# Patient Record
Sex: FEMALE | Race: WHITE | NOT HISPANIC OR LATINO | Employment: FULL TIME | URBAN - METROPOLITAN AREA
[De-identification: names, ages, dates, MRNs, and addresses within clinical notes are randomized per-mention and may not be internally consistent; named-entity substitution may affect disease eponyms.]

---

## 2019-09-01 ENCOUNTER — APPOINTMENT (OUTPATIENT)
Dept: RESPIRATORY THERAPY | Facility: HOSPITAL | Age: 39
End: 2019-09-01

## 2019-09-01 ENCOUNTER — HOSPITAL ENCOUNTER (OUTPATIENT)
Facility: HOSPITAL | Age: 39
Setting detail: OBSERVATION
Discharge: HOME | End: 2019-09-01
Attending: EMERGENCY MEDICINE | Admitting: NURSE PRACTITIONER

## 2019-09-01 ENCOUNTER — APPOINTMENT (OUTPATIENT)
Dept: GENERAL RADIOLOGY | Facility: HOSPITAL | Age: 39
End: 2019-09-01

## 2019-09-01 ENCOUNTER — HOSPITAL ENCOUNTER (OUTPATIENT)
Dept: CARDIOLOGY | Facility: HOSPITAL | Age: 39
Setting detail: OBSERVATION
Discharge: HOME | End: 2019-09-01

## 2019-09-01 VITALS
WEIGHT: 293 LBS | HEART RATE: 71 BPM | BODY MASS INDEX: 53.92 KG/M2 | OXYGEN SATURATION: 99 % | HEIGHT: 62 IN | SYSTOLIC BLOOD PRESSURE: 143 MMHG | DIASTOLIC BLOOD PRESSURE: 86 MMHG | RESPIRATION RATE: 17 BRPM | TEMPERATURE: 99.9 F

## 2019-09-01 DIAGNOSIS — R00.2 PALPITATIONS: ICD-10-CM

## 2019-09-01 DIAGNOSIS — R55 NEAR SYNCOPE: Primary | ICD-10-CM

## 2019-09-01 PROBLEM — E66.01 OBESITY, MORBID, BMI 50 OR HIGHER (HCC): Chronic | Status: ACTIVE | Noted: 2019-09-01

## 2019-09-01 PROBLEM — E03.9 HYPOTHYROIDISM (ACQUIRED): Chronic | Status: ACTIVE | Noted: 2019-09-01

## 2019-09-01 LAB
ALBUMIN SERPL-MCNC: 3.8 G/DL (ref 3.5–4.8)
ALBUMIN/GLOB SERPL: 1.3 G/DL (ref 1–1.7)
ALP SERPL-CCNC: 58 U/L (ref 32–91)
ALT SERPL W P-5'-P-CCNC: 34 U/L (ref 14–54)
ANION GAP SERPL CALCULATED.3IONS-SCNC: 13.7 MMOL/L (ref 5–15)
ANION GAP SERPL CALCULATED.3IONS-SCNC: 15 MMOL/L (ref 5–15)
ARTICHOKE IGE QN: 108 MG/DL (ref 0–100)
AST SERPL-CCNC: 32 U/L (ref 15–41)
BASOPHILS # BLD AUTO: 0.1 10*3/MM3 (ref 0–0.2)
BASOPHILS # BLD AUTO: 0.1 10*3/MM3 (ref 0–0.2)
BASOPHILS NFR BLD AUTO: 1 % (ref 0–1.5)
BASOPHILS NFR BLD AUTO: 1.1 % (ref 0–1.5)
BH CV STRESS DURATION MIN STAGE 1: 3
BH CV STRESS DURATION MIN STAGE 2: 1
BH CV STRESS DURATION SEC STAGE 1: 0
BH CV STRESS DURATION SEC STAGE 2: 45
BH CV STRESS GRADE STAGE 1: 10
BH CV STRESS GRADE STAGE 2: 12
BH CV STRESS HR STAGE 1: 123
BH CV STRESS HR STAGE 2: 152
BH CV STRESS METS STAGE 1: 5
BH CV STRESS METS STAGE 2: 7.5
BH CV STRESS PROTOCOL 1: NORMAL
BH CV STRESS RECOVERY BP: NORMAL MMHG
BH CV STRESS RECOVERY HR: 92 BPM
BH CV STRESS SPEED STAGE 1: 1.7
BH CV STRESS SPEED STAGE 2: 2.5
BH CV STRESS STAGE 1: 1
BH CV STRESS STAGE 2: 2
BILIRUB SERPL-MCNC: 0.7 MG/DL (ref 0.3–1.2)
BUN BLD-MCNC: 12 MG/DL (ref 8–20)
BUN BLD-MCNC: 9 MG/DL (ref 8–20)
BUN/CREAT SERPL: 15 (ref 5.4–26.2)
BUN/CREAT SERPL: 24 (ref 5.4–26.2)
CALCIUM SPEC-SCNC: 8.1 MG/DL (ref 8.9–10.3)
CALCIUM SPEC-SCNC: 8.1 MG/DL (ref 8.9–10.3)
CHLORIDE SERPL-SCNC: 105 MMOL/L (ref 101–111)
CHLORIDE SERPL-SCNC: 107 MMOL/L (ref 101–111)
CHOLEST SERPL-MCNC: 153 MG/DL
CO2 SERPL-SCNC: 21 MMOL/L (ref 22–32)
CO2 SERPL-SCNC: 21 MMOL/L (ref 22–32)
CREAT BLD-MCNC: 0.5 MG/DL (ref 0.4–1)
CREAT BLD-MCNC: 0.6 MG/DL (ref 0.4–1)
D DIMER PPP FEU-MCNC: 0.47 MCGFEU/ML (ref 0.17–0.59)
DEPRECATED RDW RBC AUTO: 44.2 FL (ref 37–54)
DEPRECATED RDW RBC AUTO: 44.2 FL (ref 37–54)
EOSINOPHIL # BLD AUTO: 0.3 10*3/MM3 (ref 0–0.4)
EOSINOPHIL # BLD AUTO: 0.3 10*3/MM3 (ref 0–0.4)
EOSINOPHIL NFR BLD AUTO: 4 % (ref 0.3–6.2)
EOSINOPHIL NFR BLD AUTO: 4.2 % (ref 0.3–6.2)
ERYTHROCYTE [DISTWIDTH] IN BLOOD BY AUTOMATED COUNT: 13.4 % (ref 12.3–15.4)
ERYTHROCYTE [DISTWIDTH] IN BLOOD BY AUTOMATED COUNT: 13.5 % (ref 12.3–15.4)
GFR SERPL CREATININE-BSD FRML MDRD: 110 ML/MIN/1.73
GFR SERPL CREATININE-BSD FRML MDRD: 136 ML/MIN/1.73
GLOBULIN UR ELPH-MCNC: 2.9 GM/DL (ref 2.5–3.8)
GLUCOSE BLD-MCNC: 107 MG/DL (ref 65–99)
GLUCOSE BLD-MCNC: 115 MG/DL (ref 65–99)
HCT VFR BLD AUTO: 33.8 % (ref 34–46.6)
HCT VFR BLD AUTO: 34.4 % (ref 34–46.6)
HDLC SERPL QL: 5.1
HDLC SERPL-MCNC: 30 MG/DL
HGB BLD-MCNC: 11.8 G/DL (ref 12–15.9)
HGB BLD-MCNC: 12 G/DL (ref 12–15.9)
LDLC/HDLC SERPL: 3.07 {RATIO}
LYMPHOCYTES # BLD AUTO: 2.1 10*3/MM3 (ref 0.7–3.1)
LYMPHOCYTES # BLD AUTO: 2.4 10*3/MM3 (ref 0.7–3.1)
LYMPHOCYTES NFR BLD AUTO: 29.6 % (ref 19.6–45.3)
LYMPHOCYTES NFR BLD AUTO: 35.3 % (ref 19.6–45.3)
MAGNESIUM SERPL-MCNC: 2 MG/DL (ref 1.8–2.5)
MAXIMAL PREDICTED HEART RATE: 179 BPM
MCH RBC QN AUTO: 32.6 PG (ref 26.6–33)
MCH RBC QN AUTO: 32.6 PG (ref 26.6–33)
MCHC RBC AUTO-ENTMCNC: 34.8 G/DL (ref 31.5–35.7)
MCHC RBC AUTO-ENTMCNC: 34.8 G/DL (ref 31.5–35.7)
MCV RBC AUTO: 93.4 FL (ref 79–97)
MCV RBC AUTO: 93.9 FL (ref 79–97)
MONOCYTES # BLD AUTO: 0.6 10*3/MM3 (ref 0.1–0.9)
MONOCYTES # BLD AUTO: 0.6 10*3/MM3 (ref 0.1–0.9)
MONOCYTES NFR BLD AUTO: 8.4 % (ref 5–12)
MONOCYTES NFR BLD AUTO: 8.5 % (ref 5–12)
NEUTROPHILS # BLD AUTO: 3.4 10*3/MM3 (ref 1.7–7)
NEUTROPHILS # BLD AUTO: 4.1 10*3/MM3 (ref 1.7–7)
NEUTROPHILS NFR BLD AUTO: 51.1 % (ref 42.7–76)
NEUTROPHILS NFR BLD AUTO: 56.8 % (ref 42.7–76)
NRBC BLD AUTO-RTO: 0.1 /100 WBC (ref 0–0.2)
NRBC BLD AUTO-RTO: 0.1 /100 WBC (ref 0–0.2)
PERCENT MAX PREDICTED HR: 84.92 %
PLATELET # BLD AUTO: 219 10*3/MM3 (ref 140–450)
PLATELET # BLD AUTO: 230 10*3/MM3 (ref 140–450)
PMV BLD AUTO: 7.6 FL (ref 6–12)
PMV BLD AUTO: 7.7 FL (ref 6–12)
POTASSIUM BLD-SCNC: 3.7 MMOL/L (ref 3.6–5.1)
POTASSIUM BLD-SCNC: 4 MMOL/L (ref 3.6–5.1)
PROT SERPL-MCNC: 6.7 G/DL (ref 6.1–7.9)
RBC # BLD AUTO: 3.61 10*6/MM3 (ref 3.77–5.28)
RBC # BLD AUTO: 3.68 10*6/MM3 (ref 3.77–5.28)
SODIUM BLD-SCNC: 137 MMOL/L (ref 136–144)
SODIUM BLD-SCNC: 138 MMOL/L (ref 136–144)
STRESS BASELINE BP: NORMAL MMHG
STRESS BASELINE HR: 75 BPM
STRESS PERCENT HR: 100 %
STRESS POST ESTIMATED WORKLOAD: 7 METS
STRESS POST EXERCISE DUR MIN: 4 MIN
STRESS POST EXERCISE DUR SEC: 45 SEC
STRESS POST PEAK BP: NORMAL MMHG
STRESS POST PEAK HR: 152 BPM
STRESS TARGET HR: 152 BPM
T4 FREE SERPL-MCNC: 0.56 NG/DL (ref 0.58–1.64)
TRIGL SERPL-MCNC: 154 MG/DL
TROPONIN I SERPL-MCNC: <0.03 NG/ML (ref 0–0.03)
TSH SERPL DL<=0.05 MIU/L-ACNC: 7.58 UIU/ML (ref 0.34–5.6)
VLDLC SERPL-MCNC: 30.8 MG/DL
WBC NRBC COR # BLD: 6.7 10*3/MM3 (ref 3.4–10.8)
WBC NRBC COR # BLD: 7.2 10*3/MM3 (ref 3.4–10.8)

## 2019-09-01 PROCEDURE — 99284 EMERGENCY DEPT VISIT MOD MDM: CPT

## 2019-09-01 PROCEDURE — G0378 HOSPITAL OBSERVATION PER HR: HCPCS

## 2019-09-01 PROCEDURE — 84443 ASSAY THYROID STIM HORMONE: CPT | Performed by: NURSE PRACTITIONER

## 2019-09-01 PROCEDURE — 80061 LIPID PANEL: CPT | Performed by: PHYSICIAN ASSISTANT

## 2019-09-01 PROCEDURE — 99235 HOSP IP/OBS SAME DATE MOD 70: CPT | Performed by: PHYSICIAN ASSISTANT

## 2019-09-01 PROCEDURE — 84439 ASSAY OF FREE THYROXINE: CPT | Performed by: NURSE PRACTITIONER

## 2019-09-01 PROCEDURE — 83735 ASSAY OF MAGNESIUM: CPT | Performed by: PHYSICIAN ASSISTANT

## 2019-09-01 PROCEDURE — 93226 XTRNL ECG REC<48 HR SCAN A/R: CPT

## 2019-09-01 PROCEDURE — 80048 BASIC METABOLIC PNL TOTAL CA: CPT | Performed by: PHYSICIAN ASSISTANT

## 2019-09-01 PROCEDURE — 85025 COMPLETE CBC W/AUTO DIFF WBC: CPT | Performed by: PHYSICIAN ASSISTANT

## 2019-09-01 PROCEDURE — 84484 ASSAY OF TROPONIN QUANT: CPT | Performed by: NURSE PRACTITIONER

## 2019-09-01 PROCEDURE — 83036 HEMOGLOBIN GLYCOSYLATED A1C: CPT | Performed by: PHYSICIAN ASSISTANT

## 2019-09-01 PROCEDURE — 93017 CV STRESS TEST TRACING ONLY: CPT

## 2019-09-01 PROCEDURE — 71045 X-RAY EXAM CHEST 1 VIEW: CPT

## 2019-09-01 PROCEDURE — 85025 COMPLETE CBC W/AUTO DIFF WBC: CPT | Performed by: NURSE PRACTITIONER

## 2019-09-01 PROCEDURE — 84484 ASSAY OF TROPONIN QUANT: CPT | Performed by: PHYSICIAN ASSISTANT

## 2019-09-01 PROCEDURE — 93018 CV STRESS TEST I&R ONLY: CPT | Performed by: INTERNAL MEDICINE

## 2019-09-01 PROCEDURE — 80053 COMPREHEN METABOLIC PANEL: CPT | Performed by: NURSE PRACTITIONER

## 2019-09-01 PROCEDURE — 93016 CV STRESS TEST SUPVJ ONLY: CPT | Performed by: NURSE PRACTITIONER

## 2019-09-01 PROCEDURE — 85379 FIBRIN DEGRADATION QUANT: CPT | Performed by: NURSE PRACTITIONER

## 2019-09-01 PROCEDURE — 93005 ELECTROCARDIOGRAM TRACING: CPT | Performed by: INTERNAL MEDICINE

## 2019-09-01 RX ORDER — ACETAMINOPHEN 160 MG/5ML
650 SOLUTION ORAL EVERY 4 HOURS PRN
Status: DISCONTINUED | OUTPATIENT
Start: 2019-09-01 | End: 2019-09-01 | Stop reason: HOSPADM

## 2019-09-01 RX ORDER — SODIUM CHLORIDE 0.9 % (FLUSH) 0.9 %
10 SYRINGE (ML) INJECTION AS NEEDED
Status: DISCONTINUED | OUTPATIENT
Start: 2019-09-01 | End: 2019-09-01 | Stop reason: HOSPADM

## 2019-09-01 RX ORDER — ACETAMINOPHEN 325 MG/1
650 TABLET ORAL EVERY 4 HOURS PRN
Status: DISCONTINUED | OUTPATIENT
Start: 2019-09-01 | End: 2019-09-01 | Stop reason: HOSPADM

## 2019-09-01 RX ORDER — CHOLECALCIFEROL (VITAMIN D3) 125 MCG
5 CAPSULE ORAL NIGHTLY PRN
Status: DISCONTINUED | OUTPATIENT
Start: 2019-09-01 | End: 2019-09-01 | Stop reason: HOSPADM

## 2019-09-01 RX ORDER — ACETAMINOPHEN 650 MG/1
650 SUPPOSITORY RECTAL EVERY 4 HOURS PRN
Status: DISCONTINUED | OUTPATIENT
Start: 2019-09-01 | End: 2019-09-01 | Stop reason: HOSPADM

## 2019-09-01 RX ORDER — POTASSIUM CHLORIDE 1.5 G/1.77G
40 POWDER, FOR SOLUTION ORAL AS NEEDED
Status: DISCONTINUED | OUTPATIENT
Start: 2019-09-01 | End: 2019-09-01 | Stop reason: HOSPADM

## 2019-09-01 RX ORDER — NITROGLYCERIN 0.4 MG/1
0.4 TABLET SUBLINGUAL
Status: DISCONTINUED | OUTPATIENT
Start: 2019-09-01 | End: 2019-09-01 | Stop reason: HOSPADM

## 2019-09-01 RX ORDER — ONDANSETRON 4 MG/1
4 TABLET, FILM COATED ORAL EVERY 6 HOURS PRN
Status: DISCONTINUED | OUTPATIENT
Start: 2019-09-01 | End: 2019-09-01 | Stop reason: HOSPADM

## 2019-09-01 RX ORDER — ALUMINA, MAGNESIA, AND SIMETHICONE 2400; 2400; 240 MG/30ML; MG/30ML; MG/30ML
15 SUSPENSION ORAL EVERY 6 HOURS PRN
Status: DISCONTINUED | OUTPATIENT
Start: 2019-09-01 | End: 2019-09-01 | Stop reason: HOSPADM

## 2019-09-01 RX ORDER — DOCUSATE SODIUM 100 MG/1
100 CAPSULE, LIQUID FILLED ORAL 2 TIMES DAILY PRN
Status: DISCONTINUED | OUTPATIENT
Start: 2019-09-01 | End: 2019-09-01 | Stop reason: HOSPADM

## 2019-09-01 RX ORDER — SODIUM CHLORIDE 0.9 % (FLUSH) 0.9 %
10 SYRINGE (ML) INJECTION EVERY 12 HOURS SCHEDULED
Status: DISCONTINUED | OUTPATIENT
Start: 2019-09-01 | End: 2019-09-01 | Stop reason: HOSPADM

## 2019-09-01 RX ORDER — MAGNESIUM SULFATE HEPTAHYDRATE 40 MG/ML
4 INJECTION, SOLUTION INTRAVENOUS AS NEEDED
Status: DISCONTINUED | OUTPATIENT
Start: 2019-09-01 | End: 2019-09-01 | Stop reason: HOSPADM

## 2019-09-01 RX ORDER — POTASSIUM CHLORIDE 20 MEQ/1
40 TABLET, EXTENDED RELEASE ORAL AS NEEDED
Status: DISCONTINUED | OUTPATIENT
Start: 2019-09-01 | End: 2019-09-01 | Stop reason: HOSPADM

## 2019-09-01 RX ORDER — LEVOTHYROXINE SODIUM 0.03 MG/1
25 TABLET ORAL
Status: DISCONTINUED | OUTPATIENT
Start: 2019-09-01 | End: 2019-09-01 | Stop reason: HOSPADM

## 2019-09-01 RX ORDER — POTASSIUM CHLORIDE 7.45 MG/ML
10 INJECTION INTRAVENOUS
Status: DISCONTINUED | OUTPATIENT
Start: 2019-09-01 | End: 2019-09-01 | Stop reason: HOSPADM

## 2019-09-01 RX ORDER — ONDANSETRON 2 MG/ML
4 INJECTION INTRAMUSCULAR; INTRAVENOUS EVERY 6 HOURS PRN
Status: DISCONTINUED | OUTPATIENT
Start: 2019-09-01 | End: 2019-09-01 | Stop reason: HOSPADM

## 2019-09-01 RX ORDER — MAGNESIUM SULFATE HEPTAHYDRATE 40 MG/ML
2 INJECTION, SOLUTION INTRAVENOUS AS NEEDED
Status: DISCONTINUED | OUTPATIENT
Start: 2019-09-01 | End: 2019-09-01 | Stop reason: HOSPADM

## 2019-09-01 RX ORDER — BISACODYL 10 MG
10 SUPPOSITORY, RECTAL RECTAL DAILY PRN
Status: DISCONTINUED | OUTPATIENT
Start: 2019-09-01 | End: 2019-09-01 | Stop reason: HOSPADM

## 2019-09-01 RX ADMIN — Medication 10 ML: at 08:00

## 2019-09-01 RX ADMIN — LEVOTHYROXINE SODIUM 25 MCG: 25 TABLET ORAL at 06:11

## 2019-09-01 RX ADMIN — OYSTER SHELL CALCIUM WITH VITAMIN D 1 TABLET: 500; 200 TABLET, FILM COATED ORAL at 08:00

## 2019-09-01 NOTE — DISCHARGE SUMMARY
Date of Admission: 9/1/2019    Date of Discharge:  9/1/2019    Length of stay:  LOS: 0 days     Presenting Problem/History of Present Illness  Active Hospital Problems    Diagnosis  POA   • **Palpitations [R00.2]  Yes   • Near syncope [R55]  Yes   • Obesity, morbid, BMI 50 or higher (CMS/HCC) [E66.01]  Yes   • Hypothyroidism (acquired) [E03.9]  Yes      Resolved Hospital Problems   No resolved problems to display.     Hospital course     Please refer to  history of physical for details history and physical for details.  We will add Holter monitor on discharge and follow-up with cardiology given patient's symptoms of palpitation.  No reported arrhythmia so far.  Currently pending stress test report.  Patient reports follow-up previously with Dr. Watkins and had an echocardiogram that showed mild regurgitation without any other symptoms or problems      Past Medical History:     Past Medical History:   Diagnosis Date   • Disease of thyroid gland    • Heart murmur        Past Surgical History:   History reviewed. No pertinent surgical history.    Social History:   Social History     Socioeconomic History   • Marital status:      Spouse name: Not on file   • Number of children: Not on file   • Years of education: Not on file   • Highest education level: Not on file   Tobacco Use   • Smoking status: Former Smoker     Types: Cigarettes     Last attempt to quit: 9/1/2009     Years since quitting: 10.0   • Smokeless tobacco: Never Used   Substance and Sexual Activity   • Alcohol use: No     Frequency: Never   • Drug use: No   • Sexual activity: Defer       Procedures Performed         Vital Signs  Temp:  [97.5 °F (36.4 °C)-98.2 °F (36.8 °C)] 97.7 °F (36.5 °C)  Heart Rate:  [62-74] 63  Resp:  [16-19] 18  BP: (115-150)/(45-98) 150/98    Physical Exam:  Physical Exam   Constitutional: She is oriented to person, place, and time. She appears well-developed and well-nourished. No distress.   HENT:   Head: Normocephalic and  atraumatic.   Right Ear: External ear normal.   Left Ear: External ear normal.   Nose: Nose normal.   Mouth/Throat: Oropharynx is clear and moist. No oropharyngeal exudate.   Eyes: Conjunctivae and EOM are normal. Pupils are equal, round, and reactive to light. Right eye exhibits no discharge. Left eye exhibits no discharge. No scleral icterus.   Neck: Normal range of motion. No JVD present. No tracheal deviation present. No thyromegaly present.   Cardiovascular: Normal rate, regular rhythm, normal heart sounds and intact distal pulses. Exam reveals no gallop and no friction rub.   No murmur heard.  Pulmonary/Chest: Effort normal and breath sounds normal. No stridor. No respiratory distress. She has no wheezes. She has no rales. She exhibits no tenderness.   Abdominal: Soft. Bowel sounds are normal. She exhibits no distension and no mass. There is no tenderness. There is no rebound and no guarding. No hernia.   Musculoskeletal: Normal range of motion. She exhibits no edema, tenderness or deformity.   Lymphadenopathy:     She has no cervical adenopathy.   Neurological: She is alert and oriented to person, place, and time. No cranial nerve deficit or sensory deficit. She exhibits normal muscle tone. Coordination normal.   Skin: Skin is warm and dry. No rash noted. She is not diaphoretic. No erythema.   Psychiatric: She has a normal mood and affect. Her behavior is normal.   Nursing note and vitals reviewed.      Discharge Diagnosis:     Palpitations    Near syncope    Obesity, morbid, BMI 50 or higher (CMS/[facility])    Hypothyroidism (acquired)      Discharge Disposition         Discharge Medications      Patient Not Prescribed Medications Upon Discharge         Consults:   Consults     Date and Time Order Name Status Description    9/1/2019 0146 Hospitalist (on-call MD unless specified) Completed           Pertinent Test Results:     I reviewed the patient's new clinical results.    Results from last 7 days   Lab Units  09/01/19 0533 09/01/19 0036   WBC 10*3/mm3 6.70 7.20   HEMOGLOBIN g/dL 11.8* 12.0   HEMATOCRIT % 33.8* 34.4   PLATELETS 10*3/mm3 219 230     Results from last 7 days   Lab Units 09/01/19  0533 09/01/19 0036   SODIUM mmol/L 138 137   POTASSIUM mmol/L 3.7 4.0   CHLORIDE mmol/L 107 105   CO2 mmol/L 21.0* 21.0*   BUN mg/dL 12 9   CREATININE mg/dL 0.50 0.60   GLUCOSE mg/dL 107* 115*   CALCIUM mg/dL 8.1* 8.1*     Results from last 7 days   Lab Units 09/01/19 0533 09/01/19 0036   SODIUM mmol/L 138 137   POTASSIUM mmol/L 3.7 4.0   CHLORIDE mmol/L 107 105   CO2 mmol/L 21.0* 21.0*   BUN mg/dL 12 9   CREATININE mg/dL 0.50 0.60   CALCIUM mg/dL 8.1* 8.1*   BILIRUBIN mg/dL  --  0.7   ALK PHOS U/L  --  58   ALT (SGPT) U/L  --  34   AST (SGOT) U/L  --  32   GLUCOSE mg/dL 107* 115*     Results from last 7 days   Lab Units 09/01/19  0533   MAGNESIUM mg/dL 2.0     Lab Results   Component Value Date    CALCIUM 8.1 (L) 09/01/2019     No results found for: HGBA1C          Microbiology Results (last 10 days)     ** No results found for the last 240 hours. **          ECG/EMG Results (most recent)     Procedure Component Value Units Date/Time    ECG 12 Lead [[id number]] Collected:  09/01/19 0013     Updated:  09/01/19 0514    Narrative:       HEART RATE= 63  bpm  RR Interval= 948  ms  DE Interval= 171  ms  P Horizontal Axis= 10  deg  P Front Axis= 60  deg  QRSD Interval= 94  ms  QT Interval= 424  ms  QRS Axis= -1  deg  T Wave Axis= 38  deg  - NORMAL ECG -  Sinus rhythm  Electronically Signed By:   Date and Time of Study: 2019-09-01 00:13:56    ECG 12 Lead [[id number]] Collected:  09/01/19 0813     Updated:  09/01/19 0816    Narrative:       HEART RATE= 64  bpm  RR Interval= 936  ms  DE Interval= 180  ms  P Horizontal Axis= 18  deg  P Front Axis= 59  deg  QRSD Interval= 93  ms  QT Interval= 440  ms  QRS Axis= 0  deg  T Wave Axis= 28  deg  - NORMAL ECG -  Sinus rhythm  Electronically Signed By:   Date and Time of Study: 2019-09-01  08:13:51                    Xr Chest 1 View    Result Date: 9/1/2019   1. Mild cardiomegaly without failure 2. No active cardiopulmonary process is seen 3. I have no comparison 4. Exam was obtained in the lordotic position  Electronically Signed By-Sg Rocha Jr. On:9/1/2019 7:40 AM This report was finalized on [id number] by  Sg Rocha Jr., .      Xrays, labs reviewed personally by physician.      Condition on Discharge:    Stable    Discharge Diet:     Activity at Discharge:     Follow-up Appointments  No future appointments.      Test Results Pending at Discharge   Order Current Status    Hemoglobin A1c In process           Risk for Readmission (LACE) Score: 1 (9/1/2019  6:00 AM)          Guru Yoder MD  09/01/19  10:15 AM    Time: Greater than 30 minutes in discharge planning

## 2019-09-01 NOTE — ED PROVIDER NOTES
Subjective   Patient states for the last couple days she has been having intermittent periods of rapid heart rate lasting 1 to 2 minutes during which she gets very dizzy.  The episodes seem to be occurring more frequently and her symptoms seems to be worsening . she is at approximately 10 today.  She has no chest pain during these episodes.  She states she has had previous Holter monitor and echocardiogram more than a year ago to evaluate a heart murmur.            Review of Systems   Constitutional: Negative for fever.   Respiratory: Negative for shortness of breath.    Cardiovascular: Positive for palpitations. Negative for chest pain and leg swelling.   Neurological: Positive for dizziness. Negative for headaches.       Past Medical History:   Diagnosis Date   • Disease of thyroid gland    • Heart murmur        Allergies   Allergen Reactions   • Amoxicillin Hives   • Cleocin [Clindamycin Hcl] Hives   • Penicillins Hives       History reviewed. No pertinent surgical history.    Family History   Problem Relation Age of Onset   • Diabetes Mother    • Heart disease Father    • Cancer Father        Social History     Socioeconomic History   • Marital status:      Spouse name: Not on file   • Number of children: Not on file   • Years of education: Not on file   • Highest education level: Not on file   Tobacco Use   • Smoking status: Former Smoker     Types: Cigarettes     Last attempt to quit: 9/1/2009     Years since quitting: 10.0   • Smokeless tobacco: Never Used   Substance and Sexual Activity   • Alcohol use: No     Frequency: Never   • Drug use: No   • Sexual activity: Defer           Objective   Physical Exam  39-year-old woman sitting up in the bed awake alert no acute distress on examination pharynx clear airway patent mouth moist neck is supple no JVD or bruit breath sounds clear and equal bilaterally heart sounds S1-S2 no discernible murmur abdomen is soft obese and nontender legs have no tenderness  or swelling no evidence of DVT  Procedures           ED Course      Results for orders placed or performed during the hospital encounter of 09/01/19   Comprehensive Metabolic Panel   Result Value Ref Range    Glucose 115 (H) 65 - 99 mg/dL    BUN 9 8 - 20 mg/dL    Creatinine 0.60 0.40 - 1.00 mg/dL    Sodium 137 136 - 144 mmol/L    Potassium 4.0 3.6 - 5.1 mmol/L    Chloride 105 101 - 111 mmol/L    CO2 21.0 (L) 22.0 - 32.0 mmol/L    Calcium 8.1 (L) 8.9 - 10.3 mg/dL    Total Protein 6.7 6.1 - 7.9 g/dL    Albumin 3.80 3.50 - 4.80 g/dL    ALT (SGPT) 34 14 - 54 U/L    AST (SGOT) 32 15 - 41 U/L    Alkaline Phosphatase 58 32 - 91 U/L    Total Bilirubin 0.7 0.3 - 1.2 mg/dL    eGFR Non African Amer 110 >60 mL/min/1.73    Globulin 2.9 2.5 - 3.8 gm/dL    A/G Ratio 1.3 1.0 - 1.7 g/dL    BUN/Creatinine Ratio 15.0 5.4 - 26.2    Anion Gap 15.0 5.0 - 15.0 mmol/L   D-dimer, Quantitative   Result Value Ref Range    D-Dimer, Quantitative 0.47 0.17 - 0.59 MCGFEU/mL   Troponin   Result Value Ref Range    Troponin I <0.030 0.000 - 0.030 ng/mL   T4, Free   Result Value Ref Range    Free T4 0.56 (L) 0.58 - 1.64 ng/dL   TSH   Result Value Ref Range    TSH 7.580 (H) 0.340 - 5.600 uIU/mL   CBC Auto Differential   Result Value Ref Range    WBC 7.20 3.40 - 10.80 10*3/mm3    RBC 3.68 (L) 3.77 - 5.28 10*6/mm3    Hemoglobin 12.0 12.0 - 15.9 g/dL    Hematocrit 34.4 34.0 - 46.6 %    MCV 93.4 79.0 - 97.0 fL    MCH 32.6 26.6 - 33.0 pg    MCHC 34.8 31.5 - 35.7 g/dL    RDW 13.4 12.3 - 15.4 %    RDW-SD 44.2 37.0 - 54.0 fl    MPV 7.7 6.0 - 12.0 fL    Platelets 230 140 - 450 10*3/mm3    Neutrophil % 56.8 42.7 - 76.0 %    Lymphocyte % 29.6 19.6 - 45.3 %    Monocyte % 8.5 5.0 - 12.0 %    Eosinophil % 4.0 0.3 - 6.2 %    Basophil % 1.1 0.0 - 1.5 %    Neutrophils, Absolute 4.10 1.70 - 7.00 10*3/mm3    Lymphocytes, Absolute 2.10 0.70 - 3.10 10*3/mm3    Monocytes, Absolute 0.60 0.10 - 0.90 10*3/mm3    Eosinophils, Absolute 0.30 0.00 - 0.40 10*3/mm3    Basophils,  Absolute 0.10 0.00 - 0.20 10*3/mm3    nRBC 0.1 0.0 - 0.2 /100 WBC   CBC Auto Differential   Result Value Ref Range    WBC 6.70 3.40 - 10.80 10*3/mm3    RBC 3.61 (L) 3.77 - 5.28 10*6/mm3    Hemoglobin 11.8 (L) 12.0 - 15.9 g/dL    Hematocrit 33.8 (L) 34.0 - 46.6 %    MCV 93.9 79.0 - 97.0 fL    MCH 32.6 26.6 - 33.0 pg    MCHC 34.8 31.5 - 35.7 g/dL    RDW 13.5 12.3 - 15.4 %    RDW-SD 44.2 37.0 - 54.0 fl    MPV 7.6 6.0 - 12.0 fL    Platelets 219 140 - 450 10*3/mm3    Neutrophil % 51.1 42.7 - 76.0 %    Lymphocyte % 35.3 19.6 - 45.3 %    Monocyte % 8.4 5.0 - 12.0 %    Eosinophil % 4.2 0.3 - 6.2 %    Basophil % 1.0 0.0 - 1.5 %    Neutrophils, Absolute 3.40 1.70 - 7.00 10*3/mm3    Lymphocytes, Absolute 2.40 0.70 - 3.10 10*3/mm3    Monocytes, Absolute 0.60 0.10 - 0.90 10*3/mm3    Eosinophils, Absolute 0.30 0.00 - 0.40 10*3/mm3    Basophils, Absolute 0.10 0.00 - 0.20 10*3/mm3    nRBC 0.1 0.0 - 0.2 /100 WBC   Basic Metabolic Panel   Result Value Ref Range    Glucose 107 (H) 65 - 99 mg/dL    BUN 12 8 - 20 mg/dL    Creatinine 0.50 0.40 - 1.00 mg/dL    Sodium 138 136 - 144 mmol/L    Potassium 3.7 3.6 - 5.1 mmol/L    Chloride 107 101 - 111 mmol/L    CO2 21.0 (L) 22.0 - 32.0 mmol/L    Calcium 8.1 (L) 8.9 - 10.3 mg/dL    eGFR Non African Amer 136 >60 mL/min/1.73    BUN/Creatinine Ratio 24.0 5.4 - 26.2    Anion Gap 13.7 5.0 - 15.0 mmol/L   Lipid Panel   Result Value Ref Range    Total Cholesterol 153 <=200 mg/dL    Triglycerides 154 (H) <=150 mg/dL    HDL Cholesterol 30 (L) >=39 mg/dL    LDL Cholesterol  108 (H) 0 - 100 mg/dL    VLDL Cholesterol 30.8 mg/dL    LDL/HDL Ratio 3.07     Chol/HDL Ratio 5.10    Magnesium   Result Value Ref Range    Magnesium 2.0 1.8 - 2.5 mg/dL   Troponin   Result Value Ref Range    Troponin I <0.030 0.000 - 0.030 ng/mL         EKG shows a normal sinus rhythm rate of 63 with no acute ST abnormality no ectopy chest x-ray reviewed by myself and Dr. Garcia shows mild cardiomegaly patient had no episodes of SVT  or rapid heart rate during her ER evaluation.  Lab work is negative for tachycardia work-up.  With patient's apparent cardiomegaly on chest x-ray she will be admitted to the hospitalist service for further cardiac evaluation        MDM      Final diagnoses:   Near syncope   Palpitations            Francisco Garcia MD  09/01/19 0612

## 2019-09-01 NOTE — H&P
[facility] HOSPITALIST     Kelsey Sanchez    CHIEF COMPLAINT:     Fast heart Rate with lightheadedness    HISTORY OF PRESENT ILLNESS:    38yo WF who presented to the ED with complaint of fast heart rate that is intermittent, with duration of 1-2 minutes, that is associated with lightheadedness, dyspnea, chest tightness, and nausea. Patient reports that she has had these episodes multiple times a day on 8/31/19 and reports that they were occurring back to back which is abnormal. Patient states that she has had this issue for about 1 month but she has had an increase in frequency over this past week reporting that she has had them daily and increasing amounts each day. Patient reports that she has a known murmur and was worked up with a holter monitor and ECHO at [facility] about 1 year ago, but no significant findings were appreciated. Patient denies having these symptoms at that time. The only pattern the patient is able to appreciate is that the palpitations only start in the afternoon. 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 Patient also reports that she is suppose to be on thyroid medicine but has no insurance and has not been following with PCP or Endocrinologist for management.     In, ED, admission vitals were 98.2F, 66HR, 16 RR, 139/89, 98% on RA. On labs, patient had a negative troponin, stable CMP, negative d-dimer, and stable CBC, with an elevated TSH 7.5 and a low T4 0.56. In ED, Patient reported to appear to be having SVTs. CXR pending final report. No medications given in the ED.       Past Medical History:   Diagnosis Date   • Disease of thyroid gland    • Heart murmur      History reviewed. No pertinent surgical history.  Family History   Problem Relation Age of Onset   • Diabetes Mother    • Heart disease Father    • Cancer Father      Social History     Tobacco Use   • Smoking status: Former Smoker     Types: Cigarettes     Last attempt to quit: 9/1/2009      "Years since quitting: 10.0   • Smokeless tobacco: Never Used   Substance Use Topics   • Alcohol use: No     Frequency: Never   • Drug use: No     No medications prior to admission.     Allergies:  Amoxicillin; Cleocin [clindamycin hcl]; and Penicillins      There is no immunization history on file for this patient.        REVIEW OF SYSTEMS:  Please see the above history of present illness for pertinent positives and negatives.  The remainder of the patient's systems have been reviewed and are negative.     Vital Signs  Visit Vitals  /89   Pulse 69   Temp 98.2 °F (36.8 °C)   Resp 19   Ht 160 cm (63\")   Wt (!) 141 kg (311 lb 4.6 oz)   LMP 07/31/2019   SpO2 98%   Breastfeeding? No   BMI 55.14 kg/m²       Flowsheet Rows      First Filed Value   Admission Height  160 cm (63\") Documented at 09/01/2019 0004   Admission Weight  141 kg (311 lb 4.6 oz)  (Abnormal)  Documented at 09/01/2019 0004           Physical Exam:  Physical Exam   Constitutional: Patient appears well-developed and well-nourished and in no acute distress     HEENT:   Head: Normocephalic and atraumatic.   Eyes: EOM are intact. Sclera are anicteric and non-injected.  Mouth and Throat: Patient has moist mucous membranes. Oropharynx is clear of any erythema or exudate.       Neck: Neck supple.     Cardiovascular: Inspection: No JVD present. Palpation: Palpable Pedal pulses bilaterally. No leg edema. Auscultation: cardiac sounds distant d/t body habitus but no murmur appreciated on exam, RRR    Pulmonary/Chest: Inspection: No distress, no use of accessory muscles. Lungs are clear to auscultation bilaterally. No respiratory distress. No wheezes. No rhonchi. No rales.     Abdomen /Gastrointestinal: Inspection: no distension. Palpation: no masses, no organomegaly. Soft. There is no tenderness. Bowel sounds are normal. Morbidly Obese abdomen    Musculoskeletal: Normal Muscle tone. Age appropriate, no deformities.    Neurological: Patient is alert and " oriented to person, place, and time. Cranial nerves II-XII are grossly intact with no focal deficits. Sensori-motor exam is normal. No cerebellar signs.    Skin: Skin is warm. No rash noted. Nails show no clubbing.  No cyanosis or erythema. No bruising.      Results Review:     Scheduled Meds:  Continuous Infusions:  No current facility-administered medications for this encounter.   PRN Meds:.     I reviewed the patient's new clinical results.  Lab Results (most recent)     Procedure Component Value Units Date/Time    T4, Free [[id number]]  (Abnormal) Collected:  09/01/19 0036    Specimen:  Blood Updated:  09/01/19 0131     Free T4 0.56 ng/dL      Comment: Results may be falsely increased if patient taking Biotin.       TSH [[id number]]  (Abnormal) Collected:  09/01/19 0036    Specimen:  Blood Updated:  09/01/19 0131     TSH 7.580 uIU/mL      Comment: Results may be falsely decreased if patient taking Biotin.       Troponin [[id number]]  (Normal) Collected:  09/01/19 0036    Specimen:  Blood Updated:  09/01/19 0125     Troponin I <0.030 ng/mL     Narrative:       Troponin I Reference Range:    0.00-0.03  Negative.  Repeat testing in 4-6 hours if clinically indicated.    0.04-0.29  Suspicious for myocardial injury. Serial measurements and clinical  correlation may be necessary to confirm or exclude diagnosis of acute  coronary syndrome.  Repeat testing in 4-6 hours if indicated.     >0.29 Consistent with myocardial injury.  Recommend clinical and laboratory correlation.     Results my be falsely decreased if patient taking Biotin.     Comprehensive Metabolic Panel [[id number]]  (Abnormal) Collected:  09/01/19 0036    Specimen:  Blood Updated:  09/01/19 0112     Glucose 115 mg/dL      BUN 9 mg/dL      Creatinine 0.60 mg/dL      Sodium 137 mmol/L      Potassium 4.0 mmol/L      Comment: Specimen hemolyzed.  Results may be affected.        Chloride 105 mmol/L      CO2 21.0 mmol/L      Calcium 8.1 mg/dL      Total Protein 6.7  g/dL      Albumin 3.80 g/dL      ALT (SGPT) 34 U/L      Comment: Specimen hemolyzed.  Results may be affected.        AST (SGOT) 32 U/L      Comment: Specimen hemolyzed.  Results may be affected.        Alkaline Phosphatase 58 U/L      Total Bilirubin 0.7 mg/dL      Comment: Specimen hemolyzed.  Results may be affected.        eGFR Non African Amer 110 mL/min/1.73      Globulin 2.9 gm/dL      A/G Ratio 1.3 g/dL      BUN/Creatinine Ratio 15.0     Anion Gap 15.0 mmol/L     D-dimer, Quantitative [[id number]]  (Normal) Collected:  09/01/19 0036    Specimen:  Blood Updated:  09/01/19 0103     D-Dimer, Quantitative 0.47 MCGFEU/mL     Narrative:       Reference Range  --------------------------------------------------------------------     < 0.50   Negative Predictive Value  0.50-0.59   Indeterminate    >= 0.60   Probable VTE             A very low percentage of patients with DVT may yield D-Dimer results   below the cut-off of 0.50 MCGFEU/mL.  This is known to be more   prevalent in patients with distal DVT.             Results of this test should always be interpreted in conjunction with   the patient's medical history, clinical presentation and other   findings.  Clinical diagnosis should not be based on the result of   [place] D-Dimer alone.    CBC & Differential [[id number]] Collected:  09/01/19 0036    Specimen:  Blood Updated:  09/01/19 0045    Narrative:       The following orders were created for panel order CBC & Differential.  Procedure                               Abnormality         Status                     ---------                               -----------         ------                     CBC Auto Differential[[id number]]        Abnormal            Final result                 Please view results for these tests on the individual orders.    CBC Auto Differential [[id number]]  (Abnormal) Collected:  09/01/19 0036    Specimen:  Blood Updated:  09/01/19 0045     WBC 7.20 10*3/mm3      RBC 3.68 10*6/mm3       Hemoglobin 12.0 g/dL      Hematocrit 34.4 %      MCV 93.4 fL      MCH 32.6 pg      MCHC 34.8 g/dL      RDW 13.4 %      RDW-SD 44.2 fl      MPV 7.7 fL      Platelets 230 10*3/mm3      Neutrophil % 56.8 %      Lymphocyte % 29.6 %      Monocyte % 8.5 %      Eosinophil % 4.0 %      Basophil % 1.1 %      Neutrophils, Absolute 4.10 10*3/mm3      Lymphocytes, Absolute 2.10 10*3/mm3      Monocytes, Absolute 0.60 10*3/mm3      Eosinophils, Absolute 0.30 10*3/mm3      Basophils, Absolute 0.10 10*3/mm3      nRBC 0.1 /100 WBC           Imaging Results (most recent)     Procedure Component Value Units Date/Time    XR Chest 1 View [[id number]] Updated:  09/01/19 0104        reviewed    ECG/EMG Results (most recent)     None        reviewed    Assessment/Plan       Palpitations    Near syncope    Obesity, morbid, BMI 50 or higher (CMS/[facility])    Hypothyroidism (acquired)    Palpitations with Lightheadedness:  - admission vitals were 98.2F, 66HR, 16 RR, 139/89, 98% on RA.   - On labs, patient had a negative troponin, stable CMP, negative d-dimer, and stable CBC, with an elevated TSH 7.5 and a low T4 0.56.   - In ED, Patient reported to appear to be having SVTs.   - CXR pending final report.   - No medications given in the ED.  - Will continue to monitor on tele and trend troponins  - Will request cardiac records from [facility].     Acute Hypothyroidism:  - elevated TSH 7.5 and a low T4 0.56  - Starting Levothyroxine 25mg PO    Morbid Obesity: BMI 56.45, dietary consult ordered.     I discussed the patients findings and my recommendations with patient.     Maribeth Vazquez PA-C  09/01/19  2:01 AM

## 2019-09-01 NOTE — NURSING NOTE
Patient c/o palpations and pressure in chest, appears w/o distress, placed order stat EKG , will continue to monitor.

## 2019-09-02 LAB — HBA1C MFR BLD: 5.4 % (ref 3.5–5.6)

## 2019-09-03 NOTE — PROGRESS NOTES
Case Management Discharge Note    Final Note: return home              Final Discharge Disposition Code: 01 - home or self-care

## 2019-09-05 PROCEDURE — 93227 XTRNL ECG REC<48 HR R&I: CPT | Performed by: INTERNAL MEDICINE

## 2019-10-03 PROCEDURE — 93010 ELECTROCARDIOGRAM REPORT: CPT | Performed by: INTERNAL MEDICINE

## 2020-01-24 ENCOUNTER — HOSPITAL ENCOUNTER (EMERGENCY)
Facility: HOSPITAL | Age: 40
Discharge: HOME | End: 2020-01-24
Admitting: EMERGENCY MEDICINE

## 2020-01-24 VITALS
BODY MASS INDEX: 51.91 KG/M2 | DIASTOLIC BLOOD PRESSURE: 111 MMHG | TEMPERATURE: 98.4 F | HEIGHT: 63 IN | RESPIRATION RATE: 14 BRPM | SYSTOLIC BLOOD PRESSURE: 173 MMHG | HEART RATE: 65 BPM | WEIGHT: 293 LBS | OXYGEN SATURATION: 98 %

## 2020-01-24 DIAGNOSIS — M79.605 LOWER EXTREMITY PAIN, ANTERIOR, LEFT: Primary | ICD-10-CM

## 2020-01-24 LAB
ALBUMIN SERPL-MCNC: 4.2 G/DL (ref 3.5–5.2)
ALBUMIN/GLOB SERPL: 1.2 G/DL
ALP SERPL-CCNC: 68 U/L (ref 39–117)
ALT SERPL W P-5'-P-CCNC: 32 U/L (ref 1–33)
ANION GAP SERPL CALCULATED.3IONS-SCNC: 11 MMOL/L (ref 5–15)
AST SERPL-CCNC: 23 U/L (ref 1–32)
BASOPHILS # BLD AUTO: 0.1 10*3/MM3 (ref 0–0.2)
BASOPHILS NFR BLD AUTO: 0.8 % (ref 0–1.5)
BILIRUB SERPL-MCNC: 0.2 MG/DL (ref 0.2–1.2)
BUN BLD-MCNC: 11 MG/DL (ref 6–20)
BUN/CREAT SERPL: 18.3 (ref 7–25)
CALCIUM SPEC-SCNC: 8.8 MG/DL (ref 8.6–10.5)
CHLORIDE SERPL-SCNC: 104 MMOL/L (ref 98–107)
CO2 SERPL-SCNC: 25 MMOL/L (ref 22–29)
CREAT BLD-MCNC: 0.6 MG/DL (ref 0.57–1)
DEPRECATED RDW RBC AUTO: 45.5 FL (ref 37–54)
EOSINOPHIL # BLD AUTO: 0.3 10*3/MM3 (ref 0–0.4)
EOSINOPHIL NFR BLD AUTO: 3.7 % (ref 0.3–6.2)
ERYTHROCYTE [DISTWIDTH] IN BLOOD BY AUTOMATED COUNT: 13.8 % (ref 12.3–15.4)
GFR SERPL CREATININE-BSD FRML MDRD: 110 ML/MIN/1.73
GLOBULIN UR ELPH-MCNC: 3.5 GM/DL
GLUCOSE BLD-MCNC: 99 MG/DL (ref 65–99)
HCT VFR BLD AUTO: 37.2 % (ref 34–46.6)
HGB BLD-MCNC: 13 G/DL (ref 12–15.9)
LYMPHOCYTES # BLD AUTO: 2.1 10*3/MM3 (ref 0.7–3.1)
LYMPHOCYTES NFR BLD AUTO: 29.4 % (ref 19.6–45.3)
MCH RBC QN AUTO: 32.8 PG (ref 26.6–33)
MCHC RBC AUTO-ENTMCNC: 34.9 G/DL (ref 31.5–35.7)
MCV RBC AUTO: 94.1 FL (ref 79–97)
MONOCYTES # BLD AUTO: 0.7 10*3/MM3 (ref 0.1–0.9)
MONOCYTES NFR BLD AUTO: 9.4 % (ref 5–12)
NEUTROPHILS # BLD AUTO: 4.1 10*3/MM3 (ref 1.7–7)
NEUTROPHILS NFR BLD AUTO: 56.7 % (ref 42.7–76)
NRBC BLD AUTO-RTO: 0.1 /100 WBC (ref 0–0.2)
NT-PROBNP SERPL-MCNC: 18.6 PG/ML (ref 5–450)
PLATELET # BLD AUTO: 256 10*3/MM3 (ref 140–450)
PMV BLD AUTO: 7.1 FL (ref 6–12)
POTASSIUM BLD-SCNC: 4 MMOL/L (ref 3.5–5.2)
PROT SERPL-MCNC: 7.7 G/DL (ref 6–8.5)
RBC # BLD AUTO: 3.95 10*6/MM3 (ref 3.77–5.28)
SODIUM BLD-SCNC: 140 MMOL/L (ref 136–145)
WBC NRBC COR # BLD: 7.2 10*3/MM3 (ref 3.4–10.8)

## 2020-01-24 PROCEDURE — 85025 COMPLETE CBC W/AUTO DIFF WBC: CPT | Performed by: NURSE PRACTITIONER

## 2020-01-24 PROCEDURE — 83880 ASSAY OF NATRIURETIC PEPTIDE: CPT | Performed by: NURSE PRACTITIONER

## 2020-01-24 PROCEDURE — 99283 EMERGENCY DEPT VISIT LOW MDM: CPT

## 2020-01-24 PROCEDURE — 80053 COMPREHEN METABOLIC PANEL: CPT | Performed by: NURSE PRACTITIONER

## 2020-01-24 RX ORDER — LIDOCAINE 50 MG/G
1 PATCH TOPICAL
Status: DISCONTINUED | OUTPATIENT
Start: 2020-01-24 | End: 2020-01-24 | Stop reason: HOSPADM

## 2020-01-24 RX ORDER — AMIODARONE HYDROCHLORIDE 200 MG/1
200 TABLET ORAL DAILY
COMMUNITY
End: 2020-05-05

## 2020-01-24 RX ORDER — ACETAMINOPHEN 500 MG
1000 TABLET ORAL ONCE
Status: COMPLETED | OUTPATIENT
Start: 2020-01-24 | End: 2020-01-24

## 2020-01-24 RX ORDER — DILTIAZEM HYDROCHLORIDE 90 MG/1
20 CAPSULE, EXTENDED RELEASE ORAL DAILY
COMMUNITY
End: 2020-05-05

## 2020-01-24 RX ORDER — ACETAMINOPHEN 500 MG
500 TABLET ORAL DAILY PRN
COMMUNITY
End: 2020-06-12

## 2020-01-24 RX ADMIN — ACETAMINOPHEN 1000 MG: 500 TABLET, FILM COATED ORAL at 19:28

## 2020-01-24 RX ADMIN — LIDOCAINE 1 PATCH: 50 PATCH CUTANEOUS at 19:28

## 2020-01-25 ENCOUNTER — HOSPITAL ENCOUNTER (OUTPATIENT)
Dept: CARDIOLOGY | Facility: HOSPITAL | Age: 40
Discharge: HOME | End: 2020-01-25
Admitting: NURSE PRACTITIONER

## 2020-01-25 LAB
BH CV LOWER VASCULAR LEFT COMMON FEMORAL AUGMENT: NORMAL
BH CV LOWER VASCULAR LEFT COMMON FEMORAL COMPETENT: NORMAL
BH CV LOWER VASCULAR LEFT COMMON FEMORAL COMPRESS: NORMAL
BH CV LOWER VASCULAR LEFT COMMON FEMORAL PHASIC: NORMAL
BH CV LOWER VASCULAR LEFT COMMON FEMORAL SPONT: NORMAL
BH CV LOWER VASCULAR LEFT DISTAL FEMORAL COMPRESS: NORMAL
BH CV LOWER VASCULAR LEFT GASTRONEMIUS COMPRESS: NORMAL
BH CV LOWER VASCULAR LEFT GREATER SAPH AK COMPRESS: NORMAL
BH CV LOWER VASCULAR LEFT GREATER SAPH BK COMPRESS: NORMAL
BH CV LOWER VASCULAR LEFT LESSER SAPH COMPRESS: NORMAL
BH CV LOWER VASCULAR LEFT MID FEMORAL AUGMENT: NORMAL
BH CV LOWER VASCULAR LEFT MID FEMORAL COMPETENT: NORMAL
BH CV LOWER VASCULAR LEFT MID FEMORAL COMPRESS: NORMAL
BH CV LOWER VASCULAR LEFT MID FEMORAL PHASIC: NORMAL
BH CV LOWER VASCULAR LEFT MID FEMORAL SPONT: NORMAL
BH CV LOWER VASCULAR LEFT PERONEAL COMPRESS: NORMAL
BH CV LOWER VASCULAR LEFT POPLITEAL AUGMENT: NORMAL
BH CV LOWER VASCULAR LEFT POPLITEAL COMPETENT: NORMAL
BH CV LOWER VASCULAR LEFT POPLITEAL COMPRESS: NORMAL
BH CV LOWER VASCULAR LEFT POPLITEAL PHASIC: NORMAL
BH CV LOWER VASCULAR LEFT POPLITEAL SPONT: NORMAL
BH CV LOWER VASCULAR LEFT POSTERIOR TIBIAL COMPRESS: NORMAL
BH CV LOWER VASCULAR LEFT PROXIMAL FEMORAL COMPRESS: NORMAL
BH CV LOWER VASCULAR LEFT SAPHENOFEMORAL JUNCTION AUGMENT: NORMAL
BH CV LOWER VASCULAR LEFT SAPHENOFEMORAL JUNCTION COMPETENT: NORMAL
BH CV LOWER VASCULAR LEFT SAPHENOFEMORAL JUNCTION COMPRESS: NORMAL
BH CV LOWER VASCULAR LEFT SAPHENOFEMORAL JUNCTION PHASIC: NORMAL
BH CV LOWER VASCULAR LEFT SAPHENOFEMORAL JUNCTION SPONT: NORMAL
BH CV LOWER VASCULAR RIGHT COMMON FEMORAL AUGMENT: NORMAL
BH CV LOWER VASCULAR RIGHT COMMON FEMORAL COMPETENT: NORMAL
BH CV LOWER VASCULAR RIGHT COMMON FEMORAL COMPRESS: NORMAL
BH CV LOWER VASCULAR RIGHT COMMON FEMORAL PHASIC: NORMAL
BH CV LOWER VASCULAR RIGHT COMMON FEMORAL SPONT: NORMAL

## 2020-01-25 PROCEDURE — 93971 EXTREMITY STUDY: CPT

## 2020-01-25 NOTE — DISCHARGE INSTRUCTIONS
REST AND KEEP LEFT LEG ELEVATED.  RETURN TO THIS HOSPITAL TOMORROW MORNING BETWEEN THE HOURS OF 8 AND 10 AM FOR YOUR ULTRASOUND OF YOUR LEFT LOWER LEG.  CONTINUE MEDICATIONS, AS PRESCRIBED.

## 2020-01-25 NOTE — ED PROVIDER NOTES
Subjective   39-year-old morbidly obese  female presents to the emergency room with left lower extremity pain.  Patient states that the redness and swelling started about 2 weeks ago but had increased pain yesterday and today.  Patient reports that she called her cardiologist Dr. Watkins and he suggested that she come to the ER for further evaluation.  DENIES CHEST PAIN, SHORTNESS OF BREATH OR DIZZINESS.    Onset: 2 weeks but worsening over the past 2 days  Location: Front of left lower extremity  Duration: 2 weeks  Character: Swelling, burning pain and redness  Aggravating/Alleviating Factors: Movement/none--- she denies taking any over-the-counter medications for her pain.  Radiation: Up into top of the left knee and left thigh area  Severity: Moderate to severe            Review of Systems   Constitutional: Negative.  Negative for appetite change, chills, fatigue and fever.   HENT: Negative.    Respiratory: Negative.  Negative for cough and shortness of breath.    Cardiovascular: Negative.  Negative for chest pain.   Gastrointestinal: Negative.  Negative for nausea and vomiting.   Musculoskeletal: Positive for myalgias. Negative for gait problem and joint swelling.   Skin: Positive for color change and rash. Negative for wound.   Neurological: Negative.        Past Medical History:   Diagnosis Date   • Disease of thyroid gland    • Heart murmur        Allergies   Allergen Reactions   • Amoxicillin Hives   • Cleocin [Clindamycin Hcl] Hives   • Penicillins Hives       History reviewed. No pertinent surgical history.    Family History   Problem Relation Age of Onset   • Diabetes Mother    • Heart disease Father    • Cancer Father        Social History     Socioeconomic History   • Marital status:      Spouse name: Not on file   • Number of children: Not on file   • Years of education: Not on file   • Highest education level: Not on file   Tobacco Use   • Smoking status: Former Smoker     Types:  Cigarettes     Last attempt to quit: 9/1/2009     Years since quitting: 10.4   • Smokeless tobacco: Never Used   Substance and Sexual Activity   • Alcohol use: No     Frequency: Never   • Drug use: No   • Sexual activity: Defer           Objective   Physical Exam   Constitutional: She appears well-developed and well-nourished. No distress.   HENT:   Head: Normocephalic and atraumatic.   Eyes: Pupils are equal, round, and reactive to light. Conjunctivae and EOM are normal.   Musculoskeletal: Normal range of motion.   Skin: Skin is warm, dry and intact. Capillary refill takes less than 2 seconds. Rash noted. She is not diaphoretic. There is erythema.        VERY MILDLY GENERALLY EDEMATOUS, LOCALIZED ANTERIOR REDNESS.  NO FLUCTUANCE NOTED.  NO OBVIOUS CELLULITIC APPEARANCE.   Nursing note and vitals reviewed.      Procedures           ED Course      Medications   lidocaine (LIDODERM) 5 % 1 patch (1 patch Transdermal Medication Applied 1/24/20 1928)   acetaminophen (TYLENOL) tablet 1,000 mg (1,000 mg Oral Given 1/24/20 1928)     Labs Reviewed   BNP (IN-HOUSE) - Normal    Narrative:     Among patients with dyspnea, NT-proBNP is highly sensitive for the detection of acute congestive heart failure. In addition NT-proBNP of <300 pg/ml effectively rules out acute congestive heart failure with 99% negative predictive value.    Results may be falsely decreased if patient taking Biotin.     CBC WITH AUTO DIFFERENTIAL - Normal   COMPREHENSIVE METABOLIC PANEL    Narrative:     GFR Normal >60  Chronic Kidney Disease <60  Kidney Failure <15     CBC AND DIFFERENTIAL    Narrative:     The following orders were created for panel order CBC & Differential.  Procedure                               Abnormality         Status                     ---------                               -----------         ------                     CBC Auto Differential[[id number]]        Normal              Final result                 Please view results  for these tests on the individual orders.     No radiology results for the last day - US OF VENOUS DOPPLER ORDERED AT 1855, BUT NOT ABLE TO OBTAIN TODAY, SO TOLD HER TO RETURN TO [facility] TOMORROW TO OUTPATIENT SCHEDULING FOR HER US.      ENCOURAGED PATIENT TO REST, ICE AND ELEVATE AND LIMIT ACTIVITY FOR NEXT 24 HOURS.    NOTIFIED PATIENT THAT HER LAB RESULTS WERE NORMAL                                         MDM    Final diagnoses:   Lower extremity pain, anterior, left            Fabienne Luna, APRN  01/24/20 2013

## 2020-05-05 ENCOUNTER — TELEMEDICINE (OUTPATIENT)
Dept: CARDIOLOGY | Facility: CLINIC | Age: 40
End: 2020-05-05

## 2020-05-05 VITALS — BODY MASS INDEX: 57.57 KG/M2 | WEIGHT: 293 LBS

## 2020-05-05 DIAGNOSIS — I48.91 ATRIAL FIBRILLATION, UNSPECIFIED TYPE (HCC): ICD-10-CM

## 2020-05-05 DIAGNOSIS — R00.2 PALPITATIONS: Primary | ICD-10-CM

## 2020-05-05 DIAGNOSIS — G47.33 OBSTRUCTIVE SLEEP APNEA: ICD-10-CM

## 2020-05-05 DIAGNOSIS — I10 ESSENTIAL HYPERTENSION: ICD-10-CM

## 2020-05-05 PROBLEM — I48.0 PAROXYSMAL ATRIAL FIBRILLATION: Status: ACTIVE | Noted: 2020-05-05

## 2020-05-05 PROCEDURE — 99204 OFFICE O/P NEW MOD 45 MIN: CPT | Performed by: INTERNAL MEDICINE

## 2020-05-05 RX ORDER — DILTIAZEM HYDROCHLORIDE 240 MG/1
240 CAPSULE, COATED, EXTENDED RELEASE ORAL DAILY
Status: ON HOLD | COMMUNITY
End: 2020-06-10

## 2020-05-05 RX ORDER — HYDROCHLOROTHIAZIDE 12.5 MG/1
12.5 CAPSULE, GELATIN COATED ORAL 3 TIMES WEEKLY
COMMUNITY
End: 2020-06-12

## 2020-05-05 RX ORDER — METOPROLOL SUCCINATE 25 MG/1
25 TABLET, EXTENDED RELEASE ORAL EVERY EVENING
COMMUNITY
End: 2020-06-11 | Stop reason: HOSPADM

## 2020-05-05 NOTE — PROGRESS NOTES
This is an Electronic video visit, consent obtained for video visit, electronic video visit established via My chart    CC--atrial fibrillation and sleep apnea    Sub--40-year-old pleasant patient has been having symptoms of atrial arrhythmias since last October 2019--she has seen Dr. Watkins and patient underwent EKGs, echocardiography in the past--she was placed on amiodarone which has been stopped and currently on a combination of beta-blockers and calcium channel blockers--she is unable to afford Eliquis--she was seen back in March of this year by Dr. Watkins when she was in sinus rhythm--she started have recurrent symptoms of arrhythmias with atrial arrhythmias and she is currently off amiodarone and patient established a video  visit for new consultation--she complains of class III fatigue, tiredness, shortness of breath and feels heaviness with palpitations with atrial arrhythmias in her chest--she denies any prashant syncope  She has history of significant obesity and sleep apnea and she is awaiting for sleep specialist to optimize sleep apnea therapy  He does have history of mild hypertension        Past Medical History:   Diagnosis Date   • Arrhythmia    • Atrial fibrillation (CMS/HCC)    • Disease of thyroid gland    • Heart murmur    • Hypertension      History reviewed. No pertinent surgical history.  Family History   Problem Relation Age of Onset   • Diabetes Mother    • Heart disease Father    • Cancer Father      Social History     Tobacco Use   • Smoking status: Former Smoker     Types: Cigarettes     Last attempt to quit: 9/1/2009     Years since quitting: 10.6   • Smokeless tobacco: Never Used   Substance Use Topics   • Alcohol use: No     Frequency: Never   • Drug use: No     Allergies:  Amoxicillin; Cleocin [clindamycin hcl]; and Penicillins    Review of Systems   General:  positive for fatigue and tiredness  Eyes: No redness  Cardiovascular: No chest pain, no palpitations  Respiratory:   positive for  class 3 shortness of breath  Gastrointestinal: No nausea or vomiting, bleeding  Genitourinary: no hematuria or dysuria  Musculoskeletal: No arthralgia or myalgia  Skin: No rash  Neurologic: No numbness, tingling, syncope  Hematologic/Lymphatic: No abnormal bleeding      Physical Exam   Constitutional: She appears well-developed and well-nourished. No distress.   HENT:   Head: Normocephalic and atraumatic.   Eyes: Right eye exhibits no discharge. Left eye exhibits no discharge.   Neck:   Thick neck and trachea and JVP could not be visualized clearly on this visit   Pulmonary/Chest: Effort normal.  No respiratory distress.  Neurological: She is alert.   Skin: No rash noted. She is not diaphoretic.   Psychiatric: She has a normal mood and affect.         Assessment and plan    Recurrent symptomatic atrial arrhythmias--obtain records from Dr. Watkins  Patient failed amiodarone treatment  Currently symptomatic despite beta-blockers and calcium channel blockers  Sleep apnea and awaiting for optimization of sleep apnea therapy and patient was educated about sleep apnea optimization for treatment of atrial arrhythmias  Significant obesity  Mild hypertension    After obtaining records from Dr. Watkins patient to be evaluated in the office in 2 weeks to discuss different therapeutic options with the patient  In the interim patient to continue current medications    Electronically signed by Joseph Andrews MD, 05/05/20, 12:50 PM.

## 2020-05-20 ENCOUNTER — OFFICE VISIT (OUTPATIENT)
Dept: CARDIOLOGY | Facility: CLINIC | Age: 40
End: 2020-05-20

## 2020-05-20 VITALS
WEIGHT: 293 LBS | DIASTOLIC BLOOD PRESSURE: 84 MMHG | SYSTOLIC BLOOD PRESSURE: 140 MMHG | BODY MASS INDEX: 56.15 KG/M2 | OXYGEN SATURATION: 98 % | HEART RATE: 64 BPM

## 2020-05-20 DIAGNOSIS — I48.0 PAROXYSMAL ATRIAL FIBRILLATION (HCC): ICD-10-CM

## 2020-05-20 DIAGNOSIS — R00.2 PALPITATIONS: ICD-10-CM

## 2020-05-20 DIAGNOSIS — G47.33 OBSTRUCTIVE SLEEP APNEA: Primary | ICD-10-CM

## 2020-05-20 DIAGNOSIS — I10 ESSENTIAL HYPERTENSION: ICD-10-CM

## 2020-05-20 DIAGNOSIS — E70.1 PHENYLKETONURIA (HCC): ICD-10-CM

## 2020-05-20 PROCEDURE — 93000 ELECTROCARDIOGRAM COMPLETE: CPT | Performed by: INTERNAL MEDICINE

## 2020-05-20 PROCEDURE — 99214 OFFICE O/P EST MOD 30 MIN: CPT | Performed by: INTERNAL MEDICINE

## 2020-05-20 NOTE — PROGRESS NOTES
CC--recurrent symptomatic paroxysmal atrial fibrillation    Sub--40-year-old pleasant patient has been having symptoms of atrial arrhythmias since last October 2019--she has seen Dr. Watkins and patient underwent EKGs, echocardiography in the past--she was placed on amiodarone which has been stopped and currently on a combination of beta-blockers and calcium channel blockers--she is unable to afford Eliquis--she was seen back in March of this year by Dr. Watkins when she was in sinus rhythm--she started have recurrent symptoms of arrhythmias with atrial arrhythmias and she is currently off amiodarone and patient came for follow-up --she is still having recurrent arrhythmias in the last episode happened last night and feels her heart is racing with dyspnea and fatigue without syncope or chest pain --She has history of significant obesity and sleep apnea and she is awaiting for sleep specialist to optimize sleep apnea therapy  She does have history of mild hypertension  Chronic medical conditions also include phenylketonuria, PCOS, obesity with sleep apnea  Patient did have contact with COVID patient and underwent echo with testing which was negative  She denies any symptoms of ongoing COVID pandemic    Past surgical history includes--tubal ligation, tonsillectomy and uvulectomy for sleep apnea and carpal tunnel surgery    Past Medical History:   Diagnosis Date   • Arrhythmia    • Atrial fibrillation (CMS/HCC)    • Disease of thyroid gland    • Heart murmur    • Hypertension        Family History   Problem Relation Age of Onset   • Diabetes Mother    • Heart disease Father    • Cancer Father      Social History     Tobacco Use   • Smoking status: Former Smoker     Types: Cigarettes     Last attempt to quit: 9/1/2009     Years since quitting: 10.6   • Smokeless tobacco: Never Used   Substance Use Topics   • Alcohol use: No     Frequency: Never   • Drug use: No     Allergies:  Amoxicillin; Cleocin [clindamycin hcl]; and  Penicillins    Review of Systems   General:  positive for fatigue and tiredness  Eyes: No redness  Cardiovascular: No chest pain, no palpitations  Respiratory:   positive for class 3 shortness of breath  Gastrointestinal: No nausea or vomiting, bleeding  Genitourinary: no hematuria or dysuria  Musculoskeletal: No arthralgia or myalgia  Skin: No rash  Neurologic: No numbness, tingling, syncope  Hematologic/Lymphatic: No abnormal bleeding      Physical Exam--blood pressure 140/84, pulse rate 64 patient is afebrile respiration 12 times a minute  EKG shows sinus rhythm with left atrial enlargement heart rate 64 PA interval 186 QTC is 432 and patient is in sinus rhythm and indication for EKG includes paroxysmal atrial arrhythmias and no significant EKG changes compared to prior EKG, low voltage complexes noted in the precordium consistent with obesity    General:      well developed, well nourished, in no acute distress.    Head:      normocephalic and atraumatic.    Eyes:      PERRL/EOM intact, conjunctiva and sclera clear with out nystagmus.    Neck:      no masses, thyromegaly, trachea central with normal respiratory effort  Lungs:      clear bilaterally to auscultation.    Heart:      non-displaced PMI, chest non-tender; regular rate and rhythm, S1, S2 without murmurs, rubs, or gallops  Skin:      intact without lesions or rashes.    Psych:      alert and cooperative; normal mood and affect; normal attention span and concentration.      Extremities with trace ankle edema without any cyanosis or clubbing    Neurological no focal deficits and alert oriented x3      Assessment and plan    Recurrent symptomatic paroxysmal atrial fibrillation  Obesity with sleep apnea  Mild hypertension  History of phenylketonuria    Recommendations  Therapeutic options for atrial arrhythmias educated  Medical treatment with antiarrhythmics versus ablation educated  Patient wanted to talk to her spouse and decide on further  therapy    Electronically signed by Joseph Andrews MD, 05/20/20, 8:51 AM.

## 2020-05-22 ENCOUNTER — TRANSCRIBE ORDERS (OUTPATIENT)
Dept: ADMINISTRATIVE | Facility: HOSPITAL | Age: 40
End: 2020-05-22

## 2020-05-22 ENCOUNTER — PREP FOR SURGERY (OUTPATIENT)
Dept: OTHER | Facility: HOSPITAL | Age: 40
End: 2020-05-22

## 2020-05-22 DIAGNOSIS — I48.0 PAROXYSMAL ATRIAL FIBRILLATION (HCC): Primary | ICD-10-CM

## 2020-05-22 DIAGNOSIS — E03.9 HYPOTHYROIDISM, UNSPECIFIED TYPE: Primary | ICD-10-CM

## 2020-05-22 RX ORDER — SODIUM CHLORIDE 0.9 % (FLUSH) 0.9 %
10 SYRINGE (ML) INJECTION AS NEEDED
Status: CANCELLED | OUTPATIENT
Start: 2020-05-22

## 2020-05-22 RX ORDER — SODIUM CHLORIDE 0.9 % (FLUSH) 0.9 %
3 SYRINGE (ML) INJECTION EVERY 12 HOURS SCHEDULED
Status: CANCELLED | OUTPATIENT
Start: 2020-05-22

## 2020-05-22 RX ORDER — SODIUM CHLORIDE 9 MG/ML
80 INJECTION, SOLUTION INTRAVENOUS CONTINUOUS
Status: CANCELLED | OUTPATIENT
Start: 2020-05-22

## 2020-06-08 ENCOUNTER — TRANSCRIBE ORDERS (OUTPATIENT)
Dept: ADMINISTRATIVE | Facility: HOSPITAL | Age: 40
End: 2020-06-08

## 2020-06-08 ENCOUNTER — APPOINTMENT (OUTPATIENT)
Dept: LAB | Facility: HOSPITAL | Age: 40
End: 2020-06-08

## 2020-06-08 ENCOUNTER — HOSPITAL ENCOUNTER (OUTPATIENT)
Dept: ULTRASOUND IMAGING | Facility: HOSPITAL | Age: 40
Discharge: HOME | End: 2020-06-08
Admitting: MEDICAL GENETICS

## 2020-06-08 ENCOUNTER — LAB (OUTPATIENT)
Dept: LAB | Facility: HOSPITAL | Age: 40
End: 2020-06-08

## 2020-06-08 DIAGNOSIS — E55.9 VITAMIN D DEFICIENCY: ICD-10-CM

## 2020-06-08 DIAGNOSIS — E03.9 HYPOTHYROIDISM, ADULT: Primary | ICD-10-CM

## 2020-06-08 DIAGNOSIS — E03.9 HYPOTHYROIDISM, ADULT: ICD-10-CM

## 2020-06-08 DIAGNOSIS — E03.9 HYPOTHYROIDISM, UNSPECIFIED TYPE: ICD-10-CM

## 2020-06-08 DIAGNOSIS — I48.0 PAROXYSMAL ATRIAL FIBRILLATION (HCC): ICD-10-CM

## 2020-06-08 LAB
25(OH)D3 SERPL-MCNC: 20.9 NG/ML (ref 30–100)
ALBUMIN SERPL-MCNC: 4.3 G/DL (ref 3.5–5.2)
ALBUMIN/GLOB SERPL: 1.5 G/DL
ALP SERPL-CCNC: 56 U/L (ref 39–117)
ALT SERPL W P-5'-P-CCNC: 47 U/L (ref 1–33)
ANION GAP SERPL CALCULATED.3IONS-SCNC: 10.2 MMOL/L (ref 5–15)
AST SERPL-CCNC: 37 U/L (ref 1–32)
B-HCG UR QL: NEGATIVE
BASOPHILS # BLD AUTO: 0.06 10*3/MM3 (ref 0–0.2)
BASOPHILS NFR BLD AUTO: 1 % (ref 0–1.5)
BILIRUB SERPL-MCNC: 0.5 MG/DL (ref 0.2–1.2)
BUN BLD-MCNC: 10 MG/DL (ref 6–20)
BUN/CREAT SERPL: 14.7 (ref 7–25)
CALCIUM SPEC-SCNC: 8.6 MG/DL (ref 8.6–10.5)
CHLORIDE SERPL-SCNC: 102 MMOL/L (ref 98–107)
CHOLEST SERPL-MCNC: 182 MG/DL (ref 0–200)
CO2 SERPL-SCNC: 22.8 MMOL/L (ref 22–29)
CREAT BLD-MCNC: 0.68 MG/DL (ref 0.57–1)
DEPRECATED RDW RBC AUTO: 45.3 FL (ref 37–54)
EOSINOPHIL # BLD AUTO: 0.2 10*3/MM3 (ref 0–0.4)
EOSINOPHIL NFR BLD AUTO: 3.5 % (ref 0.3–6.2)
ERYTHROCYTE [DISTWIDTH] IN BLOOD BY AUTOMATED COUNT: 13.2 % (ref 12.3–15.4)
FERRITIN SERPL-MCNC: 170 NG/ML (ref 13–150)
GFR SERPL CREATININE-BSD FRML MDRD: 95 ML/MIN/1.73
GLOBULIN UR ELPH-MCNC: 2.9 GM/DL
GLUCOSE BLD-MCNC: 106 MG/DL (ref 65–99)
HBA1C MFR BLD: 5.8 % (ref 3.5–5.6)
HCT VFR BLD AUTO: 40.1 % (ref 34–46.6)
HDLC SERPL-MCNC: 41 MG/DL (ref 40–60)
HGB BLD-MCNC: 13.6 G/DL (ref 12–15.9)
IMM GRANULOCYTES # BLD AUTO: 0.02 10*3/MM3 (ref 0–0.05)
IMM GRANULOCYTES NFR BLD AUTO: 0.3 % (ref 0–0.5)
IRON 24H UR-MRATE: 140 MCG/DL (ref 37–145)
IRON SATN MFR SERPL: 36 % (ref 20–50)
LDLC SERPL CALC-MCNC: 112 MG/DL (ref 0–100)
LDLC/HDLC SERPL: 2.74 {RATIO}
LYMPHOCYTES # BLD AUTO: 1.81 10*3/MM3 (ref 0.7–3.1)
LYMPHOCYTES NFR BLD AUTO: 31.4 % (ref 19.6–45.3)
MAGNESIUM SERPL-MCNC: 2.1 MG/DL (ref 1.6–2.6)
MCH RBC QN AUTO: 31.6 PG (ref 26.6–33)
MCHC RBC AUTO-ENTMCNC: 33.9 G/DL (ref 31.5–35.7)
MCV RBC AUTO: 93.3 FL (ref 79–97)
MONOCYTES # BLD AUTO: 0.52 10*3/MM3 (ref 0.1–0.9)
MONOCYTES NFR BLD AUTO: 9 % (ref 5–12)
NEUTROPHILS # BLD AUTO: 3.15 10*3/MM3 (ref 1.7–7)
NEUTROPHILS NFR BLD AUTO: 54.8 % (ref 42.7–76)
NRBC BLD AUTO-RTO: 0 /100 WBC (ref 0–0.2)
PLATELET # BLD AUTO: 246 10*3/MM3 (ref 140–450)
PMV BLD AUTO: 9.7 FL (ref 6–12)
POTASSIUM BLD-SCNC: 4.3 MMOL/L (ref 3.5–5.2)
PROT SERPL-MCNC: 7.2 G/DL (ref 6–8.5)
RBC # BLD AUTO: 4.3 10*6/MM3 (ref 3.77–5.28)
SODIUM BLD-SCNC: 135 MMOL/L (ref 136–145)
T3FREE SERPL-MCNC: 2.91 PG/ML (ref 2–4.4)
T4 FREE SERPL-MCNC: 0.98 NG/DL (ref 0.93–1.7)
TIBC SERPL-MCNC: 387 MCG/DL (ref 298–536)
TRANSFERRIN SERPL-MCNC: 260 MG/DL (ref 200–360)
TRIGL SERPL-MCNC: 143 MG/DL (ref 0–150)
VLDLC SERPL-MCNC: 28.6 MG/DL (ref 5–40)
WBC NRBC COR # BLD: 5.76 10*3/MM3 (ref 3.4–10.8)

## 2020-06-08 PROCEDURE — 82728 ASSAY OF FERRITIN: CPT

## 2020-06-08 PROCEDURE — 84466 ASSAY OF TRANSFERRIN: CPT

## 2020-06-08 PROCEDURE — 82306 VITAMIN D 25 HYDROXY: CPT

## 2020-06-08 PROCEDURE — C9803 HOPD COVID-19 SPEC COLLECT: HCPCS

## 2020-06-08 PROCEDURE — 83735 ASSAY OF MAGNESIUM: CPT

## 2020-06-08 PROCEDURE — 80053 COMPREHEN METABOLIC PANEL: CPT

## 2020-06-08 PROCEDURE — 85025 COMPLETE CBC W/AUTO DIFF WBC: CPT

## 2020-06-08 PROCEDURE — 84481 FREE ASSAY (FT-3): CPT

## 2020-06-08 PROCEDURE — 81025 URINE PREGNANCY TEST: CPT

## 2020-06-08 PROCEDURE — U0004 COV-19 TEST NON-CDC HGH THRU: HCPCS

## 2020-06-08 PROCEDURE — 83036 HEMOGLOBIN GLYCOSYLATED A1C: CPT

## 2020-06-08 PROCEDURE — 83540 ASSAY OF IRON: CPT

## 2020-06-08 PROCEDURE — 84439 ASSAY OF FREE THYROXINE: CPT

## 2020-06-08 PROCEDURE — 80061 LIPID PANEL: CPT

## 2020-06-08 PROCEDURE — 36415 COLL VENOUS BLD VENIPUNCTURE: CPT

## 2020-06-08 PROCEDURE — 76536 US EXAM OF HEAD AND NECK: CPT

## 2020-06-09 ENCOUNTER — ANESTHESIA EVENT (OUTPATIENT)
Dept: CARDIOLOGY | Facility: HOSPITAL | Age: 40
End: 2020-06-09

## 2020-06-09 LAB
REF LAB TEST METHOD: NORMAL
SARS-COV-2 RNA RESP QL NAA+PROBE: NOT DETECTED

## 2020-06-09 RX ORDER — SODIUM CHLORIDE 9 MG/ML
9 INJECTION, SOLUTION INTRAVENOUS CONTINUOUS PRN
Status: CANCELLED | OUTPATIENT
Start: 2020-06-09

## 2020-06-09 RX ORDER — SODIUM CHLORIDE 0.9 % (FLUSH) 0.9 %
10 SYRINGE (ML) INJECTION EVERY 12 HOURS SCHEDULED
Status: CANCELLED | OUTPATIENT
Start: 2020-06-09

## 2020-06-09 RX ORDER — SODIUM CHLORIDE 0.9 % (FLUSH) 0.9 %
10 SYRINGE (ML) INJECTION AS NEEDED
Status: CANCELLED | OUTPATIENT
Start: 2020-06-09

## 2020-06-10 ENCOUNTER — HOSPITAL ENCOUNTER (OUTPATIENT)
Dept: CARDIOLOGY | Facility: HOSPITAL | Age: 40
Discharge: HOME | End: 2020-06-10

## 2020-06-10 ENCOUNTER — HOSPITAL ENCOUNTER (OUTPATIENT)
Facility: HOSPITAL | Age: 40
Discharge: HOME | End: 2020-06-11
Attending: INTERNAL MEDICINE | Admitting: INTERNAL MEDICINE

## 2020-06-10 ENCOUNTER — ANESTHESIA (OUTPATIENT)
Dept: CARDIOLOGY | Facility: HOSPITAL | Age: 40
End: 2020-06-10

## 2020-06-10 VITALS — OXYGEN SATURATION: 100 % | DIASTOLIC BLOOD PRESSURE: 77 MMHG | SYSTOLIC BLOOD PRESSURE: 113 MMHG

## 2020-06-10 DIAGNOSIS — I48.0 PAROXYSMAL ATRIAL FIBRILLATION (HCC): ICD-10-CM

## 2020-06-10 LAB
ACT BLD: 213 SECONDS (ref 89–137)
ACT BLD: 263 SECONDS (ref 89–137)
ACT BLD: 274 SECONDS (ref 89–137)
ACT BLD: 318 SECONDS (ref 89–137)
ACT BLD: 98 SECONDS (ref 89–137)
BH CV ECHO MEAS - BSA(HAYCOCK): 2.6 M^2
BH CV ECHO MEAS - BSA: 2.4 M^2
BH CV ECHO MEAS - BZI_BMI: 56.2 KILOGRAMS/M^2
BH CV ECHO MEAS - BZI_METRIC_HEIGHT: 160 CM
BH CV ECHO MEAS - BZI_METRIC_WEIGHT: 143.8 KG
BH CV ECHO MEAS - EF(MOD-BP): 60 %

## 2020-06-10 PROCEDURE — 25010000002 PROTAMINE SULFATE PER 10 MG: Performed by: ANESTHESIOLOGIST ASSISTANT

## 2020-06-10 PROCEDURE — 93656 COMPRE EP EVAL ABLTJ ATR FIB: CPT | Performed by: INTERNAL MEDICINE

## 2020-06-10 PROCEDURE — C1759 CATH, INTRA ECHOCARDIOGRAPHY: HCPCS | Performed by: INTERNAL MEDICINE

## 2020-06-10 PROCEDURE — 25010000002 HYDROMORPHONE PER 4 MG: Performed by: ANESTHESIOLOGIST ASSISTANT

## 2020-06-10 PROCEDURE — 25010000002 ONDANSETRON PER 1 MG: Performed by: ANESTHESIOLOGIST ASSISTANT

## 2020-06-10 PROCEDURE — 93613 INTRACARDIAC EPHYS 3D MAPG: CPT | Performed by: INTERNAL MEDICINE

## 2020-06-10 PROCEDURE — 25010000002 PROPOFOL 10 MG/ML EMULSION: Performed by: ANESTHESIOLOGIST ASSISTANT

## 2020-06-10 PROCEDURE — 25010000002 HEPARIN (PORCINE) PER 1000 UNITS: Performed by: ANESTHESIOLOGIST ASSISTANT

## 2020-06-10 PROCEDURE — 25010000002 SUCCINYLCHOLINE PER 20 MG: Performed by: ANESTHESIOLOGIST ASSISTANT

## 2020-06-10 PROCEDURE — C1733 CATH, EP, OTHR THAN COOL-TIP: HCPCS | Performed by: INTERNAL MEDICINE

## 2020-06-10 PROCEDURE — 93655 ICAR CATH ABLTJ DSCRT ARRHYT: CPT | Performed by: INTERNAL MEDICINE

## 2020-06-10 PROCEDURE — 0 IOPAMIDOL PER 1 ML: Performed by: INTERNAL MEDICINE

## 2020-06-10 PROCEDURE — 93320 DOPPLER ECHO COMPLETE: CPT

## 2020-06-10 PROCEDURE — 25010000002 MIDAZOLAM PER 1 MG: Performed by: ANESTHESIOLOGIST ASSISTANT

## 2020-06-10 PROCEDURE — C1732 CATH, EP, DIAG/ABL, 3D/VECT: HCPCS | Performed by: INTERNAL MEDICINE

## 2020-06-10 PROCEDURE — 93325 DOPPLER ECHO COLOR FLOW MAPG: CPT | Performed by: INTERNAL MEDICINE

## 2020-06-10 PROCEDURE — C1730 CATH, EP, 19 OR FEW ELECT: HCPCS | Performed by: INTERNAL MEDICINE

## 2020-06-10 PROCEDURE — G0378 HOSPITAL OBSERVATION PER HR: HCPCS

## 2020-06-10 PROCEDURE — 93662 INTRACARDIAC ECG (ICE): CPT | Performed by: INTERNAL MEDICINE

## 2020-06-10 PROCEDURE — C1769 GUIDE WIRE: HCPCS | Performed by: INTERNAL MEDICINE

## 2020-06-10 PROCEDURE — 93325 DOPPLER ECHO COLOR FLOW MAPG: CPT

## 2020-06-10 PROCEDURE — C1894 INTRO/SHEATH, NON-LASER: HCPCS | Performed by: INTERNAL MEDICINE

## 2020-06-10 PROCEDURE — C1893 INTRO/SHEATH, FIXED,NON-PEEL: HCPCS | Performed by: INTERNAL MEDICINE

## 2020-06-10 PROCEDURE — 25010000002 FENTANYL CITRATE (PF) 100 MCG/2ML SOLUTION: Performed by: ANESTHESIOLOGIST ASSISTANT

## 2020-06-10 PROCEDURE — C1766 INTRO/SHEATH,STRBLE,NON-PEEL: HCPCS | Performed by: INTERNAL MEDICINE

## 2020-06-10 PROCEDURE — 93312 ECHO TRANSESOPHAGEAL: CPT

## 2020-06-10 PROCEDURE — 93312 ECHO TRANSESOPHAGEAL: CPT | Performed by: INTERNAL MEDICINE

## 2020-06-10 PROCEDURE — 93320 DOPPLER ECHO COMPLETE: CPT | Performed by: INTERNAL MEDICINE

## 2020-06-10 PROCEDURE — 25010000002 DEXAMETHASONE PER 1 MG: Performed by: ANESTHESIOLOGIST ASSISTANT

## 2020-06-10 PROCEDURE — 85347 COAGULATION TIME ACTIVATED: CPT

## 2020-06-10 RX ORDER — HYDROCODONE BITARTRATE AND ACETAMINOPHEN 5; 325 MG/1; MG/1
1 TABLET ORAL EVERY 4 HOURS PRN
Status: DISCONTINUED | OUTPATIENT
Start: 2020-06-10 | End: 2020-06-11 | Stop reason: HOSPADM

## 2020-06-10 RX ORDER — HYDRALAZINE HYDROCHLORIDE 20 MG/ML
5 INJECTION INTRAMUSCULAR; INTRAVENOUS
Status: DISCONTINUED | OUTPATIENT
Start: 2020-06-10 | End: 2020-06-11 | Stop reason: HOSPADM

## 2020-06-10 RX ORDER — ONDANSETRON 2 MG/ML
4 INJECTION INTRAMUSCULAR; INTRAVENOUS EVERY 6 HOURS PRN
Status: DISCONTINUED | OUTPATIENT
Start: 2020-06-10 | End: 2020-06-11 | Stop reason: HOSPADM

## 2020-06-10 RX ORDER — EPHEDRINE SULFATE/0.9% NACL/PF 25 MG/5 ML
SYRINGE (ML) INTRAVENOUS AS NEEDED
Status: DISCONTINUED | OUTPATIENT
Start: 2020-06-10 | End: 2020-06-10 | Stop reason: SURG

## 2020-06-10 RX ORDER — SUCCINYLCHOLINE CHLORIDE 20 MG/ML
INJECTION INTRAMUSCULAR; INTRAVENOUS AS NEEDED
Status: DISCONTINUED | OUTPATIENT
Start: 2020-06-10 | End: 2020-06-10 | Stop reason: SURG

## 2020-06-10 RX ORDER — ACETAMINOPHEN 325 MG/1
650 TABLET ORAL EVERY 4 HOURS PRN
Status: DISCONTINUED | OUTPATIENT
Start: 2020-06-10 | End: 2020-06-11 | Stop reason: HOSPADM

## 2020-06-10 RX ORDER — FENTANYL CITRATE 50 UG/ML
INJECTION, SOLUTION INTRAMUSCULAR; INTRAVENOUS AS NEEDED
Status: DISCONTINUED | OUTPATIENT
Start: 2020-06-10 | End: 2020-06-10 | Stop reason: SURG

## 2020-06-10 RX ORDER — SODIUM CHLORIDE 9 MG/ML
80 INJECTION, SOLUTION INTRAVENOUS CONTINUOUS
Status: DISCONTINUED | OUTPATIENT
Start: 2020-06-10 | End: 2020-06-11 | Stop reason: HOSPADM

## 2020-06-10 RX ORDER — HYDROMORPHONE HCL 110MG/55ML
1 PATIENT CONTROLLED ANALGESIA SYRINGE INTRAVENOUS
Status: DISCONTINUED | OUTPATIENT
Start: 2020-06-10 | End: 2020-06-11 | Stop reason: HOSPADM

## 2020-06-10 RX ORDER — DEXAMETHASONE SODIUM PHOSPHATE 4 MG/ML
INJECTION, SOLUTION INTRA-ARTICULAR; INTRALESIONAL; INTRAMUSCULAR; INTRAVENOUS; SOFT TISSUE AS NEEDED
Status: DISCONTINUED | OUTPATIENT
Start: 2020-06-10 | End: 2020-06-10 | Stop reason: SURG

## 2020-06-10 RX ORDER — HYDROMORPHONE HCL 110MG/55ML
PATIENT CONTROLLED ANALGESIA SYRINGE INTRAVENOUS AS NEEDED
Status: DISCONTINUED | OUTPATIENT
Start: 2020-06-10 | End: 2020-06-10 | Stop reason: SURG

## 2020-06-10 RX ORDER — MIDAZOLAM HYDROCHLORIDE 1 MG/ML
INJECTION INTRAMUSCULAR; INTRAVENOUS AS NEEDED
Status: DISCONTINUED | OUTPATIENT
Start: 2020-06-10 | End: 2020-06-10 | Stop reason: SURG

## 2020-06-10 RX ORDER — ONDANSETRON 2 MG/ML
4 INJECTION INTRAMUSCULAR; INTRAVENOUS ONCE AS NEEDED
Status: DISCONTINUED | OUTPATIENT
Start: 2020-06-10 | End: 2020-06-11 | Stop reason: HOSPADM

## 2020-06-10 RX ORDER — ACETAMINOPHEN 650 MG/1
650 SUPPOSITORY RECTAL EVERY 4 HOURS PRN
Status: DISCONTINUED | OUTPATIENT
Start: 2020-06-10 | End: 2020-06-11 | Stop reason: HOSPADM

## 2020-06-10 RX ORDER — PROPOFOL 10 MG/ML
VIAL (ML) INTRAVENOUS AS NEEDED
Status: DISCONTINUED | OUTPATIENT
Start: 2020-06-10 | End: 2020-06-10 | Stop reason: SURG

## 2020-06-10 RX ORDER — PHENYLEPHRINE HCL IN 0.9% NACL 0.5 MG/5ML
.5-3 SYRINGE (ML) INTRAVENOUS
Status: DISCONTINUED | OUTPATIENT
Start: 2020-06-10 | End: 2020-06-11 | Stop reason: HOSPADM

## 2020-06-10 RX ORDER — HYDROCODONE BITARTRATE AND ACETAMINOPHEN 5; 325 MG/1; MG/1
1 TABLET ORAL ONCE AS NEEDED
Status: DISCONTINUED | OUTPATIENT
Start: 2020-06-10 | End: 2020-06-11 | Stop reason: HOSPADM

## 2020-06-10 RX ORDER — NALOXONE HCL 0.4 MG/ML
0.4 VIAL (ML) INJECTION
Status: DISCONTINUED | OUTPATIENT
Start: 2020-06-10 | End: 2020-06-11 | Stop reason: HOSPADM

## 2020-06-10 RX ORDER — ONDANSETRON 2 MG/ML
INJECTION INTRAMUSCULAR; INTRAVENOUS AS NEEDED
Status: DISCONTINUED | OUTPATIENT
Start: 2020-06-10 | End: 2020-06-10 | Stop reason: SURG

## 2020-06-10 RX ORDER — SODIUM CHLORIDE 0.9 % (FLUSH) 0.9 %
3 SYRINGE (ML) INJECTION EVERY 12 HOURS SCHEDULED
Status: DISCONTINUED | OUTPATIENT
Start: 2020-06-10 | End: 2020-06-10 | Stop reason: HOSPADM

## 2020-06-10 RX ORDER — HEPARIN SODIUM 1000 [USP'U]/ML
INJECTION, SOLUTION INTRAVENOUS; SUBCUTANEOUS AS NEEDED
Status: DISCONTINUED | OUTPATIENT
Start: 2020-06-10 | End: 2020-06-10 | Stop reason: SURG

## 2020-06-10 RX ORDER — LABETALOL HYDROCHLORIDE 5 MG/ML
5 INJECTION, SOLUTION INTRAVENOUS
Status: DISCONTINUED | OUTPATIENT
Start: 2020-06-10 | End: 2020-06-11 | Stop reason: HOSPADM

## 2020-06-10 RX ORDER — METOPROLOL SUCCINATE 50 MG/1
50 TABLET, EXTENDED RELEASE ORAL NIGHTLY
Status: DISCONTINUED | OUTPATIENT
Start: 2020-06-10 | End: 2020-06-11 | Stop reason: HOSPADM

## 2020-06-10 RX ORDER — PROTAMINE SULFATE 10 MG/ML
INJECTION, SOLUTION INTRAVENOUS AS NEEDED
Status: DISCONTINUED | OUTPATIENT
Start: 2020-06-10 | End: 2020-06-10 | Stop reason: SURG

## 2020-06-10 RX ORDER — LIDOCAINE HYDROCHLORIDE 20 MG/ML
INJECTION, SOLUTION INFILTRATION; PERINEURAL AS NEEDED
Status: DISCONTINUED | OUTPATIENT
Start: 2020-06-10 | End: 2020-06-10 | Stop reason: HOSPADM

## 2020-06-10 RX ORDER — MORPHINE SULFATE 4 MG/ML
1 INJECTION, SOLUTION INTRAMUSCULAR; INTRAVENOUS EVERY 4 HOURS PRN
Status: DISCONTINUED | OUTPATIENT
Start: 2020-06-10 | End: 2020-06-11 | Stop reason: HOSPADM

## 2020-06-10 RX ORDER — MEPERIDINE HYDROCHLORIDE 25 MG/ML
12.5 INJECTION INTRAMUSCULAR; INTRAVENOUS; SUBCUTANEOUS
Status: DISCONTINUED | OUTPATIENT
Start: 2020-06-10 | End: 2020-06-11 | Stop reason: HOSPADM

## 2020-06-10 RX ORDER — HYDROMORPHONE HCL 110MG/55ML
1.5 PATIENT CONTROLLED ANALGESIA SYRINGE INTRAVENOUS
Status: DISCONTINUED | OUTPATIENT
Start: 2020-06-10 | End: 2020-06-11 | Stop reason: HOSPADM

## 2020-06-10 RX ORDER — DIPHENHYDRAMINE HYDROCHLORIDE 50 MG/ML
12.5 INJECTION INTRAMUSCULAR; INTRAVENOUS
Status: DISCONTINUED | OUTPATIENT
Start: 2020-06-10 | End: 2020-06-11 | Stop reason: HOSPADM

## 2020-06-10 RX ORDER — SODIUM CHLORIDE 9 MG/ML
9 INJECTION, SOLUTION INTRAVENOUS CONTINUOUS PRN
Status: DISCONTINUED | OUTPATIENT
Start: 2020-06-10 | End: 2020-06-11 | Stop reason: HOSPADM

## 2020-06-10 RX ORDER — SODIUM CHLORIDE 0.9 % (FLUSH) 0.9 %
10 SYRINGE (ML) INJECTION EVERY 12 HOURS SCHEDULED
Status: DISCONTINUED | OUTPATIENT
Start: 2020-06-10 | End: 2020-06-10 | Stop reason: HOSPADM

## 2020-06-10 RX ORDER — SODIUM CHLORIDE 0.9 % (FLUSH) 0.9 %
10 SYRINGE (ML) INJECTION AS NEEDED
Status: DISCONTINUED | OUTPATIENT
Start: 2020-06-10 | End: 2020-06-10 | Stop reason: HOSPADM

## 2020-06-10 RX ORDER — IPRATROPIUM BROMIDE AND ALBUTEROL SULFATE 2.5; .5 MG/3ML; MG/3ML
3 SOLUTION RESPIRATORY (INHALATION) ONCE AS NEEDED
Status: DISCONTINUED | OUTPATIENT
Start: 2020-06-10 | End: 2020-06-11 | Stop reason: HOSPADM

## 2020-06-10 RX ORDER — HYDROCODONE BITARTRATE AND ACETAMINOPHEN 7.5; 325 MG/1; MG/1
2 TABLET ORAL EVERY 4 HOURS PRN
Status: DISCONTINUED | OUTPATIENT
Start: 2020-06-10 | End: 2020-06-11 | Stop reason: HOSPADM

## 2020-06-10 RX ADMIN — Medication 5 MG: at 08:32

## 2020-06-10 RX ADMIN — HEPARIN SODIUM 4000 UNITS: 1000 INJECTION, SOLUTION INTRAVENOUS; SUBCUTANEOUS at 09:28

## 2020-06-10 RX ADMIN — ONDANSETRON 4 MG: 2 INJECTION INTRAMUSCULAR; INTRAVENOUS at 10:32

## 2020-06-10 RX ADMIN — DEXAMETHASONE SODIUM PHOSPHATE 4 MG: 4 INJECTION, SOLUTION INTRAMUSCULAR; INTRAVENOUS at 08:21

## 2020-06-10 RX ADMIN — METOPROLOL SUCCINATE 50 MG: 50 TABLET, EXTENDED RELEASE ORAL at 20:01

## 2020-06-10 RX ADMIN — HEPARIN SODIUM 5000 UNITS: 1000 INJECTION, SOLUTION INTRAVENOUS; SUBCUTANEOUS at 08:45

## 2020-06-10 RX ADMIN — HEPARIN SODIUM 8000 UNITS: 1000 INJECTION, SOLUTION INTRAVENOUS; SUBCUTANEOUS at 08:50

## 2020-06-10 RX ADMIN — PROTAMINE SULFATE 100 MG: 10 INJECTION, SOLUTION INTRAVENOUS at 10:31

## 2020-06-10 RX ADMIN — ACETAMINOPHEN 650 MG: 325 TABLET, FILM COATED ORAL at 18:10

## 2020-06-10 RX ADMIN — PROPOFOL 50 MG: 10 INJECTION, EMULSION INTRAVENOUS at 09:13

## 2020-06-10 RX ADMIN — HEPARIN SODIUM 4000 UNITS: 1000 INJECTION, SOLUTION INTRAVENOUS; SUBCUTANEOUS at 09:47

## 2020-06-10 RX ADMIN — SUCCINYLCHOLINE CHLORIDE 120 MG: 20 INJECTION, SOLUTION INTRAMUSCULAR; INTRAVENOUS at 08:15

## 2020-06-10 RX ADMIN — SODIUM CHLORIDE: 900 INJECTION, SOLUTION INTRAVENOUS at 08:51

## 2020-06-10 RX ADMIN — FAMOTIDINE 20 MG: 10 INJECTION, SOLUTION INTRAVENOUS at 08:24

## 2020-06-10 RX ADMIN — Medication 5 MG: at 08:35

## 2020-06-10 RX ADMIN — HEPARIN SODIUM 6000 UNITS: 1000 INJECTION, SOLUTION INTRAVENOUS; SUBCUTANEOUS at 09:08

## 2020-06-10 RX ADMIN — MIDAZOLAM 3 MG: 1 INJECTION INTRAMUSCULAR; INTRAVENOUS at 09:14

## 2020-06-10 RX ADMIN — HYDROMORPHONE HYDROCHLORIDE 2 MG: 2 INJECTION INTRAMUSCULAR; INTRAVENOUS; SUBCUTANEOUS at 08:22

## 2020-06-10 RX ADMIN — FENTANYL CITRATE 100 MCG: 50 INJECTION, SOLUTION INTRAMUSCULAR; INTRAVENOUS at 08:15

## 2020-06-10 RX ADMIN — SODIUM CHLORIDE 80 ML/HR: 900 INJECTION, SOLUTION INTRAVENOUS at 07:05

## 2020-06-10 RX ADMIN — MIDAZOLAM 2 MG: 1 INJECTION INTRAMUSCULAR; INTRAVENOUS at 09:45

## 2020-06-10 RX ADMIN — PROPOFOL 50 MG: 10 INJECTION, EMULSION INTRAVENOUS at 09:00

## 2020-06-10 RX ADMIN — HYDROCODONE BITARTRATE AND ACETAMINOPHEN 1 TABLET: 5; 325 TABLET ORAL at 23:52

## 2020-06-10 RX ADMIN — PROPOFOL 150 MG: 10 INJECTION, EMULSION INTRAVENOUS at 08:15

## 2020-06-10 RX ADMIN — APIXABAN 5 MG: 5 TABLET, FILM COATED ORAL at 20:01

## 2020-06-10 RX ADMIN — PROPOFOL 200 MG: 10 INJECTION, EMULSION INTRAVENOUS at 07:43

## 2020-06-10 NOTE — ANESTHESIA PROCEDURE NOTES
Airway  Urgency: elective    Date/Time: 6/10/2020 8:16 AM  Airway not difficult    General Information and Staff    Patient location during procedure: OR  Anesthesiologist: Rajinder Pendleton MD  CRNA: Venu Sheikh AA    Indications and Patient Condition  Indications for airway management: airway protection    Preoxygenated: yes  MILS maintained throughout  Mask difficulty assessment: 1 - vent by mask    Final Airway Details  Final airway type: endotracheal airway      Successful airway: ETT  Cuffed: yes   Successful intubation technique: direct laryngoscopy  Facilitating devices/methods: intubating stylet  Endotracheal tube insertion site: oral  Blade: Tiffanie  Blade size: 3  ETT size (mm): 7.0  Cormack-Lehane Classification: grade IIa - partial view of glottis  Placement verified by: capnometry   Cuff volume (mL): 8  Measured from: lips  ETT/EBT  to lips (cm): 22  Number of attempts at approach: 1  Assessment: lips, teeth, and gum same as pre-op and atraumatic intubation

## 2020-06-10 NOTE — ANESTHESIA PREPROCEDURE EVALUATION
Anesthesia Evaluation     NPO Solid Status: > 8 hours  NPO Liquid Status: > 8 hours           Airway   Mallampati: II  TM distance: >3 FB  No difficulty expected and Possible difficult intubation  Dental      Pulmonary    (+) sleep apnea,   Cardiovascular     (+) hypertension, valvular problems/murmurs, dysrhythmias Atrial Fib,       Neuro/Psych  (+) headaches,     GI/Hepatic/Renal/Endo    (+) morbid obesity,      Musculoskeletal     Abdominal    Substance History      OB/GYN          Other        ROS/Med Hx Other: From cards note      Sub--40-year-old pleasant patient has been having symptoms of atrial arrhythmias since last October 2019--she has seen Dr. Watkins and patient underwent EKGs, echocardiography in the past--she was placed on amiodarone which has been stopped and currently on a combination of beta-blockers and calcium channel blockers--she is unable to afford Eliquis--she was seen back in March of this year by Dr. Watkins when she was in sinus rhythm--she started have recurrent symptoms of arrhythmias with atrial arrhythmias and she is currently off amiodarone and patient came for follow-up --she is still having recurrent arrhythmias in the last episode happened last night and feels her heart is racing with dyspnea and fatigue without syncope or chest pain --She has history of significant obesity and sleep apnea and she is awaiting for sleep specialist to optimize sleep apnea therapy  She does have history of mild hypertension  Chronic medical conditions also include phenylketonuria, PCOS, obesity with sleep apnea                Anesthesia Plan    ASA 3     MAC     intravenous induction     Anesthetic plan, all risks, benefits, and alternatives have been provided, discussed and informed consent has been obtained with: patient.

## 2020-06-10 NOTE — ANESTHESIA POSTPROCEDURE EVALUATION
Patient: Jo Asif    Procedure Summary     Date:  06/10/20 Room / Location:  [place] CATH LAB 3 / [facility] CATH INVASIVE LOCATION    Anesthesia Start:  0746 Anesthesia Stop:  1051    Procedure:  EP/Ablation (N/A ) Diagnosis:       Paroxysmal atrial fibrillation (CMS/HCC)      Atrial flutter with rapid ventricular response (CMS/HCC)      Obstructive sleep apnea      Essential hypertension      Atrial fibrillation, persistent (CMS/HCC)      (Post atrial arrhythmia ablation without complications)    Provider:  Joseph Andrews MD Provider:  Rajinder Pendleton MD    Anesthesia Type:  MAC ASA Status:  3          Anesthesia Type: MAC    Vitals  Vitals Value Taken Time   /85 6/10/2020 11:36 AM   Temp 97 °F (36.1 °C) 6/10/2020 11:36 AM   Pulse 60 6/10/2020 11:36 AM   Resp 12 6/10/2020 11:36 AM   SpO2 98 % 6/10/2020 11:36 AM           Post Anesthesia Care and Evaluation    Patient location during evaluation: PACU  Patient participation: complete - patient participated  Level of consciousness: awake  Pain scale: See nurse's notes for pain score.  Pain management: adequate  Airway patency: patent  Anesthetic complications: No anesthetic complications  PONV Status: none  Cardiovascular status: acceptable  Respiratory status: acceptable  Hydration status: acceptable    Comments: Patient seen and examined postoperatively; vital signs stable; SpO2 greater than or equal to 90%; cardiopulmonary status stable; nausea/vomiting adequately controlled; pain adequately controlled; no apparent anesthesia complications; patient discharged from anesthesia care when discharge criteria were met

## 2020-06-10 NOTE — INTERVAL H&P NOTE
Patient decided on ablation therapy which is scheduled today and risks and benefits and outcomes educated  H&P reviewed. The patient was examined and there are no changes to the H&P.

## 2020-06-11 VITALS
HEART RATE: 77 BPM | TEMPERATURE: 97.8 F | SYSTOLIC BLOOD PRESSURE: 137 MMHG | DIASTOLIC BLOOD PRESSURE: 75 MMHG | BODY MASS INDEX: 48.82 KG/M2 | HEIGHT: 65 IN | RESPIRATION RATE: 12 BRPM | OXYGEN SATURATION: 98 % | WEIGHT: 293 LBS

## 2020-06-11 PROCEDURE — G0378 HOSPITAL OBSERVATION PER HR: HCPCS

## 2020-06-11 PROCEDURE — 93005 ELECTROCARDIOGRAM TRACING: CPT | Performed by: INTERNAL MEDICINE

## 2020-06-11 PROCEDURE — 99217 PR OBSERVATION CARE DISCHARGE MANAGEMENT: CPT | Performed by: INTERNAL MEDICINE

## 2020-06-11 RX ORDER — METOPROLOL SUCCINATE 50 MG/1
50 TABLET, EXTENDED RELEASE ORAL NIGHTLY
Qty: 30 TABLET | Refills: 3 | Status: SHIPPED | OUTPATIENT
Start: 2020-06-11 | End: 2020-10-06

## 2020-06-11 NOTE — PLAN OF CARE
Problem: Patient Care Overview  Goal: Plan of Care Review  Outcome: Outcome(s) achieved  Flowsheets (Taken 6/11/2020 0729)  Progress: improving  Plan of Care Reviewed With: patient  Goal: Individualization and Mutuality  Outcome: Outcome(s) achieved  Goal: Discharge Needs Assessment  Outcome: Outcome(s) achieved  Goal: Interprofessional Rounds/Family Conf  Outcome: Outcome(s) achieved     Problem: Pain, Acute (Adult)  Goal: Identify Related Risk Factors and Signs and Symptoms  Outcome: Outcome(s) achieved  Goal: Acceptable Pain Control/Comfort Level  Outcome: Outcome(s) achieved

## 2020-06-11 NOTE — DISCHARGE SUMMARY
Admitting diagnosis  Recurrent symptomatic atrial arrhythmias in the form of atrial fibrillation and atrial flutter refractory to medical treatment  Phenylketonuria  Essential hypertension  Morbid obesity with sleep apnea     discharging diagnosis  Post atrial arrhythmia ablation without complications         procedures done  OLYA and A. fib ablation     condition at discharge  good      Physical Exam  VITALS REVIEWED--blood pressure 137/75 pulse rate is 60 patient afebrile respiration 12 times a minute and right groin soft without hematoma    General:      well developed, well nourished, in no acute distress.    Head:      normocephalic and atraumatic.    Eyes:      PERRL/EOM intact, conjunctiva and sclera clear with out nystagmus.    Neck:      no masses, thyromegaly,  trachea central with normal respiratory effort and PMI displaced laterally  Lungs:      clear bilaterally to auscultation.    Heart:       Sinus rhythm without any murmurs or gallops   Msk:      no deformity or scoliosis noted of thoracic or lumbar spine.    Pulses:      pulses normal in all 4 extremities.    Extremities:       no cyanosis or clubbing--trace left pedal edema and trace right pedal edema.    Neurologic:      no focal deficits.   alert oriented x3  Skin:      intact without lesions or rashes.    Psych:      alert and cooperative; normal mood and affect; normal attention span and concentration.       Hospital course--  Patient had refractory atrial arrhythmias in the form of atrial fibrillation atrial flutter underwent atrial arrhythmia ablation yesterday after OLYA and overnight observation was done and patient remained in sinus rhythm without any postprocedure complications.  She was on Cardizem at home which has been stopped and beta-blockers have been titrated to optimize hypertension and patient is being discharged home and follow-up appointments made on this patient and postprocedure instructions were given and medications were  reviewed on this patient.  And patient to avoid work for 1 week.     medications and allergies reviewed     post procedure instructions given     discharge care discussed with the nurse and the patient     post procedure appointments made      Electronically signed by Joseph Andrews MD, 06/11/20, 7:45 AM.

## 2020-06-11 NOTE — PROGRESS NOTES
Discharge Planning Assessment  [facility]     Patient Name: Jo Asif  MRN: [id number]  Today's Date: 6/11/2020    Admit Date: 6/10/2020    Discharge Needs Assessment     Row Name 06/11/20 0639       Discharge Needs Assessment    Discharge Coordination/Progress  CM called floor to collaborate with pts nurse regarding discharge needs.  Spoke with SANDRINE Lamas who states that no discharge needs are identified.  Anticipate routine home.                         Discharge Plan     Row Name 06/11/20 0639       Plan    Plan  anticipate routine home.        Karie [place] [address]  [phone] office  [phone]  fax  [email or web address]

## 2020-06-11 NOTE — DISCHARGE INSTRUCTIONS
Post Cath Instructions    **.  Specific Physician Instructions: ***    1) Drink plenty of fluids for the next 24 hours.  This helps to eliminate the dye used in your procedure through urination.  You may resume a normal diet; however, try to avoid foods that would cause gas or constipation.    2) Sedative medication given to you during your catheterization may decrease your judgement and reaction time for up to 24-48 hours.  Therefore:  a. DO NOT drive or operate hazardous machinery (48 hours)  b. DO NOT consume alcoholic beverages  c. DO NOT make any important/legal decisions  d. Have someone stay with you for at least 24 hours    3) To allow proper healing and prevent bleeding, the following activities are to be strictly avoided for the next 24-48 hours:  a. Excessive bending at wound site  b. Straining (anything that would tense up muscles around the affected puncture site)  c. Lifting objects greater than 5 pounds, pushing, or pulling for 5 days    i. For Groin Cases:  1. Refrain from sexual activity  2. Refrain from running or vigorous walking  3. No prolonged sitting or standing  4. Limit stair climbing as much as possible    4) Keep the puncture site clean and dry.  You may remove the dressing tomorrow and replace it with a band-aid for at least one additional day.  Gently clean the site with mild soap and water.  No scrubbing/rubbing and lightly pat the area dry.  Showers are acceptable; however, avoid submerging in water (tub baths, hot tubs, swimming pools, dishwater, etc…) for at least one week.  The site should be completely healed before resuming these activities to reduce the risk of infection.  Check the site often.  Watch for signs and symptoms of infection and notify your physician if any of the following occur:  a. Bleeding or an increase in swelling at the puncture site  b. Fever  c. Increased soreness around puncture site  d. Foul odor or significant drainage from the puncture site  e. Swelling,  redness, or warmth at the puncture site    **A bruise or small “pea sized” lump under the skin at the puncture site is not unusual.  This should disappear within 3-4 weeks.**  5) CONTACT YOUR PHYSICIAN OR CALL 911 IF YOU EXPERIENCE ANY OF THE FOLLOWING:  a. Increased angina (chest pain) or frequent sensations of pressure, burning, pain, or other discomfort in the chest, arm, jaws, or stomach  b. Lightheadedness, dizziness, faint feeling, sweating, or difficulty breathing  c. Odd sensation changes like numbness, tingling, coldness, or pain in the arm or leg in which the catheter was inserted  d. Limb in which the catheter was inserted becomes pale/bluish in color    IMPORTANT:  Although this occurs very rarely, if you should develop bright red or excessive bleeding, feel a “pop” inside at the insertion site, or notice a sudden increase in swelling larger than a walnut, you should call 911.  Hold continuous firm pressure to the access site until emergency personnel arrive.  It is best if someone else can do this for you.

## 2020-06-12 ENCOUNTER — HOSPITAL ENCOUNTER (OUTPATIENT)
Facility: HOSPITAL | Age: 40
Setting detail: OBSERVATION
Discharge: HOME | End: 2020-06-14
Attending: INTERNAL MEDICINE | Admitting: HOSPITALIST

## 2020-06-12 ENCOUNTER — APPOINTMENT (OUTPATIENT)
Dept: CT IMAGING | Facility: HOSPITAL | Age: 40
End: 2020-06-12

## 2020-06-12 DIAGNOSIS — R07.9 CHEST PAIN, UNSPECIFIED TYPE: Primary | ICD-10-CM

## 2020-06-12 DIAGNOSIS — R06.02 SHORTNESS OF BREATH: ICD-10-CM

## 2020-06-12 LAB
ALBUMIN SERPL-MCNC: 4.2 G/DL (ref 3.5–5.2)
ALBUMIN/GLOB SERPL: 1.3 G/DL
ALP SERPL-CCNC: 57 U/L (ref 39–117)
ALT SERPL W P-5'-P-CCNC: 60 U/L (ref 1–33)
ANION GAP SERPL CALCULATED.3IONS-SCNC: 9 MMOL/L (ref 5–15)
AST SERPL-CCNC: 48 U/L (ref 1–32)
BASOPHILS # BLD AUTO: 0.1 10*3/MM3 (ref 0–0.2)
BASOPHILS NFR BLD AUTO: 1.2 % (ref 0–1.5)
BILIRUB SERPL-MCNC: 0.2 MG/DL (ref 0.2–1.2)
BUN BLD-MCNC: 16 MG/DL (ref 6–20)
BUN BLD-MCNC: ABNORMAL MG/DL
BUN/CREAT SERPL: ABNORMAL
CALCIUM SPEC-SCNC: 9.2 MG/DL (ref 8.6–10.5)
CHLORIDE SERPL-SCNC: 103 MMOL/L (ref 98–107)
CO2 SERPL-SCNC: 23 MMOL/L (ref 22–29)
CREAT BLD-MCNC: 0.71 MG/DL (ref 0.57–1)
DEPRECATED RDW RBC AUTO: 47.7 FL (ref 37–54)
EOSINOPHIL # BLD AUTO: 0.2 10*3/MM3 (ref 0–0.4)
EOSINOPHIL NFR BLD AUTO: 2.6 % (ref 0.3–6.2)
ERYTHROCYTE [DISTWIDTH] IN BLOOD BY AUTOMATED COUNT: 14.7 % (ref 12.3–15.4)
GFR SERPL CREATININE-BSD FRML MDRD: 90 ML/MIN/1.73
GLOBULIN UR ELPH-MCNC: 3.2 GM/DL
GLUCOSE BLD-MCNC: 181 MG/DL (ref 65–99)
HCT VFR BLD AUTO: 35.6 % (ref 34–46.6)
HGB BLD-MCNC: 12.4 G/DL (ref 12–15.9)
LIPASE SERPL-CCNC: 31 U/L (ref 13–60)
LYMPHOCYTES # BLD AUTO: 2.9 10*3/MM3 (ref 0.7–3.1)
LYMPHOCYTES NFR BLD AUTO: 31.6 % (ref 19.6–45.3)
MCH RBC QN AUTO: 32.9 PG (ref 26.6–33)
MCHC RBC AUTO-ENTMCNC: 35 G/DL (ref 31.5–35.7)
MCV RBC AUTO: 94.2 FL (ref 79–97)
MONOCYTES # BLD AUTO: 0.7 10*3/MM3 (ref 0.1–0.9)
MONOCYTES NFR BLD AUTO: 7.9 % (ref 5–12)
NEUTROPHILS # BLD AUTO: 5.1 10*3/MM3 (ref 1.7–7)
NEUTROPHILS NFR BLD AUTO: 56.7 % (ref 42.7–76)
NRBC BLD AUTO-RTO: 0.1 /100 WBC (ref 0–0.2)
PLATELET # BLD AUTO: 223 10*3/MM3 (ref 140–450)
PMV BLD AUTO: 7.5 FL (ref 6–12)
POTASSIUM BLD-SCNC: 3.8 MMOL/L (ref 3.5–5.2)
PROT SERPL-MCNC: 7.4 G/DL (ref 6–8.5)
RBC # BLD AUTO: 3.78 10*6/MM3 (ref 3.77–5.28)
SODIUM BLD-SCNC: 135 MMOL/L (ref 136–145)
TROPONIN T SERPL-MCNC: 0.39 NG/ML (ref 0–0.03)
WBC NRBC COR # BLD: 9.1 10*3/MM3 (ref 3.4–10.8)

## 2020-06-12 PROCEDURE — 85025 COMPLETE CBC W/AUTO DIFF WBC: CPT | Performed by: PHYSICIAN ASSISTANT

## 2020-06-12 PROCEDURE — 99284 EMERGENCY DEPT VISIT MOD MDM: CPT

## 2020-06-12 PROCEDURE — 99219 PR INITIAL OBSERVATION CARE/DAY 50 MINUTES: CPT | Performed by: NURSE PRACTITIONER

## 2020-06-12 PROCEDURE — 80053 COMPREHEN METABOLIC PANEL: CPT | Performed by: PHYSICIAN ASSISTANT

## 2020-06-12 PROCEDURE — 93005 ELECTROCARDIOGRAM TRACING: CPT

## 2020-06-12 PROCEDURE — G0378 HOSPITAL OBSERVATION PER HR: HCPCS

## 2020-06-12 PROCEDURE — 84484 ASSAY OF TROPONIN QUANT: CPT | Performed by: PHYSICIAN ASSISTANT

## 2020-06-12 PROCEDURE — 25010000002 ONDANSETRON PER 1 MG: Performed by: EMERGENCY MEDICINE

## 2020-06-12 PROCEDURE — 83690 ASSAY OF LIPASE: CPT | Performed by: PHYSICIAN ASSISTANT

## 2020-06-12 PROCEDURE — 96375 TX/PRO/DX INJ NEW DRUG ADDON: CPT

## 2020-06-12 PROCEDURE — 93005 ELECTROCARDIOGRAM TRACING: CPT | Performed by: INTERNAL MEDICINE

## 2020-06-12 PROCEDURE — 96374 THER/PROPH/DIAG INJ IV PUSH: CPT

## 2020-06-12 PROCEDURE — 71275 CT ANGIOGRAPHY CHEST: CPT

## 2020-06-12 PROCEDURE — 0 IOPAMIDOL PER 1 ML: Performed by: PHYSICIAN ASSISTANT

## 2020-06-12 PROCEDURE — 25010000002 MORPHINE PER 10 MG: Performed by: EMERGENCY MEDICINE

## 2020-06-12 RX ORDER — SODIUM CHLORIDE 0.9 % (FLUSH) 0.9 %
10 SYRINGE (ML) INJECTION AS NEEDED
Status: DISCONTINUED | OUTPATIENT
Start: 2020-06-12 | End: 2020-06-14 | Stop reason: HOSPADM

## 2020-06-12 RX ORDER — ONDANSETRON 2 MG/ML
4 INJECTION INTRAMUSCULAR; INTRAVENOUS ONCE
Status: COMPLETED | OUTPATIENT
Start: 2020-06-12 | End: 2020-06-12

## 2020-06-12 RX ORDER — METOPROLOL SUCCINATE 50 MG/1
50 TABLET, EXTENDED RELEASE ORAL NIGHTLY
Status: DISCONTINUED | OUTPATIENT
Start: 2020-06-13 | End: 2020-06-14 | Stop reason: HOSPADM

## 2020-06-12 RX ORDER — MORPHINE SULFATE 4 MG/ML
2 INJECTION, SOLUTION INTRAMUSCULAR; INTRAVENOUS ONCE
Status: COMPLETED | OUTPATIENT
Start: 2020-06-12 | End: 2020-06-12

## 2020-06-12 RX ADMIN — NITROGLYCERIN 1 INCH: 20 OINTMENT TOPICAL at 21:30

## 2020-06-12 RX ADMIN — IOPAMIDOL 100 ML: 755 INJECTION, SOLUTION INTRAVENOUS at 20:57

## 2020-06-12 RX ADMIN — MORPHINE SULFATE 2 MG: 4 INJECTION INTRAVENOUS at 20:35

## 2020-06-12 RX ADMIN — ONDANSETRON 4 MG: 2 INJECTION INTRAMUSCULAR; INTRAVENOUS at 20:35

## 2020-06-13 LAB
ANION GAP SERPL CALCULATED.3IONS-SCNC: 9 MMOL/L (ref 5–15)
B-HCG UR QL: NEGATIVE
BUN BLD-MCNC: 16 MG/DL (ref 6–20)
BUN BLD-MCNC: ABNORMAL MG/DL
BUN/CREAT SERPL: ABNORMAL
CALCIUM SPEC-SCNC: 8.9 MG/DL (ref 8.6–10.5)
CHLORIDE SERPL-SCNC: 104 MMOL/L (ref 98–107)
CO2 SERPL-SCNC: 23 MMOL/L (ref 22–29)
CREAT BLD-MCNC: 0.66 MG/DL (ref 0.57–1)
DEPRECATED RDW RBC AUTO: 47.3 FL (ref 37–54)
ERYTHROCYTE [DISTWIDTH] IN BLOOD BY AUTOMATED COUNT: 14.6 % (ref 12.3–15.4)
GFR SERPL CREATININE-BSD FRML MDRD: 98 ML/MIN/1.73
GLUCOSE BLD-MCNC: 127 MG/DL (ref 65–99)
HCT VFR BLD AUTO: 31.9 % (ref 34–46.6)
HGB BLD-MCNC: 11.1 G/DL (ref 12–15.9)
MCH RBC QN AUTO: 33 PG (ref 26.6–33)
MCHC RBC AUTO-ENTMCNC: 34.9 G/DL (ref 31.5–35.7)
MCV RBC AUTO: 94.4 FL (ref 79–97)
PLATELET # BLD AUTO: 213 10*3/MM3 (ref 140–450)
PMV BLD AUTO: 7.3 FL (ref 6–12)
POTASSIUM BLD-SCNC: 3.6 MMOL/L (ref 3.5–5.2)
RBC # BLD AUTO: 3.38 10*6/MM3 (ref 3.77–5.28)
SODIUM BLD-SCNC: 136 MMOL/L (ref 136–145)
TROPONIN T SERPL-MCNC: 0.24 NG/ML (ref 0–0.03)
TROPONIN T SERPL-MCNC: 0.36 NG/ML (ref 0–0.03)
TROPONIN T SERPL-MCNC: <0.01 NG/ML (ref 0–0.03)
TSH SERPL DL<=0.05 MIU/L-ACNC: 9.03 UIU/ML (ref 0.27–4.2)
WBC NRBC COR # BLD: 8.2 10*3/MM3 (ref 3.4–10.8)

## 2020-06-13 PROCEDURE — G0378 HOSPITAL OBSERVATION PER HR: HCPCS

## 2020-06-13 PROCEDURE — 80048 BASIC METABOLIC PNL TOTAL CA: CPT | Performed by: NURSE PRACTITIONER

## 2020-06-13 PROCEDURE — 96361 HYDRATE IV INFUSION ADD-ON: CPT

## 2020-06-13 PROCEDURE — 99244 OFF/OP CNSLTJ NEW/EST MOD 40: CPT | Performed by: INTERNAL MEDICINE

## 2020-06-13 PROCEDURE — 81025 URINE PREGNANCY TEST: CPT | Performed by: NURSE PRACTITIONER

## 2020-06-13 PROCEDURE — 84443 ASSAY THYROID STIM HORMONE: CPT | Performed by: NURSE PRACTITIONER

## 2020-06-13 PROCEDURE — 85027 COMPLETE CBC AUTOMATED: CPT | Performed by: NURSE PRACTITIONER

## 2020-06-13 PROCEDURE — 99225 PR SBSQ OBSERVATION CARE/DAY 25 MINUTES: CPT | Performed by: HOSPITALIST

## 2020-06-13 PROCEDURE — 84484 ASSAY OF TROPONIN QUANT: CPT | Performed by: NURSE PRACTITIONER

## 2020-06-13 RX ORDER — CHOLECALCIFEROL (VITAMIN D3) 125 MCG
5 CAPSULE ORAL NIGHTLY PRN
Status: DISCONTINUED | OUTPATIENT
Start: 2020-06-13 | End: 2020-06-14 | Stop reason: HOSPADM

## 2020-06-13 RX ORDER — ACETAMINOPHEN 325 MG/1
650 TABLET ORAL EVERY 4 HOURS PRN
Status: DISCONTINUED | OUTPATIENT
Start: 2020-06-13 | End: 2020-06-14 | Stop reason: HOSPADM

## 2020-06-13 RX ORDER — ONDANSETRON 2 MG/ML
4 INJECTION INTRAMUSCULAR; INTRAVENOUS EVERY 6 HOURS PRN
Status: DISCONTINUED | OUTPATIENT
Start: 2020-06-13 | End: 2020-06-14 | Stop reason: HOSPADM

## 2020-06-13 RX ORDER — SODIUM CHLORIDE 0.9 % (FLUSH) 0.9 %
10 SYRINGE (ML) INJECTION EVERY 12 HOURS SCHEDULED
Status: DISCONTINUED | OUTPATIENT
Start: 2020-06-13 | End: 2020-06-14 | Stop reason: HOSPADM

## 2020-06-13 RX ORDER — SODIUM CHLORIDE 0.9 % (FLUSH) 0.9 %
10 SYRINGE (ML) INJECTION AS NEEDED
Status: DISCONTINUED | OUTPATIENT
Start: 2020-06-13 | End: 2020-06-14 | Stop reason: HOSPADM

## 2020-06-13 RX ORDER — SODIUM CHLORIDE 9 MG/ML
75 INJECTION, SOLUTION INTRAVENOUS CONTINUOUS
Status: DISCONTINUED | OUTPATIENT
Start: 2020-06-13 | End: 2020-06-14 | Stop reason: HOSPADM

## 2020-06-13 RX ORDER — ACETAMINOPHEN 650 MG/1
650 SUPPOSITORY RECTAL EVERY 4 HOURS PRN
Status: DISCONTINUED | OUTPATIENT
Start: 2020-06-13 | End: 2020-06-14 | Stop reason: HOSPADM

## 2020-06-13 RX ORDER — ONDANSETRON 4 MG/1
4 TABLET, FILM COATED ORAL EVERY 6 HOURS PRN
Status: DISCONTINUED | OUTPATIENT
Start: 2020-06-13 | End: 2020-06-14 | Stop reason: HOSPADM

## 2020-06-13 RX ORDER — HYDROXYZINE HYDROCHLORIDE 25 MG/1
50 TABLET, FILM COATED ORAL ONCE AS NEEDED
Status: DISCONTINUED | OUTPATIENT
Start: 2020-06-13 | End: 2020-06-14 | Stop reason: HOSPADM

## 2020-06-13 RX ORDER — NITROGLYCERIN 0.4 MG/1
0.4 TABLET SUBLINGUAL
Status: DISCONTINUED | OUTPATIENT
Start: 2020-06-13 | End: 2020-06-14 | Stop reason: HOSPADM

## 2020-06-13 RX ORDER — ACETAMINOPHEN 160 MG/5ML
650 SOLUTION ORAL EVERY 4 HOURS PRN
Status: DISCONTINUED | OUTPATIENT
Start: 2020-06-13 | End: 2020-06-14 | Stop reason: HOSPADM

## 2020-06-13 RX ADMIN — METOPROLOL SUCCINATE 50 MG: 50 TABLET, EXTENDED RELEASE ORAL at 00:24

## 2020-06-13 RX ADMIN — APIXABAN 5 MG: 5 TABLET, FILM COATED ORAL at 20:03

## 2020-06-13 RX ADMIN — NITROGLYCERIN 0.4 MG: 0.4 TABLET SUBLINGUAL at 08:04

## 2020-06-13 RX ADMIN — Medication 10 ML: at 00:59

## 2020-06-13 RX ADMIN — APIXABAN 5 MG: 5 TABLET, FILM COATED ORAL at 08:04

## 2020-06-13 RX ADMIN — Medication 10 ML: at 20:03

## 2020-06-13 RX ADMIN — ACETAMINOPHEN 650 MG: 325 TABLET, FILM COATED ORAL at 08:04

## 2020-06-13 RX ADMIN — SODIUM CHLORIDE 75 ML/HR: 900 INJECTION, SOLUTION INTRAVENOUS at 00:58

## 2020-06-13 RX ADMIN — METOPROLOL SUCCINATE 50 MG: 50 TABLET, EXTENDED RELEASE ORAL at 20:03

## 2020-06-13 RX ADMIN — Medication 10 ML: at 08:05

## 2020-06-13 RX ADMIN — Medication 10 ML: at 00:25

## 2020-06-13 NOTE — H&P
"      [facility] Medicine Services      Patient Name: Jo Asif  : 1980  MRN: [id number]  Primary Care Physician: Kelsey Sanchez  Date of admission: 2020    Patient Care Team:  Kelsey Sanchez as PCP - General (Nurse Practitioner)          Subjective   History Present Illness     Chief Complaint:   Chief Complaint   Patient presents with   • Shortness of Breath                40 year old morbidly obese BMI 57 female presents with complaints of chest pressure and dyspnea. She was discharged from here yesterday after a cryo-ablation for perisistent atrial fib/flutter. She reports she felt ok when she went home then started to get chest pressure and short of air. She reports is worsens when lying down and increased with activity. She denies dizziness, nausea, fever or cough. CT chest done today per radiology:  \"1. Mild motion artifact and slightly suboptimal contrast bolus. No  pulmonary embolus or acute abnormality is identified.  2. There is mild cardiomegaly and evidence of prior granulomatous  disease.  3. Mild hepatomegaly and fatty infiltration of the liver.'    Troponin was elevated at 0.393, EKG shows sinus rhythm with PVC, ALT 60,AST 48. She was pre-op Covid-19 negative on 6/10/20.   Echo done 6/10/2-    \"Left ventricular wall thickness is consistent with mild concentric hypertrophy.  Right ventricular cavity is moderately dilated.  Left atrial cavity size is mild-to-moderately dilated.  Mild tricuspid valve regurgitation is present.'    She denies PMH of CAD . She does have PMH of essential HTN, FRANK- CPap non tolerant and Phenylketonuria. She has been taking a BB and Eliquis since the ablation. She will be admitted for further evaluation and treatment and Dr Gill of cardiology consulted.serum glucose non fasting was 181 . She denies DM2.       Review of Systems   Constitution: Negative.   HENT: Negative.    Eyes: Negative.    Cardiovascular: Positive for chest pain " and orthopnea.   Respiratory: Positive for shortness of breath.    Endocrine: Negative.    Hematologic/Lymphatic: Negative.    Skin: Negative.    Musculoskeletal: Negative.    Gastrointestinal: Negative.    Genitourinary: Negative.    Neurological: Negative.    Psychiatric/Behavioral: Negative.    Allergic/Immunologic: Negative.            Personal History     Past Medical History:   Past Medical History:   Diagnosis Date   • Arrhythmia    • Atrial fibrillation (CMS/HCC)    • Disease of thyroid gland    • Heart murmur    • Hypertension    • Migraine    • PCOS (polycystic ovarian syndrome)    • PKU (phenylketonuria) (CMS/HCC)        Surgical History:      Past Surgical History:   Procedure Laterality Date   • CARDIAC ELECTROPHYSIOLOGY PROCEDURE N/A 6/10/2020    Procedure: EP/Ablation;  Surgeon: Joseph Andrews MD;  Location: [facility] INVASIVE LOCATION;  Service: Cardiovascular;  Laterality: N/A;   • CARPAL TUNNEL RELEASE Right    • TONSILLECTOMY     • TUBAL ABDOMINAL LIGATION     • UVULECTOMY             Family History: family history includes Cancer in her father; Diabetes in her mother; Heart disease in her father. Otherwise pertinent FHx was reviewed and unremarkable.     Social History:  reports that she quit smoking about 10 years ago. Her smoking use included cigarettes. She has never used smokeless tobacco. She reports that she does not drink alcohol or use drugs.      Medications:  Prior to Admission medications    Medication Sig Start Date End Date Taking? Authorizing Provider   acetaminophen (TYLENOL) 500 MG tablet Take 500 mg by mouth Daily As Needed.    ProviderStacey MD   apixaban (ELIQUIS) 5 MG tablet tablet Take 1 tablet by mouth Every 12 (Twelve) Hours. Indications: Atrial Fibrillation 6/11/20   Joseph Andrews MD   hydroCHLOROthiazide (MICROZIDE) 12.5 MG capsule Take 12.5 mg by mouth 3 (Three) Times a Week. Monday, wednsday and friday     ProviderStacey MD   metoprolol  succinate XL (TOPROL-XL) 50 MG 24 hr tablet Take 1 tablet by mouth Every Night. 6/11/20   Joseph Andrews MD   Pegvalleahe-pqpz (PALYNZIQ) 20 MG/ML solution prefilled syringe Inject 20 mg under the skin into the appropriate area as directed 3 (Three) Times a Week. Monday, Wednesday and Friday 1/29/19   Provider, MD Stacey   Potassium Chloride (KLOR-CON PO) Take 10 mEq by mouth 3 (Three) Times a Week. Monday, Wednesday and Friday.    Provider, MD Stacey       Allergies:    Allergies   Allergen Reactions   • Amoxicillin Hives   • Cleocin [Clindamycin Hcl] Hives   • Penicillins Hives       Objective   Objective     Vital Signs  Temp:  [98.1 °F (36.7 °C)] 98.1 °F (36.7 °C)  Heart Rate:  [69-72] 69  Resp:  [20] 20  BP: (148-168)/() 148/86  SpO2:  [99 %-100 %] 99 %  on   ;      Body mass index is 57.52 kg/m².    Physical Exam   Constitutional: She is oriented to person, place, and time.   BMI 57   HENT:   Head: Normocephalic and atraumatic.   Eyes: EOM are normal.   Neck: Normal range of motion. Neck supple.   Cardiovascular: Normal rate, regular rhythm and normal heart sounds.   Pulmonary/Chest: Effort normal and breath sounds normal.   Abdominal: Soft. Bowel sounds are normal.   Genitourinary:   Genitourinary Comments: deferred   Musculoskeletal: Normal range of motion.   Neurological: She is alert and oriented to person, place, and time.   Skin: Skin is warm and dry.   Psychiatric: Her behavior is normal. Thought content normal.   Appears anxious   Vitals reviewed.      Results Review:  I have personally reviewed most recent lab results and agree with findings, most notably: troponin.    Results from last 7 days   Lab Units 06/12/20 2029   WBC 10*3/mm3 9.10   HEMOGLOBIN g/dL 12.4   HEMATOCRIT % 35.6   PLATELETS 10*3/mm3 223     Results from last 7 days   Lab Units 06/12/20 2029   SODIUM mmol/L 135*   POTASSIUM mmol/L 3.8   CHLORIDE mmol/L 103   CO2 mmol/L 23.0   BUN  16   CREATININE mg/dL 0.71      GLUCOSE mg/dL 181*   CALCIUM mg/dL 9.2   ALT (SGPT) U/L 60*   AST (SGOT) U/L 48*   TROPONIN T ng/mL 0.393*     Estimated Creatinine Clearance: 147 mL/min (by C-G formula based on SCr of 0.71 mg/dL).  Brief Urine Lab Results  (Last result in the past 365 days)      Color   Clarity   Blood   Leuk Est   Nitrite   Protein   CREAT   Urine HCG        06/08/20 1140               Negative           Microbiology Results (last 10 days)     Procedure Component Value - Date/Time    COVID PRE-OP / PRE-PROCEDURE SCREENING ORDER (NO ISOLATION) - Swab, Nasopharynx [[id number]] Collected:  06/08/20 1148    Lab Status:  Final result Specimen:  Swab from Nasopharynx Updated:  06/09/20 1626    Narrative:       The following orders were created for panel order COVID PRE-OP / PRE-PROCEDURE SCREENING ORDER (NO ISOLATION) - Swab, Nasopharynx.  Procedure                               Abnormality         Status                     ---------                               -----------         ------                     COVID-19,BIOTAP, NP/OP S...[[id number]]                      Final result                 Please view results for these tests on the individual orders.    COVID-19,BIOTAP, NP/OP SWAB IN TRANSPORT MEDIA OR SALINE 24-36 HR TAT - Swab, Nasopharynx [[id number]] Collected:  06/08/20 1148    Lab Status:  Final result Specimen:  Swab from Nasopharynx Updated:  06/09/20 1626     Reference Lab Report --     COVID19 Not Detected          ECG/EMG Results (most recent)     Procedure Component Value Units Date/Time    ECG 12 Lead [[id number]] Collected:  06/12/20 2008     Updated:  06/12/20 2010    Narrative:       HEART RATE= 93  bpm  RR Interval= 744  ms  MA Interval= 179  ms  P Horizontal Axis= 5  deg  P Front Axis= 60  deg  QRSD Interval= 100  ms  QT Interval= 366  ms  QRS Axis= 4  deg  T Wave Axis= 63  deg  - ABNORMAL ECG -  Sinus rhythm  Paired ventricular premature complexes  Probable left atrial enlargement  Electronically Signed By:    Date and Time of Study: 2020-06-12 20:08:45          Results for orders placed during the hospital encounter of 01/24/20   Duplex Venous Lower Extremity - Left CAR    Narrative · Normal left lower extremity venous duplex scan.          Results for orders placed during the hospital encounter of 06/10/20   Adult Transesophageal Echo (OLYA) W/ Cont if Necessary Per Protocol    Narrative · Left ventricular wall thickness is consistent with mild concentric   hypertrophy.  · Right ventricular cavity is moderately dilated.  · Left atrial cavity size is mild-to-moderately dilated.  · Mild tricuspid valve regurgitation is present.      Procedure indication    Recurrent symptomatic atrial fibrillation and failed medical treatment     conscious sedation administered by anesthesia     consent obtained before procedure       Procedure note   after obtaining a valid consent patient was sedated by Anesthesia and a   OLYA probe was easily placed into esophagus with multiplane imaging with   2D, color and Doppler followed by bubble study with agitated saline   without any complications    OLYA  Findings    Normal LV systolic function  Moderate right ventricle enlargement with normal RV systolic function and   mild to moderate left atrial enlargement and moderate right atrial   enlargement without any shunt or clot    Plan  Proceed with ablation      Procedures done  Transesophageal echocardiography    Electronically signed by Joseph Andrews MD, 06/10/20, 8:25 AM.         Us Thyroid    Result Date: 6/8/2020  Impression:  1.Enlarged, heterogeneous thyroid gland. 2.1.0 cm right thyroid nodule (TI RADS 3). No imaging follow-up is needed based on its size and morphology.  Reference:  Navjot DAIGLE, et al. (2017). ACR Thyroid Imaging, Reporting and Data System (TI-RADS): White Paper of the ACR TI-RADS Committee. Journal of the American College of Radiology (JACR), 14(5), pp.587-595.  Electronically Signed By-Iris Arvizu On:6/8/2020  12:54 PM This report was finalized on [id number] by  Iris Arvizu, .    Ct Chest Pulmonary Embolism    Result Date: 6/12/2020   1. Mild motion artifact and slightly suboptimal contrast bolus. No pulmonary embolus or acute abnormality is identified. 2. There is mild cardiomegaly and evidence of prior granulomatous disease. 3. Mild hepatomegaly and fatty infiltration of the liver.  Electronically Signed By-Shyam Phoenix DO. On:6/12/2020 9:26 PM This report was finalized on [id number] by  Shyam Phoenix DO..        Estimated Creatinine Clearance: 147 mL/min (by C-G formula based on SCr of 0.71 mg/dL).    Assessment/Plan   Assessment/Plan       Active Hospital Problems    Diagnosis  POA   • Chest pain [R07.9]  Yes      Resolved Hospital Problems   No resolved problems to display.       Chest pain / orthopnea / dyspnea e;evated troponin 0.393 with recent cryo ablation 6/10/20, serial troponin, cardiology consulted, continuous cardiac monitoring     Recurrent symptomatic atrial fibrillation and atrial flutter refractory to medical treatment s/ p ablation 6/10/20- EKG shows SR with PVC on Beta blocker and Eliquis, cardiology consulted see above     Essential hypertension- on Toprol XL 50 mg po daily    Morbid obesity BMI 57- lifestyle management education     Sleep apnea- reports intolerant to CPap all night     Phenylketonuria - on Palynziq sub q 3 times weekly reports had today     Hyperglycemia - non-fasting 181- A1C in am      Elevated ALT, AST minor- no abdominal complaints       VTE Prophylaxis - on Eliquis     Mechanical Order History:     None      Pharmalogical Order History:     None          CODE STATUS:    Code Status and Medical Interventions:   Ordered at: 06/12/20 2209     Code Status:    CPR     Medical Interventions (Level of Support Prior to Arrest):    Full       This patient has been examined wearing appropriate Personal Protective Hlgfwvgph51/12/20      I discussed the patient's findings and  my recommendations with patient.        Electronically signed by ROSENDO Sebastian, 06/12/20, 10:06 PM.  [facility] Hospitalist Team

## 2020-06-13 NOTE — PLAN OF CARE
Problem: Patient Care Overview  Goal: Plan of Care Review  Outcome: Ongoing (interventions implemented as appropriate)  Flowsheets (Taken 6/13/2020 8019)  Progress: no change  Plan of Care Reviewed With: patient  Note:   No c/o of CP or discomfort.  Will continue to monitor and current orders care plan

## 2020-06-13 NOTE — ED PROVIDER NOTES
Subjective   History:  Patient is a 40-year-old female who presents to the ER with chest pain and dyspnea.  Patient reports that she was just discharged from the hospital yesterday the previous day she had an ablation for persistent A. fib/flutter medication resistant.  She reports initially she felt normal and at baseline but started to experience chest pain last night this morning she called the cardiologist and was told if it continued to come to the ER.  She reports nothing makes it better or worse it is fairly constant.    Onset: 2 days  Location: chest  Duration: constant  Character: sharp pain   Aggravating/Alleviating factors: None  Radiation None  Severity: moderate            Review of Systems   Constitutional: Negative for diaphoresis, fatigue and fever.   HENT: Negative.    Respiratory: Positive for shortness of breath.    Cardiovascular: Positive for chest pain. Negative for palpitations and leg swelling.   Gastrointestinal: Negative for abdominal distention, abdominal pain, nausea and vomiting.   Genitourinary: Negative.    Musculoskeletal: Negative.    Skin: Negative.    Neurological: Negative.    Psychiatric/Behavioral: Negative.        Past Medical History:   Diagnosis Date   • Arrhythmia    • Atrial fibrillation (CMS/HCC)    • Disease of thyroid gland    • Heart murmur    • Hypertension    • Migraine    • PCOS (polycystic ovarian syndrome)    • PKU (phenylketonuria) (CMS/HCC)        Allergies   Allergen Reactions   • Amoxicillin Hives   • Cleocin [Clindamycin Hcl] Hives   • Penicillins Hives       Past Surgical History:   Procedure Laterality Date   • CARDIAC ELECTROPHYSIOLOGY PROCEDURE N/A 6/10/2020    Procedure: EP/Ablation;  Surgeon: Joseph Andrews MD;  Location: [facility] INVASIVE LOCATION;  Service: Cardiovascular;  Laterality: N/A;   • CARPAL TUNNEL RELEASE Right    • TONSILLECTOMY     • TUBAL ABDOMINAL LIGATION     • UVULECTOMY         Family History   Problem Relation Age of Onset    • Diabetes Mother    • Heart disease Father    • Cancer Father        Social History     Socioeconomic History   • Marital status:      Spouse name: Not on file   • Number of children: Not on file   • Years of education: Not on file   • Highest education level: Not on file   Tobacco Use   • Smoking status: Former Smoker     Types: Cigarettes     Last attempt to quit: 9/1/2009     Years since quitting: 10.7   • Smokeless tobacco: Never Used   Substance and Sexual Activity   • Alcohol use: No     Frequency: Never   • Drug use: No   • Sexual activity: Defer           Objective   Physical Exam   Constitutional: She is oriented to person, place, and time. She appears well-developed and well-nourished.   HENT:   Head: Normocephalic and atraumatic.   Eyes: Pupils are equal, round, and reactive to light.   Neck: Normal range of motion.   Cardiovascular: Normal rate and regular rhythm.   Pulmonary/Chest: Effort normal and breath sounds normal. She has no decreased breath sounds. She has no wheezes. She has no rhonchi. She has no rales.   Musculoskeletal: Normal range of motion.   Neurological: She is alert and oriented to person, place, and time.   Skin: Skin is warm and dry.   Psychiatric: She has a normal mood and affect. Her behavior is normal.       Procedures           ED Course  ED Course as of Jun 12 2152 Fri Jun 12, 2020 2013 EKG interpreted by ER physician reviewed myself rate of 93 sinus rhythm with PVCs noted.    [MG]      ED Course User Index  [MG] Tanya Purdy PA-C      Ct Chest Pulmonary Embolism    Result Date: 6/12/2020   1. Mild motion artifact and slightly suboptimal contrast bolus. No pulmonary embolus or acute abnormality is identified. 2. There is mild cardiomegaly and evidence of prior granulomatous disease. 3. Mild hepatomegaly and fatty infiltration of the liver.  Electronically Signed By-Shyam Phoenix DO. On:6/12/2020 9:26 PM This report was finalized on [id number] by  Shyam  DO. Alban.    Labs Reviewed   COMPREHENSIVE METABOLIC PANEL - Abnormal; Notable for the following components:       Result Value    Glucose 181 (*)     Sodium 135 (*)     ALT (SGPT) 60 (*)     AST (SGOT) 48 (*)     All other components within normal limits    Narrative:     GFR Normal >60  Chronic Kidney Disease <60  Kidney Failure <15     TROPONIN (IN-HOUSE) - Abnormal; Notable for the following components:    Troponin T 0.393 (*)     All other components within normal limits    Narrative:     Troponin T Reference Range:  <= 0.03 ng/mL-   Negative for AMI  >0.03 ng/mL-     Abnormal for myocardial necrosis.  Clinicians would have to utilize clinical acumen, EKG, Troponin and serial changes to determine if it is an Acute Myocardial Infarction or myocardial injury due to an underlying chronic condition.       Results may be falsely decreased if patient taking Biotin.     LIPASE - Normal   CBC WITH AUTO DIFFERENTIAL - Normal   BUN - Normal   CBC AND DIFFERENTIAL    Narrative:     The following orders were created for panel order CBC & Differential.  Procedure                               Abnormality         Status                     ---------                               -----------         ------                     CBC Auto Differential[[id number]]        Normal              Final result                 Please view results for these tests on the individual orders.     Medications   sodium chloride 0.9 % flush 10 mL (has no administration in time range)   Morphine sulfate (PF) injection 2 mg (2 mg Intravenous Given 6/12/20 2035)   ondansetron (ZOFRAN) injection 4 mg (4 mg Intravenous Given 6/12/20 2035)   iopamidol (ISOVUE-370) 76 % injection 100 mL (100 mL Intravenous Given 6/12/20 2057)   nitroglycerin (NITROSTAT) ointment 1 inch (1 inch Topical Given 6/12/20 2130)                                          MDM  Number of Diagnoses or Management Options  Chest pain, unspecified type:   Shortness of breath:    Diagnosis management comments: I examined the patient using the appropriate personal protective equipment.      DISPOSITION:   Chart Review:  Comorbidity:  has a past medical history of Arrhythmia, Atrial fibrillation (CMS/HCC), Disease of thyroid gland, Heart murmur, Hypertension, Migraine, PCOS (polycystic ovarian syndrome), and PKU (phenylketonuria) (CMS/HCC).  Differentials:this list is not all inclusive and does not constitute the entirety of considered causes --> arrhythmia, PE, CAD  ECG: interpreted by ER physician and reviewed by myself: Sinus rhythm with PVCs  Labs: Troponin elevated at 0.393    Imaging: Was interpreted by physician and reviewed by myself:  Ct Chest Pulmonary Embolism    Result Date: 6/12/2020   1. Mild motion artifact and slightly suboptimal contrast bolus. No pulmonary embolus or acute abnormality is identified. 2. There is mild cardiomegaly and evidence of prior granulomatous disease. 3. Mild hepatomegaly and fatty infiltration of the liver.  Electronically Signed By-Shyam Phoenix DO. On:6/12/2020 9:26 PM This report was finalized on [id number] by  Shyam Phoenix DO..      Disposition/Treatment:  Patient is a 40-year-old female presents to the ER with worsening chest pain dyspnea.  She reports she had cardiac ablation 2 days ago with Dr. Andrews she reports that she felt fine initially but started to experience pain last night she called the office this morning and was told if her pain persisted to go to the ER.  Patient does have an elevated troponin but this can be contributed to an ablation.  I spoke with Dr. Broussard about this patient and he agrees with the plan to admission.  Patient was admitted to the hospitalist.  Patient's pain was significantly improved after pain medication.  She was stable in agreement plan         Amount and/or Complexity of Data Reviewed  Clinical lab tests: reviewed  Tests in the radiology section of CPT®: reviewed  Tests in the medicine section of  CPT®: reviewed    Patient Progress  Patient progress: stable      Final diagnoses:   Chest pain, unspecified type   Shortness of breath            Tanya Purdy PA-C  06/12/20 4844

## 2020-06-13 NOTE — PROGRESS NOTES
"      [facility] Daily Progress Note      Hospitalist Team  LOS 0 days      Patient Care Team:  Kelsey Sanchez PCP - General (Nurse Practitioner)    Patient Location: 229/1      Subjective   Subjective     Chief Complaint / Subjective  Chief Complaint   Patient presents with   • Shortness of Breath         Brief Synopsis of Hospital Course/HPI    The patient is a 41-year-old female with history of DM, CAD, PCOS and phenylketonuria, hypertension, FRANK noncompliant with CPAP and atrial fibrillation that underwent ablation for persistent A.fib/a flutter on 6/10/2020.  She claims to have experienced bilateral chest tightness and shortness of air when she got home on 6/11/20 and shortness of breath was worse when she laid flat therefore slept on a chair.    In the ED, she underwent CTA of the chest which is negative for pulmonary embolism.  COVID-19 was tested was negative on 6/10/2020 .    Date::    6/13/20: Denies shortness of breath or chest pain.  Noncompliant with CPAP.      Review of Systems   All other systems reviewed and are negative.        Objective   Objective      Vital Signs  Temp:  [97.5 °F (36.4 °C)-98.1 °F (36.7 °C)] 97.7 °F (36.5 °C)  Heart Rate:  [56-72] 56  Resp:  [16-20] 16  BP: (103-168)/() 103/67  Oxygen Therapy  SpO2: 98 %  Pulse Oximetry Type: Intermittent  Device (Oxygen Therapy): room air  Device (Oxygen Therapy): room air  Flowsheet Rows      First Filed Value   Admission Height  160 cm (63\") Documented at 06/12/2020 2002   Admission Weight  (!) 147 kg (324 lb 11.8 oz) Documented at 06/12/2020 2002        Intake & Output (last 3 days)       06/10 0701 - 06/11 0700 06/11 0701 - 06/12 0700 06/12 0701 - 06/13 0700 06/13 0701 - 06/14 0700    I.V. (mL/kg)   980 (6.7)     Total Intake(mL/kg)   980 (6.7)     Net   +980                 Lines, Drains & Airways    Active LDAs     Name:   Placement date:   Placement time:   Site:   Days:    Peripheral IV " 06/12/20 2027 Right Antecubital   06/12/20 2027    Antecubital   less than 1              Physical Exam:    Physical Exam   Constitutional: She appears well-developed and well-nourished.   HENT:   Head: Normocephalic and atraumatic.   Eyes: Pupils are equal, round, and reactive to light. EOM are normal.   Neck: Normal range of motion. Neck supple.   Cardiovascular: Normal rate and regular rhythm.   Pulmonary/Chest: Effort normal and breath sounds normal.   Abdominal: Soft. Bowel sounds are normal.   Obese abdomen.   Musculoskeletal: Normal range of motion.   Neurological: She is alert.   Skin: Skin is warm and dry.   Psychiatric: She has a normal mood and affect.        Wounds (last 24 hours)      LDA Wound     Row Name 06/13/20 0701 06/13/20 0023          Wound 06/13/20 0022 Right anterior groin Incision    Wound - Properties Group Date first assessed: 06/13/20  -CS Time first assessed: 0022  -CS Side: Right  -CS Orientation: anterior  -CS Location: groin  -CS Primary Wound Type: Incision  -CS    Dressing Appearance  dry;intact  -CR  dry;intact;dried drainage  -CS     Closure  --  Adhesive bandage  -CS     Base  --  blanchable;clean;dry;maroon/purple  -CS     Periwound  --  intact  -CS     Periwound Temperature  --  cool  -CS     Periwound Skin Turgor  --  soft  -CS     Drainage Characteristics/Odor  --  bleeding controlled  -CS     Drainage Amount  --  scant  -CS       User Key  (r) = Recorded By, (t) = Taken By, (c) = Cosigned By    Initials Name Provider Type    CS Tania Laureano RN Registered Nurse    Justine Valle RN Registered Nurse          Procedures:              Results Review:     I reviewed the patient's new clinical results.      Lab Results (last 24 hours)     Procedure Component Value Units Date/Time    Troponin [[id number]]  (Normal) Collected:  06/13/20 0844    Specimen:  Blood Updated:  06/13/20 1037     Troponin T <0.010 ng/mL     Narrative:       Troponin T Reference Range:  <=  0.03 ng/mL-   Negative for AMI  >0.03 ng/mL-     Abnormal for myocardial necrosis.  Clinicians would have to utilize clinical acumen, EKG, Troponin and serial changes to determine if it is an Acute Myocardial Infarction or myocardial injury due to an underlying chronic condition.       Results may be falsely decreased if patient taking Biotin.      BUN [[id number]]  (Normal) Collected:  06/13/20 0205    Specimen:  Blood Updated:  06/13/20 0822     BUN 16 mg/dL     Pregnancy, Urine - Urine, Clean Catch [[id number]]  (Normal) Collected:  06/13/20 0424    Specimen:  Urine, Clean Catch Updated:  06/13/20 0535     HCG, Urine QL Negative    Troponin [[id number]]  (Abnormal) Collected:  06/13/20 0205    Specimen:  Blood Updated:  06/13/20 0300     Troponin T 0.362 ng/mL     Narrative:       Troponin T Reference Range:  <= 0.03 ng/mL-   Negative for AMI  >0.03 ng/mL-     Abnormal for myocardial necrosis.  Clinicians would have to utilize clinical acumen, EKG, Troponin and serial changes to determine if it is an Acute Myocardial Infarction or myocardial injury due to an underlying chronic condition.       Results may be falsely decreased if patient taking Biotin.      TSH [[id number]]  (Abnormal) Collected:  06/13/20 0205    Specimen:  Blood Updated:  06/13/20 0257     TSH 9.030 uIU/mL     Basic Metabolic Panel [[id number]]  (Abnormal) Collected:  06/13/20 0205    Specimen:  Blood Updated:  06/13/20 0253     Glucose 127 mg/dL      BUN --     Comment: Testing performed by alternate method        Creatinine 0.66 mg/dL      Sodium 136 mmol/L      Potassium 3.6 mmol/L      Chloride 104 mmol/L      CO2 23.0 mmol/L      Calcium 8.9 mg/dL      eGFR Non African Amer 98 mL/min/1.73      BUN/Creatinine Ratio --     Comment: Testing not performed.        Anion Gap 9.0 mmol/L     Narrative:       GFR Normal >60  Chronic Kidney Disease <60  Kidney Failure <15      CBC (No Diff) [[id number]]  (Abnormal) Collected:  06/13/20 0205    Specimen:   Blood Updated:  06/13/20 0232     WBC 8.20 10*3/mm3      RBC 3.38 10*6/mm3      Hemoglobin 11.1 g/dL      Hematocrit 31.9 %      MCV 94.4 fL      MCH 33.0 pg      MCHC 34.9 g/dL      RDW 14.6 %      RDW-SD 47.3 fl      MPV 7.3 fL      Platelets 213 10*3/mm3     BUN [[id number]]  (Normal) Collected:  06/12/20 2029    Specimen:  Blood Updated:  06/12/20 2104     BUN 16 mg/dL     Comprehensive Metabolic Panel [[id number]]  (Abnormal) Collected:  06/12/20 2029    Specimen:  Blood Updated:  06/12/20 2103     Glucose 181 mg/dL      BUN --     Comment: Testing performed by alternate method        Creatinine 0.71 mg/dL      Sodium 135 mmol/L      Potassium 3.8 mmol/L      Chloride 103 mmol/L      CO2 23.0 mmol/L      Calcium 9.2 mg/dL      Total Protein 7.4 g/dL      Albumin 4.20 g/dL      ALT (SGPT) 60 U/L      AST (SGOT) 48 U/L      Comment: Specimen hemolyzed.  Results may be affected.        Alkaline Phosphatase 57 U/L      Total Bilirubin 0.2 mg/dL      eGFR Non African Amer 90 mL/min/1.73      Globulin 3.2 gm/dL      A/G Ratio 1.3 g/dL      BUN/Creatinine Ratio --     Comment: Testing not performed.        Anion Gap 9.0 mmol/L     Narrative:       GFR Normal >60  Chronic Kidney Disease <60  Kidney Failure <15      Troponin [[id number]]  (Abnormal) Collected:  06/12/20 2029    Specimen:  Blood Updated:  06/12/20 2102     Troponin T 0.393 ng/mL     Narrative:       Troponin T Reference Range:  <= 0.03 ng/mL-   Negative for AMI  >0.03 ng/mL-     Abnormal for myocardial necrosis.  Clinicians would have to utilize clinical acumen, EKG, Troponin and serial changes to determine if it is an Acute Myocardial Infarction or myocardial injury due to an underlying chronic condition.       Results may be falsely decreased if patient taking Biotin.      Lipase [[id number]]  (Normal) Collected:  06/12/20 2029    Specimen:  Blood Updated:  06/12/20 2102     Lipase 31 U/L     CBC & Differential [[id number]] Collected:  06/12/20 2029     Specimen:  Blood Updated:  06/12/20 2038    Narrative:       The following orders were created for panel order CBC & Differential.  Procedure                               Abnormality         Status                     ---------                               -----------         ------                     CBC Auto Differential[[id number]]        Normal              Final result                 Please view results for these tests on the individual orders.    CBC Auto Differential [[id number]]  (Normal) Collected:  06/12/20 2029    Specimen:  Blood Updated:  06/12/20 2038     WBC 9.10 10*3/mm3      RBC 3.78 10*6/mm3      Hemoglobin 12.4 g/dL      Hematocrit 35.6 %      MCV 94.2 fL      MCH 32.9 pg      MCHC 35.0 g/dL      RDW 14.7 %      RDW-SD 47.7 fl      MPV 7.5 fL      Platelets 223 10*3/mm3      Neutrophil % 56.7 %      Lymphocyte % 31.6 %      Monocyte % 7.9 %      Eosinophil % 2.6 %      Basophil % 1.2 %      Neutrophils, Absolute 5.10 10*3/mm3      Lymphocytes, Absolute 2.90 10*3/mm3      Monocytes, Absolute 0.70 10*3/mm3      Eosinophils, Absolute 0.20 10*3/mm3      Basophils, Absolute 0.10 10*3/mm3      nRBC 0.1 /100 WBC         No results found for: HGBA1C            Lab Results   Component Value Date    LIPASE 31 06/12/2020     Lab Results   Component Value Date    CHOL 182 06/08/2020    TRIG 143 06/08/2020    HDL 41 06/08/2020     (H) 06/08/2020       No results found for: INTRAOP, PREDX, FINALDX, COMDX    Microbiology Results (last 10 days)     Procedure Component Value - Date/Time    COVID PRE-OP / PRE-PROCEDURE SCREENING ORDER (NO ISOLATION) - Swab, Nasopharynx [[id number]] Collected:  06/08/20 1148    Lab Status:  Final result Specimen:  Swab from Nasopharynx Updated:  06/09/20 1626    Narrative:       The following orders were created for panel order COVID PRE-OP / PRE-PROCEDURE SCREENING ORDER (NO ISOLATION) - Swab, Nasopharynx.  Procedure                               Abnormality         Status                      ---------                               -----------         ------                     COVID-19,BIOTAP, NP/OP S...[[id number]]                      Final result                 Please view results for these tests on the individual orders.    COVID-19,BIOTAP, NP/OP SWAB IN TRANSPORT MEDIA OR SALINE 24-36 HR TAT - Swab, Nasopharynx [[id number]] Collected:  06/08/20 1148    Lab Status:  Final result Specimen:  Swab from Nasopharynx Updated:  06/09/20 1626     Reference Lab Report --     COVID19 Not Detected          ECG/EMG Results (most recent)     Procedure Component Value Units Date/Time    ECG 12 Lead [[id number]] Collected:  06/12/20 2008     Updated:  06/12/20 2010    Narrative:       HEART RATE= 93  bpm  RR Interval= 744  ms  IL Interval= 179  ms  P Horizontal Axis= 5  deg  P Front Axis= 60  deg  QRSD Interval= 100  ms  QT Interval= 366  ms  QRS Axis= 4  deg  T Wave Axis= 63  deg  - ABNORMAL ECG -  Sinus rhythm  Paired ventricular premature complexes  Probable left atrial enlargement  Electronically Signed By:   Date and Time of Study: 2020-06-12 20:08:45          Results for orders placed during the hospital encounter of 01/24/20   Duplex Venous Lower Extremity - Left CAR    Narrative · Normal left lower extremity venous duplex scan.          Results for orders placed during the hospital encounter of 06/10/20   Adult Transesophageal Echo (OLYA) W/ Cont if Necessary Per Protocol    Narrative · Left ventricular wall thickness is consistent with mild concentric   hypertrophy.  · Right ventricular cavity is moderately dilated.  · Left atrial cavity size is mild-to-moderately dilated.  · Mild tricuspid valve regurgitation is present.      Procedure indication    Recurrent symptomatic atrial fibrillation and failed medical treatment     conscious sedation administered by anesthesia     consent obtained before procedure       Procedure note   after obtaining a valid consent patient was sedated by  Anesthesia and a   OLYA probe was easily placed into esophagus with multiplane imaging with   2D, color and Doppler followed by bubble study with agitated saline   without any complications    OLYA  Findings    Normal LV systolic function  Moderate right ventricle enlargement with normal RV systolic function and   mild to moderate left atrial enlargement and moderate right atrial   enlargement without any shunt or clot    Plan  Proceed with ablation      Procedures done  Transesophageal echocardiography    Electronically signed by Joseph Andrews MD, 06/10/20, 8:25 AM.         Us Thyroid    Result Date: 6/8/2020  Impression:  1.Enlarged, heterogeneous thyroid gland. 2.1.0 cm right thyroid nodule (TI RADS 3). No imaging follow-up is needed based on its size and morphology.  Reference:  Navjot FN, et al. (2017). ACR Thyroid Imaging, Reporting and Data System (TI-RADS): White Paper of the ACR TI-RADS Committee. Journal of the American College of Radiology (JACR), 14(5), pp.587-595.  Electronically Signed By-Iris Arvizu On:6/8/2020 12:54 PM This report was finalized on [id number] by  Iris Arvizu, .    Ct Chest Pulmonary Embolism    Result Date: 6/12/2020   1. Mild motion artifact and slightly suboptimal contrast bolus. No pulmonary embolus or acute abnormality is identified. 2. There is mild cardiomegaly and evidence of prior granulomatous disease. 3. Mild hepatomegaly and fatty infiltration of the liver.  Electronically Signed By-Shyam Phoenix DO. On:6/12/2020 9:26 PM This report was finalized on [id number] by  Shyam Phoenix DO..          Xrays, labs reviewed personally by physician.    Medication Review:   I have reviewed the patient's current medication list      Scheduled Meds    apixaban 5 mg Oral Q12H   metoprolol succinate XL 50 mg Oral Nightly   [START ON 6/14/2020] Pegvaliase-pqpz 10 mg Subcutaneous Once per day on Mon Wed Fri   sodium chloride 10 mL Intravenous Q12H       Meds  Infusions    sodium chloride 75 mL/hr Last Rate: 75 mL/hr (06/13/20 1030)       Meds PRN  •  acetaminophen **OR** acetaminophen **OR** acetaminophen  •  hydrOXYzine  •  melatonin  •  nitroglycerin  •  ondansetron **OR** ondansetron  •  [COMPLETED] Insert peripheral IV **AND** sodium chloride  •  sodium chloride        Assessment/Plan   Assessment/Plan     Active Hospital Problems    Diagnosis  POA   • Chest pain [R07.9]  Yes      Resolved Hospital Problems   No resolved problems to display.       MEDICAL DECISION MAKING COMPLEXITY BY PROBLEM:       Chest pain:  -Atypical for angina like chest pain  -CTA chest--> ruled out PE  -Cycle cardiac enzymes  -Cardiology consulted      Atrial fibrillation s/ p ablation 6/10/20  -Continue beta blocker and Eliquis     Essential hypertension:  - Toprol XL 50 mg po daily     Sleep apnea  -Noncompliant with CPAP       Phenylketonuria:  - on Palynziq sub q 3 times weekly      Hyperglycemia:  --hemoglobin A1C--> 5.8          VTE Prophylaxis -   Mechanical Order History:     None      Pharmalogical Order History:     Ordered     Dose Route Frequency Stop    06/12/20 2314  apixaban (ELIQUIS) tablet 5 mg      5 mg PO Every 12 Hours Scheduled --            Code Status -   Code Status and Medical Interventions:   Ordered at: 06/12/20 2206     Code Status:    CPR     Medical Interventions (Level of Support Prior to Arrest):    Full       This patient has been examined wearing appropriate Personal Protective Equipment  06/13/20        Discharge Planning          Destination      Coordination has not been started for this encounter.      Durable Medical Equipment      Coordination has not been started for this encounter.      Dialysis/Infusion      Coordination has not been started for this encounter.      Home Medical Care      Coordination has not been started for this encounter.      Therapy      Coordination has not been started for this encounter.      [facility]       Coordination has not been started for this encounter.            Electronically signed by Khari Peralta DO, 06/13/20, 12:07.  [facility] Hospitalist Team

## 2020-06-13 NOTE — PROGRESS NOTES
Continued Stay Note  JUSTIN Zamarripa     Patient Name: Jo Asif  MRN: [id number]  Today's Date: 6/13/2020    Admit Date: 6/12/2020    Discharge Plan     Row Name 06/13/20 1114       Plan    Plan  Anticipate routine home.               Shagufta Hutchinson RN

## 2020-06-13 NOTE — CONSULTS
Cardiology Consult Note    Patient Identification:  Name: Jo Asif  Age: 40 y.o.  Sex: female  :  1980  MRN: [id number]             Requesting Physician : Hospitalist Henrietta Benites    Reason for Consultation / Chief Complaint : Chest pain, A. fib    History of Present Illness:      This is a 40-year-old with PMH of    -Atrial fibrillation, status post ablation 6/10/2020  -Hypertension  -Phenylketonuria  -PCO S, obesity, FRANK  -Family history of heart disease in father and diabetes in mother  -Tubal ligation, tonsillectomy, uvulectomy for FRANK and carpal tunnel  -Former smoker  -Allergy/intolerance to amoxicillin Cleocin and penicillin    Presented 2020 with complaint of chest pain and shortness of breath.  Patient was in the hospital and had A. fib ablation on 6/10/2020 and was discharged on 2020.  Patient apparently was feeling good after discharge and started having constant substernal chest pain in the night call cardiology office was advised to come to the ER.  Patient had prolonged midsternal chest pain and some difficulty breathing.  Pain was relieved after patient was given medications in the emergency room.  Patient is currently pain-free and does not have shortness of breath.  Work-up revealed elevated troponin at 0.393.  Hyperglycemia with sugar of 181 and 127  CT PE study is negative for PE.  Mild cardiomegaly with mild hepatomegaly and fatty infiltrates of liver.  EKG from 2020 reveals sinus rhythm at the rate of 93 bpm with PVC and couplets    Assessment:      -Chest pain  -Elevated troponin  -A. fib status post ablation 2020  -Hypertension  -Obesity, FRANK, PCOS  -Elevated LFTs, fatty liver  -Hyperglycemia with hemoglobin A1c of 5.8 consistent with prediabetes  -Elevated TSH at 9      Recommendations / Plan:        Telemetry  Serial cardiac enzymes to see the trend  Patient's cardiac enzymes can be mildly elevated because she had ablation recently.  We will  check an echocardiogram to rule out pericardial effusion.  Continue conservative management.  We will follow-up and consider further evaluation and treatment  Thank you for letting me participate in the care of this lady  Had a lengthy discussion with patient reviewed all possible etiologies.         Diagnosis Plan   1. Chest pain, unspecified type     2. Shortness of breath                Past Medical History:  Past Medical History:   Diagnosis Date   • Arrhythmia    • Atrial fibrillation (CMS/HCC)    • Disease of thyroid gland    • Heart murmur    • Hypertension    • Migraine    • PCOS (polycystic ovarian syndrome)    • PKU (phenylketonuria) (CMS/HCC)      Past Surgical History:  Past Surgical History:   Procedure Laterality Date   • CARDIAC ELECTROPHYSIOLOGY PROCEDURE N/A 6/10/2020    Procedure: EP/Ablation;  Surgeon: Joseph Andrews MD;  Location: [facility] INVASIVE LOCATION;  Service: Cardiovascular;  Laterality: N/A;   • CARPAL TUNNEL RELEASE Right    • TONSILLECTOMY     • TUBAL ABDOMINAL LIGATION     • UVULECTOMY        Allergies:  Allergies   Allergen Reactions   • Amoxicillin Hives   • Cleocin [Clindamycin Hcl] Hives   • Penicillins Hives     Home Meds:  Medications Prior to Admission   Medication Sig Dispense Refill Last Dose   • apixaban (ELIQUIS) 5 MG tablet tablet Take 1 tablet by mouth Every 12 (Twelve) Hours. Indications: Atrial Fibrillation 60 tablet 0 6/12/2020 at Unknown time   • metoprolol succinate XL (TOPROL-XL) 50 MG 24 hr tablet Take 1 tablet by mouth Every Night. 30 tablet 3 6/11/2020 at Unknown time   • Pegvaliase-pqpz (PALYNZIQ) 20 MG/ML solution prefilled syringe Inject 10 mg under the skin into the appropriate area as directed 3 (Three) Times a Week. Monday, Wednesday and Friday 6/7/2020     Current Meds:     Current Facility-Administered Medications:   •  acetaminophen (TYLENOL) tablet 650 mg, 650 mg, Oral, Q4H PRN, 650 mg at 06/13/20 0804 **OR** acetaminophen (TYLENOL) 160  MG/5ML solution 650 mg, 650 mg, Oral, Q4H PRN **OR** acetaminophen (TYLENOL) suppository 650 mg, 650 mg, Rectal, Q4H PRN, Henrietta Harrison APRN  •  apixaban (ELIQUIS) tablet 5 mg, 5 mg, Oral, Q12H, Henrietta Harrison APRN, 5 mg at 06/13/20 0804  •  hydrOXYzine (ATARAX) tablet 50 mg, 50 mg, Oral, Once PRN, Henrietta Harrison APRN  •  melatonin tablet 5 mg, 5 mg, Oral, Nightly PRN, Henrietta Harrison APRN  •  metoprolol succinate XL (TOPROL-XL) 24 hr tablet 50 mg, 50 mg, Oral, Nightly, Henrietta Harrison APRN, 50 mg at 06/13/20 0024  •  nitroglycerin (NITROSTAT) SL tablet 0.4 mg, 0.4 mg, Sublingual, Q5 Min PRN, Henrietta Harrison APRN, 0.4 mg at 06/13/20 0804  •  ondansetron (ZOFRAN) tablet 4 mg, 4 mg, Oral, Q6H PRN **OR** ondansetron (ZOFRAN) injection 4 mg, 4 mg, Intravenous, Q6H PRN, Henrietta Harrison APRN  •  [START ON 6/14/2020] Pegvaliase-pqpz solution prefilled syringe 10 mg, 10 mg, Subcutaneous, Once per day on Mon Wed Fri, Henrietta Harrison APRN  •  [COMPLETED] Insert peripheral IV, , , Once **AND** sodium chloride 0.9 % flush 10 mL, 10 mL, Intravenous, PRN, Tanya Purdy PA-C, 10 mL at 06/13/20 0025  •  sodium chloride 0.9 % flush 10 mL, 10 mL, Intravenous, Q12H, Henrietta Harrison APRN, 10 mL at 06/13/20 0805  •  sodium chloride 0.9 % flush 10 mL, 10 mL, Intravenous, PRN, Henrietta Harrison APRN  •  sodium chloride 0.9 % infusion, 75 mL/hr, Intravenous, Continuous, Henrietta Harrison APRN, Last Rate: 75 mL/hr at 06/13/20 0814, 75 mL/hr at 06/13/20 0814  Social History:   Social History     Tobacco Use   • Smoking status: Former Smoker     Types: Cigarettes     Last attempt to quit: 9/1/2009     Years since quitting: 10.7   • Smokeless tobacco: Never Used   Substance Use Topics   • Alcohol use: No     Frequency: Never      Family History:  Family History   Problem Relation Age of Onset   • Diabetes Mother    • Heart disease Father    • Cancer Father      "    Review of Systems : Review of Systems   Constitution: Negative for weight gain and weight loss.   HENT: Negative for nosebleeds.    Cardiovascular: Positive for chest pain. Negative for dyspnea on exertion, near-syncope, palpitations and syncope.   Respiratory: Positive for shortness of breath. Negative for cough and hemoptysis.    Endocrine: Negative for cold intolerance, heat intolerance, polydipsia and polyphagia.   Hematologic/Lymphatic: Does not bruise/bleed easily.   Skin: Negative for rash.   Musculoskeletal: Negative for arthritis and back pain.   Gastrointestinal: Negative for diarrhea, hematochezia, melena, nausea and vomiting.   Genitourinary: Negative for dysuria and hematuria.   Neurological: Negative for dizziness and seizures.   Psychiatric/Behavioral: Negative for altered mental status.              Constitutional:  Temp:  [97.5 °F (36.4 °C)-98.1 °F (36.7 °C)] 97.7 °F (36.5 °C)  Heart Rate:  [56-72] 56  Resp:  [16-20] 16  BP: (103-168)/() 103/67    Physical Exam   /67 (BP Location: Left arm, Patient Position: Lying)   Pulse 56   Temp 97.7 °F (36.5 °C) (Oral)   Resp 16   Ht 160 cm (63\")   Wt (!) 147 kg (324 lb 1.2 oz)   LMP 06/05/2020   SpO2 98%   BMI 57.41 kg/m²   Physical Exam  General:  Appears in no acute distress  Eyes: Sclera is anicteric,  conjunctiva is clear   HEENT:  No JVD. Thyroid not visibly enlarged. No mucosal pallor or cyanosis  Respiratory: Respirations regular and unlabored at rest.  Bilaterally good breath sounds, with good air entry in all fields. No crackles, rubs or wheezes auscultated  Cardiovascular: S1,S2 Regular rate and rhythm. No murmur, rub or gallop auscultated. No pretibial pitting edema  Gastrointestinal: Abdomen soft, flat, non tender. Bowel sounds present.   Musculoskeletal:  No abnormal movements  Extremities: No digital clubbing or cyanosis  Skin: Color pink. Skin warm and dry to touch. No rashes  No xanthoma  Neuro: Alert and awake, no " lateralizing deficits appreciated    Cardiographics  ECG: EKG tracing was  personally reviewed by me  ECG 12 Lead   Preliminary Result   HEART RATE= 93  bpm   RR Interval= 744  ms   VT Interval= 179  ms   P Horizontal Axis= 5  deg   P Front Axis= 60  deg   QRSD Interval= 100  ms   QT Interval= 366  ms   QRS Axis= 4  deg   T Wave Axis= 63  deg   - ABNORMAL ECG -   Sinus rhythm   Paired ventricular premature complexes   Probable left atrial enlargement   Electronically Signed By:    Date and Time of Study: 2020-06-12 20:08:45          Telemetry: Sinus rhythm with PVCs    Echocardiogram:   Results for orders placed during the hospital encounter of 06/10/20   Adult Transesophageal Echo (OLYA) W/ Cont if Necessary Per Protocol    Narrative · Left ventricular wall thickness is consistent with mild concentric   hypertrophy.  · Right ventricular cavity is moderately dilated.  · Left atrial cavity size is mild-to-moderately dilated.  · Mild tricuspid valve regurgitation is present.      Procedure indication    Recurrent symptomatic atrial fibrillation and failed medical treatment     conscious sedation administered by anesthesia     consent obtained before procedure       Procedure note   after obtaining a valid consent patient was sedated by Anesthesia and a   OLYA probe was easily placed into esophagus with multiplane imaging with   2D, color and Doppler followed by bubble study with agitated saline   without any complications    OLYA  Findings    Normal LV systolic function  Moderate right ventricle enlargement with normal RV systolic function and   mild to moderate left atrial enlargement and moderate right atrial   enlargement without any shunt or clot    Plan  Proceed with ablation      Procedures done  Transesophageal echocardiography    Electronically signed by Joseph Andrews MD, 06/10/20, 8:25 AM.         Imaging  Chest X-ray:   Imaging Results (Last 24 Hours)     Procedure Component Value Units Date/Time    CT  Chest Pulmonary Embolism [[id number]] Collected:  06/12/20 2120     Updated:  06/12/20 2128    Narrative:          DATE OF EXAM:  6/12/2020 8:46 PM     PROCEDURE:  CT CHEST PULMONARY EMBOLISM-     INDICATIONS:   history of a fib, recent ablation worsening SOA 40-year-old female     COMPARISON:   Single view chest radiograph dated 09/01/2019     TECHNIQUE:  Routine transaxial slices were obtained through the chest after the  intravenous administration of 100 mL of Isovue 370. Reconstructed  coronal and sagittal images were also obtained. Automated exposure  control and iterative construction methods were used.        FINDINGS:  Mild respiratory motion artifact degrades image quality. There is  cardiomegaly. The pulmonary arteries are normal in caliber the contrast  bolus is slightly suboptimal no large embolus is identified. Smaller  segmental and subsegmental emboli could go undetected. The thoracic  aorta is normal in course and caliber without disease. No suspicious  pulmonary nodules or consolidations. No interstitial thickening or  bronchiectasis. The tracheobronchial tree is widely patent. No  endobronchial lesions are identified. No pleural or pericardial  effusions. No pneumothorax.  No pathologically enlarged lymph nodes.  There are calcified mediastinal and hilar nodes from prior granulomatous  disease. Thyroid gland is normal in size.  Limited images of the upper  abdomen demonstrate normal adrenal glands. The liver is mildly enlarged  and diffusely fatty infiltrated.  No acute bony abnormality or  suspicious focal osseous lesion in the visualized bony thorax.          Impression:          1. Mild motion artifact and slightly suboptimal contrast bolus. No  pulmonary embolus or acute abnormality is identified.  2. There is mild cardiomegaly and evidence of prior granulomatous  disease.  3. Mild hepatomegaly and fatty infiltration of the liver.     Electronically Signed By-Shyam Phoenix DO. On:6/12/2020 9:26  PM  This report was finalized on [id number] by  Shyam Phoenix DO..          Lab Review: I have reviewed the labs  Results from last 7 days   Lab Units 06/13/20  0205 06/12/20 2029   TROPONIN T ng/mL 0.362* 0.393*     Results from last 7 days   Lab Units 06/08/20  1140   MAGNESIUM mg/dL 2.1     Results from last 7 days   Lab Units 06/13/20  0205 06/12/20 2029   SODIUM mmol/L 136 135*   POTASSIUM mmol/L 3.6 3.8   BUN   --  16   CREATININE mg/dL 0.66 0.71   CALCIUM mg/dL 8.9 9.2     @[id number]@  Results from last 7 days   Lab Units 06/13/20  0205 06/12/20 2029 06/08/20  1140   WBC 10*3/mm3 8.20 9.10 5.76   HEMOGLOBIN g/dL 11.1* 12.4 13.6   HEMATOCRIT % 31.9* 35.6 40.1   PLATELETS 10*3/mm3 213 223 246                     Broderick Samuels MD  6/13/2020, 08:16      EMR Dragon/Transcription:   Dictated utilizing Dragon dictation

## 2020-06-14 ENCOUNTER — APPOINTMENT (OUTPATIENT)
Dept: CARDIOLOGY | Facility: HOSPITAL | Age: 40
End: 2020-06-14

## 2020-06-14 VITALS
TEMPERATURE: 97.7 F | HEART RATE: 65 BPM | DIASTOLIC BLOOD PRESSURE: 86 MMHG | HEIGHT: 63 IN | WEIGHT: 293 LBS | BODY MASS INDEX: 51.91 KG/M2 | RESPIRATION RATE: 20 BRPM | OXYGEN SATURATION: 100 % | SYSTOLIC BLOOD PRESSURE: 147 MMHG

## 2020-06-14 LAB
BH CV ECHO MEAS - ACS: 1.8 CM
BH CV ECHO MEAS - AO MAX PG (FULL): 7.6 MMHG
BH CV ECHO MEAS - AO MAX PG: 11.7 MMHG
BH CV ECHO MEAS - AO MEAN PG (FULL): 4.3 MMHG
BH CV ECHO MEAS - AO MEAN PG: 6.3 MMHG
BH CV ECHO MEAS - AO ROOT AREA (BSA CORRECTED): 1.2
BH CV ECHO MEAS - AO ROOT AREA: 6.6 CM^2
BH CV ECHO MEAS - AO ROOT DIAM: 2.9 CM
BH CV ECHO MEAS - AO V2 MAX: 170.7 CM/SEC
BH CV ECHO MEAS - AO V2 MEAN: 119.2 CM/SEC
BH CV ECHO MEAS - AO V2 VTI: 37.9 CM
BH CV ECHO MEAS - ASC AORTA: 2.9 CM
BH CV ECHO MEAS - BSA(HAYCOCK): 2.7 M^2
BH CV ECHO MEAS - BSA: 2.4 M^2
BH CV ECHO MEAS - BZI_BMI: 57.7 KILOGRAMS/M^2
BH CV ECHO MEAS - BZI_METRIC_HEIGHT: 160 CM
BH CV ECHO MEAS - BZI_METRIC_WEIGHT: 147.9 KG
BH CV ECHO MEAS - EDV(CUBED): 87.1 ML
BH CV ECHO MEAS - EDV(MOD-SP2): 55.2 ML
BH CV ECHO MEAS - EDV(MOD-SP4): 115.5 ML
BH CV ECHO MEAS - EDV(TEICH): 89.2 ML
BH CV ECHO MEAS - EF(CUBED): 68.5 %
BH CV ECHO MEAS - EF(MOD-BP): 66 %
BH CV ECHO MEAS - EF(MOD-SP2): 75.6 %
BH CV ECHO MEAS - EF(MOD-SP4): 58.5 %
BH CV ECHO MEAS - EF(TEICH): 60.3 %
BH CV ECHO MEAS - ESV(CUBED): 27.4 ML
BH CV ECHO MEAS - ESV(MOD-SP2): 13.5 ML
BH CV ECHO MEAS - ESV(MOD-SP4): 47.9 ML
BH CV ECHO MEAS - ESV(TEICH): 35.4 ML
BH CV ECHO MEAS - FS: 32 %
BH CV ECHO MEAS - IVS/LVPW: 1.1
BH CV ECHO MEAS - IVSD: 1.6 CM
BH CV ECHO MEAS - LA DIMENSION(2D): 3.5 CM
BH CV ECHO MEAS - LV DIASTOLIC VOL/BSA (35-75): 48.5 ML/M^2
BH CV ECHO MEAS - LV MASS(C)D: 293.3 GRAMS
BH CV ECHO MEAS - LV MASS(C)DI: 123.2 GRAMS/M^2
BH CV ECHO MEAS - LV MAX PG: 4 MMHG
BH CV ECHO MEAS - LV MEAN PG: 1.9 MMHG
BH CV ECHO MEAS - LV SYSTOLIC VOL/BSA (12-30): 20.1 ML/M^2
BH CV ECHO MEAS - LV V1 MAX: 100.1 CM/SEC
BH CV ECHO MEAS - LV V1 MEAN: 64.7 CM/SEC
BH CV ECHO MEAS - LV V1 VTI: 22.1 CM
BH CV ECHO MEAS - LVIDD: 4.4 CM
BH CV ECHO MEAS - LVIDS: 3 CM
BH CV ECHO MEAS - LVPWD: 1.5 CM
BH CV ECHO MEAS - MV A MAX VEL: 52 CM/SEC
BH CV ECHO MEAS - MV DEC SLOPE: 325.5 CM/SEC^2
BH CV ECHO MEAS - MV DEC TIME: 0.3 SEC
BH CV ECHO MEAS - MV E MAX VEL: 96.2 CM/SEC
BH CV ECHO MEAS - MV E/A: 1.8
BH CV ECHO MEAS - MV MAX PG: 4.3 MMHG
BH CV ECHO MEAS - MV MEAN PG: 1.8 MMHG
BH CV ECHO MEAS - MV V2 MAX: 103.7 CM/SEC
BH CV ECHO MEAS - MV V2 MEAN: 62.4 CM/SEC
BH CV ECHO MEAS - MV V2 VTI: 27.1 CM
BH CV ECHO MEAS - PA ACC TIME: 0.19 SEC
BH CV ECHO MEAS - PA MAX PG (FULL): 4.3 MMHG
BH CV ECHO MEAS - PA MAX PG: 6.1 MMHG
BH CV ECHO MEAS - PA MEAN PG (FULL): 2.2 MMHG
BH CV ECHO MEAS - PA MEAN PG: 3 MMHG
BH CV ECHO MEAS - PA PR(ACCEL): -6.1 MMHG
BH CV ECHO MEAS - PA V2 MAX: 123.2 CM/SEC
BH CV ECHO MEAS - PA V2 MEAN: 80.8 CM/SEC
BH CV ECHO MEAS - PA V2 VTI: 33.5 CM
BH CV ECHO MEAS - PULM A REVS DUR: 0.16 SEC
BH CV ECHO MEAS - PULM A REVS VEL: 31.9 CM/SEC
BH CV ECHO MEAS - PULM DIAS VEL: 36 CM/SEC
BH CV ECHO MEAS - PULM S/D: 1.1
BH CV ECHO MEAS - PULM SYS VEL: 38.9 CM/SEC
BH CV ECHO MEAS - PVA(I,A): 1.2 CM^2
BH CV ECHO MEAS - PVA(I,D): 1.2 CM^2
BH CV ECHO MEAS - PVA(V,A): 1.4 CM^2
BH CV ECHO MEAS - PVA(V,D): 1.4 CM^2
BH CV ECHO MEAS - RV MAX PG: 1.8 MMHG
BH CV ECHO MEAS - RV MEAN PG: 0.8 MMHG
BH CV ECHO MEAS - RV V1 MAX: 66.2 CM/SEC
BH CV ECHO MEAS - RV V1 MEAN: 41.4 CM/SEC
BH CV ECHO MEAS - RV V1 VTI: 15.1 CM
BH CV ECHO MEAS - RVDD: 3 CM
BH CV ECHO MEAS - RVOT AREA: 2.6 CM^2
BH CV ECHO MEAS - RVOT DIAM: 1.8 CM
BH CV ECHO MEAS - SI(AO): 105.5 ML/M^2
BH CV ECHO MEAS - SI(CUBED): 25.1 ML/M^2
BH CV ECHO MEAS - SI(MOD-SP2): 17.5 ML/M^2
BH CV ECHO MEAS - SI(MOD-SP4): 28.4 ML/M^2
BH CV ECHO MEAS - SI(TEICH): 22.6 ML/M^2
BH CV ECHO MEAS - SV(AO): 251.1 ML
BH CV ECHO MEAS - SV(CUBED): 59.7 ML
BH CV ECHO MEAS - SV(MOD-SP2): 41.7 ML
BH CV ECHO MEAS - SV(MOD-SP4): 67.6 ML
BH CV ECHO MEAS - SV(RVOT): 39.3 ML
BH CV ECHO MEAS - SV(TEICH): 53.8 ML

## 2020-06-14 PROCEDURE — G0378 HOSPITAL OBSERVATION PER HR: HCPCS

## 2020-06-14 PROCEDURE — 25010000002 SULFUR HEXAFLUORIDE MICROSPH 60.7-25 MG RECONSTITUTED SUSPENSION: Performed by: HOSPITALIST

## 2020-06-14 PROCEDURE — 93306 TTE W/DOPPLER COMPLETE: CPT

## 2020-06-14 PROCEDURE — 99214 OFFICE O/P EST MOD 30 MIN: CPT | Performed by: INTERNAL MEDICINE

## 2020-06-14 PROCEDURE — 99217 PR OBSERVATION CARE DISCHARGE MANAGEMENT: CPT | Performed by: HOSPITALIST

## 2020-06-14 PROCEDURE — 93306 TTE W/DOPPLER COMPLETE: CPT | Performed by: INTERNAL MEDICINE

## 2020-06-14 RX ADMIN — APIXABAN 5 MG: 5 TABLET, FILM COATED ORAL at 09:43

## 2020-06-14 RX ADMIN — SULFUR HEXAFLUORIDE 2 ML: KIT at 07:27

## 2020-06-14 RX ADMIN — SODIUM CHLORIDE 75 ML/HR: 900 INJECTION, SOLUTION INTRAVENOUS at 02:49

## 2020-06-14 RX ADMIN — ACETAMINOPHEN 650 MG: 325 TABLET, FILM COATED ORAL at 03:51

## 2020-06-14 RX ADMIN — Medication 10 ML: at 09:44

## 2020-06-14 NOTE — PROGRESS NOTES
Cardiology Progress Note    Patient Identification:  Name: Jo Asif  Age: 40 y.o.  Sex: female  :  1980  MRN: [id number]                 Follow Up / Chief Complaint: Follow-up for chest pain, A. fib  Chief Complaint   Patient presents with   • Shortness of Breath       Interval History: Currently chest pain free       Subjective: Denies any chest pain this morning.  Complaining of fatigue and malaise.      Objective: Troponin mildly elevated at 0.393 has come down to 0.2 389       History of Present Illness:       This is a 40-year-old with PMH of     -Atrial fibrillation, status post ablation 6/10/2020  -Hypertension  -Phenylketonuria  -PCO S, obesity, FRANK  -Family history of heart disease in father and diabetes in mother  -Tubal ligation, tonsillectomy, uvulectomy for FRANK and carpal tunnel  -Former smoker  -Allergy/intolerance to amoxicillin Cleocin and penicillin     Presented 2020 with complaint of chest pain and shortness of breath.  Patient was in the hospital and had A. fib ablation on 6/10/2020 and was discharged on 2020.  Patient apparently was feeling good after discharge and started having constant substernal chest pain in the night call cardiology office was advised to come to the ER.  Patient had prolonged midsternal chest pain and some difficulty breathing.  Pain was relieved after patient was given medications in the emergency room.  Patient is currently pain-free and does not have shortness of breath.  Work-up revealed elevated troponin at 0.393.  Hyperglycemia with sugar of 181 and 127  CT PE study is negative for PE.  Mild cardiomegaly with mild hepatomegaly and fatty infiltrates of liver.  EKG from 2020 reveals sinus rhythm at the rate of 93 bpm with PVC and couplets     Assessment:       -Chest pain  -Elevated troponin  -A. fib status post ablation 2020  -Hypertension  -Obesity, FRANK, PCOS  -Elevated LFTs, fatty liver  -Hyperglycemia with hemoglobin A1c of 5.8  consistent with prediabetes  -Elevated TSH at 9        Recommendations / Plan:         Telemetry is revealing sinus rhythm  Serial cardiac enzymes are trending down  Patient's cardiac enzymes can be mildly elevated because she had ablation recently.  Echocardiogram is not revealing any effusion.  Continue conservative management.  We will follow-up as outpatient  Advised patient to follow-up with primary cardiologist in 1 week         Past Medical History:  Past Medical History:   Diagnosis Date   • Arrhythmia    • Atrial fibrillation (CMS/HCC)    • Disease of thyroid gland    • Heart murmur    • Hypertension    • Migraine    • PCOS (polycystic ovarian syndrome)    • PKU (phenylketonuria) (CMS/HCC)      Past Surgical History:  Past Surgical History:   Procedure Laterality Date   • CARDIAC ELECTROPHYSIOLOGY PROCEDURE N/A 6/10/2020    Procedure: EP/Ablation;  Surgeon: Joseph Andrews MD;  Location: [facility] INVASIVE LOCATION;  Service: Cardiovascular;  Laterality: N/A;   • CARPAL TUNNEL RELEASE Right    • TONSILLECTOMY     • TUBAL ABDOMINAL LIGATION     • UVULECTOMY          Social History:   Social History     Tobacco Use   • Smoking status: Former Smoker     Types: Cigarettes     Last attempt to quit: 9/1/2009     Years since quitting: 10.7   • Smokeless tobacco: Never Used   Substance Use Topics   • Alcohol use: No     Frequency: Never      Family History:  Family History   Problem Relation Age of Onset   • Diabetes Mother    • Heart disease Father    • Cancer Father           Allergies:  Allergies   Allergen Reactions   • Amoxicillin Hives   • Cleocin [Clindamycin Hcl] Hives   • Penicillins Hives     Scheduled Meds:    apixaban 5 mg Q12H   metoprolol succinate XL 50 mg Nightly   Pegvaliase-pqpz 10 mg Once per day on Mon Wed Fri   sodium chloride 10 mL Q12H           INTAKE AND OUTPUT:    Intake/Output Summary (Last 24 hours) at 6/14/2020 1044  Last data filed at 6/14/2020 0939  Gross per 24 hour   Intake  "1000 ml   Output --   Net 1000 ml       Review of Systems:   Review of Systems   Constitution: Positive for malaise/fatigue. Negative for fever.   Cardiovascular: Positive for chest pain. Negative for palpitations.   Respiratory: Negative for cough and hemoptysis.    Gastrointestinal: Negative for nausea and vomiting.           Constitutional:  Temp:  [97.4 °F (36.3 °C)-97.8 °F (36.6 °C)] 97.7 °F (36.5 °C)  Heart Rate:  [62-69] 65  Resp:  [16-20] 20  BP: (113-147)/(75-86) 147/86    Physical Exam   /86   Pulse 65   Temp 97.7 °F (36.5 °C) (Oral)   Resp 20   Ht 160 cm (63\")   Wt (!) 148 kg (326 lb)   LMP 06/05/2020   SpO2 100%   BMI 57.75 kg/m²   General:  Appears in no acute distress  Eyes: Sclera is anicteric,  conjunctiva is clear   HEENT:  No JVD. Thyroid not visibly enlarged. No mucosal pallor or cyanosis  Respiratory: Respirations regular and unlabored at rest.  Bilaterally good breath sounds, with good air entry in all fields. No crackles, rubs or wheezes auscultated  Cardiovascular: S1,S2 Regular rate and rhythm. No murmur, rub or gallop auscultated.  . No pretibial pitting edema  Gastrointestinal: Abdomen soft, flat, non tender. Bowel sounds present.   Musculoskeletal:  No abnormal movements  Extremities: No digital clubbing or cyanosis  Skin: Color pink. Skin warm and dry to touch. No rashes  No xanthoma  Neuro: Alert and awake, no lateralizing deficits appreciated        Cardiographics  Telemetry: Sinus rhythm    ECG:   ECG 12 Lead   Preliminary Result   HEART RATE= 93  bpm   RR Interval= 744  ms   SD Interval= 179  ms   P Horizontal Axis= 5  deg   P Front Axis= 60  deg   QRSD Interval= 100  ms   QT Interval= 366  ms   QRS Axis= 4  deg   T Wave Axis= 63  deg   - ABNORMAL ECG -   Sinus rhythm   Paired ventricular premature complexes   Probable left atrial enlargement   Electronically Signed By:    Date and Time of Study: 2020-06-12 20:08:45        I have personally reviewed " "EKG    Echocardiogram: Results for orders placed during the hospital encounter of 06/12/20   Adult Transthoracic Echo Complete W/ Cont if Necessary Per Protocol    Narrative Clinically difficult study therefore Lumason contrast was used.  Normal LV size and contractility EF of 60 to 65%  Normal RV size  Normal atrial size  Aortic valve, mitral valve, tricuspid valve appears structurally normal,   mild mitral regurgitation seen.  No pericardial effusion seen.  Proximal aorta appears normal in size.       Lab Review   I have reviewed the labs  Results from last 7 days   Lab Units 06/13/20  1354 06/13/20  0844 06/13/20  0205   TROPONIN T ng/mL 0.239* <0.010 0.362*     Results from last 7 days   Lab Units 06/08/20  1140   MAGNESIUM mg/dL 2.1     Results from last 7 days   Lab Units 06/13/20  0205   SODIUM mmol/L 136   POTASSIUM mmol/L 3.6   BUN  16   CREATININE mg/dL 0.66   CALCIUM mg/dL 8.9         Results from last 7 days   Lab Units 06/13/20  0205 06/12/20  2029 06/08/20  1140   WBC 10*3/mm3 8.20 9.10 5.76   HEMOGLOBIN g/dL 11.1* 12.4 13.6   HEMATOCRIT % 31.9* 35.6 40.1   PLATELETS 10*3/mm3 213 223 246           RADIOLOGY:  Imaging Results (Last 24 Hours)     ** No results found for the last 24 hours. **                )6/14/2020  MD CHRIS Crowley Teleport/Transcription:   \"Dictated utilizing Dragon dictation\".   "

## 2020-06-14 NOTE — SIGNIFICANT NOTE
06/14/20 1210   Discharge of Care   Discharge Mode wheel chair   Discharge Destination home   Discharged Accompanied by self only   Discharge Teaching Done  Yes   Learning Method Explanation;Written Materials

## 2020-06-14 NOTE — PLAN OF CARE
Problem: Patient Care Overview  Goal: Plan of Care Review  Outcome: Ongoing (interventions implemented as appropriate)  Flowsheets (Taken 6/14/2020 2242)  Progress: improving  Plan of Care Reviewed With: patient  Note:   No c/o of chest pain or distress.  Will continue with current orders and care plan.  Pt to have echocardiogram today.

## 2020-06-14 NOTE — DISCHARGE SUMMARY
[facility] Medicine Services  DISCHARGE SUMMARY        Prepared For PCP:  Kelsey Sanchez    Patient Name: Jo Asif  : 1980  MRN: [id number]      Date of Admission:   2020    Date of Discharge:  2020    Length of stay:  LOS: 0 days     Hospital Course     Presenting Problem:   Shortness of breath [R06.02]  Chest pain, unspecified type [R07.9]      Active Hospital Problems    Diagnosis  POA   • Chest pain [R07.9]  Yes      Resolved Hospital Problems   No resolved problems to display.           Hospital Course:    The patient was placed on observation.  Cardiac enzymes were cycled and MI was ruled out.  She was evaluated by cardiologist in the morning of 2020.  TTE was ordered and revealed normal LV function with valvular dysfunction or pericardial effusion.  She was discharged home in the morning 2020.  She needs to follow-up with her cardiologist within 1 week.          Recommendation for Outpatient Providers:             Reasons For Change In Medications and Indications for New Medications:        Day of Discharge     HPI:     The patient is a 41-year-old female with history of DM, CAD, PCOS and phenylketonuria, hypertension, FRANK noncompliant with CPAP and atrial fibrillation that underwent ablation for persistent A.fib/a flutter on 6/10/2020.  She claimed to have experienced bilateral chest tightness and shortness of air when she got home on 20 and shortness of breath was worse when she laid flat therefore slept on a chair.     In the ED, she underwent CTA of the chest which is negative for pulmonary embolism.  COVID-19 was tested was negative on 6/10/2020 .     Vital Signs:   Temp:  [97.4 °F (36.3 °C)-97.8 °F (36.6 °C)] 97.7 °F (36.5 °C)  Heart Rate:  [62-69] 65  Resp:  [16-20] 20  BP: (113-147)/(75-86) 147/86     Physical Exam:  Physical Exam   Constitutional: She is oriented to person, place, and time. She appears well-developed and  well-nourished.   HENT:   Head: Normocephalic and atraumatic.   Eyes: Pupils are equal, round, and reactive to light. EOM are normal.   Neck: Normal range of motion. Neck supple.   Cardiovascular: Normal rate and regular rhythm.   Pulmonary/Chest: Effort normal and breath sounds normal.   Abdominal: Soft. Bowel sounds are normal.   Obese abdomen   Musculoskeletal:   Moves all extremities equally.   Lymphadenopathy:     She has no cervical adenopathy.   Neurological: She is alert and oriented to person, place, and time. No cranial nerve deficit or sensory deficit.   Skin: Skin is warm and dry. No rash noted.   Psychiatric: She has a normal mood and affect. Her speech is normal and behavior is normal. Cognition and memory are normal.       Pertinent  and/or Most Recent Results     Results from last 7 days   Lab Units 06/13/20  0205 06/12/20 2029 06/08/20  1140   WBC 10*3/mm3 8.20 9.10 5.76   HEMOGLOBIN g/dL 11.1* 12.4 13.6   HEMATOCRIT % 31.9* 35.6 40.1   PLATELETS 10*3/mm3 213 223 246   SODIUM mmol/L 136 135* 135*   POTASSIUM mmol/L 3.6 3.8 4.3   CHLORIDE mmol/L 104 103 102   CO2 mmol/L 23.0 23.0 22.8   BUN  16 16 10   CREATININE mg/dL 0.66 0.71 0.68   GLUCOSE mg/dL 127* 181* 106*   CALCIUM mg/dL 8.9 9.2 8.6     Results from last 7 days   Lab Units 06/12/20 2029 06/08/20  1140   BILIRUBIN mg/dL 0.2 0.5   ALK PHOS U/L 57 56   ALT (SGPT) U/L 60* 47*   AST (SGOT) U/L 48* 37*     Results from last 7 days   Lab Units 06/08/20  1140   CHOLESTEROL mg/dL 182   TRIGLYCERIDES mg/dL 143   HDL CHOL mg/dL 41     Results from last 7 days   Lab Units 06/13/20  1354 06/13/20  0844 06/13/20  0205 06/12/20 2029 06/08/20  1140   TSH uIU/mL  --   --  9.030*  --   --    HEMOGLOBIN A1C %  --   --   --   --  5.8*   TROPONIN T ng/mL 0.239* <0.010 0.362* 0.393*  --        Brief Urine Lab Results  (Last result in the past 365 days)      Color   Clarity   Blood   Leuk Est   Nitrite   Protein   CREAT   Urine HCG        06/13/20 9385                Negative           Microbiology Results Abnormal     None          Us Thyroid    Result Date: 6/8/2020  Impression: Impression:  1.Enlarged, heterogeneous thyroid gland. 2.1.0 cm right thyroid nodule (TI RADS 3). No imaging follow-up is needed based on its size and morphology.  Reference:  Navjot DAIGLE, et al. (2017). ACR Thyroid Imaging, Reporting and Data System (TI-RADS): White Paper of the ACR TI-RADS Committee. Journal of the American College of Radiology (JACR), 14(5), pp.587-595.  Electronically Signed By-Iris Arvizu On:6/8/2020 12:54 PM This report was finalized on [id number] by  Iris Arvizu, .    Ct Chest Pulmonary Embolism    Result Date: 6/12/2020  Impression:  1. Mild motion artifact and slightly suboptimal contrast bolus. No pulmonary embolus or acute abnormality is identified. 2. There is mild cardiomegaly and evidence of prior granulomatous disease. 3. Mild hepatomegaly and fatty infiltration of the liver.  Electronically Signed By-Shyam Phoenix DO. On:6/12/2020 9:26 PM This report was finalized on [id number] by  Shyam Phoenix DO..      Results for orders placed during the hospital encounter of 01/24/20   Duplex Venous Lower Extremity - Left CAR    Narrative · Normal left lower extremity venous duplex scan.          Results for orders placed during the hospital encounter of 01/24/20   Duplex Venous Lower Extremity - Left CAR    Narrative · Normal left lower extremity venous duplex scan.          Results for orders placed during the hospital encounter of 06/12/20   Adult Transthoracic Echo Complete W/ Cont if Necessary Per Protocol    Narrative Clinically difficult study therefore Lumason contrast was used.  Normal LV size and contractility EF of 60 to 65%  Normal RV size  Normal atrial size  Aortic valve, mitral valve, tricuspid valve appears structurally normal,   mild mitral regurgitation seen.  No pericardial effusion seen.  Proximal aorta appears normal in size.               Test  Results Pending at Discharge        Procedures Performed           Consults:   Consults     Date and Time Order Name Status Description    6/13/2020 0051 Inpatient Cardiology Consult Completed     6/12/2020 2147 Hospitalist (on-call MD unless specified) Completed             Discharge Details        Discharge Medications      Continue These Medications      Instructions Start Date   apixaban 5 MG tablet tablet  Commonly known as:  ELIQUIS   5 mg, Oral, Every 12 Hours Scheduled      metoprolol succinate XL 50 MG 24 hr tablet  Commonly known as:  TOPROL-XL   50 mg, Oral, Nightly      Palynziq 20 MG/ML solution prefilled syringe  Generic drug:  Pegvaliase-pqpz   10 mg, Subcutaneous, 3 Times Weekly, Monday, Wednesday and Friday             Allergies   Allergen Reactions   • Amoxicillin Hives   • Cleocin [Clindamycin Hcl] Hives   • Penicillins Hives         Discharge Disposition:  Home or Self Care    Diet:  Hospital:  Diet Order   Procedures   • Diet Cardiac; Healthy Heart         Discharge Activity: as tolerated        CODE STATUS:    Code Status and Medical Interventions:   Ordered at: 06/12/20 2206     Code Status:    CPR     Medical Interventions (Level of Support Prior to Arrest):    Full         Follow-up Appointments  No future appointments.    Additional Instructions for the Follow-ups that You Need to Schedule     Discharge Follow-up with PCP   As directed       Currently Documented PCP:    Kelsey Sanchez    PCP Phone Number:    [phone]     Follow Up Details:  1 week                 Condition on Discharge:      Stable      This patient has been examined wearing appropriate Personal Protective Equipment 06/14/20      Electronically signed by Khari Peralta DO, 06/14/20, 11:45 AM.      Time: I spent  15 minutes on this discharge activity which included face-to-face encounter with the patient/reviewing the data in the system/coordination of the care with the nursing staff as well as  consultants/documentation/entering orders.

## 2020-06-15 PROCEDURE — 93010 ELECTROCARDIOGRAM REPORT: CPT | Performed by: INTERNAL MEDICINE

## 2020-06-19 ENCOUNTER — OFFICE VISIT (OUTPATIENT)
Dept: CARDIOLOGY | Facility: CLINIC | Age: 40
End: 2020-06-19

## 2020-06-19 VITALS
WEIGHT: 293 LBS | SYSTOLIC BLOOD PRESSURE: 141 MMHG | OXYGEN SATURATION: 99 % | BODY MASS INDEX: 56.83 KG/M2 | HEART RATE: 76 BPM | DIASTOLIC BLOOD PRESSURE: 86 MMHG

## 2020-06-19 DIAGNOSIS — E70.1 PHENYLKETONURIA (HCC): ICD-10-CM

## 2020-06-19 DIAGNOSIS — I10 ESSENTIAL HYPERTENSION: ICD-10-CM

## 2020-06-19 DIAGNOSIS — G47.33 OBSTRUCTIVE SLEEP APNEA: ICD-10-CM

## 2020-06-19 DIAGNOSIS — I48.0 PAROXYSMAL ATRIAL FIBRILLATION (HCC): Primary | ICD-10-CM

## 2020-06-19 DIAGNOSIS — R06.09 DYSPNEA ON EXERTION: ICD-10-CM

## 2020-06-19 PROCEDURE — 99214 OFFICE O/P EST MOD 30 MIN: CPT | Performed by: INTERNAL MEDICINE

## 2020-06-19 PROCEDURE — 93000 ELECTROCARDIOGRAM COMPLETE: CPT | Performed by: INTERNAL MEDICINE

## 2020-06-19 NOTE — PROGRESS NOTES
CC--recurrent symptomatic paroxysmal atrial fibrillation    Sub--40-year-old pleasant patient has been having symptoms of atrial arrhythmias since last October 2019--she has seen Dr. Watkins and patient underwent EKGs, echocardiography in the past--she was placed on amiodarone which has been stopped and currently on a combination of beta-blockers and calcium channel blockers--she is unable to afford Eliquis--she was seen back in March of this year by Dr. Watkins when she was in sinus rhythm--she started have recurrent symptoms of arrhythmias with atrial arrhythmias and she is currently off amiodarone and patient came for follow-up --she is still having recurrent arrhythmias in the last episode happened last night and feels her heart is racing with dyspnea and fatigue without syncope or chest pain --She has history of significant obesity and sleep apnea and she is awaiting for sleep specialist to optimize sleep apnea therapy  She does have history of mild hypertension  Chronic medical conditions also include phenylketonuria, PCOS, obesity with sleep apnea  Patient did have contact with COVID patient and underwent echo with testing which was negative  She denies any symptoms of ongoing COVID pandemic  Patient underwent AF ablation last week and post ablation patient did not have any complications was discharged home and she was readmitted because of shortness of breath and a CT chest was done which did not show pulmonary embolus and repeat echocardiography without any effusion was discharged home and comes back for evaluation  She complains of trouble breathing and also right groin discomfort and denies any chest pain and feels fatigued    Past surgical history includes--tubal ligation, tonsillectomy and uvulectomy for sleep apnea and carpal tunnel surgery    Past Medical History:   Diagnosis Date   • Arrhythmia    • Atrial fibrillation (CMS/HCC)    • Disease of thyroid gland    • Heart murmur    • Hypertension         Family History   Problem Relation Age of Onset   • Diabetes Mother    • Heart disease Father    • Cancer Father      Social History     Tobacco Use   • Smoking status: Former Smoker     Types: Cigarettes     Last attempt to quit: 9/1/2009     Years since quitting: 10.6   • Smokeless tobacco: Never Used   Substance Use Topics   • Alcohol use: No     Frequency: Never   • Drug use: No     Allergies:  Amoxicillin; Cleocin [clindamycin hcl]; and Penicillins    Review of Systems   General:  positive for fatigue and tiredness  Eyes: No redness  Cardiovascular: No chest pain, no palpitations  Respiratory:   positive for class 3 shortness of breath  Gastrointestinal: No nausea or vomiting, bleeding  Genitourinary: no hematuria or dysuria  Musculoskeletal: No arthralgia or myalgia  Skin: No rash  Neurologic: No numbness, tingling, syncope  Hematologic/Lymphatic: No abnormal bleeding      Physical Exam--blood pressure 141/86, pulse rate 76 patient is afebrile respiration 12 times a minute  EKG shows sinus rhythm with left atrial enlargement heart rate 74 FL interval 172 QRS of 102 QTC is 390 and no significant EKG changes compared to previous EKG and EKG indication includes recent AF ablation and dyspnea       General:      well developed, well nourished, in no acute distress.    Head:      normocephalic and atraumatic.    Eyes:      PERRL/EOM intact, conjunctiva and sclera clear with out nystagmus.    Neck:      no masses, thyromegaly, trachea central with normal respiratory effort  Lungs:      clear bilaterally to auscultation.    Heart:      non-displaced PMI, chest non-tender; regular rate and rhythm, S1, S2 without murmurs, rubs, or gallops  Skin:      intact without lesions or rashes.    Psych:      alert and cooperative; normal mood and affect; normal attention span and concentration.      Extremities with trace ankle edema without any cyanosis or clubbing    Neurological no focal deficits and alert oriented  x3    Right groin was soft with mild bruising and a small hematoma which was tender without any bruit on auscultation      Assessment and plan    Recurrent symptomatic paroxysmal atrial fibrillation  Obesity with sleep apnea  Mild hypertension  History of phenylketonuria  Post AF ablation with resolution of arrhythmia  Post AF ablation with dyspnea and also right groin discomfort with benign physical exam and repeat CT chest and echo postprocedure without any significant abnormalities  Patient was recommended to use an ice pack on the groin and resume her sleep apnea therapy  Patient to be off for few weeks to recover from the ablation  Reevaluate closely in 2 weeks and continue current medications    Electronically signed by Joseph Andrews MD, 06/19/20, 12:47 PM.

## 2020-07-06 ENCOUNTER — OFFICE VISIT (OUTPATIENT)
Dept: CARDIOLOGY | Facility: CLINIC | Age: 40
End: 2020-07-06

## 2020-07-06 VITALS
WEIGHT: 293 LBS | DIASTOLIC BLOOD PRESSURE: 91 MMHG | BODY MASS INDEX: 55.45 KG/M2 | OXYGEN SATURATION: 96 % | SYSTOLIC BLOOD PRESSURE: 149 MMHG | HEART RATE: 59 BPM

## 2020-07-06 DIAGNOSIS — I48.0 PAROXYSMAL ATRIAL FIBRILLATION (HCC): Primary | ICD-10-CM

## 2020-07-06 DIAGNOSIS — G47.33 OBSTRUCTIVE SLEEP APNEA: ICD-10-CM

## 2020-07-06 DIAGNOSIS — I10 ESSENTIAL HYPERTENSION: ICD-10-CM

## 2020-07-06 DIAGNOSIS — E70.1 PHENYLKETONURIA (HCC): ICD-10-CM

## 2020-07-06 PROCEDURE — 93000 ELECTROCARDIOGRAM COMPLETE: CPT | Performed by: INTERNAL MEDICINE

## 2020-07-06 PROCEDURE — 99213 OFFICE O/P EST LOW 20 MIN: CPT | Performed by: INTERNAL MEDICINE

## 2020-07-06 RX ORDER — PAROXETINE HYDROCHLORIDE 20 MG/1
20 TABLET, FILM COATED ORAL EVERY MORNING
COMMUNITY
End: 2021-02-11

## 2020-07-06 NOTE — PROGRESS NOTES
CC--recurrent symptomatic paroxysmal atrial fibrillation    Sub--40-year-old pleasant patient has been having symptoms of atrial arrhythmias since last October 2019--she has seen Dr. Watkins and patient underwent EKGs, echocardiography in the past--she was placed on amiodarone which has been stopped and currently on a combination of beta-blockers and calcium channel blockers--she is unable to afford Eliquis--she was seen back in March of this year by Dr. Watkins when she was in sinus rhythm--she started have recurrent symptoms of arrhythmias with atrial arrhythmias and she is currently off amiodarone and patient came for follow-up --she is still having recurrent arrhythmias in the last episode happened last night and feels her heart is racing with dyspnea and fatigue without syncope or chest pain --She has history of significant obesity and sleep apnea and she is awaiting for sleep specialist to optimize sleep apnea therapy  She does have history of mild hypertension  Chronic medical conditions also include phenylketonuria, PCOS, obesity with sleep apnea  Patient did have contact with COVID patient and underwent echo with testing which was negative  She denies any symptoms of ongoing COVID pandemic  Patient underwent AF ablation few weeks ago  and post ablation patient did not have any complications was discharged home and she was readmitted because of shortness of breath and a CT chest was done which did not show pulmonary embolus and repeat echocardiography without any effusion was discharged home and came for evaluation after recent office evaluation for dyspnea --she has no further symptoms of unusual dyspnea or any  discomfort or palpitations and she had mild sleep problems for which she is initiated on Paxil and she feels much better   Past surgical history includes--tubal ligation, tonsillectomy and uvulectomy for sleep apnea and carpal tunnel surgery    Past Medical History:   Diagnosis Date   • Arrhythmia    •  Atrial fibrillation (CMS/HCC)    • Disease of thyroid gland    • Heart murmur    • Hypertension        Family History   Problem Relation Age of Onset   • Diabetes Mother    • Heart disease Father    • Cancer Father      Social History     Tobacco Use   • Smoking status: Former Smoker     Types: Cigarettes     Last attempt to quit: 9/1/2009     Years since quitting: 10.6   • Smokeless tobacco: Never Used   Substance Use Topics   • Alcohol use: No     Frequency: Never   • Drug use: No     Allergies:  Amoxicillin; Cleocin [clindamycin hcl]; and Penicillins    Review of Systems   General:  positive for fatigue and tiredness  Eyes: No redness  Cardiovascular: No chest pain, no palpitations  Respiratory:   positive for class 2 shortness of breath  Gastrointestinal: No nausea or vomiting, bleeding  Genitourinary: no hematuria or dysuria  Musculoskeletal: No arthralgia or myalgia  Skin: No rash  Neurologic: No numbness, tingling, syncope  Hematologic/Lymphatic: No abnormal bleeding      Physical Exam--blood pressure 149/91, pulse rate 59 patient is afebrile respiration 12 times a minute  EKG shows sinus rhythm with left atrial enlargement heart rate 58 and TX interval of 190 QRS of 101 QTC of 424 and low voltage complexes and no significant EKG changes compared to prior EKG an EKG indication includes atrial arrhythmias with recent ablation       General:      well developed, well nourished, in no acute distress.    Head:      normocephalic and atraumatic.    Eyes:      PERRL/EOM intact, conjunctiva and sclera clear with out nystagmus.    Neck:      no masses, thyromegaly, trachea central with normal respiratory effort  Lungs:      clear bilaterally to auscultation.    Heart:      non-displaced PMI, chest non-tender; regular rate and rhythm, S1, S2 without murmurs, rubs, or gallops  Skin:      intact without lesions or rashes.    Psych:      alert and cooperative; normal mood and affect; normal attention span and  concentration.      Extremities with trace ankle edema without any cyanosis or clubbing    Neurological no focal deficits and alert oriented x3          Assessment and plan    Recurrent symptomatic paroxysmal atrial fibrillation  Obesity with sleep apnea  Mild hypertension  History of phenylketonuria  Post AF ablation with resolution of arrhythmia  Post AF ablation with dyspnea and also right groin discomfort with resolution since last visit   Gradual weight reduction encouraged  Stop Eliquis  Start baby aspirin  Follow-up in 3 months    Electronically signed by Joseph Andrews MD, 07/06/20, 9:41 AM.

## 2020-09-19 ENCOUNTER — APPOINTMENT (OUTPATIENT)
Dept: CT IMAGING | Facility: HOSPITAL | Age: 40
End: 2020-09-19

## 2020-09-19 ENCOUNTER — HOSPITAL ENCOUNTER (EMERGENCY)
Facility: HOSPITAL | Age: 40
Discharge: HOME | End: 2020-09-19
Attending: EMERGENCY MEDICINE | Admitting: EMERGENCY MEDICINE

## 2020-09-19 VITALS
HEART RATE: 72 BPM | TEMPERATURE: 98.1 F | OXYGEN SATURATION: 99 % | DIASTOLIC BLOOD PRESSURE: 82 MMHG | HEIGHT: 63 IN | WEIGHT: 293 LBS | BODY MASS INDEX: 51.91 KG/M2 | RESPIRATION RATE: 18 BRPM | SYSTOLIC BLOOD PRESSURE: 134 MMHG

## 2020-09-19 DIAGNOSIS — M54.50 ACUTE LEFT-SIDED LOW BACK PAIN WITHOUT SCIATICA: Primary | ICD-10-CM

## 2020-09-19 LAB
BILIRUB UR QL STRIP: NEGATIVE
CLARITY UR: CLEAR
COLOR UR: YELLOW
GLUCOSE UR STRIP-MCNC: NEGATIVE MG/DL
HGB UR QL STRIP.AUTO: NEGATIVE
KETONES UR QL STRIP: NEGATIVE
LEUKOCYTE ESTERASE UR QL STRIP.AUTO: NEGATIVE
NITRITE UR QL STRIP: NEGATIVE
PH UR STRIP.AUTO: 5.5 [PH] (ref 5–8)
PROT UR QL STRIP: NEGATIVE
SP GR UR STRIP: 1.03 (ref 1–1.03)
UROBILINOGEN UR QL STRIP: NORMAL

## 2020-09-19 PROCEDURE — 81003 URINALYSIS AUTO W/O SCOPE: CPT | Performed by: EMERGENCY MEDICINE

## 2020-09-19 PROCEDURE — 74176 CT ABD & PELVIS W/O CONTRAST: CPT

## 2020-09-19 PROCEDURE — P9612 CATHETERIZE FOR URINE SPEC: HCPCS

## 2020-09-19 PROCEDURE — 99284 EMERGENCY DEPT VISIT MOD MDM: CPT

## 2020-09-19 RX ORDER — METHYLPREDNISOLONE 4 MG/1
TABLET ORAL
Qty: 21 TABLET | Refills: 0 | Status: SHIPPED | OUTPATIENT
Start: 2020-09-19 | End: 2021-02-11

## 2020-09-19 RX ORDER — HYDROCODONE BITARTRATE AND ACETAMINOPHEN 7.5; 325 MG/1; MG/1
1 TABLET ORAL ONCE
Status: COMPLETED | OUTPATIENT
Start: 2020-09-19 | End: 2020-09-19

## 2020-09-19 RX ORDER — CYCLOBENZAPRINE HCL 10 MG
10 TABLET ORAL 3 TIMES DAILY PRN
Qty: 20 TABLET | Refills: 0 | Status: SHIPPED | OUTPATIENT
Start: 2020-09-19 | End: 2021-03-15

## 2020-09-19 RX ADMIN — HYDROCODONE BITARTRATE AND ACETAMINOPHEN 1 TABLET: 7.5; 325 TABLET ORAL at 15:07

## 2020-09-19 NOTE — ED PROVIDER NOTES
Subjective   Chief complaint: Left-sided back pain    40-year-old female presents with left-sided back pain.  Patient states pain started on Monday and has been constant.  The pain does not radiate.  She has had no dysuria or hematuria.  She denies any numbness, weakness, tingling.  She was prescribed Mobic by a tele-doctor but states it has not helped.  She denies any alleviating or exacerbating factors.  Pain is described as moderate in intensity.      History provided by:  Patient      Review of Systems   Constitutional: Negative for fever.   HENT: Negative for congestion and sore throat.    Eyes: Negative for redness.   Respiratory: Negative for cough and shortness of breath.    Cardiovascular: Negative for chest pain.   Gastrointestinal: Negative for abdominal pain, diarrhea and vomiting.   Genitourinary: Negative for dysuria and hematuria.   Musculoskeletal: Positive for back pain.   Skin: Negative for rash.   Neurological: Negative for dizziness and headaches.   Psychiatric/Behavioral: Negative for confusion.       Past Medical History:   Diagnosis Date   • Arrhythmia    • Atrial fibrillation (CMS/HCC)    • Disease of thyroid gland    • Heart murmur    • Hypertension    • Migraine    • PCOS (polycystic ovarian syndrome)    • PKU (phenylketonuria) (CMS/HCC)        Allergies   Allergen Reactions   • Amoxicillin Hives   • Cleocin [Clindamycin Hcl] Hives   • Penicillins Hives       Past Surgical History:   Procedure Laterality Date   • ABLATION OF DYSRHYTHMIC FOCUS     • CARDIAC ELECTROPHYSIOLOGY PROCEDURE N/A 6/10/2020    Procedure: EP/Ablation;  Surgeon: Joseph Andrews MD;  Location: [facility] INVASIVE LOCATION;  Service: Cardiovascular;  Laterality: N/A;   • CARPAL TUNNEL RELEASE Right    • TONSILLECTOMY     • TUBAL ABDOMINAL LIGATION     • UVULECTOMY         Family History   Problem Relation Age of Onset   • Diabetes Mother    • Heart disease Father    • Cancer Father        Social History  "    Socioeconomic History   • Marital status:      Spouse name: Not on file   • Number of children: Not on file   • Years of education: Not on file   • Highest education level: Not on file   Tobacco Use   • Smoking status: Former Smoker     Types: Cigarettes     Quit date: 2009     Years since quittin.0   • Smokeless tobacco: Never Used   Substance and Sexual Activity   • Alcohol use: No     Frequency: Never   • Drug use: No   • Sexual activity: Defer       /88   Pulse 70   Temp 98.1 °F (36.7 °C) (Oral)   Resp 16   Ht 160 cm (63\")   Wt (!) 144 kg (316 lb 9.3 oz)   LMP 2020   SpO2 97%   Breastfeeding No   BMI 56.08 kg/m²       Objective   Physical Exam  Constitutional:       Appearance: She is well-developed. She is obese.   HENT:      Head: Normocephalic and atraumatic.   Eyes:      Extraocular Movements: Extraocular movements intact.      Pupils: Pupils are equal, round, and reactive to light.   Neck:      Musculoskeletal: Normal range of motion and neck supple.   Cardiovascular:      Rate and Rhythm: Normal rate and regular rhythm.      Heart sounds: Normal heart sounds.   Pulmonary:      Effort: Pulmonary effort is normal. No respiratory distress.      Breath sounds: Normal breath sounds.   Abdominal:      General: Bowel sounds are normal.      Palpations: Abdomen is soft.      Tenderness: There is no abdominal tenderness. There is no right CVA tenderness or left CVA tenderness.   Musculoskeletal: Normal range of motion.      Comments: There is no tenderness to the back and no visible injuries.   Skin:     General: Skin is warm and dry.   Neurological:      General: No focal deficit present.      Mental Status: She is alert and oriented to person, place, and time.      Comments: Reflexes are present and equal to the bilateral lower extremities         Procedures           ED Course      Results for orders placed or performed during the hospital encounter of 20 "   Urinalysis With Culture If Indicated - Urine, Catheter In/Out    Specimen: Urine, Catheter In/Out   Result Value Ref Range    Color, UA Yellow Yellow, Straw    Appearance, UA Clear Clear    pH, UA 5.5 5.0 - 8.0    Specific Gravity, UA 1.027 1.005 - 1.030    Glucose, UA Negative Negative    Ketones, UA Negative Negative    Bilirubin, UA Negative Negative    Blood, UA Negative Negative    Protein, UA Negative Negative    Leuk Esterase, UA Negative Negative    Nitrite, UA Negative Negative    Urobilinogen, UA 0.2 E.U./dL 0.2 - 1.0 E.U./dL     Ct Abdomen Pelvis Without Contrast    Result Date: 9/19/2020  No acute abnormality in the abdomen or pelvis.  Electronically Signed By-Isaac Michaels On:9/19/2020 3:33 PM This report was finalized on [id number] by  Isaac Michaels, .                                         MDM   Patient had the above evaluation.  Results were discussed with the patient.  CT abdomen and pelvis showed no acute normality.  Urinalysis was normal.  Pain seems to be musculoskeletal in nature.  She will be given prescriptions for Medrol Dosepak and Flexeril.  She will continue Mobic as previously prescribed.      Final diagnoses:   Acute left-sided low back pain without sciatica            Norbert Abdi MD  09/19/20 5383

## 2020-10-06 RX ORDER — METOPROLOL SUCCINATE 50 MG/1
TABLET, EXTENDED RELEASE ORAL
Qty: 90 TABLET | Refills: 1 | Status: SHIPPED | OUTPATIENT
Start: 2020-10-06 | End: 2022-02-10

## 2020-11-23 ENCOUNTER — TELEPHONE (OUTPATIENT)
Dept: BARIATRICS/WEIGHT MGMT | Facility: CLINIC | Age: 40
End: 2020-11-23

## 2020-11-30 ENCOUNTER — TELEPHONE (OUTPATIENT)
Dept: BARIATRICS/WEIGHT MGMT | Facility: CLINIC | Age: 40
End: 2020-11-30

## 2020-12-30 ENCOUNTER — TRANSCRIBE ORDERS (OUTPATIENT)
Dept: PHYSICAL THERAPY | Facility: CLINIC | Age: 40
End: 2020-12-30

## 2020-12-30 DIAGNOSIS — S50.01XA CONTUSION OF RIGHT ELBOW, INITIAL ENCOUNTER: Primary | ICD-10-CM

## 2020-12-30 DIAGNOSIS — S80.01XA CONTUSION OF RIGHT KNEE, INITIAL ENCOUNTER: ICD-10-CM

## 2021-01-04 ENCOUNTER — TREATMENT (OUTPATIENT)
Dept: PHYSICAL THERAPY | Facility: CLINIC | Age: 41
End: 2021-01-04

## 2021-01-04 DIAGNOSIS — S50.01XA CONTUSION OF RIGHT ELBOW, INITIAL ENCOUNTER: Primary | ICD-10-CM

## 2021-01-04 DIAGNOSIS — S80.01XA CONTUSION OF RIGHT KNEE, INITIAL ENCOUNTER: ICD-10-CM

## 2021-01-04 PROCEDURE — 97110 THERAPEUTIC EXERCISES: CPT | Performed by: PHYSICAL THERAPIST

## 2021-01-04 PROCEDURE — 97162 PT EVAL MOD COMPLEX 30 MIN: CPT | Performed by: PHYSICAL THERAPIST

## 2021-01-04 NOTE — PROGRESS NOTES
Physical Therapy Initial Evaluation and Plan of Care    Patient: Jo Asif   : 1980  Diagnosis/ICD-10 Code:  Contusion of right elbow, initial encounter [S50.01XA]  Referring practitioner: DEANNA Pratt  Date of Initial Visit: 2021  Today's Date: 2021  Patient seen for 1 sessions           Subjective Questionnaire: QuickDASH: 52.27% limited; OKS: 38/48      Subjective Evaluation    History of Present Illness  Date of onset: 2020  Mechanism of injury: Pt reports on 20 she was working and they got a shipment in. Pt states she has to get up into the container to cut the air pillow to be able to remove the pallets so she could open the dock plate to get in there. When pt went to step out she misjudged the step and landed on her R elbow & knee onto the steel plate of the dock plate. Pt was trying to reach to grab something to catch herself, but couldn't stop the fall.    Pt required assistance to get up and was taken by her spouse to [facility]. Pt was then sent home that day. Pt then RTW on light duty restrictions which she is currently on. Pt had knee/elbow xrays taken then a week later they checked the shoulder. Pain is now centralized to the elbow. The knee is some better. Pt is using a sling at work and doing primarily sit down work with no R UE use at work. Pt was put on Naproxen, Tazanidine & generic Flexeril.    Pain: 4/10 current, 2-3/10 at best, 6-7/10 at worst    Aggravating/functional factors: sleeping, picking up/carrying anything of weight, reaching for self-care, straightening the arm, standing, sitting, walking, stairs non-reciprocal with rail, driving, turning ignition key and gear shift (spouse has been driving her most of the time), writing, house work, typing, getting in bed    Relieving factors: ice > heat, rest, mid range of knee positioning, changing positions    Social Hx: lives with spouse, 1 step into house, 2 snakes (one is 70#) & 4 bearded dragons,   at [place]     Hand dominance: right    Patient Goals  Patient goals for therapy: decreased pain, improved balance, increased strength, independence with ADLs/IADLs, increased motion and decreased edema  Patient goal: pain free, improved motion, return to normal work duties, regular use of the arm/leg for ADLs, feeding snakes/handling snakes/bearded dragons       Objective          Strength/Myotome Testing     Right Shoulder     Planes of Motion   External rotation at 0°: 4+   Internal rotation at 0°: 4+     Right Elbow   Flexion: 4  Extension: 4  Forearm supination: 4  Forearm pronation: 4+    Right Wrist/Hand   Wrist extension: 4  Wrist flexion: 4+  Radial deviation: 4+  Ulnar deviation: 4+    Right Hip   Planes of Motion   Flexion: 3+    Right Knee   Flexion: 4  Extension: 4-    Right Ankle/Foot   Dorsiflexion: 4+  Great toe extension: 4+    Additional Strength Details  Pain with resisted RD & ext  Pain with resisted supination        Vitals:  /82    AROM:   R shld elevations WFL  R shld IR BTB to sacral region painful L elbow  R shld ER BTH to T3 painful L elbow  R Elbow  pain at end of range; pt states she can straighten fully with increased pain  R forearm supination 60  R forearm pronation 75   R Wrist WFL with most limitation with wrist ext 50 degrees due to elbow/forearm pain  R knee  seated pain at end of range both dirs  R hip flex ~90 degrees (visually measured)    Observation: R UE guarded in at abdomen with shld IR/add/elbow flexed; mild abrasion noted ant R knee    Palpation: TTP @ med > lat elbow/epicondyles, distal upper arm, forearm and ant/lat knee    Sensation: reports occasional tingling R hand if sleeps on it wrong    Posture: head fwd/rounded shoulders; seated w/knee partially extended/R arm across abdomen    Gait: no AD, antalgic, reduced gt speed, decreased hip/knee flex/ext, reduced arm swing R due to guarding arm across abdoment    Balance appears fair with  walking & moving in the clinic; will test more formally in future visits prn    Transfers: sit to/from stand requires L UE A    Special Tests: R resisted wrist ext painful, Tinel's at elbow (-)    Assessment & Plan     Assessment  Impairments: abnormal coordination, abnormal gait, abnormal muscle firing, abnormal muscle tone, abnormal or restricted ROM, activity intolerance, impaired balance, impaired physical strength, lacks appropriate home exercise program, pain with function, safety issue and weight-bearing intolerance  Assessment details: The patient is a 40 y.o. female who presents to physical therapy today for contusion R elbow/contusion R knee. Pain with resisted wrist ext are suggestive of lat epicondylitis. Upon initial evaluation, the patient demonstrates the following impairments: pain, reduced posture, guarding, decreased ROM/flexibility, strength, balance, gait and function. Due to these impairments, the patient is unable to/limited with: sleeping, picking up/carrying anything of weight, reaching for self-care, straightening the arm, standing, sitting, walking, stairs non-reciprocal with rail, driving, turning ignition key and gear shift, writing, house work, typing, getting in bed. The patient would benefit from skilled PT services to address functional limitations and impairments and to improve patient quality of life.      Barriers to therapy: morbid obesity, hypothyroidism, hx near syncope, palpitations, paroxysmal Afib, chest pain, arthritis, asthma, headachescould affect PT Rx/progress/outcomes if exacerbated or unregulated  Prognosis: good    Goals  Plan Goals: STGs in 4 weeks:  Decrease pain to 5/10 on average   Increase R UE/LE AROM 10 degrees or 1-2 spinal levels where limited as much  Assess SLS, /pinch strength & special tests as able    LTGs by discharge  Increase R UE/LE ROM to WFL/WNL   Increase UE/LE strength to 4+/5 or better  Pt will be able to drive without difficulty or pain  including shifting gears & turning on ignition  Pt will be able to wash/dress/groom without difficulty or pain  Pt will be able to reach/lift items into/out of an overhead cabinet without difficulty or pain  Pt will be able to lift/carry laundry baskets, garbage/grocery bags, pots/pans or heavier items to prepare for RTW without difficulty or pain  Pt will be able to sleep without waking from pain  Pt will be able to stand or walk 20-30 mins for ADLs/work activities without difficulty or increased pain  Pt will be able to go up/down stairs reciprocally with/without use of rail(s) with minimal difficulty or pain  Pt will rise from sitting without difficulty or increased pain    Plan  Therapy options: will be seen for skilled physical therapy services  Planned modality interventions: cryotherapy, electrical stimulation/Russian stimulation, thermotherapy (hydrocollator packs) and ultrasound  Planned therapy interventions: transfer training, therapeutic activities, stretching, strengthening, spinal/joint mobilization, soft tissue mobilization, postural training, neuromuscular re-education, manual therapy, home exercise program, functional ROM exercises, flexibility, body mechanics training, ADL retraining, abdominal trunk stabilization, motor coordination training, balance/weight-bearing training, joint mobilization, IADL retraining and gait training  Duration in weeks: 20  Treatment plan discussed with: patient      History # of Personal Factors and/or Comorbidities: HIGH (3+)  Examination of Body System(s): # of elements: HIGH (4+)  Clinical Presentation: EVOLVING  Clinical Decision Making: MODERATE      Timed:         Manual Therapy:         mins  88684;     Therapeutic Exercise:    8     mins  26600;     Neuromuscular Royce:        mins  99885;    Therapeutic Activity:          mins  95505;     Gait Training:           mins  38823;     Ultrasound:          mins  59157;    Ionto                                   mins    97810  Self Care                            mins   25595  Canalith Repos         mins 10271      Un-Timed:  Electrical Stimulation:         mins  48551 ( );  Dry Needling          mins self-pay  Traction          mins 42894  Low Eval          Mins  46480  Mod Eval    34      Mins  03011  High Eval                            Mins  00126  Re-Eval                               mins  08506    Ice x8' N/C      Timed Treatment:   8   mins   Total Treatment:     50   mins    PT SIGNATURE: Krystyna Ornelas, PT   [place] Lic# [id number]  DATE TREATMENT INITIATED: 1/4/2021    Initial Certification  Certification Period: 4/4/2021  I certify that the therapy services are furnished while this patient is under my care.  The services outlined above are required by this patient, and will be reviewed every 90 days.     PHYSICIAN: Francisco Mujica PA      DATE:     Please sign and return via fax to [phone]. Thank you, [facility].

## 2021-01-06 ENCOUNTER — TREATMENT (OUTPATIENT)
Dept: PHYSICAL THERAPY | Facility: CLINIC | Age: 41
End: 2021-01-06

## 2021-01-06 DIAGNOSIS — S80.01XA CONTUSION OF RIGHT KNEE, INITIAL ENCOUNTER: ICD-10-CM

## 2021-01-06 DIAGNOSIS — S50.01XA CONTUSION OF RIGHT ELBOW, INITIAL ENCOUNTER: Primary | ICD-10-CM

## 2021-01-06 PROCEDURE — 97140 MANUAL THERAPY 1/> REGIONS: CPT | Performed by: PHYSICAL THERAPIST

## 2021-01-06 PROCEDURE — 97110 THERAPEUTIC EXERCISES: CPT | Performed by: PHYSICAL THERAPIST

## 2021-01-06 NOTE — PROGRESS NOTES
Physical Therapy Daily Progress Note    VISIT#: 2    Subjective   Jo Asif reports her knee is sore today from waking up on her stomach with the knee straight. Pt has no question on HEP. Pt reports doing HEP 3x/day. Pt notes [employer] didn't have a contract with her work so she was unable to get the neoprene brace there. Pt will check with [facility] to see if they know where she can get the brace from and still have it covered by w/c.     Pain Rating (0-10): 3.5 knee, 2 elbow    Objective R knee: Negative Ant/post drawer & Varus/valgus stress test    See Exercise & Manual Logs for complete treatment.     Patient Education: cues for therex    Assessment/Plan Pt tolerated session fairly well except some increased pain with AA chest press with pvc pipe. Pt appeared with improved mobility and reduced pain levels overall by end of session at both areas. Pt declined icing and will perform at home.     Progress per Plan of Care and Progress strengthening /stabilization /functional activity            Timed:         Manual Therapy:  15       mins  25888;     Therapeutic Exercise:   30      mins  88456;     Neuromuscular Royce:        mins  55875;    Therapeutic Activity:          mins  83583;     Gait Training:           mins  59662;     Ultrasound:         mins  34241;    Ionto                                   mins   94519  Self Care                            mins   91624  Canalith Repos                   mins  89026    Un-Timed:  Electrical Stimulation:         mins  94449 ( );  Dry Needling          mins self-pay  Traction          mins 90386  Low Eval          Mins  63094  Mod Eval          Mins  27320  High Eval                           Mins  87863  Re-Eval                               mins  32637    Timed Treatment:  45    mins   Total Treatment:     45   mins    Krystyna Ornelas, PT  [place] License # [id number]  Physical Therapist

## 2021-01-15 ENCOUNTER — TREATMENT (OUTPATIENT)
Dept: PHYSICAL THERAPY | Facility: CLINIC | Age: 41
End: 2021-01-15

## 2021-01-15 DIAGNOSIS — S50.01XA CONTUSION OF RIGHT ELBOW, INITIAL ENCOUNTER: Primary | ICD-10-CM

## 2021-01-15 DIAGNOSIS — S80.01XA CONTUSION OF RIGHT KNEE, INITIAL ENCOUNTER: ICD-10-CM

## 2021-01-15 PROCEDURE — 97530 THERAPEUTIC ACTIVITIES: CPT | Performed by: PHYSICAL THERAPIST

## 2021-01-15 PROCEDURE — 97110 THERAPEUTIC EXERCISES: CPT | Performed by: PHYSICAL THERAPIST

## 2021-01-15 NOTE — PROGRESS NOTES
"Physical Therapy Daily Progress Note    VISIT#: 3    Subjective   Jo Asif reports she was getting into bed (which is pretty high) and she still cannot tolerate putting any pressure on that knee. \"I thought that spot on my knee would be a little better by now but it hasn't improved.\"  Current Pain Level: 2-3/10 ELBOW    1-2/10 KNEE    Objective   Palpation: tender to light palpation over the R LH of the biceps.   Pain over same area w/ lifting in FL and ABD.    See Exercise, Manual, and Modality Logs for complete treatment.     Patient Education: cues for therex    Assessment/Plan  Patient has been consistent with her HEP. Most of the exercises irritate the anterior knee, medial elbow and now the ant shoulder. Inc pain over the ant shoulder and down through the bicep with FL and ABD. Limited tolerance to exercises overall. She could have pushed through it but would have increased her pain level over time in the knee and RUE.    Plan: Has 3 more visits approved but goes to see the PA today to see if she needs to be referred to a specialist. Pt to let us know today what the plan is.    Goals  Plan Goals: STGs in 4 weeks:  Decrease pain to 5/10 on average   Increase R UE/LE AROM 10 degrees or 1-2 spinal levels where limited as much  Assess SLS, /pinch strength & special tests as able    LTGs by discharge  Increase R UE/LE ROM to WFL/WNL   Increase UE/LE strength to 4+/5 or better  Pt will be able to drive without difficulty or pain including shifting gears & turning on ignition  Pt will be able to wash/dress/groom without difficulty or pain  Pt will be able to reach/lift items into/out of an overhead cabinet without difficulty or pain  Pt will be able to lift/carry laundry baskets, garbage/grocery bags, pots/pans or heavier items to prepare for RTW without difficulty or pain  Pt will be able to sleep without waking from pain  Pt will be able to stand or walk 20-30 mins for ADLs/work activities without " difficulty or increased pain  Pt will be able to go up/down stairs reciprocally with/without use of rail(s) with minimal difficulty or pain  Pt will rise from sitting without difficulty or increased pain            Timed:            Therapeutic Exercise:    28     mins  93446;       Therapeutic Activity:     12     mins  32995;         Un-Timed:      Timed Treatment:   40   mins   Total Treatment:     40   mins    Evelyn Patel PTA    Physical Therapist Assistant

## 2021-01-19 ENCOUNTER — TREATMENT (OUTPATIENT)
Dept: PHYSICAL THERAPY | Facility: CLINIC | Age: 41
End: 2021-01-19

## 2021-01-19 DIAGNOSIS — S80.01XA CONTUSION OF RIGHT KNEE, INITIAL ENCOUNTER: ICD-10-CM

## 2021-01-19 DIAGNOSIS — S50.01XA CONTUSION OF RIGHT ELBOW, INITIAL ENCOUNTER: Primary | ICD-10-CM

## 2021-01-19 PROCEDURE — 97530 THERAPEUTIC ACTIVITIES: CPT | Performed by: PHYSICAL THERAPIST

## 2021-01-19 PROCEDURE — 97110 THERAPEUTIC EXERCISES: CPT | Performed by: PHYSICAL THERAPIST

## 2021-01-19 NOTE — PROGRESS NOTES
Physical Therapy Daily Progress Note    VISIT#: 4    Subjective   Jo Asif reports no change in her symptoms. She had a very hard time getting her knee comfortable while trying to sleep over the weekend.  Current Pain Level: 2/10 elbow   3/10 shoulder  3/10 knee    Objective   TTP over the right LH of the biceps.    See Exercise, Manual, and Modality Logs for complete treatment.     Patient Education: added supination/pronation to HEP.    Assessment/Plan  Increased pain in the R knee with all exercises this date. Attempted to stretch the elbow into flexion but began experiencing increased pain along the anterior shoulder. Any sort of bicep curl motion is irritating in the shoulder and elbow.     Plan: finish her last 3 approved visits. Goes to see PA after her visit on Friday for more imaging.      Goals  Plan Goals: STGs in 4 weeks:  Decrease pain to 5/10 on average   Increase R UE/LE AROM 10 degrees or 1-2 spinal levels where limited as much  Assess SLS, /pinch strength & special tests as able    LTGs by discharge  Increase R UE/LE ROM to WFL/WNL   Increase UE/LE strength to 4+/5 or better  Pt will be able to drive without difficulty or pain including shifting gears & turning on ignition  Pt will be able to wash/dress/groom without difficulty or pain  Pt will be able to reach/lift items into/out of an overhead cabinet without difficulty or pain  Pt will be able to lift/carry laundry baskets, garbage/grocery bags, pots/pans or heavier items to prepare for RTW without difficulty or pain  Pt will be able to sleep without waking from pain  Pt will be able to stand or walk 20-30 mins for ADLs/work activities without difficulty or increased pain  Pt will be able to go up/down stairs reciprocally with/without use of rail(s) with minimal difficulty or pain  Pt will rise from sitting without difficulty or increased pain            Timed:            Therapeutic Exercise:    24     mins  41933;       Therapeutic  Activity:     16     mins  07406;         Un-Timed:  Cold Pack: 10 min (no charge)      Timed Treatment:   40   mins   Total Treatment:     50   mins    Evelyn Patel PTA    Physical Therapist Assistant

## 2021-01-20 ENCOUNTER — TREATMENT (OUTPATIENT)
Dept: PHYSICAL THERAPY | Facility: CLINIC | Age: 41
End: 2021-01-20

## 2021-01-20 DIAGNOSIS — S80.01XA CONTUSION OF RIGHT KNEE, INITIAL ENCOUNTER: ICD-10-CM

## 2021-01-20 DIAGNOSIS — S50.01XA CONTUSION OF RIGHT ELBOW, INITIAL ENCOUNTER: Primary | ICD-10-CM

## 2021-01-20 PROCEDURE — 97110 THERAPEUTIC EXERCISES: CPT | Performed by: PHYSICAL THERAPIST

## 2021-01-20 PROCEDURE — 97535 SELF CARE MNGMENT TRAINING: CPT | Performed by: PHYSICAL THERAPIST

## 2021-01-20 NOTE — PROGRESS NOTES
Physical Therapy Daily Progress Note    VISIT#: 5    Subjective   Jo Asif reports no change in her right arm or knee. Her knee is a little more tender to walk on this morning with a slightly increased limp.  Current Pain Level: 2/10 elbow  3/10 shoulder  3/10 knee    Objective   Increased tenderness just medial to the tibial tuberosity.  Tenderness along the LH of the biceps on the R shoulder.    See Exercise, Manual, and Modality Logs for complete treatment.     Patient Education: cues for therex; went over some questions she had about her work restrictions    Assessment/Plan  Increased pain with SAQ's today and had to stop the exercise early. Attempted some heel slides and the pain became very uncomfortable just medial to the tibial tuberosity. Used K-tape to provide some support to the knee while she is walking. Low tolerance to UE exercises today.     Plan:Goes to see PA after her visit on Friday for more imaging.        Goals  Plan Goals: STGs in 4 weeks:  Decrease pain to 5/10 on average   Increase R UE/LE AROM 10 degrees or 1-2 spinal levels where limited as much  Assess SLS, /pinch strength & special tests as able    LTGs by discharge  Increase R UE/LE ROM to WFL/WNL   Increase UE/LE strength to 4+/5 or better  Pt will be able to drive without difficulty or pain including shifting gears & turning on ignition  Pt will be able to wash/dress/groom without difficulty or pain  Pt will be able to reach/lift items into/out of an overhead cabinet without difficulty or pain  Pt will be able to lift/carry laundry baskets, garbage/grocery bags, pots/pans or heavier items to prepare for RTW without difficulty or pain  Pt will be able to sleep without waking from pain  Pt will be able to stand or walk 20-30 mins for ADLs/work activities without difficulty or increased pain  Pt will be able to go up/down stairs reciprocally with/without use of rail(s) with minimal difficulty or pain  Pt will rise from  sitting without difficulty or increased pain            Timed:         Manual Therapy:    4     mins  47419;     Therapeutic Exercise:    21     mins  60772;       Self Care                       10     mins   90666      Un-Timed:  Cold Pack: 10 min (no charge)      Timed Treatment:   35   mins   Total Treatment:     45   mins    Evelyn Patel PTA    Physical Therapist Assistant

## 2021-01-22 ENCOUNTER — TREATMENT (OUTPATIENT)
Dept: PHYSICAL THERAPY | Facility: CLINIC | Age: 41
End: 2021-01-22

## 2021-01-22 DIAGNOSIS — S50.01XA CONTUSION OF RIGHT ELBOW, INITIAL ENCOUNTER: Primary | ICD-10-CM

## 2021-01-22 DIAGNOSIS — S80.01XA CONTUSION OF RIGHT KNEE, INITIAL ENCOUNTER: ICD-10-CM

## 2021-01-22 PROCEDURE — 97140 MANUAL THERAPY 1/> REGIONS: CPT | Performed by: PHYSICAL THERAPIST

## 2021-01-22 NOTE — PROGRESS NOTES
Physical Therapy Daily Progress Note/AuthNote/DrNote    VISIT#: 6    Subjective   Jo Asif reports she goes to see the PA after her visit today. States her knee feels more stiff and doesn't want to bend. She has also noticed more popping in the knee and it is uncomfortable when it happen. She can only find relief if she extends the leg. She has been wearing her compression sleeve on her elbow but it is making her elbow pain worse.  Current Pain Level: 3-4/10 elbow  2/10 shoulder  3/10 knee  [place] Knee: 22/48  Quick Dash: 61%      Objective   AROM  R Elbow: 22* to 127* with pain    R Shoulder  Flexion: 145*  ER BTB: L3 with pain in SH and Elbow  IR BTH: C7-T1 with pain    MMT:   Right Elbow   Flexion: 4- with pain  Extension: 4- with pain  Forearm supination: 4  Forearm pronation: 4 with pain    R Knee  Flexion: 4- with anterior knee pain  Extension: 3+ with anterior knee pain    R Hip   Flexion: 4- with knee pain    Gait: antalgic, decreased stance time on RLE, lacking heel strike, RUE in bent and guarded position   Palpation: Tender to the lateral epicondyle. Tender over the R patellar tendon and medial and lateral to the tibial tuberosity.     Strength  R Hand: 20#   L Hand: 40#  Pinch Strength  R: 3#   L: 17#    SLS Test  LLE: 19 sec  RLE: 3 sec  See Exercise, Manual, and Modality Logs for complete treatment.     Patient Education:    Assessment/Plan  At this time patient has been seen for 6 visits. She has met 1 goal and progressing towards 2 goals out of a total of 13 goals. We have seen little progress with PT in her knee and R UE. Her pain remains constant and can increase with certain activities such as ADL's and especially work activities. Her gait pattern is compromised due to her knee pain and she has a guarded posture with her RUE. Some of her strength and ROM measurements have decreased slightly and are limited by pain.      Plan: Patient to see PA later this morning. She is going to let  us know if we are going to continue PT or hold.    Goals  Plan Goals: STGs in 4 weeks:  Decrease pain to 5/10 on average Met  Increase R UE/LE AROM 10 degrees or 1-2 spinal levels where limited as much Not Met  Assess SLS, /pinch strength & special tests as able  Strength & SLS Test Assessed see above    LTGs by discharge  Increase R UE/LE ROM to WFL/WNL Not Met  Increase UE/LE strength to 4+/5 or better Progressing  Pt will be able to drive without difficulty or pain including shifting gears & turning on ignition Progressing/Able to do but very difficult  Pt will be able to wash/dress/groom without difficulty or pain Not Met/Spouse Assist  Pt will be able to reach/lift items into/out of an overhead cabinet without difficulty or pain Not Met  Pt will be able to lift/carry laundry baskets, garbage/grocery bags, pots/pans or heavier items to prepare for RTW without difficulty or pain Not Met  Pt will be able to sleep without waking from pain Not Met   Pt will be able to stand or walk 20-30 mins for ADLs/work activities without difficulty or increased pain Not Met/Pain All The Time  Pt will be able to go up/down stairs reciprocally with/without use of rail(s) with minimal difficulty or pain Not Met/ Needs Railing and one step at a time  Pt will rise from sitting without difficulty or increased pain Not Met/Doesn't put weight on RLE         Timed:         Manual Therapy:    40     mins  75580;       Un-Timed:    Timed Treatment:   40   mins   Total Treatment:     40   mins    Evelyn Patel PTA    Physical Therapist Assistant

## 2021-02-11 ENCOUNTER — OFFICE VISIT (OUTPATIENT)
Dept: ORTHOPEDIC SURGERY | Facility: CLINIC | Age: 41
End: 2021-02-11

## 2021-02-11 ENCOUNTER — TELEPHONE (OUTPATIENT)
Dept: ORTHOPEDIC SURGERY | Facility: CLINIC | Age: 41
End: 2021-02-11

## 2021-02-11 VITALS
HEIGHT: 63 IN | DIASTOLIC BLOOD PRESSURE: 85 MMHG | HEART RATE: 66 BPM | BODY MASS INDEX: 51.91 KG/M2 | WEIGHT: 293 LBS | SYSTOLIC BLOOD PRESSURE: 139 MMHG

## 2021-02-11 DIAGNOSIS — M25.511 RIGHT SHOULDER PAIN, UNSPECIFIED CHRONICITY: Primary | ICD-10-CM

## 2021-02-11 DIAGNOSIS — R20.0 NUMBNESS AND TINGLING IN RIGHT HAND: ICD-10-CM

## 2021-02-11 DIAGNOSIS — M25.561 RIGHT KNEE PAIN, UNSPECIFIED CHRONICITY: ICD-10-CM

## 2021-02-11 DIAGNOSIS — R20.2 NUMBNESS AND TINGLING IN RIGHT HAND: ICD-10-CM

## 2021-02-11 PROCEDURE — 99203 OFFICE O/P NEW LOW 30 MIN: CPT | Performed by: ORTHOPAEDIC SURGERY

## 2021-02-11 RX ORDER — LEVOTHYROXINE SODIUM 0.05 MG/1
75 TABLET ORAL DAILY
Status: ON HOLD | COMMUNITY
Start: 2020-12-28 | End: 2022-03-18 | Stop reason: DRUGHIGH

## 2021-02-11 RX ORDER — FREMANEZUMAB-VFRM 225 MG/1.5ML
INJECTION SUBCUTANEOUS
COMMUNITY
Start: 2021-01-31

## 2021-02-11 RX ORDER — TIZANIDINE 2 MG/1
TABLET ORAL
COMMUNITY
Start: 2021-01-31 | End: 2021-03-15

## 2021-02-11 NOTE — PROGRESS NOTES
Patient ID: Jo Asif is a 40 y.o. female.    Chief Complaint:    Chief Complaint   Patient presents with   • Right Shoulder - Pain     Fall at work    • Right Elbow - Pain   • Right Knee - Pain       HPI:  Jo is a 40-year-old female here with right shoulder, elbow, and knee pain after a fall in 2020.  She has been in physical therapy since that time.  Pain is mainly present over the front of the shoulder.  She is having a little bit of pain at the elbow with tingling to the hand.  She is also having lateral joint line tenderness with popping and clicking and swelling.  Therapy and oral medication have not helped, she is on light duty.  Past Medical History:   Diagnosis Date   • Arrhythmia    • Atrial fibrillation (CMS/HCC)    • Disease of thyroid gland    • Heart murmur    • Hypertension    • Migraine    • PCOS (polycystic ovarian syndrome)    • PKU (phenylketonuria) (CMS/HCC)        Past Surgical History:   Procedure Laterality Date   • ABLATION OF DYSRHYTHMIC FOCUS     • CARDIAC ELECTROPHYSIOLOGY PROCEDURE N/A 6/10/2020    Procedure: EP/Ablation;  Surgeon: Joseph Andrews MD;  Location: [facility] INVASIVE LOCATION;  Service: Cardiovascular;  Laterality: N/A;   • CARPAL TUNNEL RELEASE Right    • TONSILLECTOMY     • TUBAL ABDOMINAL LIGATION     • UVULECTOMY         Family History   Problem Relation Age of Onset   • Diabetes Mother    • Heart disease Father    • Cancer Father           Social History     Occupational History   • Not on file   Tobacco Use   • Smoking status: Former Smoker     Types: Cigarettes     Quit date: 2009     Years since quittin.4   • Smokeless tobacco: Never Used   Substance and Sexual Activity   • Alcohol use: No     Frequency: Never   • Drug use: No   • Sexual activity: Defer      Review of Systems   Respiratory: Negative for chest tightness.    Musculoskeletal: Positive for arthralgias.       Objective:    /85   Pulse 66   " Ht 160 cm (63\")   Wt (!) 148 kg (327 lb)   BMI 57.93 kg/m²     Physical Examination:  She is a pleasant female in no distress. She is alert and oriented x3 and appears her stated age.  Right shoulder demonstrates no scars and no atrophy with mild pain over the bicep groove.  Passive elevation is 170 degrees abduction 130 degrees external rotation 40 degrees internal rotation S1.  She has mild pain and weakness on Speed, Magnolia, supraspinatus testing.  Belly press and liftoff are 5/5 and 4/5  Right elbow demonstrates tenderness over the medial epicondyle and cubital tunnel.  Tinel at the elbow is negative. Sensory and motor exam are intact all distributions. Radial pulse is palpable and capillary refill is less than two seconds to all digits  Right knee demonstrates no scars with tenderness over the lateral joint line.  Knee range of motion 0 to 125 degrees with positive lateral Cecilia.  No instability.  Sensory and motor exam are intact all distributions. Dorsalis pedis and posterior tibialis pulses are palpable and capillary refill is less than two seconds to all digits    Imaging:  Previous x-rays demonstrate mild medial compartment arthritis, x-rays of the shoulder and elbow demonstrate well-maintained joint spaces no fracture    Assessment:  Right shoulder pain  Right knee pain  Right hand numbness    Plan:  I recommend MRI of the shoulder to evaluate for cuff tear, MRI of the knee to evaluate for meniscus tear and EMG of the right upper extremity to evaluate for neuritis      Procedures         Disclaimer: Please note that areas of this note were completed with computer voice recognition software.  Quite often unanticipated grammatical, syntax, homophones, and other interpretive errors are inadvertently transcribed by the computer software. Please excuse any errors that have escaped final proofreading.  "

## 2021-02-11 NOTE — PATIENT INSTRUCTIONS
MRI follow-up instructions    Today at your office visit, Dr. Gaviria recommended an MRI (magnetic resonance imaging) to evaluate your joint pain.  This requires a precertification process, which our office will do, and then we will contact you when it is approved and go over scheduling options.  We typically recommend these to be performed at [facility] or [facility].  If for some reason it is performed elsewhere please arrange to have that facility give you a disc with your images on it so Dr. Gaviria can review it at your follow-up visit.    When checking out today we recommend making an appointment to go over your results in approximately two weeks.  If your MRI is done sooner than that we would be happy to schedule you sooner to go over your results, just contact us at [phone] or through the [employer] portal to let us know your MRI is completed.  Seeing you in person for the results gives us the best opportunity to look at your images together and explain the diagnosis and treatment options to best help you.

## 2021-02-18 NOTE — TELEPHONE ENCOUNTER
Auth letter dated 2/16/2021 from DEEPAK David  with [employer].  This letter is approval for Jo Asif to have an EMG of the right arm and MRI of the right knee and shoulder for a work related injury that occurred on and or around 12/11/2020. Submit all bills with documentation to :    [employer]  [id number]  [place] [address]    Call placed to patient, left message on machine regarding receipt of letter. Advised of phone number for [facility], advised we must have a disc of images, and to be sure to make a follow up with Dr. Gaviria after she has completed the MRIs and EMG.

## 2021-03-03 ENCOUNTER — TELEPHONE (OUTPATIENT)
Dept: PODIATRY | Facility: CLINIC | Age: 41
End: 2021-03-03

## 2021-03-03 NOTE — TELEPHONE ENCOUNTER
Pt has called and is asking for her Flexeril,Zanaflex, and naproxen refilled she states the  That referred her to you on refilled gave her enough till the 1st of the month. Please advise

## 2021-03-04 RX ORDER — NAPROXEN 250 MG/1
250 TABLET ORAL 2 TIMES DAILY WITH MEALS
Qty: 28 TABLET | Refills: 0 | Status: SHIPPED | OUTPATIENT
Start: 2021-03-04 | End: 2021-03-26 | Stop reason: HOSPADM

## 2021-03-04 RX ORDER — CYCLOBENZAPRINE HCL 5 MG
5 TABLET ORAL 3 TIMES DAILY PRN
Qty: 30 TABLET | Refills: 0 | Status: SHIPPED | OUTPATIENT
Start: 2021-03-04 | End: 2021-04-26

## 2021-03-09 ENCOUNTER — PROCEDURE VISIT (OUTPATIENT)
Dept: NEUROLOGY | Facility: CLINIC | Age: 41
End: 2021-03-09

## 2021-03-09 VITALS — TEMPERATURE: 97.5 F

## 2021-03-09 DIAGNOSIS — R20.2 PARESTHESIA OF RIGHT UPPER EXTREMITY: Primary | ICD-10-CM

## 2021-03-09 PROBLEM — D64.9 ANEMIA: Status: ACTIVE | Noted: 2020-05-20

## 2021-03-09 PROBLEM — E78.5 HYPERLIPIDEMIA: Status: ACTIVE | Noted: 2020-05-20

## 2021-03-09 PROBLEM — E66.9 OBESITY: Status: ACTIVE | Noted: 2020-05-20

## 2021-03-09 PROBLEM — E03.9 HYPOTHYROIDISM (ACQUIRED): Status: ACTIVE | Noted: 2020-05-20

## 2021-03-09 PROBLEM — L83 ACANTHOSIS NIGRICANS: Status: ACTIVE | Noted: 2020-05-20

## 2021-03-09 PROCEDURE — 95886 MUSC TEST DONE W/N TEST COMP: CPT | Performed by: PSYCHIATRY & NEUROLOGY

## 2021-03-09 PROCEDURE — 95908 NRV CNDJ TST 3-4 STUDIES: CPT | Performed by: PSYCHIATRY & NEUROLOGY

## 2021-03-09 RX ORDER — PAROXETINE HYDROCHLORIDE 20 MG/1
20 TABLET, FILM COATED ORAL EVERY MORNING
COMMUNITY
Start: 2021-02-22 | End: 2022-02-10

## 2021-03-09 NOTE — PROGRESS NOTES
EMG and Nerve Conduction Studies    The complete report includes the data sheets.     Referring Doctor: Kb Gaviria MD    History: RUE/Numbness and Tingling of right hand    Results:    1.  The right median sensory motor nerve conduction studies are normal.    2.  The right ulnar sensory and motor nerve conduction studies are normal.    3.  EMG of the muscles examined in the right C5-T1 myotomes were normal.    Impression:      This is a normal study.  There is no evidence of focal median or ulnar neuropathy and no sign of neurogenic change in the muscles examined in the right C5-T1 myotomes.      Joseph Seipel, M.D.

## 2021-03-15 ENCOUNTER — OFFICE VISIT (OUTPATIENT)
Dept: ORTHOPEDIC SURGERY | Facility: CLINIC | Age: 41
End: 2021-03-15

## 2021-03-15 ENCOUNTER — PREP FOR SURGERY (OUTPATIENT)
Dept: OTHER | Facility: HOSPITAL | Age: 41
End: 2021-03-15

## 2021-03-15 VITALS
HEIGHT: 63 IN | BODY MASS INDEX: 51.91 KG/M2 | DIASTOLIC BLOOD PRESSURE: 90 MMHG | SYSTOLIC BLOOD PRESSURE: 144 MMHG | HEART RATE: 68 BPM | WEIGHT: 293 LBS

## 2021-03-15 DIAGNOSIS — M23.303 DERANGEMENT OF MEDIAL MENISCUS OF RIGHT KNEE: Primary | ICD-10-CM

## 2021-03-15 DIAGNOSIS — M25.561 RIGHT KNEE PAIN, UNSPECIFIED CHRONICITY: Primary | ICD-10-CM

## 2021-03-15 PROCEDURE — 99214 OFFICE O/P EST MOD 30 MIN: CPT | Performed by: ORTHOPAEDIC SURGERY

## 2021-03-16 PROBLEM — M25.561 RIGHT KNEE PAIN: Status: ACTIVE | Noted: 2021-03-16

## 2021-03-18 ENCOUNTER — HOSPITAL ENCOUNTER (OUTPATIENT)
Dept: CARDIOLOGY | Facility: HOSPITAL | Age: 41
Discharge: HOME | End: 2021-03-18

## 2021-03-18 ENCOUNTER — LAB (OUTPATIENT)
Dept: LAB | Facility: HOSPITAL | Age: 41
End: 2021-03-18

## 2021-03-18 ENCOUNTER — HOSPITAL ENCOUNTER (OUTPATIENT)
Dept: GENERAL RADIOLOGY | Facility: HOSPITAL | Age: 41
Discharge: HOME | End: 2021-03-18

## 2021-03-18 DIAGNOSIS — M25.561 RIGHT KNEE PAIN, UNSPECIFIED CHRONICITY: ICD-10-CM

## 2021-03-18 LAB
ANION GAP SERPL CALCULATED.3IONS-SCNC: 7.7 MMOL/L (ref 5–15)
BASOPHILS # BLD AUTO: 0.05 10*3/MM3 (ref 0–0.2)
BASOPHILS NFR BLD AUTO: 0.9 % (ref 0–1.5)
BUN SERPL-MCNC: 11 MG/DL (ref 6–20)
BUN/CREAT SERPL: 17.7 (ref 7–25)
CALCIUM SPEC-SCNC: 8.7 MG/DL (ref 8.6–10.5)
CHLORIDE SERPL-SCNC: 104 MMOL/L (ref 98–107)
CO2 SERPL-SCNC: 27.3 MMOL/L (ref 22–29)
CREAT SERPL-MCNC: 0.62 MG/DL (ref 0.57–1)
DEPRECATED RDW RBC AUTO: 42.7 FL (ref 37–54)
EOSINOPHIL # BLD AUTO: 0.15 10*3/MM3 (ref 0–0.4)
EOSINOPHIL NFR BLD AUTO: 2.6 % (ref 0.3–6.2)
ERYTHROCYTE [DISTWIDTH] IN BLOOD BY AUTOMATED COUNT: 12.4 % (ref 12.3–15.4)
GFR SERPL CREATININE-BSD FRML MDRD: 106 ML/MIN/1.73
GLUCOSE SERPL-MCNC: 121 MG/DL (ref 65–99)
HCT VFR BLD AUTO: 39.1 % (ref 34–46.6)
HGB BLD-MCNC: 13.3 G/DL (ref 12–15.9)
IMM GRANULOCYTES # BLD AUTO: 0.02 10*3/MM3 (ref 0–0.05)
IMM GRANULOCYTES NFR BLD AUTO: 0.3 % (ref 0–0.5)
LYMPHOCYTES # BLD AUTO: 1.62 10*3/MM3 (ref 0.7–3.1)
LYMPHOCYTES NFR BLD AUTO: 28 % (ref 19.6–45.3)
MCH RBC QN AUTO: 32.4 PG (ref 26.6–33)
MCHC RBC AUTO-ENTMCNC: 34 G/DL (ref 31.5–35.7)
MCV RBC AUTO: 95.1 FL (ref 79–97)
MONOCYTES # BLD AUTO: 0.57 10*3/MM3 (ref 0.1–0.9)
MONOCYTES NFR BLD AUTO: 9.8 % (ref 5–12)
NEUTROPHILS NFR BLD AUTO: 3.38 10*3/MM3 (ref 1.7–7)
NEUTROPHILS NFR BLD AUTO: 58.4 % (ref 42.7–76)
NRBC BLD AUTO-RTO: 0 /100 WBC (ref 0–0.2)
PLATELET # BLD AUTO: 244 10*3/MM3 (ref 140–450)
PMV BLD AUTO: 9.7 FL (ref 6–12)
POTASSIUM SERPL-SCNC: 4.3 MMOL/L (ref 3.5–5.2)
QT INTERVAL: 416 MS
RBC # BLD AUTO: 4.11 10*6/MM3 (ref 3.77–5.28)
SODIUM SERPL-SCNC: 139 MMOL/L (ref 136–145)
WBC # BLD AUTO: 5.79 10*3/MM3 (ref 3.4–10.8)

## 2021-03-18 PROCEDURE — 36415 COLL VENOUS BLD VENIPUNCTURE: CPT

## 2021-03-18 PROCEDURE — 71046 X-RAY EXAM CHEST 2 VIEWS: CPT

## 2021-03-18 PROCEDURE — 85025 COMPLETE CBC W/AUTO DIFF WBC: CPT

## 2021-03-18 PROCEDURE — 80048 BASIC METABOLIC PNL TOTAL CA: CPT

## 2021-03-18 PROCEDURE — 93010 ELECTROCARDIOGRAM REPORT: CPT | Performed by: INTERNAL MEDICINE

## 2021-03-18 PROCEDURE — 93005 ELECTROCARDIOGRAM TRACING: CPT | Performed by: ORTHOPAEDIC SURGERY

## 2021-03-24 ENCOUNTER — LAB (OUTPATIENT)
Dept: LAB | Facility: HOSPITAL | Age: 41
End: 2021-03-24

## 2021-03-24 DIAGNOSIS — M25.561 RIGHT KNEE PAIN, UNSPECIFIED CHRONICITY: ICD-10-CM

## 2021-03-24 LAB — SARS-COV-2 ORF1AB RESP QL NAA+PROBE: NOT DETECTED

## 2021-03-24 PROCEDURE — C9803 HOPD COVID-19 SPEC COLLECT: HCPCS

## 2021-03-24 PROCEDURE — U0004 COV-19 TEST NON-CDC HGH THRU: HCPCS

## 2021-03-25 ENCOUNTER — ANESTHESIA EVENT (OUTPATIENT)
Dept: PERIOP | Facility: HOSPITAL | Age: 41
End: 2021-03-25

## 2021-03-25 RX ORDER — HYDROCODONE BITARTRATE AND ACETAMINOPHEN 5; 325 MG/1; MG/1
1 TABLET ORAL EVERY 6 HOURS PRN
Qty: 10 TABLET | Refills: 0 | Status: SHIPPED | OUTPATIENT
Start: 2021-03-25 | End: 2021-05-26

## 2021-03-25 RX ORDER — NAPROXEN 500 MG/1
500 TABLET ORAL 2 TIMES DAILY WITH MEALS
Qty: 28 TABLET | Refills: 0 | Status: SHIPPED | OUTPATIENT
Start: 2021-03-25 | End: 2021-04-26

## 2021-03-25 RX ORDER — CEPHALEXIN 500 MG/1
500 CAPSULE ORAL 4 TIMES DAILY
Qty: 4 CAPSULE | Refills: 0 | Status: SHIPPED | OUTPATIENT
Start: 2021-03-25 | End: 2021-03-26

## 2021-03-25 RX ORDER — PROMETHAZINE HYDROCHLORIDE 12.5 MG/1
12.5 TABLET ORAL EVERY 6 HOURS PRN
Qty: 21 TABLET | Refills: 1 | Status: SHIPPED | OUTPATIENT
Start: 2021-03-25 | End: 2021-04-26

## 2021-03-26 ENCOUNTER — ANESTHESIA (OUTPATIENT)
Dept: PERIOP | Facility: HOSPITAL | Age: 41
End: 2021-03-26

## 2021-03-26 ENCOUNTER — HOSPITAL ENCOUNTER (OUTPATIENT)
Facility: HOSPITAL | Age: 41
Setting detail: HOSPITAL OUTPATIENT SURGERY
Discharge: HOME | End: 2021-03-26
Attending: ORTHOPAEDIC SURGERY | Admitting: ORTHOPAEDIC SURGERY

## 2021-03-26 VITALS
RESPIRATION RATE: 12 BRPM | OXYGEN SATURATION: 98 % | SYSTOLIC BLOOD PRESSURE: 145 MMHG | HEIGHT: 64 IN | HEART RATE: 61 BPM | WEIGHT: 293 LBS | TEMPERATURE: 97.2 F | DIASTOLIC BLOOD PRESSURE: 91 MMHG | BODY MASS INDEX: 50.02 KG/M2

## 2021-03-26 DIAGNOSIS — M25.561 ACUTE PAIN OF RIGHT KNEE: Primary | ICD-10-CM

## 2021-03-26 DIAGNOSIS — M25.561 RIGHT KNEE PAIN, UNSPECIFIED CHRONICITY: ICD-10-CM

## 2021-03-26 LAB — B-HCG UR QL: NEGATIVE

## 2021-03-26 PROCEDURE — 29880 ARTHRS KNE SRG MNISECTMY M&L: CPT | Performed by: ORTHOPAEDIC SURGERY

## 2021-03-26 PROCEDURE — 25010000002 KETOROLAC TROMETHAMINE PER 15 MG: Performed by: ORTHOPAEDIC SURGERY

## 2021-03-26 PROCEDURE — 25010000002 PROPOFOL 10 MG/ML EMULSION: Performed by: ANESTHESIOLOGY

## 2021-03-26 PROCEDURE — 25010000002 ONDANSETRON PER 1 MG: Performed by: ANESTHESIOLOGY

## 2021-03-26 PROCEDURE — 25010000002 KETOROLAC TROMETHAMINE PER 15 MG: Performed by: ANESTHESIOLOGY

## 2021-03-26 PROCEDURE — 25010000003 LIDOCAINE 1 % SOLUTION 20 ML VIAL: Performed by: ORTHOPAEDIC SURGERY

## 2021-03-26 PROCEDURE — 25010000002 DEXAMETHASONE PER 1 MG: Performed by: ANESTHESIOLOGY

## 2021-03-26 PROCEDURE — 25010000002 HYDROMORPHONE PER 4 MG: Performed by: ANESTHESIOLOGY

## 2021-03-26 PROCEDURE — 25010000002 FENTANYL CITRATE (PF) 100 MCG/2ML SOLUTION: Performed by: ANESTHESIOLOGY

## 2021-03-26 PROCEDURE — 96372 THER/PROPH/DIAG INJ SC/IM: CPT | Performed by: ORTHOPAEDIC SURGERY

## 2021-03-26 PROCEDURE — 81025 URINE PREGNANCY TEST: CPT | Performed by: ORTHOPAEDIC SURGERY

## 2021-03-26 PROCEDURE — 25010000002 EPINEPHRINE PER 0.1 MG: Performed by: ORTHOPAEDIC SURGERY

## 2021-03-26 PROCEDURE — 25010000002 MIDAZOLAM PER 1 MG: Performed by: ANESTHESIOLOGY

## 2021-03-26 RX ORDER — IPRATROPIUM BROMIDE AND ALBUTEROL SULFATE 2.5; .5 MG/3ML; MG/3ML
3 SOLUTION RESPIRATORY (INHALATION) ONCE AS NEEDED
Status: DISCONTINUED | OUTPATIENT
Start: 2021-03-26 | End: 2021-03-26 | Stop reason: HOSPADM

## 2021-03-26 RX ORDER — NEOSTIGMINE METHYLSULFATE 5 MG/5 ML
SYRINGE (ML) INTRAVENOUS AS NEEDED
Status: DISCONTINUED | OUTPATIENT
Start: 2021-03-26 | End: 2021-03-26 | Stop reason: SURG

## 2021-03-26 RX ORDER — DIPHENHYDRAMINE HYDROCHLORIDE 50 MG/ML
12.5 INJECTION INTRAMUSCULAR; INTRAVENOUS
Status: DISCONTINUED | OUTPATIENT
Start: 2021-03-26 | End: 2021-03-26 | Stop reason: HOSPADM

## 2021-03-26 RX ORDER — GLYCOPYRROLATE 1 MG/5 ML
SYRINGE (ML) INTRAVENOUS AS NEEDED
Status: DISCONTINUED | OUTPATIENT
Start: 2021-03-26 | End: 2021-03-26 | Stop reason: SURG

## 2021-03-26 RX ORDER — ONDANSETRON 2 MG/ML
4 INJECTION INTRAMUSCULAR; INTRAVENOUS ONCE AS NEEDED
Status: DISCONTINUED | OUTPATIENT
Start: 2021-03-26 | End: 2021-03-26 | Stop reason: HOSPADM

## 2021-03-26 RX ORDER — NALOXONE HCL 0.4 MG/ML
0.4 VIAL (ML) INJECTION AS NEEDED
Status: DISCONTINUED | OUTPATIENT
Start: 2021-03-26 | End: 2021-03-26 | Stop reason: HOSPADM

## 2021-03-26 RX ORDER — CEFAZOLIN SODIUM IN 0.9 % NACL 3 G/100 ML
3 INTRAVENOUS SOLUTION, PIGGYBACK (ML) INTRAVENOUS ONCE
Status: DISCONTINUED | OUTPATIENT
Start: 2021-03-26 | End: 2021-03-26 | Stop reason: HOSPADM

## 2021-03-26 RX ORDER — ROCURONIUM BROMIDE 10 MG/ML
INJECTION, SOLUTION INTRAVENOUS AS NEEDED
Status: DISCONTINUED | OUTPATIENT
Start: 2021-03-26 | End: 2021-03-26 | Stop reason: SURG

## 2021-03-26 RX ORDER — DEXAMETHASONE SODIUM PHOSPHATE 4 MG/ML
INJECTION, SOLUTION INTRA-ARTICULAR; INTRALESIONAL; INTRAMUSCULAR; INTRAVENOUS; SOFT TISSUE AS NEEDED
Status: DISCONTINUED | OUTPATIENT
Start: 2021-03-26 | End: 2021-03-26 | Stop reason: SURG

## 2021-03-26 RX ORDER — FENTANYL CITRATE 50 UG/ML
INJECTION, SOLUTION INTRAMUSCULAR; INTRAVENOUS AS NEEDED
Status: DISCONTINUED | OUTPATIENT
Start: 2021-03-26 | End: 2021-03-26 | Stop reason: SURG

## 2021-03-26 RX ORDER — HYDROMORPHONE HCL 110MG/55ML
0.5 PATIENT CONTROLLED ANALGESIA SYRINGE INTRAVENOUS
Status: DISCONTINUED | OUTPATIENT
Start: 2021-03-26 | End: 2021-03-26 | Stop reason: HOSPADM

## 2021-03-26 RX ORDER — DIPHENHYDRAMINE HCL 25 MG
25 CAPSULE ORAL
Status: DISCONTINUED | OUTPATIENT
Start: 2021-03-26 | End: 2021-03-26 | Stop reason: HOSPADM

## 2021-03-26 RX ORDER — PROPOFOL 10 MG/ML
VIAL (ML) INTRAVENOUS AS NEEDED
Status: DISCONTINUED | OUTPATIENT
Start: 2021-03-26 | End: 2021-03-26 | Stop reason: SURG

## 2021-03-26 RX ORDER — KETOROLAC TROMETHAMINE 30 MG/ML
INJECTION, SOLUTION INTRAMUSCULAR; INTRAVENOUS AS NEEDED
Status: DISCONTINUED | OUTPATIENT
Start: 2021-03-26 | End: 2021-03-26 | Stop reason: SURG

## 2021-03-26 RX ORDER — MEPERIDINE HYDROCHLORIDE 25 MG/ML
12.5 INJECTION INTRAMUSCULAR; INTRAVENOUS; SUBCUTANEOUS
Status: DISCONTINUED | OUTPATIENT
Start: 2021-03-26 | End: 2021-03-26 | Stop reason: HOSPADM

## 2021-03-26 RX ORDER — PROMETHAZINE HYDROCHLORIDE 25 MG/1
25 TABLET ORAL ONCE AS NEEDED
Status: DISCONTINUED | OUTPATIENT
Start: 2021-03-26 | End: 2021-03-26 | Stop reason: HOSPADM

## 2021-03-26 RX ORDER — ONDANSETRON 2 MG/ML
INJECTION INTRAMUSCULAR; INTRAVENOUS AS NEEDED
Status: DISCONTINUED | OUTPATIENT
Start: 2021-03-26 | End: 2021-03-26 | Stop reason: SURG

## 2021-03-26 RX ORDER — LIDOCAINE HYDROCHLORIDE 10 MG/ML
INJECTION, SOLUTION EPIDURAL; INFILTRATION; INTRACAUDAL; PERINEURAL AS NEEDED
Status: DISCONTINUED | OUTPATIENT
Start: 2021-03-26 | End: 2021-03-26 | Stop reason: SURG

## 2021-03-26 RX ORDER — HYDROMORPHONE HCL 110MG/55ML
1 PATIENT CONTROLLED ANALGESIA SYRINGE INTRAVENOUS
Status: DISCONTINUED | OUTPATIENT
Start: 2021-03-26 | End: 2021-03-26 | Stop reason: HOSPADM

## 2021-03-26 RX ORDER — SODIUM CHLORIDE, SODIUM LACTATE, POTASSIUM CHLORIDE, CALCIUM CHLORIDE 600; 310; 30; 20 MG/100ML; MG/100ML; MG/100ML; MG/100ML
9 INJECTION, SOLUTION INTRAVENOUS CONTINUOUS PRN
Status: DISCONTINUED | OUTPATIENT
Start: 2021-03-26 | End: 2021-03-26 | Stop reason: HOSPADM

## 2021-03-26 RX ORDER — LIDOCAINE HYDROCHLORIDE 10 MG/ML
0.5 INJECTION, SOLUTION EPIDURAL; INFILTRATION; INTRACAUDAL; PERINEURAL ONCE AS NEEDED
Status: DISCONTINUED | OUTPATIENT
Start: 2021-03-26 | End: 2021-03-26 | Stop reason: HOSPADM

## 2021-03-26 RX ORDER — HYDROCODONE BITARTRATE AND ACETAMINOPHEN 5; 325 MG/1; MG/1
1 TABLET ORAL ONCE AS NEEDED
Status: COMPLETED | OUTPATIENT
Start: 2021-03-26 | End: 2021-03-26

## 2021-03-26 RX ORDER — OXYCODONE HYDROCHLORIDE 5 MG/1
10 TABLET ORAL EVERY 4 HOURS PRN
Status: DISCONTINUED | OUTPATIENT
Start: 2021-03-26 | End: 2021-03-26 | Stop reason: HOSPADM

## 2021-03-26 RX ORDER — MIDAZOLAM HYDROCHLORIDE 1 MG/ML
INJECTION INTRAMUSCULAR; INTRAVENOUS AS NEEDED
Status: DISCONTINUED | OUTPATIENT
Start: 2021-03-26 | End: 2021-03-26 | Stop reason: SURG

## 2021-03-26 RX ORDER — LORAZEPAM 2 MG/ML
1 INJECTION INTRAMUSCULAR
Status: DISCONTINUED | OUTPATIENT
Start: 2021-03-26 | End: 2021-03-26 | Stop reason: HOSPADM

## 2021-03-26 RX ORDER — LABETALOL HYDROCHLORIDE 5 MG/ML
5 INJECTION, SOLUTION INTRAVENOUS
Status: DISCONTINUED | OUTPATIENT
Start: 2021-03-26 | End: 2021-03-26 | Stop reason: HOSPADM

## 2021-03-26 RX ORDER — SODIUM CHLORIDE 0.9 % (FLUSH) 0.9 %
10 SYRINGE (ML) INJECTION AS NEEDED
Status: DISCONTINUED | OUTPATIENT
Start: 2021-03-26 | End: 2021-03-26 | Stop reason: HOSPADM

## 2021-03-26 RX ORDER — SODIUM CHLORIDE 0.9 % (FLUSH) 0.9 %
10 SYRINGE (ML) INJECTION EVERY 12 HOURS SCHEDULED
Status: DISCONTINUED | OUTPATIENT
Start: 2021-03-26 | End: 2021-03-26 | Stop reason: HOSPADM

## 2021-03-26 RX ADMIN — SUGAMMADEX 200 MG: 100 INJECTION, SOLUTION INTRAVENOUS at 10:13

## 2021-03-26 RX ADMIN — SODIUM CHLORIDE, SODIUM LACTATE, POTASSIUM CHLORIDE, AND CALCIUM CHLORIDE 9 ML/HR: .6; .31; .03; .02 INJECTION, SOLUTION INTRAVENOUS at 08:04

## 2021-03-26 RX ADMIN — ONDANSETRON 4 MG: 2 INJECTION INTRAMUSCULAR; INTRAVENOUS at 10:00

## 2021-03-26 RX ADMIN — LIDOCAINE HYDROCHLORIDE 50 MG: 10 INJECTION, SOLUTION EPIDURAL; INFILTRATION; INTRACAUDAL; PERINEURAL at 09:37

## 2021-03-26 RX ADMIN — Medication 1 MG: at 10:05

## 2021-03-26 RX ADMIN — FENTANYL CITRATE 100 MCG: 50 INJECTION, SOLUTION INTRAMUSCULAR; INTRAVENOUS at 09:37

## 2021-03-26 RX ADMIN — KETOROLAC TROMETHAMINE 30 MG: 30 INJECTION, SOLUTION INTRAMUSCULAR at 10:03

## 2021-03-26 RX ADMIN — ROCURONIUM BROMIDE 50 MG: 10 INJECTION INTRAVENOUS at 09:37

## 2021-03-26 RX ADMIN — Medication 5 MG: at 10:05

## 2021-03-26 RX ADMIN — DEXAMETHASONE SODIUM PHOSPHATE 4 MG: 4 INJECTION, SOLUTION INTRAMUSCULAR; INTRAVENOUS at 09:44

## 2021-03-26 RX ADMIN — HYDROCODONE BITARTRATE AND ACETAMINOPHEN 1 TABLET: 5; 325 TABLET ORAL at 11:07

## 2021-03-26 RX ADMIN — HYDROMORPHONE HYDROCHLORIDE 0.5 MG: 2 INJECTION, SOLUTION INTRAMUSCULAR; INTRAVENOUS; SUBCUTANEOUS at 10:36

## 2021-03-26 RX ADMIN — CEFAZOLIN SODIUM 3 G: 1 INJECTION, POWDER, FOR SOLUTION INTRAMUSCULAR; INTRAVENOUS at 09:30

## 2021-03-26 RX ADMIN — MIDAZOLAM 2 MG: 1 INJECTION INTRAMUSCULAR; INTRAVENOUS at 09:37

## 2021-03-26 RX ADMIN — PROPOFOL 200 MG: 10 INJECTION, EMULSION INTRAVENOUS at 09:37

## 2021-03-26 NOTE — ANESTHESIA PROCEDURE NOTES
Airway  Urgency: elective    Date/Time: 3/26/2021 9:39 AM  Airway not difficult    General Information and Staff    Patient location during procedure: OR  Anesthesiologist: Dorian Thompson MD    Indications and Patient Condition  Indications for airway management: airway protection    Preoxygenated: yes  MILS not maintained throughout  Mask difficulty assessment: 2 - vent by mask + OA or adjuvant +/- NMBA    Final Airway Details  Final airway type: endotracheal airway      Successful airway: ETT  Cuffed: yes   Successful intubation technique: direct laryngoscopy  Facilitating devices/methods: intubating stylet  Endotracheal tube insertion site: oral  Blade: Tiffanie  Blade size: 3  ETT size (mm): 7.5  Cormack-Lehane Classification: grade I - full view of glottis  Placement verified by: chest auscultation, capnometry and palpation of cuff   Measured from: lips  ETT/EBT  to lips (cm): 20  Number of attempts at approach: 1  Assessment: lips, teeth, and gum same as pre-op and atraumatic intubation

## 2021-03-26 NOTE — ANESTHESIA POSTPROCEDURE EVALUATION
Patient: Jo Asif    Procedure Summary     Date: 03/26/21 Room / Location: [facility] OR  / [facility] MAIN OR    Anesthesia Start: 0930 Anesthesia Stop: 1020    Procedure: KNEE ARTHROSCOPY with  partial medial lateral meniscectomy (Right Knee) Diagnosis:       Right knee pain, unspecified chronicity      (Right knee pain, unspecified chronicity [M25.561])    Surgeons: Kb Gaviria MD Provider: Dorian Thompson MD    Anesthesia Type: general ASA Status: 3          Anesthesia Type: general    Vitals  Vitals Value Taken Time   /68 03/26/21 1045   Temp     Pulse 56 03/26/21 1046   Resp     SpO2 100 % 03/26/21 1046   Vitals shown include unvalidated device data.        Post Anesthesia Care and Evaluation    Patient location during evaluation: PACU  Patient participation: complete - patient participated  Level of consciousness: awake  Pain scale: See nurse's notes for pain score.  Pain management: adequate  Airway patency: patent  Anesthetic complications: No anesthetic complications  PONV Status: none  Cardiovascular status: acceptable  Respiratory status: acceptable  Hydration status: acceptable    Comments: Patient seen and examined postoperatively; vital signs stable; SpO2 greater than or equal to 90%; cardiopulmonary status stable; nausea/vomiting adequately controlled; pain adequately controlled; no apparent anesthesia complications; patient discharged from anesthesia care when discharge criteria were met

## 2021-03-26 NOTE — ANESTHESIA PREPROCEDURE EVALUATION
Anesthesia Evaluation     Patient summary reviewed and Nursing notes reviewed   NPO Solid Status: > 8 hours  NPO Liquid Status: > 8 hours           Airway   Mallampati: II  TM distance: >3 FB  Neck ROM: full  No difficulty expected  Dental - normal exam     Pulmonary    (+) asthma,  Cardiovascular     (+) hypertension, valvular problems/murmurs MR, dysrhythmias Atrial Fib, hyperlipidemia,       Neuro/Psych  GI/Hepatic/Renal/Endo    (+) morbid obesity, GERD,      Musculoskeletal     Abdominal    Substance History      OB/GYN          Other        ROS/Med Hx Other: Normal LV size and contractility EF of 60 to 65%  Normal RV size  Normal atrial size  Aortic valve, mitral valve, tricuspid valve appears structurally normal, mild mitral regurgitation seen.  No pericardial effusion seen.  Proximal aorta appears normal in size.                Anesthesia Plan    ASA 3     general     intravenous induction     Anesthetic plan, all risks, benefits, and alternatives have been provided, discussed and informed consent has been obtained with: patient.    Plan discussed with CRNA and CAA.

## 2021-03-29 ENCOUNTER — OFFICE VISIT (OUTPATIENT)
Dept: ORTHOPEDIC SURGERY | Facility: CLINIC | Age: 41
End: 2021-03-29

## 2021-03-29 VITALS
DIASTOLIC BLOOD PRESSURE: 87 MMHG | SYSTOLIC BLOOD PRESSURE: 179 MMHG | HEART RATE: 76 BPM | WEIGHT: 293 LBS | HEIGHT: 64 IN | BODY MASS INDEX: 50.02 KG/M2

## 2021-03-29 DIAGNOSIS — M23.303 DERANGEMENT OF MEDIAL MENISCUS OF RIGHT KNEE: ICD-10-CM

## 2021-03-29 DIAGNOSIS — M25.561 RIGHT KNEE PAIN, UNSPECIFIED CHRONICITY: ICD-10-CM

## 2021-03-29 DIAGNOSIS — Z47.89 ORTHOPEDIC AFTERCARE: Primary | ICD-10-CM

## 2021-03-29 PROCEDURE — 99024 POSTOP FOLLOW-UP VISIT: CPT | Performed by: ORTHOPAEDIC SURGERY

## 2021-03-29 NOTE — PATIENT INSTRUCTIONS
Knee Arthroscopy: Post- Operative Visit Objectives    1) Review the operative findings, procedures and photos.  2) Make sure medications are effective and not causing problems.  a) Anti-inflammatory-Naproxen 500mg twice a day for two weeks.  If you have stomach upset a gentler over the counter medication such as Aleve, Ibuprofen, Advil or Motrin can be used.  If you have any problems, allergies, or severe stomach intolerance stop taking these medicines and please tell us.   b) Pain: Hydrocodone or oxycodone is for pain. This is an excellent pain reliever that is a narcotic plus Tylenol. You may take 1 tablet every 6 hours as necessary.  Many patients don’t require the use of this if pain is mild…in those circumstances just use Tylenol extra-strength, 1 or 2 tablets every 6 hours  c) Antibiotic: You will receive a prescription for 24 hours of an antibiotic, please finish this whole bottle.   3) Wound Care:  a) Today we will change your dressings. If it is appropriate we will cover your wounds with a plastic covering called Tegaderm. This will allow you to shower immediately. No baths or swimming until the bandages are removed.         You can peel the Tegaderm and Steri-Strips off in 2 weeks at home then shower without a dressing         The ace bandage remains on for 2 weeks as well then as needed  4) Exercises and Physical Therapy   a) Continue the basic exercises 4x/day  b) Today you can begin to ride a stationary bike.  Start at 10 minutes with no resistance and slowly increase the time (by 1-2 minute increments).  Once you have reached 30 minutes you may add some mild resistance every few days.  c) Ultimate goal is to ride continuously for 1 hour per day, 5 days per week with moderate resistance.  d) Most of the time formal therapy is not required, but if so it will be ordered today  5) Cane or Crutches  a) Make sure that you are staying on your crutches or cane for 7 days.   b) Remember in the 1st 4 weeks make  a point not to “over-walk” especially if you have some arthritis…this will cause more pain and swelling!  6) Follow Up appointments  a) Schedule Follow up visit before you leave the office today for approximately 4 weeks.  7) Notes etc:  a) Make sure you have all necessary notes and documentation for school or work.  8) Issues: Remember our goal is to make this process smooth and easy if there is any thing you need please ask us or call [phone]  9)

## 2021-03-29 NOTE — PROGRESS NOTES
Patient ID: Jo Asif is a 40 y.o. female.    3/26/21 right knee arthroscopy partial medial lateral meniscectomy with grade III chondromalacia patella  Pain controlled  Objective:    LMP 03/18/2021     Physical Examination:  Wounds are well approximated without infection.  Trace effusion, Renu negative. Sensory and motor exam are intact all distributions. Dorsalis pedis and posterior tibialis pulses are palpable and capillary refill is less than two seconds to all digits       Imaging:      Assessment:  Doing well after knee arthroscopy    Plan:  Wounds were cleaned and redressed. Restrictions discussed. Begin home exercises. See me in 4 weeks. Remove dressing in 2 weeks    Procedures

## 2021-03-31 ENCOUNTER — TREATMENT (OUTPATIENT)
Dept: PHYSICAL THERAPY | Facility: CLINIC | Age: 41
End: 2021-03-31

## 2021-03-31 DIAGNOSIS — Z47.89 ORTHOPEDIC AFTERCARE: Primary | ICD-10-CM

## 2021-03-31 DIAGNOSIS — M23.303 DERANGEMENT OF MEDIAL MENISCUS OF RIGHT KNEE: ICD-10-CM

## 2021-03-31 DIAGNOSIS — M25.561 RIGHT KNEE PAIN, UNSPECIFIED CHRONICITY: ICD-10-CM

## 2021-03-31 PROCEDURE — 97162 PT EVAL MOD COMPLEX 30 MIN: CPT | Performed by: PHYSICAL THERAPIST

## 2021-03-31 PROCEDURE — 97110 THERAPEUTIC EXERCISES: CPT | Performed by: PHYSICAL THERAPIST

## 2021-04-02 ENCOUNTER — TREATMENT (OUTPATIENT)
Dept: PHYSICAL THERAPY | Facility: CLINIC | Age: 41
End: 2021-04-02

## 2021-04-02 DIAGNOSIS — M25.561 RIGHT KNEE PAIN, UNSPECIFIED CHRONICITY: ICD-10-CM

## 2021-04-02 DIAGNOSIS — M23.303 DERANGEMENT OF MEDIAL MENISCUS OF RIGHT KNEE: ICD-10-CM

## 2021-04-02 DIAGNOSIS — Z47.89 ORTHOPEDIC AFTERCARE: Primary | ICD-10-CM

## 2021-04-02 PROCEDURE — 97110 THERAPEUTIC EXERCISES: CPT | Performed by: PHYSICAL THERAPIST

## 2021-04-02 PROCEDURE — 97530 THERAPEUTIC ACTIVITIES: CPT | Performed by: PHYSICAL THERAPIST

## 2021-04-02 NOTE — PROGRESS NOTES
Physical Therapy Daily Progress Note    VISIT#: 2    Subjective   Jo Asif reports her knee has been bothering her more for the last couple of days with difficulty sleeping last night. Pt states she feels the ice aggravated it more.      Pain Rating (0-10): 3-4    Objective     See Exercise, Manual, and Modality Logs for complete treatment.     Patient Education: cues for therex; discussed avoiding heat due to possibly increasing swelling    Assessment/Plan Pt tolerated therex and theracts fairly today due to increased pain. Pt  was able to complete all tasks given except for A/A SLR due to increased pain. Pt will ice at home.     Progress per Plan of Care and Progress strengthening /stabilization /functional activity            Timed:         Manual Therapy:         mins  02520;     Therapeutic Exercise:   23     mins  85567;     Neuromuscular Royce:        mins  52128;    Therapeutic Activity:    10      mins  66178;     Gait Training:           mins  46973;     Ultrasound:         mins  17206;    Ionto                                   mins   73075  Self Care                            mins   43577  Canalith Repos                   mins  54585    Un-Timed:  Electrical Stimulation:         mins  87848 ( );  Dry Needling          mins self-pay  Traction          mins 23196  Low Eval          Mins  77059  Mod Eval          Mins  58952  High Eval                           Mins  69814  Re-Eval                               mins  58588    Timed Treatment:  33    mins   Total Treatment:    33   mins    Krystyna Ornelas, PT  [place] License # [id number]  Physical Therapist

## 2021-04-06 ENCOUNTER — TREATMENT (OUTPATIENT)
Dept: PHYSICAL THERAPY | Facility: CLINIC | Age: 41
End: 2021-04-06

## 2021-04-06 DIAGNOSIS — Z47.89 ORTHOPEDIC AFTERCARE: Primary | ICD-10-CM

## 2021-04-06 DIAGNOSIS — M23.303 DERANGEMENT OF MEDIAL MENISCUS OF RIGHT KNEE: ICD-10-CM

## 2021-04-06 DIAGNOSIS — M25.561 RIGHT KNEE PAIN, UNSPECIFIED CHRONICITY: ICD-10-CM

## 2021-04-06 PROCEDURE — 97110 THERAPEUTIC EXERCISES: CPT | Performed by: PHYSICAL THERAPIST

## 2021-04-06 PROCEDURE — 97116 GAIT TRAINING THERAPY: CPT | Performed by: PHYSICAL THERAPIST

## 2021-04-06 NOTE — PROGRESS NOTES
Physical Therapy Daily Progress Note    VISIT#: 3    Subjective   Jo Asif reports Sunday the knee didn't want to bend and her  had to help her get out of the tub shower. Pt states yesterday started out better, but by the end of the day she was hurting. Pt states she had to use the cane most of the day Sunday, but is trying to go without it so much. Pt notes that if the ice machine is on too long, she gets spasms. Pt notes she turns it off if it gets too much. Pt states she got a nurse  who is investigating if something will be done with the shoulder or not. Pt states she may attend visits with her.     Pain Rating (0-10): 1-2    Objective R knee (degrees): ext lag 13 with SAQ, flex 85 with heel slide; PROM ext lag 5 w/heel prop    See Exercise, Manual, and Modality Logs for complete treatment.     Patient Education: cues for therex for hold times & form; discussed that generally we try to keep using the cane until gait is more normal (pt notes MD told her to wean off after the first week; pt using more for out in public); discussed proper fit of the cane and gt mechanics with cane; discussed that sometimes with small tears at a shoulder, surgery is not required    Assessment/Plan Added gait training with small cone step overs and gait mechanics with plinth for support. Pt with some med knee pain with small cone step overs with stance phase of gt. Improved gait quality noted in the clinic after gt training, with some hesitancy to bend the R knee when walking out of the clinic. Pt was able to correct with cuing as she left the clinic. Pt will bring the cane in to practice gait and steps (pt has 1 step at home). Exs took longer for completion as pt was instr to hold contractions or end of movement.     Progress per Plan of Care and Progress strengthening /stabilization /functional activity            Timed:         Manual Therapy:         mins  44191;     Therapeutic Exercise:   40       mins  03966;     Neuromuscular Royce:        mins  19454;    Therapeutic Activity:          mins  71703;     Gait Training:     10    mins  03849;     Ultrasound:         mins  48952;    Ionto                                   mins   20648  Self Care                            mins   47431  Canalith Repos                   mins  35416    Un-Timed:  Electrical Stimulation:         mins  75495 ( );  Dry Needling          mins self-pay  Traction          mins 44795  Low Eval          Mins  66924  Mod Eval          Mins  17937  High Eval                           Mins  09043  Re-Eval                               mins  53001    Timed Treatment:   50    mins   Total Treatment:      50   mins    Krystyna Ornelas PT  [place] License # [id number]  Physical Therapist

## 2021-04-07 RX ORDER — CYCLOBENZAPRINE HCL 5 MG
5 TABLET ORAL 2 TIMES DAILY PRN
Qty: 30 TABLET | Refills: 0 | Status: SHIPPED | OUTPATIENT
Start: 2021-04-07 | End: 2021-05-26

## 2021-04-08 ENCOUNTER — TREATMENT (OUTPATIENT)
Dept: PHYSICAL THERAPY | Facility: CLINIC | Age: 41
End: 2021-04-08

## 2021-04-08 ENCOUNTER — TELEPHONE (OUTPATIENT)
Dept: ORTHOPEDIC SURGERY | Facility: CLINIC | Age: 41
End: 2021-04-08

## 2021-04-08 DIAGNOSIS — M25.561 RIGHT KNEE PAIN, UNSPECIFIED CHRONICITY: ICD-10-CM

## 2021-04-08 DIAGNOSIS — M23.303 DERANGEMENT OF MEDIAL MENISCUS OF RIGHT KNEE: ICD-10-CM

## 2021-04-08 DIAGNOSIS — Z47.89 ORTHOPEDIC AFTERCARE: Primary | ICD-10-CM

## 2021-04-08 PROCEDURE — 97116 GAIT TRAINING THERAPY: CPT | Performed by: PHYSICAL THERAPIST

## 2021-04-08 PROCEDURE — 97110 THERAPEUTIC EXERCISES: CPT | Performed by: PHYSICAL THERAPIST

## 2021-04-08 NOTE — TELEPHONE ENCOUNTER
Provider: DR RODRIGUEZ    Caller: ARNAUD MCCOLLUM    Relationship to Patient: SELF    Phone Number: [phone]    Reason for Call: PT SAYS THAT SHE IS SUPPOSED TO RETURN TO WORK ON 4/12, BUT SHE IS STILL IN A LOT OF PAIN AND HAVING TROUBLE MOVING AROUND, AND DOES NOT THINK SHE CAN GO BACK JUST YET. SHE WANTS TO KNOW IF DR RODRIGUEZ WANTS HER TO STAY OFF WORK UNTIL SHE COMES BACK FOR HER FOLLOW UP APPT ON 4/26. SHE IS REGAINING RANGE OF MOTION BUT SHE SAID HER KNEE CONTINUES TO HAVE STIFFNESS. SHE ALSO WANTS TO KNOW IF SHE NEEDS TO CONTINUE ON NAPROXEN, WHICH SHE WILL RUN OUT OF ON Monday, OR IF SHE NEEDS TO START TAKING ALEVE OR IBUPROFEN. PLEASE CALL HER BACK AT THE NUMBER ABOVE.

## 2021-04-08 NOTE — TELEPHONE ENCOUNTER
Spoke to patient, let her know that she can stay off work until her next appointment and when her naproxen is gone she can take over the counter anti inflammatories.

## 2021-04-08 NOTE — PROGRESS NOTES
Physical Therapy Daily Progress Note    VISIT#: 4    Subjective   Jo Asif reports her knee is fine at rest but like every morning once she starts moving it more the knee pain increases significantly. By the end of the day her pain is up to 6-7/10. Sometimes her knee gets to the point that she cannot get herself out of bed or out of her chair and her  has to pull her up. Patient is unsure she will able to return to work on Monday due to the amount of walking she has to do to get to her desk and the inability to ice and elevate while she's there. She is going to call Khadra's office and talk to them about that today. She expressed how frustrated she is that her shoulder issue  is still not being addressed.  Current Pain Level: 2/10 (pre Tx)   4/10 (post Tx)    Objective   R Knee ROM  FL: 90* post str  Ext: 0*    Assessed Quad cane height and it was appropriately fit for the patient. She is also using it in the appropriate UE.    See Exercise, Manual, and Modality Logs for complete treatment.     Patient Education: cueing for therex and hold times. Discussed with patient that she can lower her time on her ice machine if it is causing her discomfort. Instructed her to only leave it on for 10-20 min at time and do it as many times a day as she feels necessary. If swelling increases or pain gets too bad to make sure she does it then.    Assessment/Plan  Patient is still presenting with increased amounts pain in the knee with all movement and weight bearing activities. She required extra time to complete the standing exercises due to needing sitting rest breaks. Reviewed heel strike/toe off gait pattern. Side stepping was challenging for her and c/o medial knee pain while having to stabilize on the surgery leg. She is still using her quad cane for ambulation outside of the house still. Knee ROM did increase by 5 degrees since her last visit.     Plan: Per PT cont 3x a week  Progress per Plan of  Care    Goals  Plan Goals: STGs in 4 weeks:  Decrease pain to 4-5/10 on average  Increase R hip flex/knee flex/ext AROM by 10 degrees   Increase RLE strength to 4/5    LTGs by discharge  Increase R LE AROM to WFL/WNL  Increase LE strength to 5/5 in available range  Pt will be able to ascend/descend stairs reciprocally with or without use of rail(s) and with minimal difficulty or pain  Pt will be able to sit/drive/ride for 30-60 mins without difficulty or pain  Pt will be able to stand 30-60 mins for basic ADLs/work activities without difficulty or pain  Pt will be able to walk 30-60 mins for grocery shopping/work activities without difficulty, pain or LOB  Pt will be able to wash/dress/groom without difficulty or increased pain  Pt will be able to sleep full nights most nights without waking from RLE symptoms          Timed:            Therapeutic Exercise:    32     mins  34971;      Gait Trainin     mins  76967;         Un-Timed:      Timed Treatment:   45   mins   Total Treatment:     45   mins    Evelyn Patel PTA    Physical Therapist Assistant

## 2021-04-12 ENCOUNTER — TREATMENT (OUTPATIENT)
Dept: PHYSICAL THERAPY | Facility: CLINIC | Age: 41
End: 2021-04-12

## 2021-04-12 DIAGNOSIS — Z47.89 ORTHOPEDIC AFTERCARE: Primary | ICD-10-CM

## 2021-04-12 DIAGNOSIS — M25.561 RIGHT KNEE PAIN, UNSPECIFIED CHRONICITY: ICD-10-CM

## 2021-04-12 DIAGNOSIS — M23.303 DERANGEMENT OF MEDIAL MENISCUS OF RIGHT KNEE: ICD-10-CM

## 2021-04-12 PROCEDURE — 97110 THERAPEUTIC EXERCISES: CPT | Performed by: PHYSICAL THERAPIST

## 2021-04-12 PROCEDURE — 97530 THERAPEUTIC ACTIVITIES: CPT | Performed by: PHYSICAL THERAPIST

## 2021-04-12 NOTE — PROGRESS NOTES
Physical Therapy Daily Progress Note    VISIT#: 5    Subjective   Jo Asif reports she has taken off the bandaging and the knee feels like it moves a little better since then. Pt continues to have difficulty sleeping and is back and forth between bed and the reclining chair. Pt has difficulty rising up from the chair due to L shld issues not being able to push herself up. Pt notes the calf feels tight and doesn't seem to get better with stretching.     Pain Rating (0-10): 2-3    Objective Girth: 51.5 cm at  jt line; palpation: no erythema, warmth noted at R calf    See Exercise, Manual, and Modality Logs for complete treatment.     Patient Education: cues for therex; discussed S&S of DVT/PE and instr pt to contact MD or get to ER if symptoms occur/worsen    Assessment/Plan Pt with 3 instances of loud audible popping (with heel slides, ham curls & mini squats) of the R knee during session with reports of increased pain. Pt was able to complete more standing activities today and continues to decline modalities as she has the machine at home.     Progress per Plan of Care and Progress strengthening /stabilization /functional activity            Timed:         Manual Therapy:         mins  10011;     Therapeutic Exercise:    28     mins  59669;     Neuromuscular Royce:        mins  12038;    Therapeutic Activity:    16      mins  56686;     Gait Training:           mins  54499;     Ultrasound:         mins  76035;    Ionto                                   mins   26188  Self Care                            mins   32432  Canalith Repos                   mins  52083    Un-Timed:  Electrical Stimulation:         mins  75248 ( );  Dry Needling          mins self-pay  Traction          mins 54975  Low Eval          Mins  08263  Mod Eval          Mins  03777  High Eval                           Mins  78803  Re-Eval                               mins  81856    Timed Treatment:   44   mins   Total Treatment:       44  mins    Krystyna Ornelas, PT  [place] License # [id number]  Physical Therapist

## 2021-04-15 ENCOUNTER — TREATMENT (OUTPATIENT)
Dept: PHYSICAL THERAPY | Facility: CLINIC | Age: 41
End: 2021-04-15

## 2021-04-15 DIAGNOSIS — M23.303 DERANGEMENT OF MEDIAL MENISCUS OF RIGHT KNEE: ICD-10-CM

## 2021-04-15 DIAGNOSIS — Z47.89 ORTHOPEDIC AFTERCARE: Primary | ICD-10-CM

## 2021-04-15 DIAGNOSIS — M25.561 RIGHT KNEE PAIN, UNSPECIFIED CHRONICITY: ICD-10-CM

## 2021-04-15 PROCEDURE — 97110 THERAPEUTIC EXERCISES: CPT | Performed by: PHYSICAL THERAPIST

## 2021-04-15 PROCEDURE — 97530 THERAPEUTIC ACTIVITIES: CPT | Performed by: PHYSICAL THERAPIST

## 2021-04-15 NOTE — PROGRESS NOTES
Physical Therapy Daily Progress Note    VISIT#: 6    Subjective   Jo Asif reports she got an extension on her time off until 4/26 when she sees the doctor because she's needing to ice/elevate frequently and due to continued pain. Pt has switched over to Aleve for the knee and took a Hydrocodone yesterday because the knee was hurting too much. Pt states bending beyond 90 degrees or even less tends to aggravate the knee. Pt notes it's still popping especially while doing the exs.     Pain Rating (0-10): 3    Objective     See Exercise, Manual, and Modality Logs for complete treatment.     Patient Education: cues for therex    Assessment/Plan   Pt with a loud audible pop with initial squat with cues for pushing buttocks backwards. Pt was able to perform all activities/exs given, but with discomfort throughout session. Pt declined modalities as she can ice at home.     Progress per Plan of Care and Progress strengthening /stabilization /functional activity            Timed:         Manual Therapy:         mins  07615;     Therapeutic Exercise:   25      mins  58610;     Neuromuscular Royce:        mins  96388;    Therapeutic Activity:    15    mins  35222;     Gait Training:           mins  02808;     Ultrasound:         mins  98335;    Ionto                                   mins   26376  Self Care                            mins   95743  Canalith Repos                   mins  77719    Un-Timed:  Electrical Stimulation:         mins  97044 ( );  Dry Needling          mins self-pay  Traction          mins 00289  Low Eval          Mins  80418  Mod Eval          Mins  72364  High Eval                           Mins  26796  Re-Eval                               mins  42884    Timed Treatment:  40    mins   Total Treatment:     40   mins    Krystyna Ornelas, PT  [place] License # [id number]  Physical Therapist

## 2021-04-16 ENCOUNTER — TELEPHONE (OUTPATIENT)
Dept: ORTHOPEDIC SURGERY | Facility: CLINIC | Age: 41
End: 2021-04-16

## 2021-04-16 DIAGNOSIS — M23.303 DERANGEMENT OF MEDIAL MENISCUS OF RIGHT KNEE: ICD-10-CM

## 2021-04-16 DIAGNOSIS — Z47.89 ORTHOPEDIC AFTERCARE: Primary | ICD-10-CM

## 2021-04-16 DIAGNOSIS — R20.0 NUMBNESS AND TINGLING IN RIGHT HAND: ICD-10-CM

## 2021-04-16 DIAGNOSIS — M25.521 RIGHT ELBOW PAIN: ICD-10-CM

## 2021-04-16 DIAGNOSIS — M25.511 RIGHT SHOULDER PAIN, UNSPECIFIED CHRONICITY: ICD-10-CM

## 2021-04-16 DIAGNOSIS — R20.2 NUMBNESS AND TINGLING IN RIGHT HAND: ICD-10-CM

## 2021-04-16 NOTE — TELEPHONE ENCOUNTER
Caller: MELLISSA FLEMING    Relationship: NURSE     Best call back number: [phone]    What form or medical record are you requesting: OFFICE NOTES FROM LAST VISIT    Who is requesting this form or medical record from you: MELLISSA FLEMING    How would you like to receive the form or medical records (pick-up, mail, fax): FAX  If fax, what is the fax number: [phone]    Timeframe paperwork needed: ASAP    Additional notes: NURSE  SAID THAT SHE IS NEEDING TO TO SPEAK WITH SOMEONE IN REGARDS TO THE PATIENT'S TREATMENT/CARE; THE PATIENT SAID THAT THE DOCTOR IS NOT ADDRESSING HER RIGHT SHOULDER PAIN AND IS FRUSTRATED, AND THE NURSE  NEEDS TO KNOW WHAT IS BEING DONE IN REGARDS TO THIS AND SO THAT THEY HAVE A TREATMENT PLAN. PLEASE CALL HER BACK AT THE NUMBER ABOVE.

## 2021-04-19 ENCOUNTER — TREATMENT (OUTPATIENT)
Dept: PHYSICAL THERAPY | Facility: CLINIC | Age: 41
End: 2021-04-19

## 2021-04-19 DIAGNOSIS — M23.303 DERANGEMENT OF MEDIAL MENISCUS OF RIGHT KNEE: ICD-10-CM

## 2021-04-19 DIAGNOSIS — Z47.89 ORTHOPEDIC AFTERCARE: Primary | ICD-10-CM

## 2021-04-19 DIAGNOSIS — M25.561 RIGHT KNEE PAIN, UNSPECIFIED CHRONICITY: ICD-10-CM

## 2021-04-19 PROCEDURE — 97530 THERAPEUTIC ACTIVITIES: CPT | Performed by: PHYSICAL THERAPIST

## 2021-04-19 PROCEDURE — 97110 THERAPEUTIC EXERCISES: CPT | Performed by: PHYSICAL THERAPIST

## 2021-04-19 NOTE — PROGRESS NOTES
Physical Therapy Daily Progress Note    VISIT#: 7    Subjective   Jo Asif reports her knee is just there. It is still pretty painful with any kind of movement. If she is seated or laying down and not moving the leg it is fine. States she got an order to also start PT on her shoulder as well.   Current Pain Level: 1-2/10 but with movement 5-6/10    Objective     See Exercise, Manual, and Modality Logs for complete treatment.     Patient Education: added standing hip flex, abd, ext and standing marches to HEP. Handouts and log in to online access given to patient.    Assessment/Plan  Experiencing popping in the knee with SAQ's that were progressively getting more painful. Instructed her to decrease to amount of quad squeeze she was doing and popping lessened. Progressed her standing exercises with hip flex, abd and ext. Difficulty performing when stabilizing with the RLE. ROM is WFL but painful. Modalities offered at end of session but she denied.    Progress per Plan of Care     Goals  Plan Goals: STGs in 4 weeks:  Decrease pain to 4-5/10 on average  Increase R hip flex/knee flex/ext AROM by 10 degrees   Increase RLE strength to 4/5    LTGs by discharge  Increase R LE AROM to WFL/WNL  Increase LE strength to 5/5 in available range  Pt will be able to ascend/descend stairs reciprocally with or without use of rail(s) and with minimal difficulty or pain  Pt will be able to sit/drive/ride for 30-60 mins without difficulty or pain  Pt will be able to stand 30-60 mins for basic ADLs/work activities without difficulty or pain  Pt will be able to walk 30-60 mins for grocery shopping/work activities without difficulty, pain or LOB  Pt will be able to wash/dress/groom without difficulty or increased pain  Pt will be able to sleep full nights most nights without waking from RLE symptoms          Timed:            Therapeutic Exercise:    25     mins  80505;      Therapeutic Activity:     18     mins  29833;             Un-Timed:      Timed Treatment:   43   mins   Total Treatment:     43   mins    Evelyn Patel PTA    Physical Therapist Assistant

## 2021-04-21 ENCOUNTER — TREATMENT (OUTPATIENT)
Dept: PHYSICAL THERAPY | Facility: CLINIC | Age: 41
End: 2021-04-21

## 2021-04-21 DIAGNOSIS — M25.521 RIGHT ELBOW PAIN: ICD-10-CM

## 2021-04-21 DIAGNOSIS — R20.2 NUMBNESS AND TINGLING IN RIGHT HAND: ICD-10-CM

## 2021-04-21 DIAGNOSIS — M25.511 RIGHT SHOULDER PAIN, UNSPECIFIED CHRONICITY: ICD-10-CM

## 2021-04-21 DIAGNOSIS — R20.0 NUMBNESS AND TINGLING IN RIGHT HAND: ICD-10-CM

## 2021-04-21 DIAGNOSIS — M23.303 DERANGEMENT OF MEDIAL MENISCUS OF RIGHT KNEE: Primary | ICD-10-CM

## 2021-04-21 DIAGNOSIS — Z47.89 ORTHOPEDIC AFTERCARE: ICD-10-CM

## 2021-04-21 PROCEDURE — 97110 THERAPEUTIC EXERCISES: CPT | Performed by: PHYSICAL THERAPIST

## 2021-04-21 PROCEDURE — 97164 PT RE-EVAL EST PLAN CARE: CPT | Performed by: PHYSICAL THERAPIST

## 2021-04-21 NOTE — PROGRESS NOTES
Re-Assessment / Re-Certification / Plan of Care        Patient: Jo Asif   : 1980  Diagnosis/ICD-10 Code:  Derangement of medial meniscus of right knee [M23.303]  Referring practitioner: Kb Gaviria MD  Date of Initial Visit: Type: THERAPY  Noted: 3/31/2021  Today's Date: 2021  Patient seen for 8 sessions      Subjective:   Jo Asif had a work injury on 20 when she misjudged a step and landed on her R elbow/knee onto the steel plate of a dock. Pt tried to reach and grab something to stop the fall when the fall occurred.     Pt has seen [facility] and tried 6 visits of PT before seeing Dr. Gaviria. Pt then had surgery on 3/26/21 to the R knee for arthroscopic med/lat meniscectomy with chondromalacia patella. Pt has continued to c/o R shld/elbow pain since she has been in PT.    MRI shld: subtle SA/SD bursitis, mild arthrosis AC jt, minor flattening of post sup humeral head can be related to prior dislocation, subtle Hill-Sachs deformity.  MRI knee: prepatellar soft tisuse swelling, post capsular ganglia extend from med to lat in posterior margin of knee post to base of PCL, minor fraying and irregularity of undersurface of post horn med meniscus    PMH: asthma, headaches, thyroid disease, Afib/heart murmur/arrhythmia, PCOS, PKU, pre diabetes, GERD, HTN    Pain 2/10 current, 1/10 best, 9/10 worst    Aggravating/functional factors: Knee: rising, walking, standing, bending knee, squatting, in/out of car, driving switching between gas/brake, work activities, sleeping, ramps slightly  Shld/elbow: cooking, reaching, washing/dressing/grooming, driving/gear shift, pushing/pulling, lifting/carrying    PLOF: no problem with any of the above    Subjective Questionnaire: QuickDASH: 38 = 61.36% limited, OKS = 25/48    Clinical Progress: improvement at the knee; just initiating Rx for shld/elbow today   Home Program Compliance: Yes  Treatment has included: therapeutic exercise, therapeutic  activity and gait training    Objective     Girth R knee:   50 cm at jt line (was 51.5 cm on )  60.7 cm at 10 cm above    Observation:  Incisions well healed at the R knee    Palpation:  TTP peripatellar at R knee & at R ant shld/pec/clavicular regions    Active Range of Motion     Neck WFL except SB L 25 degrees with pain on R side    L shoulder/elbow: WFL    R shoulder:  Flexion: 130 degrees  Abduction: 125 degrees  Extension: 40 degrees  IR BTB: sacrum   ER BTH: lower cerv  Horiz add: to ant L shld    R elbow:  Flexion: 140 degrees  Extension la degrees    Left Knee   Extension: WFL     Right Knee   Flexion: 104 degrees (heel slide)  Extensor la degree (SAQ)    Additional Active Range of Motion Details  R hip flex 85 degrees in sitting - limited by knee pain with movement    PROM knee ext to hyperextension     Strength/Myotome Testing     Left UE: grossly 5 (did not test prone)    Right UE:   Shld grossly 4 except ext 4-  Elbow 4+  Wrist flex/ext 5     Left Knee   Flexion: 5  Extension: 5     Right Knee   Flexion: 3+  Extension: 4+  Quadriceps contraction: fair/good     Additional strength details:  Hip flex 4 L/3- R  Ankle DF 4+ L/4+ R     Special Tests: Renu's (-), Neer's (+), Cross Arm (+), Stovall Mc (+), Lift off 3+/5, belly press 4/5, Bear Hug painful, but able to hold, Drop arm (-), Ros[place]'s painful both directions, but able to hold, Speeds mild pain, but able to hold    Education provided on results of ld MRI including relevant anatomy.     Assessment/Plan   Impairments: abnormal coordination, abnormal gait, abnormal muscle firing, abnormal muscle tone, abnormal or restricted ROM, activity intolerance, impaired balance, impaired physical strength, lacks appropriate home exercise program, pain with function, safety issue and weight-bearing intolerance    Assessment details: The patient is a 40 y.o. female who presents to physical therapy today for orthopedic aftercare for s/p R  med/lat meniscectomy & for R shld/elbow pain and n/t in R hand. Upon initial evaluation, the patient demonstrates the above & following impairments: pain, reduced posture, decreased ROM/flexibility, strength, gait, balance and function. Due to these impairments, the patient is unable to/limited with: Knee: rising, walking, standing, bending knee, squatting, in/out of car, driving switching between gas/brake, work activities, sleeping, ramps slightly, Shld/elbow: cooking, reaching, washing/dressing/grooming, driving/gear shift, pushing/pulling, and lifting/carrying. The patient would benefit from skilled PT services to address functional limitations and impairments and to improve patient quality of life.      Barriers to therapy: morbid obesity, hypothyroidism, hx near syncope, palpitations, paroxysmal Afib, chest pain, arthritis, asthma, headaches, HTN, and GERD could affect PT Rx/progress/outcomes if exacerbated or unregulated which could limit tolerance or compliance with PT/HEP  Prognosis: good     Goals  Plan Goals: STGs in 4 weeks:  Decrease pain to 4-5/10 on average  Increase R hip flex/knee flex/ext AROM by 10 degrees   Increase RLE strength to 4/5    LTGs by discharge  Increase R LE AROM to WFL/WNL  Increase LE strength to 5/5 in available range  Pt will be able to ascend/descend stairs reciprocally with or without use of rail(s) and with minimal difficulty or pain  Pt will be able to sit/drive/ride for 30-60 mins without difficulty or pain  Pt will be able to stand 30-60 mins for basic ADLs/work activities without difficulty or pain  Pt will be able to walk 30-60 mins for grocery shopping/work activities without difficulty, pain or LOB  Pt will be able to wash/dress/groom without difficulty or increased pain  Pt will be able to sleep full nights most nights without waking from RLE symptoms     New Goals  Short-term goals (STG) in 4 weeks:  Increase R UE AROM 10-15 degrees where limited as much    Long-term  goals (LTG) by discharge:   Pt will be able to grasp/reach/lift into overhead cabinets with the R UE or B UE to put away dishes without difficulty or increased pain  Pt will be able to lift/carry laundry, pots/pans, garbage/grocery bags or heavier items for ADLs and/or to prepare for RTW without difficulty or increased pain  Pt will be able to push/pull heavy doors for ADLs and prepare for RTW activities without difficulty or increased pain    Plan  Therapy options: will be seen for skilled physical therapy services  Planned modality interventions: cryotherapy, thermotherapy (hydrocollator packs), electrical stimulation/Russian stimulation and dry needling  Planned therapy interventions: manual therapy, neuromuscular re-education, postural training, self-care, soft tissue mobilization, spinal/joint mobilization, strengthening, stretching, therapeutic activities, transfer training, abdominal trunk stabilization, ADL retraining, body mechanics training, home exercise program, gait training, functional ROM exercises, flexibility, motor coordination training, balance/weight-bearing training and joint mobilization  Treatment plan discussed with: patient  Plan details: 30 visits; 90 day POC        Recommendations: Continue 30 visits  Timeframe: 90 days expiring 7/20/21  Prognosis to achieve goals: good    PT Signature: Krystyna Ornelas, PT  [place] Lic# [id number]      Based upon review of the patient's progress and continued therapy plan, it is my medical opinion that Jo Asif should continue physical therapy treatment at [facility]  [address]  [place] [address] [phone].    Signature: __________________________________  Kb Gaviria MD    Timed:         Manual Therapy:         mins  30369;     Therapeutic Exercise:   15      mins  13921;     Neuromuscular Royce:        mins  99365;    Therapeutic Activity:          mins  77006;     Gait  Training:           mins  59879;     Ultrasound:          mins  93045;    Ionto                                   mins   08331  Self Care                            mins   63290  Canalith Repos                  mins   53764    Un-Timed:  Electrical Stimulation:         mins  50142 ( );  Dry Needling          mins self-pay  Traction          mins 73218  Low Eval          Mins  46627  Mod Eval          Mins  75456  High Eval                            Mins  61052  Re-Eval                        25   mins  25470      Timed Treatment:  15    mins   Total Treatment:     40   mins

## 2021-04-23 ENCOUNTER — TREATMENT (OUTPATIENT)
Dept: PHYSICAL THERAPY | Facility: CLINIC | Age: 41
End: 2021-04-23

## 2021-04-23 DIAGNOSIS — M23.303 DERANGEMENT OF MEDIAL MENISCUS OF RIGHT KNEE: Primary | ICD-10-CM

## 2021-04-23 DIAGNOSIS — R20.0 NUMBNESS AND TINGLING IN RIGHT HAND: ICD-10-CM

## 2021-04-23 DIAGNOSIS — M25.521 RIGHT ELBOW PAIN: ICD-10-CM

## 2021-04-23 DIAGNOSIS — R20.2 NUMBNESS AND TINGLING IN RIGHT HAND: ICD-10-CM

## 2021-04-23 DIAGNOSIS — Z47.89 ORTHOPEDIC AFTERCARE: ICD-10-CM

## 2021-04-23 DIAGNOSIS — M25.511 RIGHT SHOULDER PAIN, UNSPECIFIED CHRONICITY: ICD-10-CM

## 2021-04-23 NOTE — PROGRESS NOTES
"Physical Therapy Daily Progress Note    VISIT#: 7    Subjective   Jo Asif Reports her knee is pretty painful yesterday evening and this morning. Reports nothing helped relieve the pain. Woke up late this AM and is running ~15 mins late for appointment. Reports has been having increased cramping of gastroc at rest.     RTD - 4/26 for 4 week knee      Current Pain Level: Knee 2/10 but with movement 5-6/10 ; R shoulder/ elbow 2/10, increases with active movement.     Objective     See Exercise, Manual, and Modality Logs for complete treatment.   Knee flexion- 100 degs during heel slide.   Patient Education: intermittent pendulums for jt distraction of elbow and shoulder.     Assessment/Plan    Pain and stiffness of knee is reduced following heel slides, cramping resolved with gastroc stretch. Is pretty independent with HEP of knee, so once relief/reduced pain is noted of knee, turned attention to pts rt. Shld/Elbow. Increased popping/crepitus at anterior shoulder is reported with AA bicep curls utilizing PVC, pt instructed on scapular retraction/adduction to reduce, little to no change noted. Attempted supine AA Flexion, with reported increased pain and only able to achieve 90 degs of flexion, attempted to apply pressure to humeral head to prevent superior translation and \"knocking\" of humerus on AC, pt reporting this increases her pain. Pt also has limited tolerance at shoulder to pendulums, but reports reduced pain at elbow.     Progress per Plan of Care, consider shoulder and elbow joint mobilizations for pain relief if pt can tolerate.     Goals  Plan Goals: STGs in 4 weeks:  Decrease pain to 4-5/10 on average  Increase R hip flex/knee flex/ext AROM by 10 degrees   Increase RLE strength to 4/5    LTGs by discharge  Increase R LE AROM to WFL/WNL  Increase LE strength to 5/5 in available range  Pt will be able to ascend/descend stairs reciprocally with or without use of rail(s) and with minimal difficulty " or pain  Pt will be able to sit/drive/ride for 30-60 mins without difficulty or pain  Pt will be able to stand 30-60 mins for basic ADLs/work activities without difficulty or pain  Pt will be able to walk 30-60 mins for grocery shopping/work activities without difficulty, pain or LOB  Pt will be able to wash/dress/groom without difficulty or increased pain  Pt will be able to sleep full nights most nights without waking from RLE symptoms          Timed:            Therapeutic Exercise:    20     mins  65359;      Therapeutic Activity:     10     mins  88096;            Un-Timed:      Timed Treatment:   30   mins   Total Treatment:     30   mins    Anna Bravo PTA  Physical Therapist Assistant

## 2021-04-26 ENCOUNTER — TREATMENT (OUTPATIENT)
Dept: PHYSICAL THERAPY | Facility: CLINIC | Age: 41
End: 2021-04-26

## 2021-04-26 ENCOUNTER — OFFICE VISIT (OUTPATIENT)
Dept: ORTHOPEDIC SURGERY | Facility: CLINIC | Age: 41
End: 2021-04-26

## 2021-04-26 VITALS
WEIGHT: 293 LBS | HEART RATE: 75 BPM | DIASTOLIC BLOOD PRESSURE: 104 MMHG | HEIGHT: 64 IN | BODY MASS INDEX: 50.02 KG/M2 | SYSTOLIC BLOOD PRESSURE: 157 MMHG

## 2021-04-26 DIAGNOSIS — Z47.89 ORTHOPEDIC AFTERCARE: Primary | ICD-10-CM

## 2021-04-26 PROCEDURE — 97110 THERAPEUTIC EXERCISES: CPT | Performed by: PHYSICAL THERAPIST

## 2021-04-26 PROCEDURE — 97530 THERAPEUTIC ACTIVITIES: CPT | Performed by: PHYSICAL THERAPIST

## 2021-04-26 PROCEDURE — 99024 POSTOP FOLLOW-UP VISIT: CPT | Performed by: ORTHOPAEDIC SURGERY

## 2021-04-26 NOTE — PROGRESS NOTES
Physical Therapy Daily Progress Note    VISIT#: 10    Subjective   Jo Asif reports she saw the MD this morning. Dr Gaviria released her back to work tomorrow 4/27. (Standing/Walking 4 hrs/day, 5# weight limit, no overhead lifting). She vented frustration that he didn't even touch or look at her knee during her visit. Her shoulder again was not addressed and he said just continue with PT and it will get better. Dr Gaviria is requesting to do gel shots in her knee. He is also requesting for a custom patellar brace to be made for her. Patient does not feel safe or ready to return to work at this time.   Current Pain Level: 3-4/10 R  Knee with WB,   2/10 R Shoulder w/ movement,   4-5/10 in the elbow w/ lifting weight    Objective   R Knee Flex : 102*    See Exercise, Manual, and Modality Logs for complete treatment.     Assessment/Plan  Patient did not experience increased knee pain with any table exercises however with weight bearing activities her pain significantly increased. Performing the 4 way hip exercise while standing on her RLE was very difficult and painful. Pt was unstable with closed chain activities.   Patient continues to experience painful clicking in the shoulder with forward or overhead movement and no resistance. Pt attempted to lift a 1# & 2# weight off a shelf. She could tolerate the 1# but could only complete 2 reps with the 2# before the pain in the elbow increased and she felt like she was going to drop it. Most of her pain is along the medial epicondyle with lifting.    Next Visit: NuStep, Shoulder Isometrics  Progress per Plan of Care    Goals  Plan Goals: STGs in 4 weeks:  Decrease pain to 4-5/10 on average  Increase R hip flex/knee flex/ext AROM by 10 degrees   Increase RLE strength to 4/5    LTGs by discharge  Increase R LE AROM to WFL/WNL  Increase LE strength to 5/5 in available range  Pt will be able to ascend/descend stairs reciprocally with or without use of rail(s) and with  minimal difficulty or pain  Pt will be able to sit/drive/ride for 30-60 mins without difficulty or pain  Pt will be able to stand 30-60 mins for basic ADLs/work activities without difficulty or pain  Pt will be able to walk 30-60 mins for grocery shopping/work activities without difficulty, pain or LOB  Pt will be able to wash/dress/groom without difficulty or increased pain  Pt will be able to sleep full nights most nights without waking from RLE symptoms          Timed:            Therapeutic Exercise:    31     mins  65785;     Therapeutic Activity:     16     mins  01156;       Un-Timed:      Timed Treatment:   47   mins   Total Treatment:     47   mins    Evelyn Patel PTA    Physical Therapist Assistant

## 2021-04-26 NOTE — PROGRESS NOTES
Patient ID: Jo Asif is a 41 y.o. female.    3/26/21 right knee arthroscopy partial medial lateral meniscectomy with grade III chondromalacia patella  Having some popping in the knee.  Shoulder pains continued as well    Objective:    There were no vitals taken for this visit.    Physical Examination:  Right knee demonstrates healed incisions no effusion.  Range of motion 0 to 125 degrees with negative Renu    Right shoulder demonstrates intact skin with mild pain in the impingement area, passive elevation 170 abduction 130 external rotation 40 with mild pain but no weakness on Speed, Junior, supraspinatus testing       Imaging:      Assessment:  Doing well after right knee arthroscopy with patellofemoral arthritis  Right shoulder pain    Plan:  I recommend a custom patella tracking brace due to her leg shape and the need for intimate fit and pseudolaxity as well as viscosupplementation for her knee.  Continue formal therapy for her shoulder and elbow as well as the knee.  Can do some standing and walking at work with continued restriction of the right upper extremity.  See me in a month    Procedures

## 2021-04-27 ENCOUNTER — TELEPHONE (OUTPATIENT)
Dept: ORTHOPEDIC SURGERY | Facility: CLINIC | Age: 41
End: 2021-04-27

## 2021-04-28 ENCOUNTER — TREATMENT (OUTPATIENT)
Dept: PHYSICAL THERAPY | Facility: CLINIC | Age: 41
End: 2021-04-28

## 2021-04-28 DIAGNOSIS — M23.303 DERANGEMENT OF MEDIAL MENISCUS OF RIGHT KNEE: Primary | ICD-10-CM

## 2021-04-28 DIAGNOSIS — M25.521 RIGHT ELBOW PAIN: ICD-10-CM

## 2021-04-28 DIAGNOSIS — R20.2 NUMBNESS AND TINGLING IN RIGHT HAND: ICD-10-CM

## 2021-04-28 DIAGNOSIS — Z47.89 ORTHOPEDIC AFTERCARE: ICD-10-CM

## 2021-04-28 DIAGNOSIS — R20.0 NUMBNESS AND TINGLING IN RIGHT HAND: ICD-10-CM

## 2021-04-28 DIAGNOSIS — S50.01XA CONTUSION OF RIGHT ELBOW, INITIAL ENCOUNTER: ICD-10-CM

## 2021-04-28 DIAGNOSIS — M25.561 RIGHT KNEE PAIN, UNSPECIFIED CHRONICITY: ICD-10-CM

## 2021-04-28 DIAGNOSIS — M25.511 RIGHT SHOULDER PAIN, UNSPECIFIED CHRONICITY: ICD-10-CM

## 2021-04-28 DIAGNOSIS — S80.01XA CONTUSION OF RIGHT KNEE, INITIAL ENCOUNTER: ICD-10-CM

## 2021-04-28 PROCEDURE — 97530 THERAPEUTIC ACTIVITIES: CPT | Performed by: PHYSICAL THERAPIST

## 2021-04-28 PROCEDURE — 97110 THERAPEUTIC EXERCISES: CPT | Performed by: PHYSICAL THERAPIST

## 2021-04-28 NOTE — PROGRESS NOTES
Physical Therapy Daily Progress Note    VISIT#: 11    Subjective   Jo Asif reports she did return to work and it went okay. Her boss just gave her a lot of desk work to do. She did have to get up multiple times due to her knee getting stiff.  Current Pain Level: 1-2/10  Knee   1/10 Shoulder    Objective     See Exercise, Manual, and Modality Logs for complete treatment.     Patient Education: added shoulder isometrics to HEP. Added to her online account.     Assessment/Plan  The shoulder continues to pop painfully with shoulder flexion and the chest press increases the pain/heaviness in the elbow. Patient tolerated the shoulder isometrics for stabilization okay but did have sharp pain over the LHB with flexion and hand became tingly with abduction. The knee does okay with the table activities but it is still difficult with the standing activities.    Next Visit: Will need to request more auth after next visit. Possibly try NuStep if tolerated  Progress per Plan of Care     Goals  Plan Goals: STGs in 4 weeks:  Decrease pain to 4-5/10 on average  Increase R hip flex/knee flex/ext AROM by 10 degrees   Increase RLE strength to 4/5    LTGs by discharge  Increase R LE AROM to WFL/WNL  Increase LE strength to 5/5 in available range  Pt will be able to ascend/descend stairs reciprocally with or without use of rail(s) and with minimal difficulty or pain  Pt will be able to sit/drive/ride for 30-60 mins without difficulty or pain  Pt will be able to stand 30-60 mins for basic ADLs/work activities without difficulty or pain  Pt will be able to walk 30-60 mins for grocery shopping/work activities without difficulty, pain or LOB  Pt will be able to wash/dress/groom without difficulty or increased pain  Pt will be able to sleep full nights most nights without waking from RLE symptoms          Timed:            Therapeutic Exercise:    32     mins  97854;     Therapeutic Activity:     9     mins  22161;        Un-Timed:      Timed Treatment:   41   mins   Total Treatment:     41   mins    Evelyn Patel PTA    Physical Therapist Assistant

## 2021-04-29 NOTE — TELEPHONE ENCOUNTER
Caller: MELLISSA FLEMING     Relationship:     Best call back number: [phone]    What form or medical record are you requesting: OFFICE NOTES AND ANYTHING FROM 4/26 APPT    How would you like to receive the form or medical records (pick-up, mail, fax): FAX  If fax, what is the fax number: [phone]

## 2021-04-30 ENCOUNTER — TREATMENT (OUTPATIENT)
Dept: PHYSICAL THERAPY | Facility: CLINIC | Age: 41
End: 2021-04-30

## 2021-04-30 DIAGNOSIS — M25.511 RIGHT SHOULDER PAIN, UNSPECIFIED CHRONICITY: ICD-10-CM

## 2021-04-30 DIAGNOSIS — M25.521 RIGHT ELBOW PAIN: ICD-10-CM

## 2021-04-30 DIAGNOSIS — M23.303 DERANGEMENT OF MEDIAL MENISCUS OF RIGHT KNEE: Primary | ICD-10-CM

## 2021-04-30 DIAGNOSIS — Z47.89 ORTHOPEDIC AFTERCARE: ICD-10-CM

## 2021-04-30 PROCEDURE — 97530 THERAPEUTIC ACTIVITIES: CPT | Performed by: PHYSICAL THERAPIST

## 2021-04-30 PROCEDURE — 97110 THERAPEUTIC EXERCISES: CPT | Performed by: PHYSICAL THERAPIST

## 2021-04-30 NOTE — PROGRESS NOTES
Physical Therapy Daily Progress Note    VISIT#: 12    Subjective   Jo Asif reports the knee is 4/10 and the shoulder is 1/10. Pt states can't pick anything of any weight up on the R side. Pt states the knee doesn't pop as much now though.  that's because she's not doing much with them. Pt states she talked to her w/c  and thinks they will be transferring her care to Dr. Dixon who has seen her in the past on her L elbow.     Pt notes she's been back at work since Tuesday. Pt notes he returned her to be on her feet for 4 out of 8 hour shifts and pt notes she can't do more than 30 mins. Pt can walk 10-15 mins. Pt states driving is better, but riding in the car is painful between 30-60 mins.     Objective AAROM R knee flex 100 degrees & R shld flex 157 pain at end range at shoulder    See Exercise, Manual, and Modality Logs for complete treatment.     Patient Education: cues for posture/alignment, limiting range or resistance to avoid pain/symptoms and/or compensation    Assessment/Plan Pt reports clicking in the shoulder with overhead ROM in supine. Subjective c/o clicking at ant shld with bicep curls, but did not occur with PT pressure to try to feel it. Pt notes that elbow pain with bicep curls & shld flex isometrics. Isometrics increase tingling in the hand. Pt with increased pain shld/elbow and min increase in knee. Pt declined modalities.     Progress per Plan of Care and Progress strengthening /stabilization /functional activity            Timed:         Manual Therapy:         mins  03149;     Therapeutic Exercise:    34     mins  48670;     Neuromuscular Royce:        mins  67175;    Therapeutic Activity:     8     mins  68658;     Gait Training:           mins  93889;     Ultrasound:         mins  53842;    Ionto                                   mins   81240  Self Care                            mins   88462  Canalith Repos                   mins  56267    Un-Timed:  Electrical  Stimulation:         mins  50984 ( );  Dry Needling          mins self-pay  Traction          mins 25133  Low Eval          Mins  22885  Mod Eval          Mins  02546  High Eval                           Mins  62448  Re-Eval                               mins  16500    Timed Treatment:  42    mins   Total Treatment:     42   mins    Krystyna Ornelas PT  [place] License # [id number]  Physical Therapist

## 2021-05-03 ENCOUNTER — TREATMENT (OUTPATIENT)
Dept: PHYSICAL THERAPY | Facility: CLINIC | Age: 41
End: 2021-05-03

## 2021-05-03 DIAGNOSIS — Z47.89 ORTHOPEDIC AFTERCARE: ICD-10-CM

## 2021-05-03 DIAGNOSIS — M25.511 RIGHT SHOULDER PAIN, UNSPECIFIED CHRONICITY: ICD-10-CM

## 2021-05-03 DIAGNOSIS — R20.0 NUMBNESS AND TINGLING IN RIGHT HAND: ICD-10-CM

## 2021-05-03 DIAGNOSIS — M25.521 RIGHT ELBOW PAIN: ICD-10-CM

## 2021-05-03 DIAGNOSIS — M23.303 DERANGEMENT OF MEDIAL MENISCUS OF RIGHT KNEE: Primary | ICD-10-CM

## 2021-05-03 DIAGNOSIS — R20.2 NUMBNESS AND TINGLING IN RIGHT HAND: ICD-10-CM

## 2021-05-03 PROCEDURE — 97530 THERAPEUTIC ACTIVITIES: CPT | Performed by: PHYSICAL THERAPIST

## 2021-05-03 PROCEDURE — 97110 THERAPEUTIC EXERCISES: CPT | Performed by: PHYSICAL THERAPIST

## 2021-05-03 NOTE — PROGRESS NOTES
Physical Therapy Daily Progress Note    VISIT#: 13 (1/9)    Subjective   Jo Asif reports her  says they are removing her from [facility]'s care and they are contacting Dr. Izquierdo today to make an appointment. Pt reports the R shoulder and knee are 1/10 and the R elbow is 2/10.     Objective     See Exercise Logs for complete treatment.     Patient Education: cues for alt legs with LE standing activities, avoiding     Assessment/Plan  Pt with 'clicking' with shld flex with PVC at mid range. Pt having to stop mid standing activities after each 3-4 reps to rest the R knee due to pain. Pt with fair tolerance to progressions due to reports of pain and/or clicking at the shoulder and pain at the elbow. Pt with increased knee pain to 3/10 by end of session. Pt declined icing, but will do when she gets back to work.     Progress per Plan of Care and Progress strengthening /stabilization /functional activity as tolerated.            Timed:         Manual Therapy:         mins  38288;     Therapeutic Exercise:    20     mins  29172;     Neuromuscular Royce:        mins  85344;    Therapeutic Activity:    25      mins  32764;     Gait Training:           mins  24600;     Ultrasound:         mins  90701;    Ionto                                   mins   77621  Self Care                            mins   72433  Canalith Repos                   mins  55849    Un-Timed:  Electrical Stimulation:         mins  59567 ( );  Dry Needling          mins self-pay  Traction          mins 53599  Low Eval          Mins  43333  Mod Eval          Mins  91806  High Eval                           Mins  29301  Re-Eval                               mins  31030    Timed Treatment:   45   mins   Total Treatment:     45    mins    Krystyna Ornelas, PT  [place] License # [id number]  Physical Therapist

## 2021-05-05 ENCOUNTER — TREATMENT (OUTPATIENT)
Dept: PHYSICAL THERAPY | Facility: CLINIC | Age: 41
End: 2021-05-05

## 2021-05-05 DIAGNOSIS — Z47.89 ORTHOPEDIC AFTERCARE: ICD-10-CM

## 2021-05-05 DIAGNOSIS — M25.511 RIGHT SHOULDER PAIN, UNSPECIFIED CHRONICITY: ICD-10-CM

## 2021-05-05 DIAGNOSIS — M23.303 DERANGEMENT OF MEDIAL MENISCUS OF RIGHT KNEE: Primary | ICD-10-CM

## 2021-05-05 DIAGNOSIS — M25.561 RIGHT KNEE PAIN, UNSPECIFIED CHRONICITY: ICD-10-CM

## 2021-05-05 DIAGNOSIS — R20.2 NUMBNESS AND TINGLING IN RIGHT HAND: ICD-10-CM

## 2021-05-05 DIAGNOSIS — M25.521 RIGHT ELBOW PAIN: ICD-10-CM

## 2021-05-05 DIAGNOSIS — R20.0 NUMBNESS AND TINGLING IN RIGHT HAND: ICD-10-CM

## 2021-05-05 PROCEDURE — 97110 THERAPEUTIC EXERCISES: CPT | Performed by: PHYSICAL THERAPIST

## 2021-05-05 PROCEDURE — 97530 THERAPEUTIC ACTIVITIES: CPT | Performed by: PHYSICAL THERAPIST

## 2021-05-05 NOTE — PROGRESS NOTES
Physical Therapy Daily Progress Note    VISIT#: 14    Subjective   Jo Asif reports she will be seeing Dr. Dixon next week and the  will be there with her. Pt would like a progress note to take to Dr. Vigil next Thursday.     Pain Rating (0-10): 2    Objective     See Exercise Logs for complete treatment.     Patient Education: cues for therex/theracts, stair negotiation with tips for reducing strain on R knee when step down on L    Assessment/Plan Pt can now perform ~5 reps of standing exs before has to shift weight off of the L knee. Pt with some increased n/t in the R hand with sitting biceps after laying shld exs. Added median/radial n glides in sitting ( to tip position). Pt with pulling at the elbow and shoulder with median nerve glide when the arm is extended too far. Instr to gradual work toward end range, then add head tilt away when able. Pt with clicking with Tband IR with cues to limit range prn. No increase in pain with clicking. Added step-to and reciprocal stair negotiation with use of 1-2 rails (pt can only use 1 rail at a time due to width of stairs at work). Pt declined modalities at end of session.     Progress per Plan of Care and Progress strengthening /stabilization /functional activity            Timed:         Manual Therapy:         mins  16469;     Therapeutic Exercise:   20      mins  97389;     Neuromuscular Royce:        mins  04222;    Therapeutic Activity:    25      mins  92733;     Gait Training:           mins  06901;     Ultrasound:         mins  61869;    Ionto                                   mins   19249  Self Care                            mins   32920  Canalith Repos                   mins  36205    Un-Timed:  Electrical Stimulation:         mins  71322 ( );  Dry Needling          mins self-pay  Traction          mins 86081  Low Eval          Mins  08794  Mod Eval          Mins  82661  High Eval                           Mins   27339  Re-Eval                               mins  14734    Timed Treatment:  45    mins   Total Treatment:    45    mins    Krystyna Ornelas, PT  [place] License # [id number]  Physical Therapist

## 2021-05-06 ENCOUNTER — TREATMENT (OUTPATIENT)
Dept: PHYSICAL THERAPY | Facility: CLINIC | Age: 41
End: 2021-05-06

## 2021-05-06 DIAGNOSIS — M25.561 RIGHT KNEE PAIN, UNSPECIFIED CHRONICITY: ICD-10-CM

## 2021-05-06 DIAGNOSIS — M23.303 DERANGEMENT OF MEDIAL MENISCUS OF RIGHT KNEE: Primary | ICD-10-CM

## 2021-05-06 DIAGNOSIS — M25.511 RIGHT SHOULDER PAIN, UNSPECIFIED CHRONICITY: ICD-10-CM

## 2021-05-06 DIAGNOSIS — S80.01XA CONTUSION OF RIGHT KNEE, INITIAL ENCOUNTER: ICD-10-CM

## 2021-05-06 DIAGNOSIS — R20.2 NUMBNESS AND TINGLING IN RIGHT HAND: ICD-10-CM

## 2021-05-06 DIAGNOSIS — Z47.89 ORTHOPEDIC AFTERCARE: ICD-10-CM

## 2021-05-06 DIAGNOSIS — R20.0 NUMBNESS AND TINGLING IN RIGHT HAND: ICD-10-CM

## 2021-05-06 DIAGNOSIS — S50.01XA CONTUSION OF RIGHT ELBOW, INITIAL ENCOUNTER: ICD-10-CM

## 2021-05-06 DIAGNOSIS — M25.521 RIGHT ELBOW PAIN: ICD-10-CM

## 2021-05-06 PROCEDURE — 97530 THERAPEUTIC ACTIVITIES: CPT | Performed by: PHYSICAL THERAPIST

## 2021-05-06 PROCEDURE — 97112 NEUROMUSCULAR REEDUCATION: CPT | Performed by: PHYSICAL THERAPIST

## 2021-05-06 PROCEDURE — 97110 THERAPEUTIC EXERCISES: CPT | Performed by: PHYSICAL THERAPIST

## 2021-05-06 NOTE — PROGRESS NOTES
"Physical Therapy Daily Progress Note    VISIT#: 15    Subjective   Jo Asif reports she has an appointment with Zack about her shoulder next Thursday.  Pt states she has to be out by 845 to get their car back to  to get to work.  Current Pain Level: (did not rate) When asked what her pain is she said \"Lets just not even talk about that today\"    Objective     See Exercise, Manual, and Modality Logs for complete treatment.     Patient Education: cueing to keep exercises in a pain free range; TB given for SH exercises so she could perform at home.    Assessment/Plan  Patient has a lot of increased pain in the knee with anything weight bearing. She can push through the standing exercises but the knee remains very painful. Her shoulder continued to click with all motions and would get painful after a few repetitions of her exercises and required multiple rest breaks. Pt is seeing a new MD about her knee and shoulder next week.    Next Visit: Pt is requesting a Dr's Note is done at her appointment Monday and she can get the copy at her Wednesday appointment before going to the MD Thursday.   Progress per Plan of Care     Goals  Plan Goals: STGs in 4 weeks:  Decrease pain to 4-5/10 on average  Increase R hip flex/knee flex/ext AROM by 10 degrees   Increase RLE strength to 4/5    LTGs by discharge  Increase R LE AROM to WFL/WNL  Increase LE strength to 5/5 in available range  Pt will be able to ascend/descend stairs reciprocally with or without use of rail(s) and with minimal difficulty or pain  Pt will be able to sit/drive/ride for 30-60 mins without difficulty or pain  Pt will be able to stand 30-60 mins for basic ADLs/work activities without difficulty or pain  Pt will be able to walk 30-60 mins for grocery shopping/work activities without difficulty, pain or LOB  Pt will be able to wash/dress/groom without difficulty or increased pain  Pt will be able to sleep full nights most nights without waking " from RLE symptoms          Timed:            Therapeutic Exercise:    10     mins  65846;     Neuromuscular Royce:    13    mins  71243;    Therapeutic Activity:     17     mins  53443;          Un-Timed:      Timed Treatment:   40   mins   Total Treatment:     40   mins    Evelyn Patel PTA    Physical Therapist Assistant

## 2021-05-10 ENCOUNTER — TREATMENT (OUTPATIENT)
Dept: PHYSICAL THERAPY | Facility: CLINIC | Age: 41
End: 2021-05-10

## 2021-05-10 DIAGNOSIS — M25.511 RIGHT SHOULDER PAIN, UNSPECIFIED CHRONICITY: ICD-10-CM

## 2021-05-10 DIAGNOSIS — M23.303 DERANGEMENT OF MEDIAL MENISCUS OF RIGHT KNEE: Primary | ICD-10-CM

## 2021-05-10 DIAGNOSIS — R20.2 NUMBNESS AND TINGLING IN RIGHT HAND: ICD-10-CM

## 2021-05-10 DIAGNOSIS — M25.521 RIGHT ELBOW PAIN: ICD-10-CM

## 2021-05-10 DIAGNOSIS — R20.0 NUMBNESS AND TINGLING IN RIGHT HAND: ICD-10-CM

## 2021-05-10 DIAGNOSIS — Z47.89 ORTHOPEDIC AFTERCARE: ICD-10-CM

## 2021-05-10 PROCEDURE — 97110 THERAPEUTIC EXERCISES: CPT | Performed by: PHYSICAL THERAPIST

## 2021-05-10 PROCEDURE — 97530 THERAPEUTIC ACTIVITIES: CPT | Performed by: PHYSICAL THERAPIST

## 2021-05-10 NOTE — PROGRESS NOTES
yPhysical Therapy Daily Progress Note/Progress Note    VISIT#: 16     Subjective Questionnaire: QuickDASH: 38 = 61.36% limited, OKS = 23/48    Subjective   Jo Asif reports pain is 2-3 at the R knee & 3 at R shld. Pain averages 2. Pt has intermittent numbness after doing anything that causes resistance to the hand or WB through the UE. Pt wants a note for MD appt later this week.     Aggravating/functional activities: any resistance to the arm like lifting 1/2 full can of soda, overhead reaching, WB for the knee >15 mins      Objective     Active Range of Motion     R shoulder:  Flexion: 135 degrees with pain throughout the motion  Abduction: 130 degrees with pain throughout the motion  Extension: 40 degrees with pain  IR BTB: sacrum with pain  ER BTH: lower cerv/upper thor with pain  Horiz add: to ant L shld with pain     R elbow:  Flexion: 148 degrees  Extension la degrees     Right Knee   Flexion: 118 degrees (seated)  Extensor la degree (LAQ)    Additional Active Range of Motion Details  R hip flex 100 degrees in sitting     Strength R LE (seated):  Hip flex 4+  Quads 4+  Hams 5    See Exercise, Manual, and Modality Logs for complete treatment.     Patient Education: cues for therex    Assessment/Plan Pt is demonstrating overall improvements in mobility except slightly less motion with knee ext (taken with LAQ vs SAQ at re-eval). R LE strength is significantly improved. Pt does report pain with WB at the knee, but squats increase pain from distal lat knee to prox med knee.    Pt has met 1 goal and partially met 2 STGs. Pt is progressing with sitting/driving/riding, but hasn't tried much lifting/pushing/pulling with the UEs. Remainder of goals continue to be appropriate for pt. Recommend continuing with PT for remaining 21 visits which expires 21.    Goals  Plan Goals: STGs in 4 weeks:  Decrease pain to 4-5/10 on average Met 5/10  Increase R hip flex/knee flex/ext AROM by 10 degrees  Partially Met 5/10  Increase RLE strength to 4/5 Partially Met 5/10    LTGs by discharge  Increase R LE AROM to WFL/WNL  Increase LE strength to 5/5 in available range  Pt will be able to ascend/descend stairs reciprocally with or without use of rail(s) and with minimal difficulty or pain  Pt will be able to sit/drive/ride for 30-60 mins without difficulty or pain Progressing 5/10 - has to get up after 20-25 mins  Pt will be able to stand 30-60 mins for basic ADLs/work activities without difficulty or pain  Pt will be able to walk 30-60 mins for grocery shopping/work activities without difficulty, pain or LOB  Pt will be able to wash/dress/groom without difficulty or increased pain  Pt will be able to sleep full nights most nights without waking from RLE symptoms     New Goals  Short-term goals (STG) in 4 weeks:  Increase R UE AROM 10-15 degrees where limited as much     Long-term goals (LTG) by discharge:   Pt will be able to grasp/reach/lift into overhead cabinets with the R UE or B UE to put away dishes without difficulty or increased pain  Pt will be able to lift/carry laundry, pots/pans, garbage/grocery bags or heavier items for ADLs and/or to prepare for RTW without difficulty or increased pain   Pt will be able to push/pull heavy doors for ADLs and prepare for RTW activities without difficulty or increased pain Hasn't tried 5/10      Progress per Plan of Care and Progress strengthening /stabilization /functional activity        Timed:         Manual Therapy:         mins  57096;     Therapeutic Exercise:   28      mins  22132;     Neuromuscular Royce:        mins  80214;    Therapeutic Activity:     10     mins  96545;     Gait Training:           mins  83909;     Ultrasound:         mins  41520;    Ionto                                   mins   57770  Self Care                            mins   66984  Canalith Repos                   mins  53924    Un-Timed:  Electrical Stimulation:         mins  24288 (JILLIAN  );  Dry Needling          mins self-pay  Traction          mins 90173  Low Eval          Mins  64466  Mod Eval          Mins  37810  High Eval                           Mins  11143  Re-Eval                               mins  37763    Timed Treatment:  38    mins   Total Treatment:     38   mins    Krystyna Ornelas PT  [place] License # [id number]  Physical Therapist

## 2021-05-12 ENCOUNTER — TREATMENT (OUTPATIENT)
Dept: PHYSICAL THERAPY | Facility: CLINIC | Age: 41
End: 2021-05-12

## 2021-05-12 DIAGNOSIS — R20.2 NUMBNESS AND TINGLING IN RIGHT HAND: ICD-10-CM

## 2021-05-12 DIAGNOSIS — R20.0 NUMBNESS AND TINGLING IN RIGHT HAND: ICD-10-CM

## 2021-05-12 DIAGNOSIS — Z47.89 ORTHOPEDIC AFTERCARE: ICD-10-CM

## 2021-05-12 DIAGNOSIS — M25.511 RIGHT SHOULDER PAIN, UNSPECIFIED CHRONICITY: ICD-10-CM

## 2021-05-12 DIAGNOSIS — M23.303 DERANGEMENT OF MEDIAL MENISCUS OF RIGHT KNEE: Primary | ICD-10-CM

## 2021-05-12 DIAGNOSIS — M25.521 RIGHT ELBOW PAIN: ICD-10-CM

## 2021-05-12 PROCEDURE — 97530 THERAPEUTIC ACTIVITIES: CPT | Performed by: PHYSICAL THERAPIST

## 2021-05-12 PROCEDURE — 97110 THERAPEUTIC EXERCISES: CPT | Performed by: PHYSICAL THERAPIST

## 2021-05-12 NOTE — PROGRESS NOTES
Physical Therapy Daily Progress Note    VISIT#: 17    Subjective   Jo Asif reports the knee is 2 and the shoulder is 1.5. Pt states she was standing waiting for a truck yesterday and by 15 mins she was feeling increased pain. Pt states once it's irritated shifting weight doesn't relieve it and pt feels like it swells up. The elbow doesn't bother her unless she uses it for lifting anything of weight.     Objective     See Exercise Logs for complete treatment.     Patient Education: cues for therex    Assessment/Plan Chest press aggravated the elbow. Crepitus at ant shld with IR which was relieved with putting a towel roll between the elbow and body. Crepitus & grinding with LAQ. Pt with lateral tracking noted. Crepitus stopped with light pressure over lateral border of patella. Increased pressure was not well tolerated due to tenderness in the area. Increased resistance for standing 3 way hip today with fair tolerance. The knee was getting sore after doing steps so completed session at that point. Pt noted she would ice at home.     Progress per Plan of Care and Progress strengthening /stabilization /functional activity          Timed:         Manual Therapy:         mins  96359;     Therapeutic Exercise:   26      mins  26730;     Neuromuscular Royce:        mins  11036;    Therapeutic Activity:    13     mins  93225;     Gait Training:           mins  05850;     Ultrasound:         mins  20666;    Ionto                                   mins   95941  Self Care                            mins   45772  Canalith Repos                   mins  50434    Un-Timed:  Electrical Stimulation:         mins  50143 ( );  Dry Needling          mins self-pay  Traction          mins 40847  Low Eval          Mins  48705  Mod Eval          Mins  18130  High Eval                           Mins  63760  Re-Eval                               mins  28106    Timed Treatment:   39   mins   Total Treatment:      39   cash Ornelas, PT  [place] License # [id number]  Physical Therapist

## 2021-05-14 ENCOUNTER — TREATMENT (OUTPATIENT)
Dept: PHYSICAL THERAPY | Facility: CLINIC | Age: 41
End: 2021-05-14

## 2021-05-14 DIAGNOSIS — R20.2 NUMBNESS AND TINGLING IN RIGHT HAND: ICD-10-CM

## 2021-05-14 DIAGNOSIS — M25.511 RIGHT SHOULDER PAIN, UNSPECIFIED CHRONICITY: ICD-10-CM

## 2021-05-14 DIAGNOSIS — S80.01XA CONTUSION OF RIGHT KNEE, INITIAL ENCOUNTER: ICD-10-CM

## 2021-05-14 DIAGNOSIS — M25.521 RIGHT ELBOW PAIN: ICD-10-CM

## 2021-05-14 DIAGNOSIS — R20.0 NUMBNESS AND TINGLING IN RIGHT HAND: ICD-10-CM

## 2021-05-14 DIAGNOSIS — S50.01XA CONTUSION OF RIGHT ELBOW, INITIAL ENCOUNTER: ICD-10-CM

## 2021-05-14 DIAGNOSIS — Z47.89 ORTHOPEDIC AFTERCARE: ICD-10-CM

## 2021-05-14 DIAGNOSIS — M25.561 RIGHT KNEE PAIN, UNSPECIFIED CHRONICITY: ICD-10-CM

## 2021-05-14 DIAGNOSIS — M23.303 DERANGEMENT OF MEDIAL MENISCUS OF RIGHT KNEE: Primary | ICD-10-CM

## 2021-05-14 PROCEDURE — 97110 THERAPEUTIC EXERCISES: CPT | Performed by: PHYSICAL THERAPIST

## 2021-05-14 PROCEDURE — 97530 THERAPEUTIC ACTIVITIES: CPT | Performed by: PHYSICAL THERAPIST

## 2021-05-17 ENCOUNTER — TREATMENT (OUTPATIENT)
Dept: PHYSICAL THERAPY | Facility: CLINIC | Age: 41
End: 2021-05-17

## 2021-05-17 DIAGNOSIS — S80.01XA CONTUSION OF RIGHT KNEE, INITIAL ENCOUNTER: ICD-10-CM

## 2021-05-17 DIAGNOSIS — R20.2 NUMBNESS AND TINGLING IN RIGHT HAND: ICD-10-CM

## 2021-05-17 DIAGNOSIS — R20.0 NUMBNESS AND TINGLING IN RIGHT HAND: ICD-10-CM

## 2021-05-17 DIAGNOSIS — Z47.89 ORTHOPEDIC AFTERCARE: ICD-10-CM

## 2021-05-17 DIAGNOSIS — M25.561 RIGHT KNEE PAIN, UNSPECIFIED CHRONICITY: ICD-10-CM

## 2021-05-17 DIAGNOSIS — M25.521 RIGHT ELBOW PAIN: ICD-10-CM

## 2021-05-17 DIAGNOSIS — M25.511 RIGHT SHOULDER PAIN, UNSPECIFIED CHRONICITY: ICD-10-CM

## 2021-05-17 DIAGNOSIS — S50.01XA CONTUSION OF RIGHT ELBOW, INITIAL ENCOUNTER: ICD-10-CM

## 2021-05-17 DIAGNOSIS — M23.303 DERANGEMENT OF MEDIAL MENISCUS OF RIGHT KNEE: Primary | ICD-10-CM

## 2021-05-17 PROCEDURE — 97530 THERAPEUTIC ACTIVITIES: CPT | Performed by: PHYSICAL THERAPIST

## 2021-05-17 PROCEDURE — 97110 THERAPEUTIC EXERCISES: CPT | Performed by: PHYSICAL THERAPIST

## 2021-05-17 NOTE — PROGRESS NOTES
Physical Therapy Daily Progress Note    VISIT#: 19    Subjective   Jo Asif reports her knee is a little more flared up this morning than normal. She was required to work over time and did a 52hr week vs 40hr like she has been doing since her injury. She also took her son to the park and she stood for 5 min on the sidewalk and had to  the grass because the concrete was really irritating her knee.   Current Pain Level: Knee 2/10   Shldr  1/10    Objective     See Exercise, Manual, and Modality Logs for complete treatment.     Patient Education: no changes to HEP at this time. Pt did ask for ways to keep her shoulder stretched out at work without laying on the floor and having a bar. Demonstrated/instructed in walk walks and table slides into flex/scap/abd. Pictures offered but patient stated she did not need them and could remember them.    Assessment/Plan  Increased popping in the knee with step ups this date. Mini squats and 3 way hip while standing on the RLE remains extremely painful and requires multiple rest breaks to complete or has to stop early d/t pain. Any motion >90* shoulder flexion with weight </= 1# causes the shoulder to become painful and the more reps she performs the more her range is decreased. Difficult to progress her standing activities at this time due to the amount of increased pain/discomfort in the knee.    Plan: Pt is hoping to get in to see DEANNA Mayberry tomorrow if her  can get it approved.  Progress per Plan of Care     Goals  Plan Goals: STGs in 4 weeks:  Decrease pain to 4-5/10 on average Met 5/10  Increase R hip flex/knee flex/ext AROM by 10 degrees Partially Met 5/10  Increase RLE strength to 4/5 Partially Met 5/10    LTGs by discharge  Increase R LE AROM to WFL/WNL  Increase LE strength to 5/5 in available range  Pt will be able to ascend/descend stairs reciprocally with or without use of rail(s) and with minimal difficulty or pain  Pt will be able  to sit/drive/ride for 30-60 mins without difficulty or pain Progressing 5/10 - has to get up after 20-25 mins  Pt will be able to stand 30-60 mins for basic ADLs/work activities without difficulty or pain  Pt will be able to walk 30-60 mins for grocery shopping/work activities without difficulty, pain or LOB  Pt will be able to wash/dress/groom without difficulty or increased pain  Pt will be able to sleep full nights most nights without waking from RLE symptoms     New Goals  Short-term goals (STG) in 4 weeks:  Increase R UE AROM 10-15 degrees where limited as much          Timed:            Therapeutic Exercise:    38     mins  28805;       Therapeutic Activity:     16     mins  17224;         Un-Timed:  NuStep: 5 min (no charge)    Timed Treatment:   54   mins   Total Treatment:     59   mins      Evelyn Patel PTA    Physical Therapist Assistant

## 2021-05-19 ENCOUNTER — TELEPHONE (OUTPATIENT)
Dept: ORTHOPEDIC SURGERY | Facility: CLINIC | Age: 41
End: 2021-05-19

## 2021-05-19 ENCOUNTER — TREATMENT (OUTPATIENT)
Dept: PHYSICAL THERAPY | Facility: CLINIC | Age: 41
End: 2021-05-19

## 2021-05-19 DIAGNOSIS — M25.511 RIGHT SHOULDER PAIN, UNSPECIFIED CHRONICITY: ICD-10-CM

## 2021-05-19 DIAGNOSIS — Z47.89 ORTHOPEDIC AFTERCARE: ICD-10-CM

## 2021-05-19 DIAGNOSIS — M25.521 RIGHT ELBOW PAIN: ICD-10-CM

## 2021-05-19 DIAGNOSIS — R20.2 NUMBNESS AND TINGLING IN RIGHT HAND: ICD-10-CM

## 2021-05-19 DIAGNOSIS — M23.303 DERANGEMENT OF MEDIAL MENISCUS OF RIGHT KNEE: Primary | ICD-10-CM

## 2021-05-19 DIAGNOSIS — M25.561 RIGHT KNEE PAIN, UNSPECIFIED CHRONICITY: ICD-10-CM

## 2021-05-19 DIAGNOSIS — R20.0 NUMBNESS AND TINGLING IN RIGHT HAND: ICD-10-CM

## 2021-05-19 DIAGNOSIS — S50.01XA CONTUSION OF RIGHT ELBOW, INITIAL ENCOUNTER: ICD-10-CM

## 2021-05-19 DIAGNOSIS — S80.01XA CONTUSION OF RIGHT KNEE, INITIAL ENCOUNTER: ICD-10-CM

## 2021-05-19 PROCEDURE — 97530 THERAPEUTIC ACTIVITIES: CPT | Performed by: PHYSICAL THERAPIST

## 2021-05-19 PROCEDURE — 97110 THERAPEUTIC EXERCISES: CPT | Performed by: PHYSICAL THERAPIST

## 2021-05-19 PROCEDURE — 97140 MANUAL THERAPY 1/> REGIONS: CPT | Performed by: PHYSICAL THERAPIST

## 2021-05-19 NOTE — TELEPHONE ENCOUNTER
Provider: DR. RODRIGUEZ  Caller: COLIN FLEMING  Relationship to Patient: W/C NURSE     Phone Number: [phone]  Reason for Call: ORDERS  When was the patient last seen: 4-26-21    COLIN FLEMING W/C NURSE WOULD LIKE A CALL BACK TO DISCUSS ORDERS FOR PATIENT'S KNEE/ELBOW/SHOULDER DX.  COLIN STATED THAT KRISTI KENDRICK WITH [facility] MAY HAVE CALLED Monday 5-17-21 TO CANCEL ORDERS, HOWEVER, COLIN FLEMING WANTS TO MAKE SURE ALL ORDERS STAY AS IS WITH DR. RODRIGUEZ & PATIENT.

## 2021-05-19 NOTE — TELEPHONE ENCOUNTER
Spoke with . Got things straightened out. She would like Jo to continue care with Dr. Gaviria. New order was added to continue PT.

## 2021-05-19 NOTE — PROGRESS NOTES
Physical Therapy Daily Progress Note    VISIT#: 20    Subjective   Jo Asif reports the PA said no use of her R arm until she gets a 3rd opinion. We are to continue PT for 2 more weeks on the knee.  Current Pain Level: 2/10 R knee    Objective   TTP along the medial and lateral HS tendons.    See Exercise, Manual, and Modality Logs for complete treatment.     Patient Education: continue current HEP for the LE only. Holding UE exercises at this time.    Assessment/Plan  Trialed some gentle oscillations on the knee jt with min relief following but good tolerance during the treatment. Still no improvement seen with weight bearing through the RLE. She requires multiple rest breaks to complete standing activities.     Progress per Plan of Care     Goals  Plan Goals: STGs in 4 weeks:  Decrease pain to 4-5/10 on average Met 5/10  Increase R hip flex/knee flex/ext AROM by 10 degrees Partially Met 5/10  Increase RLE strength to 4/5 Partially Met 5/10    LTGs by discharge  Increase R LE AROM to WFL/WNL  Increase LE strength to 5/5 in available range  Pt will be able to ascend/descend stairs reciprocally with or without use of rail(s) and with minimal difficulty or pain  Pt will be able to sit/drive/ride for 30-60 mins without difficulty or pain Progressing 5/10 - has to get up after 20-25 mins  Pt will be able to stand 30-60 mins for basic ADLs/work activities without difficulty or pain  Pt will be able to walk 30-60 mins for grocery shopping/work activities without difficulty, pain or LOB  Pt will be able to wash/dress/groom without difficulty or increased pain  Pt will be able to sleep full nights most nights without waking from RLE symptoms     New Goals  Short-term goals (STG) in 4 weeks:  Increase R UE AROM 10-15 degrees where limited as much          Timed:         Manual Therapy:    8     mins  37250;     Therapeutic Exercise:    17     mins  82016;     Therapeutic Activity:     16     mins  07378;          Un-Timed:    Timed Treatment:   41   mins   Total Treatment:     41   mins      Evelyn Patel PTA    Physical Therapist Assistant

## 2021-05-21 ENCOUNTER — TREATMENT (OUTPATIENT)
Dept: PHYSICAL THERAPY | Facility: CLINIC | Age: 41
End: 2021-05-21

## 2021-05-21 DIAGNOSIS — R20.0 NUMBNESS AND TINGLING IN RIGHT HAND: ICD-10-CM

## 2021-05-21 DIAGNOSIS — S50.01XA CONTUSION OF RIGHT ELBOW, INITIAL ENCOUNTER: ICD-10-CM

## 2021-05-21 DIAGNOSIS — S80.01XA CONTUSION OF RIGHT KNEE, INITIAL ENCOUNTER: ICD-10-CM

## 2021-05-21 DIAGNOSIS — M25.561 RIGHT KNEE PAIN, UNSPECIFIED CHRONICITY: ICD-10-CM

## 2021-05-21 DIAGNOSIS — M25.511 RIGHT SHOULDER PAIN, UNSPECIFIED CHRONICITY: ICD-10-CM

## 2021-05-21 DIAGNOSIS — M25.521 RIGHT ELBOW PAIN: ICD-10-CM

## 2021-05-21 DIAGNOSIS — Z47.89 ORTHOPEDIC AFTERCARE: ICD-10-CM

## 2021-05-21 DIAGNOSIS — M23.303 DERANGEMENT OF MEDIAL MENISCUS OF RIGHT KNEE: Primary | ICD-10-CM

## 2021-05-21 DIAGNOSIS — R20.2 NUMBNESS AND TINGLING IN RIGHT HAND: ICD-10-CM

## 2021-05-21 PROCEDURE — 97530 THERAPEUTIC ACTIVITIES: CPT | Performed by: PHYSICAL THERAPIST

## 2021-05-21 PROCEDURE — 97110 THERAPEUTIC EXERCISES: CPT | Performed by: PHYSICAL THERAPIST

## 2021-05-21 NOTE — PROGRESS NOTES
Physical Therapy Daily Progress Note    VISIT#: 21    Subjective   Jo Asif reports she is trying to walk more frequently at work but for limited distances and sitting rest breaks in between. At this time she cannot walk for a consecutive 15 min before needing to sit down or take pressure off the RLE.  Last night she was trying to try some gentle reaching below shoulder height and the stretches she was given in PT. By the end of her shift her shoulder pain was increased. That pain continued through the night interrupting her sleep and into this morning.   Current Pain Level: 3/10 SH,  1/10 Knee    Objective   R UE AAROM  FL w/ bar: 1st stretch  150*      Last Stretch   100*    See Exercise, Manual, and Modality Logs for complete treatment.     Patient Education: cueing for therex; added some simple stepping activities on the radar rug for small weight shifting exercises    Assessment/Plan  While performing gentle overhead AA flexion the first 2 reps go back all the way. Any reps following that the shoulder gets so painful and stiffness up and her range becomes limited.   Better tolerance to standing hip exercises today. Most of her knee pain comes when she has to shift her weight laterally to lift the LLE. Fair tolerance to new stepping activity today with min-mod pain in the knee.    Progress per Plan of Care     Goals  Plan Goals: STGs in 4 weeks:  Decrease pain to 4-5/10 on average Met 5/10  Increase R hip flex/knee flex/ext AROM by 10 degrees Partially Met 5/10  Increase RLE strength to 4/5 Partially Met 5/10    LTGs by discharge  Increase R LE AROM to WFL/WNL  Increase LE strength to 5/5 in available range  Pt will be able to ascend/descend stairs reciprocally with or without use of rail(s) and with minimal difficulty or pain  Pt will be able to sit/drive/ride for 30-60 mins without difficulty or pain Progressing 5/10 - has to get up after 20-25 mins  Pt will be able to stand 30-60 mins for basic  ADLs/work activities without difficulty or pain  Pt will be able to walk 30-60 mins for grocery shopping/work activities without difficulty, pain or LOB  Pt will be able to wash/dress/groom without difficulty or increased pain  Pt will be able to sleep full nights most nights without waking from RLE symptoms     New Goals  Short-term goals (STG) in 4 weeks:  Increase R UE AROM 10-15 degrees where limited as much          Timed:           Therapeutic Exercise:    38     mins  11121;     Therapeutic Activity:     15     mins  15829;       Un-Timed:  NuStep: 10 min (no charge)    Timed Treatment:   53   mins   Total Treatment:     63   mins      Evelyn Patel, PTA    Physical Therapist Assistant

## 2021-05-26 ENCOUNTER — OFFICE VISIT (OUTPATIENT)
Dept: ORTHOPEDIC SURGERY | Facility: CLINIC | Age: 41
End: 2021-05-26

## 2021-05-26 ENCOUNTER — TREATMENT (OUTPATIENT)
Dept: PHYSICAL THERAPY | Facility: CLINIC | Age: 41
End: 2021-05-26

## 2021-05-26 VITALS
DIASTOLIC BLOOD PRESSURE: 94 MMHG | HEIGHT: 64 IN | BODY MASS INDEX: 50.02 KG/M2 | HEART RATE: 76 BPM | SYSTOLIC BLOOD PRESSURE: 155 MMHG | WEIGHT: 293 LBS

## 2021-05-26 DIAGNOSIS — M23.303 DERANGEMENT OF MEDIAL MENISCUS OF RIGHT KNEE: Primary | ICD-10-CM

## 2021-05-26 DIAGNOSIS — R20.0 NUMBNESS AND TINGLING IN RIGHT HAND: ICD-10-CM

## 2021-05-26 DIAGNOSIS — M25.511 RIGHT SHOULDER PAIN, UNSPECIFIED CHRONICITY: ICD-10-CM

## 2021-05-26 DIAGNOSIS — Z47.89 ORTHOPEDIC AFTERCARE: ICD-10-CM

## 2021-05-26 DIAGNOSIS — M25.521 RIGHT ELBOW PAIN: ICD-10-CM

## 2021-05-26 DIAGNOSIS — M25.511 RIGHT SHOULDER PAIN, UNSPECIFIED CHRONICITY: Primary | ICD-10-CM

## 2021-05-26 DIAGNOSIS — R20.2 NUMBNESS AND TINGLING IN RIGHT HAND: ICD-10-CM

## 2021-05-26 PROCEDURE — 97110 THERAPEUTIC EXERCISES: CPT | Performed by: PHYSICAL THERAPIST

## 2021-05-26 PROCEDURE — 20550 NJX 1 TENDON SHEATH/LIGAMENT: CPT | Performed by: ORTHOPAEDIC SURGERY

## 2021-05-26 PROCEDURE — 99213 OFFICE O/P EST LOW 20 MIN: CPT | Performed by: ORTHOPAEDIC SURGERY

## 2021-05-26 PROCEDURE — 97530 THERAPEUTIC ACTIVITIES: CPT | Performed by: PHYSICAL THERAPIST

## 2021-05-26 RX ORDER — TRIAMCINOLONE ACETONIDE 40 MG/ML
20 INJECTION, SUSPENSION INTRA-ARTICULAR; INTRAMUSCULAR ONCE
Status: COMPLETED | OUTPATIENT
Start: 2021-05-26 | End: 2021-06-23

## 2021-05-26 NOTE — PROGRESS NOTES
Physical Therapy Daily Progress Note    VISIT#: 22    Subjective   Jo Asif reports she just saw Dr. Gaviria who did an injex in the R elbow. Pt states once she got out of there and got here, they called her to come back and get her knee measured for a brace. Pt states he told her that at the next visit, if she's not much better, they will either send her back to work or do a surgery.     Pt RMD on 6/16.     Pain Rating (0-10): 1 R knee, 3-4 R elbow from injex, 2 R shld    Objective     See Exercise, Manual, and Modality Logs for complete treatment.     Patient Education: cues for therex    Assessment/Plan  Progressed to combo bridge/ball squeeze and added more activities on radar rug. Pt with some wheezing noted during session which pt noted was due to allergies. Deferred UEs today due to cortisone injex prior to session. Pt declined Nustep at end of session.     Progress per Plan of Care and Progress strengthening /stabilization /functional activity            Timed:         Manual Therapy:         mins  13242;     Therapeutic Exercise:   10      mins  51261;     Neuromuscular Royce:        mins  33332;    Therapeutic Activity:    18      mins  41858;     Gait Training:           mins  88944;     Ultrasound:         mins  60280;    Ionto                                   mins   76666  Self Care                            mins   73979  Canalith Repos                   mins  29390    Un-Timed:  Electrical Stimulation:         mins  61614 ( );  Dry Needling          mins self-pay  Traction          mins 23958  Low Eval          Mins  16832  Mod Eval          Mins  37124  High Eval                           Mins  99685  Re-Eval                               mins  27249    Timed Treatment:  28    mins   Total Treatment:    28    mins    Krystyna Ornelas, PT  [place] License # [id number]  Physical Therapist

## 2021-05-26 NOTE — PROGRESS NOTES
"     Patient ID: Jo Asif is a 41 y.o. female.  Right knee pain    Having some continued popping in the shoulder as well as continued right elbow pain with lifting    Status post right knee arthroscopy in March of this year with patellofemoral arthritis    Objective:    /94   Pulse 76   Ht 162.6 cm (64\")   Wt (!) 152 kg (334 lb)   BMI 57.33 kg/m²     Physical Examination:    Right shoulder demonstrates intact skin.  Active elevation 180 abduction 130 external Tatian 40 with mild pain but no weakness on Speed Andrew supraspinatus testing.  Right elbow demonstrates moderate pain over the lateral condyle with elbow range of motion 0-1 35 and mild pain on resisted wrist extension    Imaging:      Assessment:  Right shoulder pain mild improvement  Right elbow pain unchanged  Resolving right knee pain    Plan:  Continue physical therapy for her shoulder and knee.  For the elbow, Risks and benefits of the injection were discussed. Under sterile technique and written consent I injected 20mg of Kenalog  and 1/2cc of 1% Lidocaine plain into the affected area. It was well tolerated. Postinjection instructions were given.  Continue light duty and see me in a month    Procedures  "

## 2021-05-27 ENCOUNTER — TELEPHONE (OUTPATIENT)
Dept: ORTHOPEDIC SURGERY | Facility: CLINIC | Age: 41
End: 2021-05-27

## 2021-05-27 NOTE — TELEPHONE ENCOUNTER
Caller: MELLISSA FLEMING     Relationship: [employer]     Best call back number: [phone]    What form or medical record are you requesting: DICTATION FROM 05/26/21    Who is requesting this form or medical record from you: [employer]     How would you like to receive the form or medical records (pick-up, mail, fax):   If fax, what is the fax number: [phone]      Timeframe paperwork needed: ASAP    Additional notes: [facility]LL[facility] CALLED TO REQUEST THE DICTATION FROM PATIENT'S LAST APPT ON 05/26/21. PLEASE ADVISE.

## 2021-05-28 ENCOUNTER — TREATMENT (OUTPATIENT)
Dept: PHYSICAL THERAPY | Facility: CLINIC | Age: 41
End: 2021-05-28

## 2021-05-28 DIAGNOSIS — Z47.89 ORTHOPEDIC AFTERCARE: ICD-10-CM

## 2021-05-28 DIAGNOSIS — R20.0 NUMBNESS AND TINGLING IN RIGHT HAND: ICD-10-CM

## 2021-05-28 DIAGNOSIS — M25.561 RIGHT KNEE PAIN, UNSPECIFIED CHRONICITY: ICD-10-CM

## 2021-05-28 DIAGNOSIS — R20.2 NUMBNESS AND TINGLING IN RIGHT HAND: ICD-10-CM

## 2021-05-28 DIAGNOSIS — M25.511 RIGHT SHOULDER PAIN, UNSPECIFIED CHRONICITY: ICD-10-CM

## 2021-05-28 DIAGNOSIS — M25.521 RIGHT ELBOW PAIN: ICD-10-CM

## 2021-05-28 DIAGNOSIS — S50.01XA CONTUSION OF RIGHT ELBOW, INITIAL ENCOUNTER: ICD-10-CM

## 2021-05-28 DIAGNOSIS — M23.303 DERANGEMENT OF MEDIAL MENISCUS OF RIGHT KNEE: Primary | ICD-10-CM

## 2021-05-28 DIAGNOSIS — S80.01XA CONTUSION OF RIGHT KNEE, INITIAL ENCOUNTER: ICD-10-CM

## 2021-05-28 PROCEDURE — 97112 NEUROMUSCULAR REEDUCATION: CPT | Performed by: PHYSICAL THERAPIST

## 2021-05-28 PROCEDURE — 97530 THERAPEUTIC ACTIVITIES: CPT | Performed by: PHYSICAL THERAPIST

## 2021-05-28 PROCEDURE — 97110 THERAPEUTIC EXERCISES: CPT | Performed by: PHYSICAL THERAPIST

## 2021-05-28 NOTE — PROGRESS NOTES
Physical Therapy Daily Progress Note    VISIT#: 23    Subjective   Jo Asif reports she got a call that her custom knee brace was approved through workers comp. She went on Wednesday to get measured and is hoping it will be in by next Friday.   She also stated she received an injection in her R elbow at her follow up with Khadra. She felt sick to her stomach the rest of that evening because of the pain. This morning she feels like the elbow has been worse since the injection.  Current Pain Level: 1/10 SH,  1-2/10 Knee, 2/10 elbow    Objective   During treatment she set the bar down and it started to roll. She reacted and tried to quickly reach and grab it with her RUE and had instant pain int he elbow and shoulder.     See Exercise, Manual, and Modality Logs for complete treatment.     Assessment/Plan  Tried to increased the DB weight with the bicep curls and that caused her pain in the elbow to increase to a 5/10. We had to drop the weight back down to a 1# DB in order to complete the exercise but pain was still present. R shoulder continued stiff up the more repetitions she performed with the bar exercises.  Shifting laterally onto the RLE is still difficult and painful. Balance is fair with radar rug activities. R foot tends to externally rotate with ambulation and when corrected her balance gets worse and the knee becomes painful.     Progress per Plan of Care     Goals  Plan Goals: STGs in 4 weeks:  Decrease pain to 4-5/10 on average Met 5/10  Increase R hip flex/knee flex/ext AROM by 10 degrees Partially Met 5/10  Increase RLE strength to 4/5 Partially Met 5/10    LTGs by discharge  Increase R LE AROM to WFL/WNL  Increase LE strength to 5/5 in available range  Pt will be able to ascend/descend stairs reciprocally with or without use of rail(s) and with minimal difficulty or pain  Pt will be able to sit/drive/ride for 30-60 mins without difficulty or pain Progressing 5/10 - has to get up after 20-25  mins  Pt will be able to stand 30-60 mins for basic ADLs/work activities without difficulty or pain  Pt will be able to walk 30-60 mins for grocery shopping/work activities without difficulty, pain or LOB  Pt will be able to wash/dress/groom without difficulty or increased pain  Pt will be able to sleep full nights most nights without waking from RLE symptoms     New Goals  Short-term goals (STG) in 4 weeks:  Increase R UE AROM 10-15 degrees where limited as much          Timed:            Therapeutic Exercise:    23     mins  09446;     Neuromuscular Royce:    17    mins  52195;    Therapeutic Activity:     15     mins  86025;           Un-Timed:  NuStep: 10 min (no charge)      Timed Treatment:   55   mins   Total Treatment:     65   mins      Evelyn Patel, PTA    Physical Therapist Assistant

## 2021-06-01 ENCOUNTER — TREATMENT (OUTPATIENT)
Dept: PHYSICAL THERAPY | Facility: CLINIC | Age: 41
End: 2021-06-01

## 2021-06-01 DIAGNOSIS — M25.521 RIGHT ELBOW PAIN: ICD-10-CM

## 2021-06-01 DIAGNOSIS — Z47.89 ORTHOPEDIC AFTERCARE: ICD-10-CM

## 2021-06-01 DIAGNOSIS — M23.303 DERANGEMENT OF MEDIAL MENISCUS OF RIGHT KNEE: Primary | ICD-10-CM

## 2021-06-01 DIAGNOSIS — S50.01XA CONTUSION OF RIGHT ELBOW, INITIAL ENCOUNTER: ICD-10-CM

## 2021-06-01 DIAGNOSIS — R20.2 NUMBNESS AND TINGLING IN RIGHT HAND: ICD-10-CM

## 2021-06-01 DIAGNOSIS — M25.561 RIGHT KNEE PAIN, UNSPECIFIED CHRONICITY: ICD-10-CM

## 2021-06-01 DIAGNOSIS — M25.511 RIGHT SHOULDER PAIN, UNSPECIFIED CHRONICITY: ICD-10-CM

## 2021-06-01 DIAGNOSIS — R20.0 NUMBNESS AND TINGLING IN RIGHT HAND: ICD-10-CM

## 2021-06-01 DIAGNOSIS — S80.01XA CONTUSION OF RIGHT KNEE, INITIAL ENCOUNTER: ICD-10-CM

## 2021-06-01 PROCEDURE — 97112 NEUROMUSCULAR REEDUCATION: CPT | Performed by: PHYSICAL THERAPIST

## 2021-06-01 PROCEDURE — 97110 THERAPEUTIC EXERCISES: CPT | Performed by: PHYSICAL THERAPIST

## 2021-06-01 PROCEDURE — 97530 THERAPEUTIC ACTIVITIES: CPT | Performed by: PHYSICAL THERAPIST

## 2021-06-01 NOTE — PROGRESS NOTES
"Physical Therapy Daily Progress Note    VISIT#: 24    Subjective   Jo Asif reports \"I'm here\" when asked how she is feeling this morning. States her and her family went to the drive in this weekend and sitting in the car was very uncomfortable on her knee. She has to take 3 hot showers yesterday just to get some relief in her leg.   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 She out of instinct reached with her RUE to grab it and immediately dropped it due to pain.  Current Pain Level: 1/10 SH, 1/10 knee, 0/10 elbow    Objective     See Exercise, Manual, and Modality Logs for complete treatment.     Patient Education: cueing for therex; added SLS on flat ground & UE support    Assessment/Plan  Progressed the shoulder bar activities to the 2# bar. She did complete everything but with increased pain and fatigue (^ rest breaks). Balance is showing some improvement. Lateral movements on the knee can still be painful at times. Good tolerance to SLS.    Progress per Plan of Care     Goals  Plan Goals: STGs in 4 weeks:  Decrease pain to 4-5/10 on average Met 5/10  Increase R hip flex/knee flex/ext AROM by 10 degrees Partially Met 5/10  Increase RLE strength to 4/5 Partially Met 5/10    LTGs by discharge  Increase R LE AROM to WFL/WNL  Increase LE strength to 5/5 in available range  Pt will be able to ascend/descend stairs reciprocally with or without use of rail(s) and with minimal difficulty or pain  Pt will be able to sit/drive/ride for 30-60 mins without difficulty or pain Progressing 5/10 - has to get up after 20-25 mins  Pt will be able to stand 30-60 mins for basic ADLs/work activities without difficulty or pain  Pt will be able to walk 30-60 mins for grocery shopping/work activities without difficulty, pain or LOB  Pt will be able to wash/dress/groom without difficulty or increased pain  Pt will be able to sleep full nights most nights without waking from RLE " symptoms     New Goals  Short-term goals (STG) in 4 weeks:  Increase R UE AROM 10-15 degrees where limited as much          Timed:            Therapeutic Exercise:    12     mins  23228;     Neuromuscular Royce:    14    mins  94019;    Therapeutic Activity:     19     mins  60495;       Un-Timed:  NuStep: 10 min (no charge)    Timed Treatment:   45   mins   Total Treatment:     55   mins      Evelyn Patel, PTA    Physical Therapist Assistant

## 2021-06-04 ENCOUNTER — TREATMENT (OUTPATIENT)
Dept: PHYSICAL THERAPY | Facility: CLINIC | Age: 41
End: 2021-06-04

## 2021-06-04 DIAGNOSIS — M23.303 DERANGEMENT OF MEDIAL MENISCUS OF RIGHT KNEE: Primary | ICD-10-CM

## 2021-06-04 DIAGNOSIS — M25.561 RIGHT KNEE PAIN, UNSPECIFIED CHRONICITY: ICD-10-CM

## 2021-06-04 DIAGNOSIS — S50.01XA CONTUSION OF RIGHT ELBOW, INITIAL ENCOUNTER: ICD-10-CM

## 2021-06-04 DIAGNOSIS — M25.511 RIGHT SHOULDER PAIN, UNSPECIFIED CHRONICITY: ICD-10-CM

## 2021-06-04 DIAGNOSIS — Z47.89 ORTHOPEDIC AFTERCARE: ICD-10-CM

## 2021-06-04 DIAGNOSIS — R20.2 NUMBNESS AND TINGLING IN RIGHT HAND: ICD-10-CM

## 2021-06-04 DIAGNOSIS — M25.521 RIGHT ELBOW PAIN: ICD-10-CM

## 2021-06-04 DIAGNOSIS — R20.0 NUMBNESS AND TINGLING IN RIGHT HAND: ICD-10-CM

## 2021-06-04 DIAGNOSIS — S80.01XA CONTUSION OF RIGHT KNEE, INITIAL ENCOUNTER: ICD-10-CM

## 2021-06-04 PROCEDURE — 97112 NEUROMUSCULAR REEDUCATION: CPT | Performed by: PHYSICAL THERAPIST

## 2021-06-04 PROCEDURE — 97110 THERAPEUTIC EXERCISES: CPT | Performed by: PHYSICAL THERAPIST

## 2021-06-04 PROCEDURE — 97530 THERAPEUTIC ACTIVITIES: CPT | Performed by: PHYSICAL THERAPIST

## 2021-06-04 NOTE — PROGRESS NOTES
Physical Therapy Daily Progress Note    VISIT#: 25    Subjective   Jo Asif reports her shoulder was in severe pain all day Tuesday and Wednesday. Today it remains very sore.  Yesterday at work she has to move ~12 small boxes (3.5# a piece) to another table. She wanted to test her arm and see if she could complete it. After moving 3 of those boxes her shoulder pain increased to a 4/10. She completed moving the 12 boxes and had to stop. Her pain level remained the same the rest of the work shift and into today.  She has noticed she can walk for almost 30 min now and feels like the strength is starting to get better. However she has not tried standing still on that leg for a long amount time and isn't sure how it will do.   Current Pain Level: 2-3/10 SH, 1.5/10 Knee    Objective   R SH AROM  FL: 112* very painful  ABD: 84* very painful  ER: 20* felt small pop and painful    R Knee AAROM  FL: 109* no pain    See Exercise, Manual, and Modality Logs for complete treatment.     Patient Education: added HL hip abd w/ TB to her HEP. Pt denied handout but TB was given for home.    Assessment/Plan  Due to the limited shoulder range and increased amount of pain the shoulder exercises were not performed this date. Her R knee seems to be making slow but steady progress. Shifting laterally onto the RLE is still uncomfortable however she is able walk for longer time periods before pain sets in.    Progress per Plan of Care     Goals  Plan Goals: STGs in 4 weeks:  Decrease pain to 4-5/10 on average Met 5/10  Increase R hip flex/knee flex/ext AROM by 10 degrees Partially Met 5/10  Increase RLE strength to 4/5 Partially Met 5/10    LTGs by discharge  Increase R LE AROM to WFL/WNL  Increase LE strength to 5/5 in available range  Pt will be able to ascend/descend stairs reciprocally with or without use of rail(s) and with minimal difficulty or pain  Pt will be able to sit/drive/ride for 30-60 mins without difficulty or pain  Progressing 5/10 - has to get up after 20-25 mins  Pt will be able to stand 30-60 mins for basic ADLs/work activities without difficulty or pain  Pt will be able to walk 30-60 mins for grocery shopping/work activities without difficulty, pain or LOB  Pt will be able to wash/dress/groom without difficulty or increased pain  Pt will be able to sleep full nights most nights without waking from RLE symptoms     New Goals  Short-term goals (STG) in 4 weeks:  Increase R UE AROM 10-15 degrees where limited as much          Timed:            Therapeutic Exercise:    12     mins  18048;     Neuromuscular Royce:    17    mins  83405;    Therapeutic Activity:     22     mins  17361;         Un-Timed:  NuStep: 10 min (no charge)      Timed Treatment:   51   mins   Total Treatment:     61   mins      Evelyn Patel PTA    Physical Therapist Assistant

## 2021-06-08 ENCOUNTER — TREATMENT (OUTPATIENT)
Dept: PHYSICAL THERAPY | Facility: CLINIC | Age: 41
End: 2021-06-08

## 2021-06-08 DIAGNOSIS — M23.303 DERANGEMENT OF MEDIAL MENISCUS OF RIGHT KNEE: Primary | ICD-10-CM

## 2021-06-08 DIAGNOSIS — R20.2 NUMBNESS AND TINGLING IN RIGHT HAND: ICD-10-CM

## 2021-06-08 DIAGNOSIS — Z47.89 ORTHOPEDIC AFTERCARE: ICD-10-CM

## 2021-06-08 DIAGNOSIS — M25.511 RIGHT SHOULDER PAIN, UNSPECIFIED CHRONICITY: ICD-10-CM

## 2021-06-08 DIAGNOSIS — R20.0 NUMBNESS AND TINGLING IN RIGHT HAND: ICD-10-CM

## 2021-06-08 PROCEDURE — 97110 THERAPEUTIC EXERCISES: CPT | Performed by: PHYSICAL THERAPIST

## 2021-06-08 PROCEDURE — 97530 THERAPEUTIC ACTIVITIES: CPT | Performed by: PHYSICAL THERAPIST

## 2021-06-08 NOTE — PROGRESS NOTES
Physical Therapy Daily Progress Note/30 Day Progress Note    VISIT#: 26     Subjective Questionnaire: Quick DASH 46 = 72.73% limited, OKS 30/48 = 62.5% ability    Subjective   Jo Asif reports the shoulder is 2/10 and the knee is 1/10. Pt notes she is tolerating more standing and reports less aching and improved motion with the knee. Pt states she still has issues if she's in one position for too long. Pt had to work Saturday and her knee was throbbing by the end of the day and reported increased pain after 1 hour standing. Pt notes everything is still difficult with the shoulder including lifting or anything that requires strength like moving a piece of paper, cutting up food, pushing, pulling, or lifting. Pt notes the exs can still be aggravating as well.     Pt states they moved her back to Dr. Dixon for her care. Pt states she sees him on .     Objective     Palpation: TTP at R ant shoulder/pecs     R shoulder:  Flexion: 135 degrees with pain throughout the motion  Abduction: 90 degrees with pain throughout the motion & worse at end range  Scaption: 95 degrees with pain throughout the motion & worse at end range  Extension: 30 degrees with pain  IR BTB: sacrum with pain  ER BTH: occipital region of head with pain    R elbow:  Flexion: 143 degrees  Extension la degrees     Right Knee   Flexion: 118 degrees (seated)  Extensor la degree (LAQ)    Additional Active Range of Motion Details  R hip flex 100 degrees in sitting      Strength R LE (seated):  Hip flex 4+  Quads 4+  Hams 5  Shoulder flex/abd 4 painful; IR/ER 4+ to 5- and painful  Biceps 5 painful  Triceps 5- painful     Special Tests: Drop arm (-),     See Exercise Logs for complete treatment.     Patient Education: cues for therex    Assessment/Plan   Reduced mobility noted at the R shoulder with measurements taken after exs. Pt R UE strength is progressing, but it continues to be painful for pt. The knee is improving gradually and  pt is able to perform more reps with standing activities overall.     PT did accidentally bump into pt's R foot while she was performing LAQ and PT was sitting rolling stool over toward the table to take shoulder measurements. Pt noted it was a little sore, but was able to complete all knee activities without any significant increase in difficulty and without a significant increase in pain. Pt reported shld pain 3/10 and knee pain 2/10 by end of session (about what she normally rates pain), but declined Nustep and ice.     Pt has partially met 2 goals, not met one goal and is progressing toward 4 more goals. Recommend continuing with current POC which expires at 7/20. Pt will need further auth if continuing beyond 29th visit.     Goals  Plan Goals: STGs in 4 weeks:  Decrease pain to 4-5/10 on average Met 5/10  Increase R hip flex/knee flex/ext AROM by 10 degrees Partially Met 5/10  Increase RLE strength to 4/5 Partially Met 5/10    LTGs by discharge  Increase R LE AROM to WFL/WNL  Increase LE strength to 5/5 in available range  Pt will be able to ascend/descend stairs reciprocally with or without use of rail(s) and with minimal difficulty or pain Progressing 6/8 can do a couple of steps reciprocal at a time  Pt will be able to sit/drive/ride for 30-60 mins without difficulty or pain Progressing 5/10 - has to get up after 20-25 mins  Pt will be able to stand 30-60 mins for basic ADLs/work activities without difficulty or pain Not Met 6/8  Pt will be able to walk 30-60 mins for grocery shopping/work activities without difficulty, pain or LOB Partially Met 6/8  Pt will be able to wash/dress/groom without difficulty or increased pain Progressing 6/8 - improved putting socks on sometimes with pain, but no improvements with the shoulder  Pt will be able to sleep full nights most nights without waking from RLE symptoms Progressing 6/8 - some nights sleeps better, pt tolerates sleeping on her R better than her L or supine  for her shoulder  6/8     New Goals  Short-term goals (STG) in 4 weeks:  Increase R UE AROM 10-15 degrees where limited as much Partially Met 6/8 - improved elbow extension all others reduced since last measured (taken after exs with increased pain)     Progress per Plan of Care and Progress strengthening /stabilization /functional activity          Timed:         Manual Therapy:         mins  34076;     Therapeutic Exercise:   25   mins  66038;     Neuromuscular Royce:        mins  66068;    Therapeutic Activity:    25      mins  02889;     Gait Training:           mins  29011;     Ultrasound:         mins  66995;    Ionto                                   mins   24002  Self Care                            mins   47352  Canalith Repos                   mins  83135    Un-Timed:  Electrical Stimulation:         mins  93474 ( );  Dry Needling          mins self-pay  Traction          mins 55177  Low Eval          Mins  16400  Mod Eval          Mins  08171  High Eval                           Mins  01530  Re-Eval                               mins  11154    Timed Treatment:  50  mins   Total Treatment:    50   mins    Krystyna Ornelas PT  [place] License # [id number]  Physical Therapist

## 2021-06-10 ENCOUNTER — TREATMENT (OUTPATIENT)
Dept: PHYSICAL THERAPY | Facility: CLINIC | Age: 41
End: 2021-06-10

## 2021-06-10 DIAGNOSIS — M23.303 DERANGEMENT OF MEDIAL MENISCUS OF RIGHT KNEE: Primary | ICD-10-CM

## 2021-06-10 DIAGNOSIS — R20.2 NUMBNESS AND TINGLING IN RIGHT HAND: ICD-10-CM

## 2021-06-10 DIAGNOSIS — M25.511 RIGHT SHOULDER PAIN, UNSPECIFIED CHRONICITY: ICD-10-CM

## 2021-06-10 DIAGNOSIS — S50.01XA CONTUSION OF RIGHT ELBOW, INITIAL ENCOUNTER: ICD-10-CM

## 2021-06-10 DIAGNOSIS — Z47.89 ORTHOPEDIC AFTERCARE: ICD-10-CM

## 2021-06-10 DIAGNOSIS — M25.521 RIGHT ELBOW PAIN: ICD-10-CM

## 2021-06-10 DIAGNOSIS — S80.01XA CONTUSION OF RIGHT KNEE, INITIAL ENCOUNTER: ICD-10-CM

## 2021-06-10 DIAGNOSIS — R20.0 NUMBNESS AND TINGLING IN RIGHT HAND: ICD-10-CM

## 2021-06-10 DIAGNOSIS — M25.561 RIGHT KNEE PAIN, UNSPECIFIED CHRONICITY: ICD-10-CM

## 2021-06-10 PROCEDURE — 97112 NEUROMUSCULAR REEDUCATION: CPT | Performed by: PHYSICAL THERAPIST

## 2021-06-10 PROCEDURE — 97110 THERAPEUTIC EXERCISES: CPT | Performed by: PHYSICAL THERAPIST

## 2021-06-10 PROCEDURE — 97530 THERAPEUTIC ACTIVITIES: CPT | Performed by: PHYSICAL THERAPIST

## 2021-06-10 NOTE — PROGRESS NOTES
Physical Therapy Daily Progress Note    VISIT#: 27    Subjective   Jo Asif reports she is suppose to receive her knee brace today or tomorrow.   Current Pain Level: 1-2/10 SH    1/10 Knee    Objective     See Exercise, Manual, and Modality Logs for complete treatment.     Patient Education: added SLR's in neutral and w/ toes ER, SLS w/ cone taps and 4 square stepping to her HEP. Handouts given.    Assessment/Plan  Better tolerance to using the 1# bar vs the 2# bar with the UE exercises however overall activity tolerance remains limited before her pain starts to increase.  The knee seems to be making slow but steady progress. Weakness present in the VMO but fair tolerance to the addition of the SLR's. All the exercises following the SLR's seemed to be more of a struggle than they usually are.    Plan: Continuing with current POC which expires at 7/20 per PT. Pt will need further auth if continuing beyond 29th visit.   RMD (Zack 6/17/21)  Progress per Plan of Care     Goals  Plan Goals: STGs in 4 weeks:  Decrease pain to 4-5/10 on average Met 5/10  Increase R hip flex/knee flex/ext AROM by 10 degrees Partially Met 5/10  Increase RLE strength to 4/5 Partially Met 5/10    LTGs by discharge  Increase R LE AROM to WFL/WNL  Increase LE strength to 5/5 in available range  Pt will be able to ascend/descend stairs reciprocally with or without use of rail(s) and with minimal difficulty or pain Progressing 6/8 can do a couple of steps reciprocal at a time  Pt will be able to sit/drive/ride for 30-60 mins without difficulty or pain Progressing 5/10 - has to get up after 20-25 mins  Pt will be able to stand 30-60 mins for basic ADLs/work activities without difficulty or pain Not Met 6/8  Pt will be able to walk 30-60 mins for grocery shopping/work activities without difficulty, pain or LOB Partially Met 6/8  Pt will be able to wash/dress/groom without difficulty or increased pain Progressing 6/8 - improved putting  socks on sometimes with pain, but no improvements with the shoulder  Pt will be able to sleep full nights most nights without waking from RLE symptoms Progressing 6/8 - some nights sleeps better, pt tolerates sleeping on her R better than her L or supine for her shoulder  6/8     New Goals  Short-term goals (STG) in 4 weeks:  Increase R UE AROM 10-15 degrees where limited as much Partially Met 6/8 - improved elbow extension all others reduced since last measured (taken after exs with increased pain)          Timed:           Therapeutic Exercise:     23    mins  74459;     Neuromuscular Royce:    9    mins  67897;    Therapeutic Activity:     16     mins  26779;       Un-Timed:  NuStep: 10 min ( no charge)      Timed Treatment:   48   mins   Total Treatment:     58   mins      Evelyn Patel PTA    Physical Therapist Assistant

## 2021-06-16 ENCOUNTER — TREATMENT (OUTPATIENT)
Dept: PHYSICAL THERAPY | Facility: CLINIC | Age: 41
End: 2021-06-16

## 2021-06-16 DIAGNOSIS — M23.303 DERANGEMENT OF MEDIAL MENISCUS OF RIGHT KNEE: Primary | ICD-10-CM

## 2021-06-16 DIAGNOSIS — M25.511 RIGHT SHOULDER PAIN, UNSPECIFIED CHRONICITY: ICD-10-CM

## 2021-06-16 DIAGNOSIS — R20.2 NUMBNESS AND TINGLING IN RIGHT HAND: ICD-10-CM

## 2021-06-16 DIAGNOSIS — S80.01XA CONTUSION OF RIGHT KNEE, INITIAL ENCOUNTER: ICD-10-CM

## 2021-06-16 DIAGNOSIS — Z47.89 ORTHOPEDIC AFTERCARE: ICD-10-CM

## 2021-06-16 DIAGNOSIS — M25.561 RIGHT KNEE PAIN, UNSPECIFIED CHRONICITY: ICD-10-CM

## 2021-06-16 DIAGNOSIS — M25.521 RIGHT ELBOW PAIN: ICD-10-CM

## 2021-06-16 DIAGNOSIS — R20.0 NUMBNESS AND TINGLING IN RIGHT HAND: ICD-10-CM

## 2021-06-16 DIAGNOSIS — S50.01XA CONTUSION OF RIGHT ELBOW, INITIAL ENCOUNTER: ICD-10-CM

## 2021-06-16 PROCEDURE — 97110 THERAPEUTIC EXERCISES: CPT | Performed by: PHYSICAL THERAPIST

## 2021-06-16 PROCEDURE — 97112 NEUROMUSCULAR REEDUCATION: CPT | Performed by: PHYSICAL THERAPIST

## 2021-06-16 PROCEDURE — 97530 THERAPEUTIC ACTIVITIES: CPT | Performed by: PHYSICAL THERAPIST

## 2021-06-16 NOTE — PROGRESS NOTES
Physical Therapy Daily Progress Note    VISIT#: 28    Subjective   Jo Asif reports she received her knee brace the day before yesterday. She has noticed she has been able to stand on her feet for longer periods of time at work before pain sets in. When she tries to bend the knee to go up stairs or squat to fill an empty pallet she has some discomfort along the lateral part of the brace.   No changes with the shoulder. Reaching or using the R arm is still painful.   Current Pain Level: 1-2/10 SH,  1.5/10 Knee    Objective   R UE ROM  SH FL: starting  138*    Ending 110*  Elbow FL & Ext: WNL just very painful with movement    See Exercise, Manual, and Modality Logs for complete treatment.     Patient Education: no changes made today except to work on bending the knee more while brace is on to challenge the muscles in her LE. Instructed to cont table exercises at home without her brace on    Assessment/Plan  All standing exercises were performed with her brace donned today. There was a decrease seen with her lateral shifting, increased stability and reported less pressure on the knee with all activities. Overall improvement seen in her performance with the brace on. Still has some weakness in the LE but it is improving.  Lacking progress with ROM and function of the R shoulder/elbow. Unable to progress her exercises due to significant increase in pain and popping in the shoulder or pain radiating across the elbow with AAROM exercises.     Plan: Continuing with current POC which expires at 7/20 per PT. Pt will need further auth if continuing beyond 29th visit.   RMD (Zack tomorrow 6/17/21)  Progress per Plan of Care     Goals  Plan Goals: STGs in 4 weeks:  Decrease pain to 4-5/10 on average Met 5/10  Increase R hip flex/knee flex/ext AROM by 10 degrees Partially Met 5/10  Increase RLE strength to 4/5 Partially Met 5/10    LTGs by discharge  Increase R LE AROM to WFL/WNL  Increase LE strength to 5/5 in  available range  Pt will be able to ascend/descend stairs reciprocally with or without use of rail(s) and with minimal difficulty or pain Progressing 6/8 can do a couple of steps reciprocal at a time  Pt will be able to sit/drive/ride for 30-60 mins without difficulty or pain Progressing 5/10 - has to get up after 20-25 mins  Pt will be able to stand 30-60 mins for basic ADLs/work activities without difficulty or pain Progressing with the brace 6/16  Pt will be able to walk 30-60 mins for grocery shopping/work activities without difficulty, pain or LOB Progressing with the brace 6/16  Pt will be able to wash/dress/groom without difficulty or increased pain Progressing 6/8 - improved putting socks on sometimes with pain, but no improvements with the shoulder  Pt will be able to sleep full nights most nights without waking from RLE symptoms Progressing 6/8 - some nights sleeps better, pt tolerates sleeping on her R better than her L or supine for her shoulder  6/8     New Goals  Short-term goals (STG) in 4 weeks:  Increase R UE AROM 10-15 degrees where limited as much Partially Met 6/8 - improved elbow extension all others reduced since last measured (taken after exs with increased pain)          Timed:            Therapeutic Exercise:    11     mins  73446;     Neuromuscular Royce:    19    mins  41311;    Therapeutic Activity:     24     mins  89072;         Un-Timed:  NuStep: 10 min (no charge)    Timed Treatment:   64   mins   Total Treatment:     64   mins      Evelyn Patel, PTA    Physical Therapist Assistant

## 2021-06-18 ENCOUNTER — TREATMENT (OUTPATIENT)
Dept: PHYSICAL THERAPY | Facility: CLINIC | Age: 41
End: 2021-06-18

## 2021-06-18 DIAGNOSIS — R20.2 NUMBNESS AND TINGLING IN RIGHT HAND: ICD-10-CM

## 2021-06-18 DIAGNOSIS — R20.0 NUMBNESS AND TINGLING IN RIGHT HAND: ICD-10-CM

## 2021-06-18 DIAGNOSIS — S50.01XA CONTUSION OF RIGHT ELBOW, INITIAL ENCOUNTER: ICD-10-CM

## 2021-06-18 DIAGNOSIS — M25.561 RIGHT KNEE PAIN, UNSPECIFIED CHRONICITY: ICD-10-CM

## 2021-06-18 DIAGNOSIS — Z47.89 ORTHOPEDIC AFTERCARE: ICD-10-CM

## 2021-06-18 DIAGNOSIS — M23.303 DERANGEMENT OF MEDIAL MENISCUS OF RIGHT KNEE: Primary | ICD-10-CM

## 2021-06-18 DIAGNOSIS — S80.01XA CONTUSION OF RIGHT KNEE, INITIAL ENCOUNTER: ICD-10-CM

## 2021-06-18 DIAGNOSIS — M25.521 RIGHT ELBOW PAIN: ICD-10-CM

## 2021-06-18 DIAGNOSIS — M25.511 RIGHT SHOULDER PAIN, UNSPECIFIED CHRONICITY: ICD-10-CM

## 2021-06-18 PROCEDURE — 97530 THERAPEUTIC ACTIVITIES: CPT | Performed by: PHYSICAL THERAPIST

## 2021-06-18 PROCEDURE — 97110 THERAPEUTIC EXERCISES: CPT | Performed by: PHYSICAL THERAPIST

## 2021-06-18 PROCEDURE — 97112 NEUROMUSCULAR REEDUCATION: CPT | Performed by: PHYSICAL THERAPIST

## 2021-06-18 NOTE — PROGRESS NOTES
"Physical Therapy Daily Progress Note    VISIT#: 29    Subjective   Jo Asif reports extreme frustration about her follow up with Zack yesterday. Per patient, \"Zack says I have full range and any pain I'm having is on me.\" He has released her back to work on full duty and continuing her HEP with focus on strengthening the UE.   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 It was extremely painful reaching behind her back and had trouble using the arm the rest of her shift.  Current Pain Level: 1-2/10 SH   1/10 Elbow   0/10 R Knee    Objective     See Exercise, Manual, and Modality Logs for complete treatment.     Patient Education: reviewed and finalized her HEP for DC. Handouts given for progressed shoulder exercises.    Assessment/Plan  Jo has made great progress with her R knee since receiving her knee brace. Her pain level was a 0 following her session today. Good understanding of her LE program.  Progressed her shoulder strengthening program per MD request however tolerance was poor. Instructed patient to stop the exercises if shoulder pain significantly increases. She is to continue the bar exercises and isometrics as tolerated.       Plan: Due to the MD releasing her back to full duty at work her  told her to make today the last visit and just review her HEP.     Goals  Plan Goals: STGs in 4 weeks:  Decrease pain to 4-5/10 on average Met 5/10  Increase R hip flex/knee flex/ext AROM by 10 degrees Partially Met 5/10  Increase RLE strength to 4/5 Partially Met 5/10    LTGs by discharge  Increase R LE AROM to WFL/WNL Met  Increase LE strength to 5/5 in available range Progressing   Pt will be able to ascend/descend stairs reciprocally with or without use of rail(s) and with minimal difficulty or pain Progressing 6/8 can do a couple of steps reciprocal at a time  Pt will be " able to sit/drive/ride for 30-60 mins without difficulty or pain Met 6/18 with brace on  Pt will be able to stand 30-60 mins for basic ADLs/work activities without difficulty or pain Met 6/18 with brace on  Pt will be able to walk 30-60 mins for grocery shopping/work activities without difficulty, pain or LOB Met 6/18 with brace on  Pt will be able to wash/dress/groom without difficulty or increased pain Met with Knee/Not Met with Shoulder 6/18  Pt will be able to sleep full nights most nights without waking from RLE symptoms Partially Met 6/18     New Goals  Short-term goals (STG) in 4 weeks:  Increase R UE AROM 10-15 degrees where limited as much Partially Met 6/8 - ROM improved unless she has weight greater than 1# then ROM is significantly decreased and limited          Timed:            Therapeutic Exercise:    9     mins  01577;     Neuromuscular Royce:    13    mins  [id number];    Therapeutic Activity:     18     mins  02703;         Un-Timed:  NuStep: 10 min (no charge)      Timed Treatment:   40   mins   Total Treatment:     50   mins      Evelyn Patel PTA    Physical Therapist Assistant

## 2021-06-23 RX ADMIN — TRIAMCINOLONE ACETONIDE 20 MG: 40 INJECTION, SUSPENSION INTRA-ARTICULAR; INTRAMUSCULAR at 16:07

## 2021-08-11 ENCOUNTER — DOCUMENTATION (OUTPATIENT)
Dept: PHYSICAL THERAPY | Facility: CLINIC | Age: 41
End: 2021-08-11

## 2021-08-11 NOTE — PROGRESS NOTES
Discharge Summary  Discharge Summary from Physical Therapy Report      Dates  PT visit: 1/4/21-1/22/21 knee/elbow; then 3/31/21-6/18/21    Number of Visits: 6 visits initial bout; 29 visits 2nd bout    Discharge Status of Patient: Pt was originally seen for Occ Med, then eventually referred out to ortho. Pt then returned with a new evaluation beginning at the end of March after knee surgery. At pt's last visit, she reported that she was being released back to work on full duty and to continue with HEP to focus on strengthening for the UE.     Goals: Per the most recent progress note (6/8/21), pt had met 1 goal, partially met 4 goals, and was progressing toward 4 goals.     Discharge Plan: No specific D/C instr were given as pt did not return to PT after 6/18/21 and was released to RTW.     Comments: HEP given and updated throughout care prn; HEP was reviewed at her last visit and progression of shoulder exs were given with pictures     Date of Discharge: 8/11/21        Krystyna Ornelas, PT  Physical Therapist

## 2021-12-07 ENCOUNTER — TRANSCRIBE ORDERS (OUTPATIENT)
Dept: PHYSICAL THERAPY | Facility: CLINIC | Age: 41
End: 2021-12-07

## 2021-12-07 DIAGNOSIS — M19.019 PRIMARY LOCALIZED OSTEOARTHROSIS OF SHOULDER REGION, UNSPECIFIED LATERALITY: Primary | ICD-10-CM

## 2021-12-07 DIAGNOSIS — M25.569 KNEE PAIN, UNSPECIFIED CHRONICITY, UNSPECIFIED LATERALITY: ICD-10-CM

## 2022-02-10 ENCOUNTER — PREP FOR SURGERY (OUTPATIENT)
Dept: OTHER | Facility: HOSPITAL | Age: 42
End: 2022-02-10

## 2022-02-10 ENCOUNTER — OFFICE VISIT (OUTPATIENT)
Dept: ORTHOPEDIC SURGERY | Facility: CLINIC | Age: 42
End: 2022-02-10

## 2022-02-10 VITALS
DIASTOLIC BLOOD PRESSURE: 136 MMHG | BODY MASS INDEX: 50.02 KG/M2 | SYSTOLIC BLOOD PRESSURE: 194 MMHG | HEART RATE: 97 BPM | HEIGHT: 64 IN | WEIGHT: 293 LBS

## 2022-02-10 DIAGNOSIS — E66.01 OBESITY, MORBID, BMI 50 OR HIGHER: ICD-10-CM

## 2022-02-10 DIAGNOSIS — M25.511 RIGHT SHOULDER PAIN, UNSPECIFIED CHRONICITY: ICD-10-CM

## 2022-02-10 DIAGNOSIS — M17.11 PRIMARY LOCALIZED OSTEOARTHRITIS OF RIGHT KNEE: Primary | ICD-10-CM

## 2022-02-10 DIAGNOSIS — M25.511 RIGHT SHOULDER PAIN, UNSPECIFIED CHRONICITY: Primary | ICD-10-CM

## 2022-02-10 PROCEDURE — 99214 OFFICE O/P EST MOD 30 MIN: CPT | Performed by: ORTHOPAEDIC SURGERY

## 2022-02-10 RX ORDER — EPINEPHRINE 0.3 MG/.3ML
0.3 INJECTION SUBCUTANEOUS
COMMUNITY
Start: 2022-01-14

## 2022-02-10 RX ORDER — HYDROCODONE BITARTRATE AND ACETAMINOPHEN 5; 325 MG/1; MG/1
1 TABLET ORAL 3 TIMES DAILY
COMMUNITY
End: 2022-07-12

## 2022-02-10 RX ORDER — EPINEPHRINE 0.1 MG/ML
20 SYRINGE (ML) INJECTION
COMMUNITY
End: 2022-02-11

## 2022-02-10 RX ORDER — PEGVALIASE-PQPZ 10 MG/.5ML
INJECTION, SOLUTION SUBCUTANEOUS
COMMUNITY
Start: 2022-02-02 | End: 2022-02-11

## 2022-02-10 RX ORDER — MELOXICAM 15 MG/1
15 TABLET ORAL DAILY
COMMUNITY
End: 2022-03-18 | Stop reason: HOSPADM

## 2022-02-10 RX ORDER — CEFAZOLIN SODIUM IN 0.9 % NACL 3 G/100 ML
3 INTRAVENOUS SOLUTION, PIGGYBACK (ML) INTRAVENOUS ONCE
Status: CANCELLED | OUTPATIENT
Start: 2022-02-10 | End: 2022-02-10

## 2022-02-10 RX ORDER — UBIDECARENONE 75 MG
50 CAPSULE ORAL DAILY
COMMUNITY
End: 2022-07-12

## 2022-02-10 RX ORDER — EPINEPHRINE 0.3 MG/.3ML
INJECTION SUBCUTANEOUS
Status: ON HOLD | COMMUNITY
Start: 2022-01-22 | End: 2022-03-18 | Stop reason: SDUPTHER

## 2022-02-10 NOTE — PROGRESS NOTES
"     Patient ID: Jo Asif is a 41 y.o. female.  Right shoulder and knee pain  Jo returns with continued right knee pain.  She has had treatment with a brace which made her knee swell more.  She has pain mainly of the the patellofemoral joint worse with stairs.  She has also had a recent injection with pain management.  Also continues with right shoulder pain pain is mainly present over the bicep groove referring down to the bicep muscle belly worse with overhead activity also not improved with an AC joint injection as well as a bursa injection    Review of Systems:    Right knee and shoulder pain    Objective:    BP (!) 194/136   Pulse 97   Ht 162.6 cm (64\")   Wt (!) 152 kg (334 lb)   BMI 57.33 kg/m²     Physical Examination:  Right shoulder demonstrates intact skin active elevation 180 abduction 130 external rotation 40 with mild pain but no real weakness on Speed [place] supraspinatus testing.  She has moderate pain over the bicep groove.  Right knee demonstrates click in the patellofemoral joint range of motion 0-125 no instability       Imaging:   Previous MRI of the shoulder demonstrates small partial  supraspinatus tear    Assessment:    Right knee pain patellofemoral arthritis  Right shoulder partial cuff tear possible bicep tendinopathy    Plan:   Treatment options discussed.  I have referred her to our bariatric center for consideration of weight loss and her weight loss surgery.  Also recommend low impact exercise to help with her knee.  Also recommend viscosupplementation.  For the shoulder she has had extensive nonoperative treatment.  She would like to thresher arthroscopy possible rotator cuff repair possible bicep tenotomy.  Cosmetic and functional outcomes of tenotomy were discussed.    Risks and benefits, specifically risks of bleeding, scar, infection, fracture, stiffness, retear, nerve, tendon or artery damage, the need for further surgery, DVT, and loss of life or limb and " rehab were discussed. All questions were answered and addressed.      Procedures          Disclaimer: Part of this note may be an electronic transcription/translation of spoken language to printed text using the Dragon Dictation System

## 2022-02-11 PROBLEM — M25.511 RIGHT SHOULDER PAIN: Status: ACTIVE | Noted: 2022-02-11

## 2022-02-14 ENCOUNTER — TELEPHONE (OUTPATIENT)
Dept: ORTHOPEDIC SURGERY | Facility: CLINIC | Age: 42
End: 2022-02-14

## 2022-02-14 ENCOUNTER — LAB (OUTPATIENT)
Dept: LAB | Facility: HOSPITAL | Age: 42
End: 2022-02-14

## 2022-02-14 ENCOUNTER — HOSPITAL ENCOUNTER (OUTPATIENT)
Dept: CARDIOLOGY | Facility: HOSPITAL | Age: 42
Discharge: HOME | End: 2022-02-14

## 2022-02-14 DIAGNOSIS — M25.511 RIGHT SHOULDER PAIN, UNSPECIFIED CHRONICITY: ICD-10-CM

## 2022-02-14 LAB
ANION GAP SERPL CALCULATED.3IONS-SCNC: 7 MMOL/L (ref 5–15)
APTT PPP: 23.1 SECONDS (ref 24–31)
BASOPHILS # BLD AUTO: 0.04 10*3/MM3 (ref 0–0.2)
BASOPHILS NFR BLD AUTO: 0.5 % (ref 0–1.5)
BUN SERPL-MCNC: 13 MG/DL (ref 6–20)
BUN/CREAT SERPL: 18.6 (ref 7–25)
CALCIUM SPEC-SCNC: 9.1 MG/DL (ref 8.6–10.5)
CHLORIDE SERPL-SCNC: 101 MMOL/L (ref 98–107)
CO2 SERPL-SCNC: 29 MMOL/L (ref 22–29)
CREAT SERPL-MCNC: 0.7 MG/DL (ref 0.57–1)
DEPRECATED RDW RBC AUTO: 42.3 FL (ref 37–54)
EOSINOPHIL # BLD AUTO: 0.18 10*3/MM3 (ref 0–0.4)
EOSINOPHIL NFR BLD AUTO: 2.2 % (ref 0.3–6.2)
ERYTHROCYTE [DISTWIDTH] IN BLOOD BY AUTOMATED COUNT: 12.2 % (ref 12.3–15.4)
GFR SERPL CREATININE-BSD FRML MDRD: 91 ML/MIN/1.73
GLUCOSE SERPL-MCNC: 109 MG/DL (ref 65–99)
HCT VFR BLD AUTO: 39.9 % (ref 34–46.6)
HGB BLD-MCNC: 13.4 G/DL (ref 12–15.9)
IMM GRANULOCYTES # BLD AUTO: 0.03 10*3/MM3 (ref 0–0.05)
IMM GRANULOCYTES NFR BLD AUTO: 0.4 % (ref 0–0.5)
INR PPP: <0.93 (ref 0.93–1.1)
LYMPHOCYTES # BLD AUTO: 2.4 10*3/MM3 (ref 0.7–3.1)
LYMPHOCYTES NFR BLD AUTO: 29 % (ref 19.6–45.3)
MCH RBC QN AUTO: 31.8 PG (ref 26.6–33)
MCHC RBC AUTO-ENTMCNC: 33.6 G/DL (ref 31.5–35.7)
MCV RBC AUTO: 94.8 FL (ref 79–97)
MONOCYTES # BLD AUTO: 0.74 10*3/MM3 (ref 0.1–0.9)
MONOCYTES NFR BLD AUTO: 8.9 % (ref 5–12)
NEUTROPHILS NFR BLD AUTO: 4.89 10*3/MM3 (ref 1.7–7)
NEUTROPHILS NFR BLD AUTO: 59 % (ref 42.7–76)
NRBC BLD AUTO-RTO: 0 /100 WBC (ref 0–0.2)
PLATELET # BLD AUTO: 243 10*3/MM3 (ref 140–450)
PMV BLD AUTO: 9.6 FL (ref 6–12)
POTASSIUM SERPL-SCNC: 4.1 MMOL/L (ref 3.5–5.2)
PROTHROMBIN TIME: 10.3 SECONDS (ref 9.6–11.7)
RBC # BLD AUTO: 4.21 10*6/MM3 (ref 3.77–5.28)
SODIUM SERPL-SCNC: 137 MMOL/L (ref 136–145)
WBC NRBC COR # BLD: 8.28 10*3/MM3 (ref 3.4–10.8)

## 2022-02-14 PROCEDURE — 36415 COLL VENOUS BLD VENIPUNCTURE: CPT

## 2022-02-14 PROCEDURE — 85610 PROTHROMBIN TIME: CPT

## 2022-02-14 PROCEDURE — 93010 ELECTROCARDIOGRAM REPORT: CPT | Performed by: INTERNAL MEDICINE

## 2022-02-14 PROCEDURE — 85025 COMPLETE CBC W/AUTO DIFF WBC: CPT

## 2022-02-14 PROCEDURE — 85730 THROMBOPLASTIN TIME PARTIAL: CPT

## 2022-02-14 PROCEDURE — 80048 BASIC METABOLIC PNL TOTAL CA: CPT

## 2022-02-14 PROCEDURE — 93005 ELECTROCARDIOGRAM TRACING: CPT | Performed by: ORTHOPAEDIC SURGERY

## 2022-02-14 NOTE — TELEPHONE ENCOUNTER
Provider: DR RODRIGUEZ   Caller: MRS MCCOLLUM   Relationship to Patient: PATIENT   Phone Number: [phone]  Reason for Call: PATIENT CLLD IN ASKING TO S/W LUZ MARINA REG SOME QUESTIONS ABOUT HER UP COMING SX THIS WEEK ON SHOULDER, TRIED TO WARM TRANSFER NO ANSWER PATIENT IS ASKING FOR A CALL BACK.   When was the patient last seen:  2/10/22

## 2022-02-16 ENCOUNTER — LAB (OUTPATIENT)
Dept: LAB | Facility: HOSPITAL | Age: 42
End: 2022-02-16

## 2022-02-16 DIAGNOSIS — M25.511 RIGHT SHOULDER PAIN, UNSPECIFIED CHRONICITY: ICD-10-CM

## 2022-02-16 LAB — SARS-COV-2 ORF1AB RESP QL NAA+PROBE: NOT DETECTED

## 2022-02-16 PROCEDURE — U0004 COV-19 TEST NON-CDC HGH THRU: HCPCS

## 2022-02-16 PROCEDURE — C9803 HOPD COVID-19 SPEC COLLECT: HCPCS

## 2022-02-17 LAB — QT INTERVAL: 371 MS

## 2022-02-17 RX ORDER — OXYCODONE HYDROCHLORIDE AND ACETAMINOPHEN 5; 325 MG/1; MG/1
1 TABLET ORAL EVERY 6 HOURS PRN
Qty: 28 TABLET | Refills: 0 | Status: SHIPPED | OUTPATIENT
Start: 2022-02-17 | End: 2022-04-21

## 2022-02-17 RX ORDER — PROMETHAZINE HYDROCHLORIDE 12.5 MG/1
12.5 TABLET ORAL EVERY 6 HOURS PRN
Qty: 21 TABLET | Refills: 1 | Status: SHIPPED | OUTPATIENT
Start: 2022-02-17 | End: 2022-04-21

## 2022-02-17 RX ORDER — CLINDAMYCIN HYDROCHLORIDE 300 MG/1
600 CAPSULE ORAL 3 TIMES DAILY
Qty: 6 CAPSULE | Refills: 0 | Status: ON HOLD | OUTPATIENT
Start: 2022-02-17 | End: 2022-02-18

## 2022-02-17 RX ORDER — NAPROXEN 500 MG/1
500 TABLET ORAL 2 TIMES DAILY WITH MEALS
Qty: 28 TABLET | Refills: 0 | Status: SHIPPED | OUTPATIENT
Start: 2022-02-17 | End: 2022-04-21

## 2022-02-17 NOTE — H&P
[facility]   HISTORY AND PHYSICAL    Patient Name: Jo Asif  : 1980  MRN: [id number]  Primary Care Physician:  Kelsey Sanchez APRN  Date of admission: (Not on file)    Subjective   Subjective     Chief Complaint: Right shoulder pain    This is a 41-year-old female here with right shoulder pain presented for arthroscopy partial cuff repair possible bicep tenotomy no other acute complaints        Review of Systems   Cardiovascular: Negative for chest pain.   Musculoskeletal: Positive for arthralgias.        Personal History     Past Medical History:   Diagnosis Date   • Arrhythmia    • Asthma     no inhalers   • Atrial fibrillation (HCC)    • Disease of thyroid gland    • GERD (gastroesophageal reflux disease)    • Heart murmur    • Hypertension    • Migraine    • PCOS (polycystic ovarian syndrome)    • PKU (phenylketonuria) (HCC)    • Prediabetes    • Sleep apnea     no machine   • Torn meniscus 2021    right       Past Surgical History:   Procedure Laterality Date   • ABLATION OF DYSRHYTHMIC FOCUS     • CARDIAC ELECTROPHYSIOLOGY PROCEDURE N/A 6/10/2020    Procedure: EP/Ablation;  Surgeon: Joseph Andrews MD;  Location: [facility] CATH INVASIVE LOCATION;  Service: Cardiovascular;  Laterality: N/A;   • CARPAL TUNNEL RELEASE Right    • KNEE ARTHROSCOPY Right 3/26/2021    Procedure: KNEE ARTHROSCOPY with  partial medial lateral meniscectomy;  Surgeon: Kb Gaviria MD;  Location: [facility] MAIN OR;  Service: Orthopedics;  Laterality: Right;   • TONSILLECTOMY     • TUBAL ABDOMINAL LIGATION     • UVULECTOMY         Family History: family history includes Cancer in her father; Diabetes in her mother; Heart disease in her father. Otherwise pertinent FHx was reviewed and not pertinent to current issue.    Social History:  reports that she quit smoking about 12 years ago. Her smoking use included cigarettes. She has never used smokeless tobacco. She reports that she does not drink alcohol and  does not use drugs.    Home Medications:  Diclofenac Sodium, EPINEPHrine, Fremanezumab-vfrm, HYDROcodone-acetaminophen, Pegvaliase-pqpz, levothyroxine, meloxicam, and vitamin B-12    Allergies:  Allergies   Allergen Reactions   • Amoxicillin Hives   • Cleocin [Clindamycin Hcl] Hives   • Penicillins Hives       Objective    Objective     Vitals:        Right shoulder demonstrates intact skin active elevation 180 abduction 130 external rotation 40 with mild pain but no real weakness on Speed [place] supraspinatus testing.  She has moderate pain over the bicep groove.  Right knee demonstrates click in the patellofemoral joint range of motion 0-125 no instability        Imaging:   Previous MRI of the shoulder demonstrates small partial  supraspinatus tear     Assessment:    Right knee pain patellofemoral arthritis  Right shoulder partial cuff tear possible bicep tendinopathy     Plan:   Treatment options discussed.  I have referred her to our bariatric center for consideration of weight loss and her weight loss surgery.  Also recommend low impact exercise to help with her knee.  Also recommend viscosupplementation.  For the shoulder she has had extensive nonoperative treatment.  She would like to thresher arthroscopy possible rotator cuff repair possible bicep tenotomy.  Cosmetic and functional outcomes of tenotomy were discussed.     Risks and benefits, specifically risks of bleeding, scar, infection, fracture, stiffness, retear, nerve, tendon or artery damage, the need for further surgery, DVT, and loss of life or limb and rehab were discussed. All questions were answered and addressed.    Kb Gaviria MD

## 2022-02-18 ENCOUNTER — APPOINTMENT (OUTPATIENT)
Dept: CT IMAGING | Facility: HOSPITAL | Age: 42
End: 2022-02-18

## 2022-02-18 ENCOUNTER — ANESTHESIA (OUTPATIENT)
Dept: PERIOP | Facility: HOSPITAL | Age: 42
End: 2022-02-18

## 2022-02-18 ENCOUNTER — HOSPITAL ENCOUNTER (OUTPATIENT)
Facility: HOSPITAL | Age: 42
Setting detail: OBSERVATION
Discharge: HOME | End: 2022-02-19
Attending: EMERGENCY MEDICINE | Admitting: EMERGENCY MEDICINE

## 2022-02-18 ENCOUNTER — ANESTHESIA EVENT (OUTPATIENT)
Dept: PERIOP | Facility: HOSPITAL | Age: 42
End: 2022-02-18

## 2022-02-18 ENCOUNTER — APPOINTMENT (OUTPATIENT)
Dept: GENERAL RADIOLOGY | Facility: HOSPITAL | Age: 42
End: 2022-02-18

## 2022-02-18 ENCOUNTER — HOSPITAL ENCOUNTER (OUTPATIENT)
Facility: HOSPITAL | Age: 42
Setting detail: HOSPITAL OUTPATIENT SURGERY
Discharge: HOME | End: 2022-02-18
Attending: ORTHOPAEDIC SURGERY | Admitting: ORTHOPAEDIC SURGERY

## 2022-02-18 VITALS
HEART RATE: 67 BPM | WEIGHT: 293 LBS | BODY MASS INDEX: 50.02 KG/M2 | HEIGHT: 64 IN | OXYGEN SATURATION: 97 % | DIASTOLIC BLOOD PRESSURE: 95 MMHG | RESPIRATION RATE: 26 BRPM | SYSTOLIC BLOOD PRESSURE: 152 MMHG | TEMPERATURE: 96.6 F

## 2022-02-18 DIAGNOSIS — R07.9 CHEST PAIN, UNSPECIFIED TYPE: Primary | ICD-10-CM

## 2022-02-18 DIAGNOSIS — G47.33 OSA (OBSTRUCTIVE SLEEP APNEA): ICD-10-CM

## 2022-02-18 DIAGNOSIS — R06.00 DYSPNEA, UNSPECIFIED TYPE: ICD-10-CM

## 2022-02-18 DIAGNOSIS — M25.511 ACUTE PAIN OF RIGHT SHOULDER: Primary | ICD-10-CM

## 2022-02-18 DIAGNOSIS — M25.511 RIGHT SHOULDER PAIN, UNSPECIFIED CHRONICITY: ICD-10-CM

## 2022-02-18 LAB
ARTERIAL PATENCY WRIST A: POSITIVE
ATMOSPHERIC PRESS: ABNORMAL MM[HG]
B-HCG UR QL: NEGATIVE
BASE EXCESS BLDA CALC-SCNC: 1.8 MMOL/L (ref 0–3)
BDY SITE: ABNORMAL
CK MB SERPL-CCNC: 2.16 NG/ML
CK SERPL-CCNC: 40 U/L (ref 20–180)
CO2 BLDA-SCNC: 29.1 MMOL/L (ref 22–29)
HCO3 BLDA-SCNC: 27.6 MMOL/L (ref 21–28)
HEMODILUTION: NO
INHALED O2 CONCENTRATION: 21 %
MODALITY: ABNORMAL
PCO2 BLDA: 46.8 MM HG (ref 35–48)
PH BLDA: 7.38 PH UNITS (ref 7.35–7.45)
PO2 BLDA: 72.2 MM HG (ref 83–108)
QT INTERVAL: 419 MS
SAO2 % BLDCOA: 93.8 % (ref 94–98)
TROPONIN T SERPL-MCNC: <0.01 NG/ML (ref 0–0.03)
TROPONIN T SERPL-MCNC: <0.01 NG/ML (ref 0–0.03)

## 2022-02-18 PROCEDURE — 84484 ASSAY OF TROPONIN QUANT: CPT | Performed by: ANESTHESIOLOGY

## 2022-02-18 PROCEDURE — 93005 ELECTROCARDIOGRAM TRACING: CPT | Performed by: ORTHOPAEDIC SURGERY

## 2022-02-18 PROCEDURE — 82803 BLOOD GASES ANY COMBINATION: CPT

## 2022-02-18 PROCEDURE — 25010000002 ROPIVACAINE PER 1 MG: Performed by: ANESTHESIOLOGY

## 2022-02-18 PROCEDURE — 96372 THER/PROPH/DIAG INJ SC/IM: CPT

## 2022-02-18 PROCEDURE — 96374 THER/PROPH/DIAG INJ IV PUSH: CPT

## 2022-02-18 PROCEDURE — 25010000002 FENTANYL CITRATE (PF) 100 MCG/2ML SOLUTION

## 2022-02-18 PROCEDURE — 81025 URINE PREGNANCY TEST: CPT | Performed by: ORTHOPAEDIC SURGERY

## 2022-02-18 PROCEDURE — 82553 CREATINE MB FRACTION: CPT | Performed by: ANESTHESIOLOGY

## 2022-02-18 PROCEDURE — G0378 HOSPITAL OBSERVATION PER HR: HCPCS

## 2022-02-18 PROCEDURE — 25010000002 ENOXAPARIN PER 10 MG: Performed by: NURSE PRACTITIONER

## 2022-02-18 PROCEDURE — 25010000002 DEXAMETHASONE PER 1 MG: Performed by: ANESTHESIOLOGY

## 2022-02-18 PROCEDURE — 99283 EMERGENCY DEPT VISIT LOW MDM: CPT

## 2022-02-18 PROCEDURE — 93005 ELECTROCARDIOGRAM TRACING: CPT | Performed by: EMERGENCY MEDICINE

## 2022-02-18 PROCEDURE — 76942 ECHO GUIDE FOR BIOPSY: CPT | Performed by: ORTHOPAEDIC SURGERY

## 2022-02-18 PROCEDURE — 93010 ELECTROCARDIOGRAM REPORT: CPT | Performed by: INTERNAL MEDICINE

## 2022-02-18 PROCEDURE — 25010000002 MORPHINE PER 10 MG: Performed by: EMERGENCY MEDICINE

## 2022-02-18 PROCEDURE — 0 MORPHINE SULFATE 4 MG/ML SOLUTION: Performed by: EMERGENCY MEDICINE

## 2022-02-18 PROCEDURE — 96376 TX/PRO/DX INJ SAME DRUG ADON: CPT

## 2022-02-18 PROCEDURE — 71275 CT ANGIOGRAPHY CHEST: CPT

## 2022-02-18 PROCEDURE — 82550 ASSAY OF CK (CPK): CPT | Performed by: ANESTHESIOLOGY

## 2022-02-18 PROCEDURE — 71045 X-RAY EXAM CHEST 1 VIEW: CPT

## 2022-02-18 PROCEDURE — 25010000002 MIDAZOLAM PER 1 MG: Performed by: ANESTHESIOLOGY

## 2022-02-18 PROCEDURE — 36600 WITHDRAWAL OF ARTERIAL BLOOD: CPT

## 2022-02-18 PROCEDURE — 0 IOPAMIDOL PER 1 ML: Performed by: EMERGENCY MEDICINE

## 2022-02-18 PROCEDURE — 84484 ASSAY OF TROPONIN QUANT: CPT | Performed by: EMERGENCY MEDICINE

## 2022-02-18 RX ORDER — ASPIRIN 325 MG
325 TABLET ORAL ONCE
Status: COMPLETED | OUTPATIENT
Start: 2022-02-18 | End: 2022-02-18

## 2022-02-18 RX ORDER — SODIUM CHLORIDE 0.9 % (FLUSH) 0.9 %
10 SYRINGE (ML) INJECTION EVERY 12 HOURS SCHEDULED
Status: DISCONTINUED | OUTPATIENT
Start: 2022-02-18 | End: 2022-02-19 | Stop reason: HOSPADM

## 2022-02-18 RX ORDER — ACETAMINOPHEN 650 MG/1
650 SUPPOSITORY RECTAL EVERY 4 HOURS PRN
Status: DISCONTINUED | OUTPATIENT
Start: 2022-02-18 | End: 2022-02-19 | Stop reason: HOSPADM

## 2022-02-18 RX ORDER — ACETAMINOPHEN 160 MG/5ML
650 SOLUTION ORAL EVERY 4 HOURS PRN
Status: DISCONTINUED | OUTPATIENT
Start: 2022-02-18 | End: 2022-02-19 | Stop reason: HOSPADM

## 2022-02-18 RX ORDER — SODIUM CHLORIDE 0.9 % (FLUSH) 0.9 %
10 SYRINGE (ML) INJECTION AS NEEDED
Status: DISCONTINUED | OUTPATIENT
Start: 2022-02-18 | End: 2022-02-19 | Stop reason: HOSPADM

## 2022-02-18 RX ORDER — METOPROLOL TARTRATE 5 MG/5ML
INJECTION INTRAVENOUS AS NEEDED
Status: DISCONTINUED | OUTPATIENT
Start: 2022-02-18 | End: 2022-02-18 | Stop reason: SURG

## 2022-02-18 RX ORDER — ROPIVACAINE HYDROCHLORIDE 5 MG/ML
INJECTION, SOLUTION EPIDURAL; INFILTRATION; PERINEURAL
Status: COMPLETED | OUTPATIENT
Start: 2022-02-18 | End: 2022-02-18

## 2022-02-18 RX ORDER — CEFAZOLIN SODIUM IN 0.9 % NACL 3 G/100 ML
3 INTRAVENOUS SOLUTION, PIGGYBACK (ML) INTRAVENOUS ONCE
Status: DISCONTINUED | OUTPATIENT
Start: 2022-02-18 | End: 2022-02-18 | Stop reason: HOSPADM

## 2022-02-18 RX ORDER — ONDANSETRON 2 MG/ML
4 INJECTION INTRAMUSCULAR; INTRAVENOUS EVERY 6 HOURS PRN
Status: DISCONTINUED | OUTPATIENT
Start: 2022-02-18 | End: 2022-02-19 | Stop reason: HOSPADM

## 2022-02-18 RX ORDER — SODIUM CHLORIDE, SODIUM LACTATE, POTASSIUM CHLORIDE, AND CALCIUM CHLORIDE .6; .31; .03; .02 G/100ML; G/100ML; G/100ML; G/100ML
20 INJECTION, SOLUTION INTRAVENOUS CONTINUOUS
Status: DISCONTINUED | OUTPATIENT
Start: 2022-02-18 | End: 2022-02-18 | Stop reason: HOSPADM

## 2022-02-18 RX ORDER — MIDAZOLAM HYDROCHLORIDE 1 MG/ML
INJECTION INTRAMUSCULAR; INTRAVENOUS
Status: COMPLETED | OUTPATIENT
Start: 2022-02-18 | End: 2022-02-18

## 2022-02-18 RX ORDER — MORPHINE SULFATE 4 MG/ML
4 INJECTION, SOLUTION INTRAMUSCULAR; INTRAVENOUS ONCE
Status: COMPLETED | OUTPATIENT
Start: 2022-02-18 | End: 2022-02-18

## 2022-02-18 RX ORDER — ACETAMINOPHEN 325 MG/1
650 TABLET ORAL EVERY 4 HOURS PRN
Status: DISCONTINUED | OUTPATIENT
Start: 2022-02-18 | End: 2022-02-19 | Stop reason: HOSPADM

## 2022-02-18 RX ORDER — FENTANYL CITRATE 50 UG/ML
INJECTION, SOLUTION INTRAMUSCULAR; INTRAVENOUS AS NEEDED
Status: DISCONTINUED | OUTPATIENT
Start: 2022-02-18 | End: 2022-02-18 | Stop reason: SURG

## 2022-02-18 RX ORDER — OXYCODONE HYDROCHLORIDE 5 MG/1
5 TABLET ORAL EVERY 4 HOURS PRN
Refills: 0 | Status: DISCONTINUED | OUTPATIENT
Start: 2022-02-18 | End: 2022-02-19 | Stop reason: HOSPADM

## 2022-02-18 RX ORDER — MORPHINE SULFATE 2 MG/ML
2 INJECTION, SOLUTION INTRAMUSCULAR; INTRAVENOUS ONCE
Status: COMPLETED | OUTPATIENT
Start: 2022-02-18 | End: 2022-02-18

## 2022-02-18 RX ORDER — LEVOTHYROXINE SODIUM 0.05 MG/1
50 TABLET ORAL DAILY
Status: DISCONTINUED | OUTPATIENT
Start: 2022-02-19 | End: 2022-02-19 | Stop reason: HOSPADM

## 2022-02-18 RX ORDER — ONDANSETRON 4 MG/1
4 TABLET, FILM COATED ORAL EVERY 6 HOURS PRN
Status: DISCONTINUED | OUTPATIENT
Start: 2022-02-18 | End: 2022-02-19 | Stop reason: HOSPADM

## 2022-02-18 RX ORDER — NITROGLYCERIN 0.4 MG/1
0.4 TABLET SUBLINGUAL
Status: COMPLETED | OUTPATIENT
Start: 2022-02-18 | End: 2022-02-18

## 2022-02-18 RX ORDER — DEXAMETHASONE SODIUM PHOSPHATE 4 MG/ML
INJECTION, SOLUTION INTRA-ARTICULAR; INTRALESIONAL; INTRAMUSCULAR; INTRAVENOUS; SOFT TISSUE
Status: COMPLETED | OUTPATIENT
Start: 2022-02-18 | End: 2022-02-18

## 2022-02-18 RX ADMIN — SODIUM CHLORIDE, POTASSIUM CHLORIDE, SODIUM LACTATE AND CALCIUM CHLORIDE 20 ML/HR: 600; 310; 30; 20 INJECTION, SOLUTION INTRAVENOUS at 08:53

## 2022-02-18 RX ADMIN — OXYCODONE 5 MG: 5 TABLET ORAL at 23:30

## 2022-02-18 RX ADMIN — SODIUM CHLORIDE, PRESERVATIVE FREE 10 ML: 5 INJECTION INTRAVENOUS at 20:42

## 2022-02-18 RX ADMIN — ROPIVACAINE HYDROCHLORIDE 20 ML: 5 INJECTION EPIDURAL; INFILTRATION; PERINEURAL at 09:15

## 2022-02-18 RX ADMIN — IOPAMIDOL 100 ML: 755 INJECTION, SOLUTION INTRAVENOUS at 12:14

## 2022-02-18 RX ADMIN — FENTANYL CITRATE 50 MCG: 50 INJECTION INTRAMUSCULAR; INTRAVENOUS at 10:36

## 2022-02-18 RX ADMIN — DEXAMETHASONE SODIUM PHOSPHATE 4 MG: 4 INJECTION, SOLUTION INTRAMUSCULAR; INTRAVENOUS at 09:15

## 2022-02-18 RX ADMIN — ASPIRIN 325 MG ORAL TABLET 325 MG: 325 PILL ORAL at 10:59

## 2022-02-18 RX ADMIN — NITROGLYCERIN 1 INCH: 20 OINTMENT TOPICAL at 19:07

## 2022-02-18 RX ADMIN — MORPHINE SULFATE 2 MG: 2 INJECTION, SOLUTION INTRAMUSCULAR; INTRAVENOUS at 14:23

## 2022-02-18 RX ADMIN — NITROGLYCERIN 0.4 MG: 0.4 TABLET SUBLINGUAL at 10:52

## 2022-02-18 RX ADMIN — MIDAZOLAM 2 MG: 1 INJECTION INTRAMUSCULAR; INTRAVENOUS at 09:15

## 2022-02-18 RX ADMIN — FENTANYL CITRATE 50 MCG: 50 INJECTION INTRAMUSCULAR; INTRAVENOUS at 10:28

## 2022-02-18 RX ADMIN — ENOXAPARIN SODIUM 40 MG: 40 INJECTION SUBCUTANEOUS at 20:41

## 2022-02-18 RX ADMIN — MORPHINE SULFATE 4 MG: 4 INJECTION INTRAVENOUS at 19:07

## 2022-02-18 RX ADMIN — METOROPROLOL TARTRATE 3 MG: 5 INJECTION, SOLUTION INTRAVENOUS at 10:39

## 2022-02-18 RX ADMIN — SODIUM CHLORIDE, POTASSIUM CHLORIDE, SODIUM LACTATE AND CALCIUM CHLORIDE 20 ML/HR: 600; 310; 30; 20 INJECTION, SOLUTION INTRAVENOUS at 08:57

## 2022-02-18 NOTE — ANESTHESIA POSTPROCEDURE EVALUATION
"Patient: Jo Asif    Procedure Summary     Date: 02/18/22 Room / Location: [facility] OR 13 / [facility] MAIN OR    Anesthesia Start: 1021 Anesthesia Stop: 1051    Procedure: SHOULDER ARTHROSCOPY WITH ROTATOR CUFF REPAIR (Right Shoulder) Diagnosis:       Right shoulder pain, unspecified chronicity      (Right shoulder pain, unspecified chronicity [M25.511])    Surgeons: Kb Gaviria MD Provider: Alba Isabel MD    Anesthesia Type: general with block ASA Status: 3          Anesthesia Type: general with block    Vitals  Vitals Value Taken Time   /95 02/18/22 1059   Temp     Pulse 67 02/18/22 1059   Resp 26 02/18/22 1053   SpO2 97 % 02/18/22 1059           Post Anesthesia Care and Evaluation      Comments: Surgery canceled due to patient having left sided, \"crushing\" chest pain on arrival to the OR.  She was brought back to preop and given nitroglycerin sublingual, aspirin 325mg, metoprolol and a 12 lead ekg was obtained.  Cardiac enzymes also drawn and chest xray performed.  She was then transported to the ER and report and care given to Dr. Daniels.  12 lead showed sinus rhythm with PVCs, chest xray was negative for pnemothorax, cardiac enzymes negative.        "

## 2022-02-18 NOTE — PLAN OF CARE
Problem: Chest Pain  Goal: Resolution of Chest Pain Symptoms  Outcome: Ongoing, Progressing   Goal Outcome Evaluation:  Plan of Care Reviewed With: patient           Outcome Summary: New admit from ED with chest pain. Was planning on shoulder surgery today, but developed heavy chest discomfort and shortness of breath. Plan for myoview in the am. Cardio consult. Vitals WNL. Will continue to montior.

## 2022-02-18 NOTE — ANESTHESIA PREPROCEDURE EVALUATION
Anesthesia Evaluation     Patient summary reviewed and Nursing notes reviewed   no history of anesthetic complications:  NPO Solid Status: > 8 hours  NPO Liquid Status: > 8 hours           Airway   Mallampati: II  TM distance: >3 FB  Neck ROM: full  No difficulty expected  Dental      Pulmonary     breath sounds clear to auscultation  (+) asthma,sleep apnea on CPAP,   Cardiovascular     ECG reviewed  Rhythm: regular  Rate: normal    (+) hypertension, dysrhythmias (ablated) Atrial Fib, hyperlipidemia,       Neuro/Psych  (+) headaches,    GI/Hepatic/Renal/Endo    (+) morbid obesity (BMI 56), GERD,  thyroid problem     Musculoskeletal (-) negative ROS    Abdominal     Abdomen: soft.   Substance History - negative use     OB/GYN negative ob/gyn ROS         Other - negative ROS                       Anesthesia Plan    ASA 3     general with block     intravenous induction     Anesthetic plan, all risks, benefits, and alternatives have been provided, discussed and informed consent has been obtained with: patient.    Plan discussed with CAA.        CODE STATUS:

## 2022-02-18 NOTE — ANESTHESIA PROCEDURE NOTES
Peripheral Block    Pre-sedation assessment completed: 2/18/2022 9:05 AM    Patient reassessed immediately prior to procedure    Patient location during procedure: pre-op  Start time: 2/18/2022 9:10 AM  Stop time: 2/18/2022 9:15 AM  Reason for block: at surgeon's request and post-op pain management  Performed by  Anesthesiologist: Alba Isabel MD  Preanesthetic Checklist  Completed: patient identified, IV checked, site marked, risks and benefits discussed, surgical consent, monitors and equipment checked, pre-op evaluation and timeout performed  Prep:  Pt Position: supine  Sterile barriers:cap, gloves and sterile barriers  Prep: ChloraPrep  Patient monitoring: blood pressure monitoring, continuous pulse oximetry and EKG  Procedure    Sedation: yes  Performed under: local infiltration  Guidance:ultrasound guided    ULTRASOUND INTERPRETATION. Using ultrasound guidance a gauge needle was placed in close proximity to the brachial plexus nerve, at which point, under ultrasound guidance anesthetic was injected in the area of the nerve and spread of the anesthesia was seen on ultrasound in close proximity thereto.  There were no abnormalities seen on ultrasound; a digital image was taken; and the patient tolerated the procedure with no complications. Images:still images obtained, printed/placed on chart    Laterality:right  Block Type:interscalene  Injection Technique:single-shot  Needle Type:echogenic  Needle Gauge:19 G  Resistance on Injection: none  Sedation medications used: midazolam (VERSED) injection, 2 mg  Medications Used: dexamethasone (DECADRON) injection, 4 mg  ropivacaine (NAROPIN) 0.5 % injection, 20 mL  Med administered at 2/18/2022 9:15 AM      Post Assessment  Injection Assessment: negative aspiration for heme, no paresthesia on injection and incremental injection  Patient Tolerance:comfortable throughout block  Complications:no

## 2022-02-18 NOTE — PROGRESS NOTES
Patient was seen and marked for surgery and stable and received a block.  Taken to the operating room when she started to complain of chest pain shortness of breath.  Because of this anesthesia and I agreed that it is not safe to proceed with elective shoulder surgery.  She has now been taken to the preop holding area in stable condition on oxygen and will be medically evaluated at that point.  Left message with the  and awaiting his return call.

## 2022-02-18 NOTE — PERIOPERATIVE NURSING NOTE
Pt was returned to pre-op room 2 at 1043 due to c/o chest pain upon arrival to operating room.  Pt was placed on cardiac monitor at this time.  See flowsheet for vitals. Portable one view Chest x-ray, EKG and cardiac labs done.  Pt also was given PO aspirin and sublingual nitro.  See MAR. Dr Alba Isabel at bedside with nursing staff. Pt transferred to ER for further assessment.  Transferred per kelseyer, by Eduin DELUCA, Cameron DELUCA and YURI Isabel MD.  Pt was on cardiac monitor and O2 at 10L per single face mask.  Report given to Dr Daniels per Chalo ARENAS and ER nurse.

## 2022-02-18 NOTE — ED PROVIDER NOTES
Subjective   Patient is a 41-year-old female complaining of sharp left-sided chest pain and shortness of breath.  She was in preop area here in the hospital having a right shoulder block performed for reported right shoulder surgery when she developed the pain and shortness of breath.  She is sent here to the ED for evaluation.  The pain is heavy and moderate without radiation.          Review of Systems  Negative for headache earache sore throat cough fever abdominal pain vomiting diarrhea dysuria achiness weight loss or other complaint.  Completely systems was obtained and is otherwise negative  Past Medical History:   Diagnosis Date   • Arrhythmia    • Asthma     no inhalers   • Atrial fibrillation (HCC)    • Disease of thyroid gland    • GERD (gastroesophageal reflux disease)    • Heart murmur    • Hypertension    • Migraine    • PCOS (polycystic ovarian syndrome)    • PKU (phenylketonuria) (HCC)    • Prediabetes    • Sleep apnea     no machine   • Torn meniscus 03/2021    right       Allergies   Allergen Reactions   • Amoxicillin Hives   • Cleocin [Clindamycin Hcl] Anaphylaxis   • Penicillins Hives       Past Surgical History:   Procedure Laterality Date   • ABLATION OF DYSRHYTHMIC FOCUS     • CARDIAC ELECTROPHYSIOLOGY PROCEDURE N/A 6/10/2020    Procedure: EP/Ablation;  Surgeon: Joseph Andrews MD;  Location: [facility] CATH INVASIVE LOCATION;  Service: Cardiovascular;  Laterality: N/A;   • CARPAL TUNNEL RELEASE Right    • KNEE ARTHROSCOPY Right 3/26/2021    Procedure: KNEE ARTHROSCOPY with  partial medial lateral meniscectomy;  Surgeon: Kb Gaviria MD;  Location: [facility] MAIN OR;  Service: Orthopedics;  Laterality: Right;   • TONSILLECTOMY     • TUBAL ABDOMINAL LIGATION     • UVULECTOMY         Family History   Problem Relation Age of Onset   • Diabetes Mother    • Heart disease Father    • Cancer Father        Social History     Socioeconomic History   • Marital status:    Tobacco Use   •  Smoking status: Former Smoker     Types: Cigarettes     Quit date: 2009     Years since quittin.4   • Smokeless tobacco: Never Used   Vaping Use   • Vaping Use: Never used   Substance and Sexual Activity   • Alcohol use: No   • Drug use: No   • Sexual activity: Defer           Objective   Physical Exam  HEENT exam shows TMs to be clear.  Oropharynx clear moist.  Sclera nonicteric.  Neck has no adenopathy JVD or bruits.  Lungs are clear.  Heart has regular rate rhythm without murmur rub or gallop.  Chest is nontender.  Abdomen is soft nontender.  Extremity exam is no cyanosis or edema.  Procedures     My EKG interpretation shows normal sinus rhythm with no acute ST change at a rate of 69.      ED Course      Results for orders placed or performed during the hospital encounter of 22   Blood Gas, Arterial -    Specimen: Arterial Blood   Result Value Ref Range    Site Left Radial     Geronimo's Test Positive     pH, Arterial 7.380 7.350 - 7.450 pH units    pCO2, Arterial 46.8 35.0 - 48.0 mm Hg    pO2, Arterial 72.2 (L) 83.0 - 108.0 mm Hg    HCO3, Arterial 27.6 21.0 - 28.0 mmol/L    Base Excess, Arterial 1.8 0.0 - 3.0 mmol/L    O2 Saturation, Arterial 93.8 (L) 94.0 - 98.0 %    CO2 Content 29.1 (H) 22 - 29 mmol/L    Barometric Pressure for Blood Gas      Modality Room Air     FIO2 21 %    Hemodilution No    ECG 12 Lead   Result Value Ref Range    QT Interval 419 ms     XR Chest 1 View    Result Date: 2022   1. Mild right infrahilar atelectasis. 2. Heart size appears larger. This may be accentuated by the portable technique and low volume inspiration.  Electronically Signed By-Susi Melendez MD On:2022 11:19 AM This report was finalized on  by  Susi Melendez MD.    CT Chest Pulmonary Embolism    Result Date: 2022  1. No large central pulmonary embolus. Bolus timing limits assessment of the distal pulmonary arterial vasculature. 2. Right basilar airspace disease likely atelectasis.  3. Additional chronic findings above.  Electronically Signed By-Isaac Michaels MD On:2/18/2022 12:32 PM This report was finalized on [id number] by  Isaac Michaels MD.                                               MDM  Number of Diagnoses or Management Options  Diagnosis management comments: Patient has a benign physical exam.  She has no evidence of acute coronary syndrome based on EKG).  Patient's cardiac history is A. fib with ablation in the remote past.  She has continued discomfort with mild shortness of breath.  There is no evidence of pulmonary embolus or infectious process.  Patient will be brought and placed in ED observation for further cardiac monitoring and serial enzymes.       Amount and/or Complexity of Data Reviewed  Clinical lab tests: reviewed  Tests in the radiology section of CPT®: reviewed  Tests in the medicine section of CPT®: reviewed    Risk of Complications, Morbidity, and/or Mortality  Presenting problems: high  Diagnostic procedures: high  Management options: high    Patient Progress  Patient progress: stable      Final diagnoses:   Chest pain, unspecified type   Dyspnea, unspecified type       ED Disposition  ED Disposition     ED Disposition Condition Comment    Discharge Stable           No follow-up provider specified.       Medication List      No changes were made to your prescriptions during this visit.          Eddy Daniels MD  02/18/22 5537

## 2022-02-19 ENCOUNTER — APPOINTMENT (OUTPATIENT)
Dept: NUCLEAR MEDICINE | Facility: HOSPITAL | Age: 42
End: 2022-02-19

## 2022-02-19 VITALS
OXYGEN SATURATION: 97 % | HEART RATE: 54 BPM | HEIGHT: 64 IN | SYSTOLIC BLOOD PRESSURE: 154 MMHG | WEIGHT: 293 LBS | BODY MASS INDEX: 50.02 KG/M2 | TEMPERATURE: 98.2 F | DIASTOLIC BLOOD PRESSURE: 75 MMHG | RESPIRATION RATE: 17 BRPM

## 2022-02-19 PROBLEM — R07.9 CHEST PAIN: Status: RESOLVED | Noted: 2020-06-12 | Resolved: 2022-02-19

## 2022-02-19 LAB
BH CV NUCLEAR PRIOR STUDY: 3
BH CV REST NUCLEAR ISOTOPE DOSE: 7.5 MCI
BH CV STRESS BP STAGE 1: NORMAL
BH CV STRESS BP STAGE 2: NORMAL
BH CV STRESS BP STAGE 3: NORMAL
BH CV STRESS BP STAGE 4: NORMAL
BH CV STRESS COMMENTS STAGE 1: NORMAL
BH CV STRESS COMMENTS STAGE 2: NORMAL
BH CV STRESS DOSE REGADENOSON STAGE 1: 0.4
BH CV STRESS DURATION MIN STAGE 1: 0
BH CV STRESS DURATION MIN STAGE 2: 4
BH CV STRESS DURATION SEC STAGE 1: 10
BH CV STRESS DURATION SEC STAGE 2: 0
BH CV STRESS HR STAGE 1: 86
BH CV STRESS HR STAGE 2: 88
BH CV STRESS HR STAGE 3: 91
BH CV STRESS HR STAGE 4: 86
BH CV STRESS NUCLEAR ISOTOPE DOSE: 21.5 MCI
BH CV STRESS PROTOCOL 1: NORMAL
BH CV STRESS RECOVERY BP: NORMAL MMHG
BH CV STRESS RECOVERY HR: 86 BPM
BH CV STRESS STAGE 1: 1
BH CV STRESS STAGE 2: 2
BH CV STRESS STAGE 3: 3
BH CV STRESS STAGE 4: 4
LV EF NUC BP: 62 %
MAXIMAL PREDICTED HEART RATE: 177 BPM
PERCENT MAX PREDICTED HR: 48.59 %
STRESS BASELINE BP: NORMAL MMHG
STRESS BASELINE HR: 72 BPM
STRESS PERCENT HR: 57 %
STRESS POST PEAK BP: NORMAL MMHG
STRESS POST PEAK HR: 86 BPM
STRESS TARGET HR: 150 BPM
TROPONIN T SERPL-MCNC: <0.01 NG/ML (ref 0–0.03)

## 2022-02-19 PROCEDURE — G0378 HOSPITAL OBSERVATION PER HR: HCPCS

## 2022-02-19 PROCEDURE — 25010000002 ENOXAPARIN PER 10 MG: Performed by: NURSE PRACTITIONER

## 2022-02-19 PROCEDURE — A9502 TC99M TETROFOSMIN: HCPCS | Performed by: EMERGENCY MEDICINE

## 2022-02-19 PROCEDURE — 96372 THER/PROPH/DIAG INJ SC/IM: CPT

## 2022-02-19 PROCEDURE — 25010000002 ONDANSETRON PER 1 MG: Performed by: NURSE PRACTITIONER

## 2022-02-19 PROCEDURE — 93018 CV STRESS TEST I&R ONLY: CPT | Performed by: INTERNAL MEDICINE

## 2022-02-19 PROCEDURE — 93017 CV STRESS TEST TRACING ONLY: CPT

## 2022-02-19 PROCEDURE — 84484 ASSAY OF TROPONIN QUANT: CPT | Performed by: EMERGENCY MEDICINE

## 2022-02-19 PROCEDURE — 78452 HT MUSCLE IMAGE SPECT MULT: CPT

## 2022-02-19 PROCEDURE — 99214 OFFICE O/P EST MOD 30 MIN: CPT | Performed by: NURSE PRACTITIONER

## 2022-02-19 PROCEDURE — 0 TECHNETIUM TETROFOSMIN KIT: Performed by: EMERGENCY MEDICINE

## 2022-02-19 PROCEDURE — 96375 TX/PRO/DX INJ NEW DRUG ADDON: CPT

## 2022-02-19 PROCEDURE — 78452 HT MUSCLE IMAGE SPECT MULT: CPT | Performed by: INTERNAL MEDICINE

## 2022-02-19 PROCEDURE — 25010000002 REGADENOSON 0.4 MG/5ML SOLUTION: Performed by: EMERGENCY MEDICINE

## 2022-02-19 RX ADMIN — ENOXAPARIN SODIUM 40 MG: 40 INJECTION SUBCUTANEOUS at 11:31

## 2022-02-19 RX ADMIN — ONDANSETRON 4 MG: 2 INJECTION INTRAMUSCULAR; INTRAVENOUS at 11:21

## 2022-02-19 RX ADMIN — TETROFOSMIN 1 DOSE: 1.38 INJECTION, POWDER, LYOPHILIZED, FOR SOLUTION INTRAVENOUS at 10:30

## 2022-02-19 RX ADMIN — SODIUM CHLORIDE, PRESERVATIVE FREE 10 ML: 5 INJECTION INTRAVENOUS at 11:21

## 2022-02-19 RX ADMIN — OXYCODONE 5 MG: 5 TABLET ORAL at 05:39

## 2022-02-19 RX ADMIN — REGADENOSON 0.4 MG: 0.08 INJECTION, SOLUTION INTRAVENOUS at 10:30

## 2022-02-19 RX ADMIN — TETROFOSMIN 1 DOSE: 1.38 INJECTION, POWDER, LYOPHILIZED, FOR SOLUTION INTRAVENOUS at 07:30

## 2022-02-19 RX ADMIN — ACETAMINOPHEN 650 MG: 325 TABLET, FILM COATED ORAL at 11:31

## 2022-02-19 RX ADMIN — LEVOTHYROXINE SODIUM 50 MCG: 0.05 TABLET ORAL at 11:31

## 2022-02-19 NOTE — CASE MANAGEMENT/SOCIAL WORK
Discharge Planning Assessment  [facility]     Patient Name: Jo Asif  MRN: [id number]  Today's Date: 2/19/2022    Admit Date: 2/18/2022     Discharge Needs Assessment     Row Name 02/19/22 1537       Living Environment    Lives With spouse    Name(s) of Who Lives With Patient Kalpesh, Spouse    Current Living Arrangements home/apartment/condo    Primary Care Provided by self    Provides Primary Care For no one, unable/limited ability to care for self    Family Caregiver if Needed spouse    Quality of Family Relationships supportive    Able to Return to Prior Arrangements yes    Living Arrangement Comments Home with Spouse       Resource/Environmental Concerns    Resource/Environmental Concerns none    Transportation Concerns car, none       Transition Planning    Patient/Family Anticipates Transition to home with family    Patient/Family Anticipated Services at Transition none    Transportation Anticipated family or friend will provide       Discharge Needs Assessment    Equipment Currently Used at Home cane, quad    Concerns to be Addressed financial/insurance    Concerns Comments Pt is concerned about affording her deductable,  she has spoken with financial services.    Anticipated Changes Related to Illness none    Equipment Needed After Discharge none    Provided Post Acute Provider List? N/A    Provided Post Acute Provider Quality & Resource List? N/A               Discharge Plan     Row Name 02/19/22 6105       Plan    Plan Home with Family.    Plan Comments Spoke with pt at bedside. Confirmed Pharmacy and PCP. States she is concerned about her deductable , has already spoken with financial services. Denies further D/c needs at this time. Anticipate pt to D/C Home with family.              Continued Care and Services - Admitted Since 2/18/2022    Coordination has not been started for this encounter.          Demographic Summary     Row Name 02/19/22 6476       General Information    Admission Type  observation    Arrived From emergency department    Referral Source admission list    Reason for Consult discharge planning    Preferred Language English               Functional Status     Row Name 02/19/22 1530       Functional Status    Usual Activity Tolerance moderate    Current Activity Tolerance fair       Functional Status, IADL    Medications independent    Meal Preparation independent    Housekeeping independent    Laundry independent    Shopping independent       Mental Status    General Appearance WDL WDL       Mental Status Summary    Recent Changes in Mental Status/Cognitive Functioning no changes                     Met with patient in room wearing PPE: mask    Maintained distance greater than six feet and spent less than 15 minutes in the room    Darline Davey RN    Phone [phone]  Fax [phone]

## 2022-02-19 NOTE — NURSING NOTE
Pt requesting pain med for headache . Stated the nitro was causing her head to hurt. Medicated with 1 oxy tablet

## 2022-02-19 NOTE — PLAN OF CARE
Problem: Chest Pain  Goal: Resolution of Chest Pain Symptoms  Outcome: Ongoing, Progressing  Intervention: Manage Acute Chest Pain  Recent Flowsheet Documentation  Taken 2/18/2022 1930 by Mary Walsh RN  Chest Pain Intervention: (simba ferreira rn)   cardiac monitoring continued   cardiac monitor placed   12-lead ECG obtained     Problem: Adjustment to Illness (Acute Coronary Syndrome)  Goal: Optimal Adaptation to Illness  Outcome: Ongoing, Progressing     Problem: Arrhythmia/Dysrhythmia (Acute Coronary Syndrome)  Goal: Normalized Cardiac Rhythm  Outcome: Ongoing, Progressing     Problem: Cardiac-Related Pain (Acute Coronary Syndrome)  Goal: Absence of Cardiac-Related Pain  Outcome: Ongoing, Progressing  Intervention: Manage Cardiac Pain Symptoms  Recent Flowsheet Documentation  Taken 2/18/2022 1930 by Mary Walsh RN  Chest Pain Intervention: (simba ferreira rn)   cardiac monitoring continued   cardiac monitor placed   12-lead ECG obtained     Problem: Hemodynamic Instability (Acute Coronary Syndrome)  Goal: Effective Cardiac Pump Function  Outcome: Ongoing, Progressing     Problem: Tissue Perfusion (Acute Coronary Syndrome)  Goal: Adequate Tissue Perfusion  Outcome: Ongoing, Progressing  Intervention: Optimize Cardiac Tissue Perfusion  Recent Flowsheet Documentation  Taken 2/18/2022 1930 by Mary Walsh, RN  Activity Management: activity adjusted per tolerance     Problem: Adult Inpatient Plan of Care  Goal: Plan of Care Review  Outcome: Ongoing, Progressing  Flowsheets (Taken 2/19/2022 0118)  Outcome Summary: still having some numbness rt arm, c/o some headache. no chest pain presently. myoview sched for the am  Goal: Patient-Specific Goal (Individualized)  Outcome: Ongoing, Progressing  Goal: Absence of Hospital-Acquired Illness or Injury  Outcome: Ongoing, Progressing  Intervention: Identify and Manage Fall Risk  Recent Flowsheet Documentation  Taken 2/18/2022 1930 by  Mary Walsh, RN  Safety Promotion/Fall Prevention: safety round/check completed  Intervention: Prevent Skin Injury  Recent Flowsheet Documentation  Taken 2/18/2022 1930 by Mary Walsh RN  Body Position:   position maintained   position changed independently  Intervention: Prevent Infection  Recent Flowsheet Documentation  Taken 2/18/2022 1930 by Mary Walsh, RN  Infection Prevention: rest/sleep promoted  Goal: Optimal Comfort and Wellbeing  Outcome: Ongoing, Progressing  Goal: Readiness for Transition of Care  Outcome: Ongoing, Progressing     Problem: Asthma Comorbidity  Goal: Maintenance of Asthma Control  Outcome: Ongoing, Progressing   Goal Outcome Evaluation:              Outcome Summary: still having some numbness rt arm, c/o some headache. no chest pain presently. myoview sched for the am

## 2022-02-19 NOTE — H&P
[facility] Observation Unit H&P    Patient Name: Jo Asif  : 1980  MRN: [id number]  Primary Care Physician: Kelsey Sanchez APRN  Date of admission: 2022     Patient Care Team:  Kelsey Sanchez APRN as PCP - General (Nurse Practitioner)          Subjective   History Present Illness     Chief Complaint:   Chief Complaint   Patient presents with   • Chest Pain       Ms. Asif is a 41 y.o.  presents to [facility] complaining of chest pain.      41-year-old female presents to the ER with a chief complaint of severe sharp/stabbing chest pain in the left lateral rib area and shortness of breath which began after receiving anesthesia for scheduled right shoulder surgery.  The patient reports that she felt well earlier in the day without any recent complaints of dyspnea with exertion, cough, or subjective fever or chills.  The patient denies recent palpitations.  The patient does have a history of ablation in  and was released by EP cardiologist to her primary cardiologist with subsequent discontinuation of metoprolol as the patient was not having any symptoms.  Patient does have a history of sleep apnea and reports she did fall asleep after receiving medication prior to her nerve block in the right shoulder.  The patient does report that she felt very anxious when she awoke after dozing off and that on awakening she could not catch her breath.  This a.m. the patient reports chest tightness without overt shortness of breath.  The patient denies nausea or diaphoresis.      Review of Systems   Constitutional: Negative for chills, diaphoresis and fever.   Cardiovascular: Positive for chest pain. Negative for dyspnea on exertion and leg swelling.   Respiratory: Positive for shortness of breath. Negative for cough.    Gastrointestinal: Negative for nausea.   All other systems reviewed and are negative.          Personal History     Past Medical History:   Past Medical History:   Diagnosis Date    • Arrhythmia    • Asthma     no inhalers   • Atrial fibrillation (HCC)    • Disease of thyroid gland    • GERD (gastroesophageal reflux disease)    • Heart murmur    • Hypertension    • Migraine    • PCOS (polycystic ovarian syndrome)    • PKU (phenylketonuria) (HCC)    • Prediabetes    • Sleep apnea     no machine   • Torn meniscus 03/2021    right       Surgical History:      Past Surgical History:   Procedure Laterality Date   • ABLATION OF DYSRHYTHMIC FOCUS     • CARDIAC ELECTROPHYSIOLOGY PROCEDURE N/A 6/10/2020    Procedure: EP/Ablation;  Surgeon: Joseph Andrews MD;  Location: [facility] CATH INVASIVE LOCATION;  Service: Cardiovascular;  Laterality: N/A;   • CARPAL TUNNEL RELEASE Right    • KNEE ARTHROSCOPY Right 3/26/2021    Procedure: KNEE ARTHROSCOPY with  partial medial lateral meniscectomy;  Surgeon: Kb Gaviria MD;  Location: [facility] MAIN OR;  Service: Orthopedics;  Laterality: Right;   • TONSILLECTOMY     • TUBAL ABDOMINAL LIGATION     • UVULECTOMY             Family History: family history includes Cancer in her father; Diabetes in her mother; Heart disease in her father. Otherwise pertinent FHx was reviewed and unremarkable.     Social History:  reports that she quit smoking about 12 years ago. Her smoking use included cigarettes. She has never used smokeless tobacco. She reports that she does not drink alcohol and does not use drugs.      Medications:  Prior to Admission medications    Medication Sig Start Date End Date Taking? Authorizing Provider   Ajovy 225 MG/1.5ML solution prefilled syringe Every 30 (Thirty) Days. For migraines 1/31/21  Yes Stacey Foley MD   Diclofenac Sodium (VOLTAREN) 1 % gel gel Apply 4 g topically to the appropriate area as directed 4 (Four) Times a Day As Needed.   Yes Stacey Foley MD   HYDROcodone-acetaminophen (NORCO) 5-325 MG per tablet Take 1 tablet by mouth Every 6 (Six) Hours As Needed for Moderate Pain .   Yes Stacey Foley MD    levothyroxine (SYNTHROID, LEVOTHROID) 50 MCG tablet Take 50 mcg by mouth Daily. Take DOS 12/28/20  Yes Stacey Foley MD   meloxicam (MOBIC) 15 MG tablet Take 15 mg by mouth Daily. LD 2/10   Yes Stacey Foley MD   Pegvaliase-pqpz (PALYNZIQ) 20 MG/ML solution prefilled syringe Inject 10 mg under the skin into the appropriate area as directed 3 (Three) Times a Week. Monday, Wednesday and Friday 1/29/19  Yes Stacey Foley MD   vitamin B-12 (CYANOCOBALAMIN) 100 MCG tablet Take 50 mcg by mouth Daily. Hold DOS   Yes Stacey Foley MD   EPINEPHrine (EPIPEN) 0.3 MG/0.3ML solution auto-injector injection Inject 0.3 mg into the appropriate muscle as directed by prescriber. 1/14/22   Stacey Foley MD   EPINEPHrine (EPIPEN) 0.3 MG/0.3ML solution auto-injector injection  1/22/22   Stacey Foley MD   naproxen (NAPROSYN) 500 MG tablet Take 1 tablet by mouth 2 (Two) Times a Day With Meals. 2/17/22   Kb Gaviria MD   oxyCODONE-acetaminophen (Percocet) 5-325 MG per tablet Take 1 tablet by mouth Every 6 (Six) Hours As Needed for Moderate Pain . 2/17/22   Kb Gaviria MD   promethazine (PHENERGAN) 12.5 MG tablet Take 1 tablet by mouth Every 6 (Six) Hours As Needed for Nausea or Vomiting. 2/17/22   Kb Gaviria MD       Allergies:    Allergies   Allergen Reactions   • Amoxicillin Hives   • Cleocin [Clindamycin Hcl] Anaphylaxis   • Penicillins Hives       Objective   Objective     Vital Signs  Temp:  [96.6 °F (35.9 °C)-97.6 °F (36.4 °C)] 97.2 °F (36.2 °C)  Heart Rate:  [62-88] 62  Resp:  [14-26] 16  BP: (115-164)/() 129/74  SpO2:  [94 %-100 %] 97 %  on  Flow (L/min):  [2-10] 10;   Device (Oxygen Therapy): room air  Body mass index is 56.04 kg/m².    Physical Exam  Vitals and nursing note reviewed.   Constitutional:       Appearance: Normal appearance.   HENT:      Head: Normocephalic and atraumatic.      Right Ear: External ear normal.      Left Ear:  External ear normal.      Nose: Nose normal.      Mouth/Throat:      Mouth: Mucous membranes are moist.   Eyes:      General: No scleral icterus.        Right eye: No discharge.         Left eye: No discharge.      Extraocular Movements: Extraocular movements intact.      Conjunctiva/sclera: Conjunctivae normal.      Pupils: Pupils are equal, round, and reactive to light.   Cardiovascular:      Rate and Rhythm: Normal rate and regular rhythm.      Pulses: Normal pulses.      Heart sounds: Normal heart sounds.   Pulmonary:      Effort: Pulmonary effort is normal.      Breath sounds: Normal breath sounds.   Chest:      Chest wall: No tenderness.   Abdominal:      General: Bowel sounds are normal.      Palpations: Abdomen is soft.   Musculoskeletal:         General: Normal range of motion.      Cervical back: Normal range of motion and neck supple.      Right lower leg: No edema.      Left lower leg: No edema.   Skin:     General: Skin is warm and dry.      Capillary Refill: Capillary refill takes less than 2 seconds.   Neurological:      General: No focal deficit present.      Mental Status: She is alert and oriented to person, place, and time.   Psychiatric:         Mood and Affect: Mood normal.         Behavior: Behavior normal.         Thought Content: Thought content normal.         Judgment: Judgment normal.           Results Review:  I have personally reviewed most recent cardiac tracings, lab results and radiology images and interpretations and agree with findings.    Results from last 7 days   Lab Units 02/14/22  0625   WBC 10*3/mm3 8.28   HEMOGLOBIN g/dL 13.4   HEMATOCRIT % 39.9   PLATELETS 10*3/mm3 243   INR  <0.93*     Results from last 7 days   Lab Units 02/18/22  1924 02/18/22  1102 02/14/22  0625   SODIUM mmol/L  --   --  137   POTASSIUM mmol/L  --   --  4.1   CHLORIDE mmol/L  --   --  101   CO2 mmol/L  --   --  29.0   BUN mg/dL  --   --  13   CREATININE mg/dL  --   --  0.70   GLUCOSE mg/dL  --   --   109*   CALCIUM mg/dL  --   --  9.1   TROPONIN T ng/mL <0.010 <0.010  --      Estimated Creatinine Clearance: 150.5 mL/min (by C-G formula based on SCr of 0.7 mg/dL).  Brief Urine Lab Results  (Last result in the past 365 days)      Color   Clarity   Blood   Leuk Est   Nitrite   Protein   CREAT   Urine HCG        02/18/22 0826               Negative             Microbiology Results (last 10 days)     Procedure Component Value - Date/Time    COVID PRE-OP / PRE-PROCEDURE SCREENING ORDER (NO ISOLATION) - Swab, Nasopharynx [[id number]]  (Normal) Collected: 02/16/22 0807    Lab Status: Final result Specimen: Swab from Nasopharynx Updated: 02/16/22 2100    Narrative:      The following orders were created for panel order COVID PRE-OP / PRE-PROCEDURE SCREENING ORDER (NO ISOLATION) - Swab, Nasopharynx.  Procedure                               Abnormality         Status                     ---------                               -----------         ------                     COVID-19,APTIMA PANTHER(...[[id number]]  Normal              Final result                 Please view results for these tests on the individual orders.    COVID-19,APTIMA PANTHER(DENIA),[facility], NP/OP SWAB IN UTM/VTM/SALINE TRANSPORT MEDIA,24 HR TAT - Swab, Nasopharynx [[id number]]  (Normal) Collected: 02/16/22 0807    Lab Status: Final result Specimen: Swab from Nasopharynx Updated: 02/16/22 2100     COVID19 Not Detected    Narrative:      Fact sheet for providers: https://www.fda.gov/media/616830/download     Fact sheet for patients: https://www.fda.gov/media/260499/download    Test performed by RT PCR.          ECG/EMG Results (most recent)     Procedure Component Value Units Date/Time    ECG 12 Lead [[id number]] Collected: 02/18/22 1127     Updated: 02/18/22 1811     QT Interval 419 ms     Narrative:      HEART RATE= 69  bpm  RR Interval= 868  ms  WI Interval= 166  ms  P Horizontal Axis= 30  deg  P Front Axis= 65  deg  QRSD Interval= 97  ms  QT  Interval= 419  ms  QRS Axis= 50  deg  T Wave Axis= -3  deg  - ABNORMAL ECG -  Sinus rhythm  Inferior infarct, old  When compared with ECG of 14-Feb-2022 6:32:51,  Significant repolarization change  Significant axis, voltage or hypertrophy change  Electronically Signed By: Eddy Daniels ([facility]) 18-Feb-2022 18:10:36  Date and Time of Study: 2022-02-18 11:27:12    ECG 12 Lead [[id number]] Collected: 02/18/22 1915     Updated: 02/18/22 1916     QT Interval 402 ms     Narrative:      HEART RATE= 77  bpm  RR Interval= 776  ms  WI Interval= 173  ms  P Horizontal Axis= 17  deg  P Front Axis= 58  deg  QRSD Interval= 90  ms  QT Interval= 402  ms  QRS Axis= 18  deg  T Wave Axis= 2  deg  - ABNORMAL ECG -  Sinus rhythm  Ventricular trigeminy  When compared with ECG of 18-Feb-2022 11:27:12,  No significant change  Electronically Signed By:   Date and Time of Study: 2022-02-18 19:15:06          Results for orders placed during the hospital encounter of 01/24/20    Duplex Venous Lower Extremity - Left CAR    Interpretation Summary  · Normal left lower extremity venous duplex scan.      Results for orders placed during the hospital encounter of 06/12/20    Adult Transthoracic Echo Complete W/ Cont if Necessary Per Protocol    Interpretation Summary  Clinically difficult study therefore Lumason contrast was used.  Normal LV size and contractility EF of 60 to 65%  Normal RV size  Normal atrial size  Aortic valve, mitral valve, tricuspid valve appears structurally normal, mild mitral regurgitation seen.  No pericardial effusion seen.  Proximal aorta appears normal in size.      XR Chest 1 View    Result Date: 2/18/2022   1. Mild right infrahilar atelectasis. 2. Heart size appears larger. This may be accentuated by the portable technique and low volume inspiration.  Electronically Signed By-Susi Melendez MD On:2/18/2022 11:19 AM This report was finalized on [id number] by  Susi Melendez MD.    CT Chest Pulmonary Embolism    Result  Date: 2/18/2022  1. No large central pulmonary embolus. Bolus timing limits assessment of the distal pulmonary arterial vasculature. 2. Right basilar airspace disease likely atelectasis. 3. Additional chronic findings above.  Electronically Signed By-Isaac Michaels MD On:2/18/2022 12:32 PM This report was finalized on [id number] by  Isaac Michaels MD.        Estimated Creatinine Clearance: 150.5 mL/min (by C-G formula based on SCr of 0.7 mg/dL).    Assessment/Plan   Assessment/Plan       Active Hospital Problems    Diagnosis  POA   • Chest pain [R07.9]  Yes      Resolved Hospital Problems   No resolved problems to display.     Chest pain, uncertain etiology-- I strongly suspect that the patient had acute episode of sleep apnea after receiving sedating medication and upon awakening had associated anxiety, however, cardiac cause needs to be evaluated as well as continuous heart monitoring secondary to history of atrial fibrillation: Serial troponin; rest Myoview  -Serial troponin negative x2    PKU, chronic: hold pegvaliase    Chronic right shoulder pain: continue oxycodone;hold meloxicam     Hypothyroidism, chronic: continue levothyroxine    FRANK without current treatment due to insurance and financial constraints: outpatient sleep referral     VTE Prophylaxis -   Mechanical Order History:     None      Pharmalogical Order History:      Ordered     Dose Route Frequency Stop    02/18/22 1703  enoxaparin (LOVENOX) syringe 40 mg         40 mg SC Every 12 Hours Scheduled --                CODE STATUS:    Code Status and Medical Interventions:   Ordered at: 02/18/22 1703     Code Status (Patient has no pulse and is not breathing):    CPR (Attempt to Resuscitate)     Medical Interventions (Patient has pulse or is breathing):    Full Support       This patient has been examined wearing personal protective equipment.     I discussed the patient's findings and my recommendations with patient.      Signature:Electronically  signed by Janis Schulte, ROSENDO, 02/19/22, 7:50 AM EST.

## 2022-02-19 NOTE — CONSULTS
CARDIOLOGY CONSULT NOTE      Referring Provider: Frances    Reason for Consultation: Chest Pain    Attending: Eddy Daniels MD    Chief complaint    Chest Pain/Dyspnea    Subjective .     History of present illness:  Jo Asif is a 41 y.o. female who presents with chest pain and dyspnea.     Pt was scheduled for elective shoulder replacement surgery yesterday.  She received an interscalene block and was taken to OR.  She developed chest pain and dyspnea.  This was treated with SL NTG, ASA, IV metoprolol.  EKG did not show acute ST/T wave abnormalities.  There were isolated PVCS.  She had a CT PE protocol that was negative for PE.  Cardiac Enzymes remained negative.     Her pain was relieved and not recurred.  She denies any current or recent exertional chest pain.  She complains of somewhat chronic dyspnea with exertion that is been unchanged over the last year and she is attributed to her weight.    Pt has not prior known history of CAD, MI or HF.  Denies DM.  Past history of Atrial fibrillation with prior atrial fibrillation ablation.     Other past medical history includes obstructive sleep apnea she is not compliant with CPAP therapy.  She also reports a history of asthma    EKG did not show acute ST or T wave segment abnormalities.  There is isolated PVCs.  Troponin was negative x3 stress Myoview with Lexiscan has been ordered for this morning    Review of Systems   Constitutional: Negative for decreased appetite and diaphoresis.   HENT: Negative for congestion, hearing loss and nosebleeds.    Cardiovascular: Positive for chest pain and dyspnea on exertion. Negative for claudication, irregular heartbeat, leg swelling, near-syncope, orthopnea, palpitations, paroxysmal nocturnal dyspnea and syncope.   Respiratory: Positive for shortness of breath. Negative for cough and sleep disturbances due to breathing.    Endocrine: Negative for polyuria.   Hematologic/Lymphatic: Does not bruise/bleed easily.   Skin:  Negative for itching and rash.   Musculoskeletal: Negative for back pain, muscle weakness and myalgias.   Gastrointestinal: Negative for abdominal pain, change in bowel habit and nausea.   Genitourinary: Negative for dysuria, flank pain, frequency and hesitancy.   Neurological: Negative for dizziness, tremors and weakness.   Psychiatric/Behavioral: Negative for altered mental status. The patient does not have insomnia.        History  Past Medical History:   Diagnosis Date   • Arrhythmia    • Asthma     no inhalers   • Atrial fibrillation (HCC)    • Disease of thyroid gland    • GERD (gastroesophageal reflux disease)    • Heart murmur    • Hypertension    • Migraine    • PCOS (polycystic ovarian syndrome)    • PKU (phenylketonuria) (HCC)    • Prediabetes    • Sleep apnea     no machine   • Torn meniscus 2021    right       Past Surgical History:   Procedure Laterality Date   • ABLATION OF DYSRHYTHMIC FOCUS     • CARDIAC ELECTROPHYSIOLOGY PROCEDURE N/A 6/10/2020    Procedure: EP/Ablation;  Surgeon: Joseph Andrews MD;  Location: [facility] INVASIVE LOCATION;  Service: Cardiovascular;  Laterality: N/A;   • CARPAL TUNNEL RELEASE Right    • KNEE ARTHROSCOPY Right 3/26/2021    Procedure: KNEE ARTHROSCOPY with  partial medial lateral meniscectomy;  Surgeon: Kb Gaviria MD;  Location: [facility] MAIN OR;  Service: Orthopedics;  Laterality: Right;   • TONSILLECTOMY     • TUBAL ABDOMINAL LIGATION     • UVULECTOMY         Family History   Problem Relation Age of Onset   • Diabetes Mother    • Heart disease Father    • Cancer Father        Social History     Tobacco Use   • Smoking status: Former Smoker     Types: Cigarettes     Quit date: 2009     Years since quittin.4   • Smokeless tobacco: Never Used   Vaping Use   • Vaping Use: Never used   Substance Use Topics   • Alcohol use: No   • Drug use: No        Medications Prior to Admission   Medication Sig Dispense Refill Last Dose   • Ajovy 225  MG/1.5ML solution prefilled syringe Every 30 (Thirty) Days. For migraines   Past Month at Unknown time   • Diclofenac Sodium (VOLTAREN) 1 % gel gel Apply 4 g topically to the appropriate area as directed 4 (Four) Times a Day As Needed.   2/18/2022 at Unknown time   • HYDROcodone-acetaminophen (NORCO) 5-325 MG per tablet Take 1 tablet by mouth Every 6 (Six) Hours As Needed for Moderate Pain .   2/17/2022 at Unknown time   • levothyroxine (SYNTHROID, LEVOTHROID) 50 MCG tablet Take 50 mcg by mouth Daily. Take DOS   2/17/2022 at Unknown time   • meloxicam (MOBIC) 15 MG tablet Take 15 mg by mouth Daily. LD 2/10   Past Week at Unknown time   • Pegvaliase-pqpz (PALYNZIQ) 20 MG/ML solution prefilled syringe Inject 10 mg under the skin into the appropriate area as directed 3 (Three) Times a Week. Monday, Wednesday and Friday   Past Week at Unknown time   • vitamin B-12 (CYANOCOBALAMIN) 100 MCG tablet Take 50 mcg by mouth Daily. Hold DOS   Past Week at Unknown time   • EPINEPHrine (EPIPEN) 0.3 MG/0.3ML solution auto-injector injection Inject 0.3 mg into the appropriate muscle as directed by prescriber.      • EPINEPHrine (EPIPEN) 0.3 MG/0.3ML solution auto-injector injection       • naproxen (NAPROSYN) 500 MG tablet Take 1 tablet by mouth 2 (Two) Times a Day With Meals. 28 tablet 0    • oxyCODONE-acetaminophen (Percocet) 5-325 MG per tablet Take 1 tablet by mouth Every 6 (Six) Hours As Needed for Moderate Pain . 28 tablet 0    • promethazine (PHENERGAN) 12.5 MG tablet Take 1 tablet by mouth Every 6 (Six) Hours As Needed for Nausea or Vomiting. 21 tablet 1          Amoxicillin, Cleocin [clindamycin hcl], and Penicillins    Scheduled Meds:enoxaparin, 40 mg, Subcutaneous, Q12H  levothyroxine, 50 mcg, Oral, Daily  sodium chloride, 10 mL, Intravenous, Q12H      Continuous Infusions:   PRN Meds:.•  acetaminophen **OR** acetaminophen **OR** acetaminophen  •  ondansetron **OR** ondansetron  •  oxyCODONE  •  sodium  "chloride    Objective     VITAL SIGNS  Vitals:    02/18/22 1646 02/18/22 1809 02/18/22 2201 02/19/22 0545   BP: 134/80 160/89 132/69 129/74   BP Location: Left arm Left arm Left arm Right arm   Patient Position: Lying Lying Lying Lying   Pulse: 88 87 81 62   Resp: 18 18 18 16   Temp:  97.5 °F (36.4 °C) 97.4 °F (36.3 °C) 97.2 °F (36.2 °C)   TempSrc:  Axillary Oral Oral   SpO2: 95% 96% 95% 97%   Weight:  (!) 148 kg (326 lb 8 oz)     Height:  162.6 cm (64\")         Flowsheet Rows      First Filed Value   Admission Height 162.6 cm (64\") Documented at 02/18/2022 1116   Admission Weight 153 kg (337 lb 4.9 oz) Documented at 02/18/2022 1116          Body mass index is 56.04 kg/m².     TELEMETRY: Sinus rhythm with isolated PVCs    Physical Exam:  Vitals reviewed.   Constitutional:       General: Not in acute distress.     Appearance: Normal appearance. Well-developed.   Eyes:      Pupils: Pupils are equal, round, and reactive to light.   HENT:      Head: Normocephalic and atraumatic.   Neck:      Vascular: No JVD.   Pulmonary:      Effort: Pulmonary effort is normal.      Breath sounds: Normal breath sounds.   Cardiovascular:      Normal rate. Regular rhythm.   Pulses:     Intact distal pulses.   Edema:     Peripheral edema absent.   Abdominal:      General: There is no distension.      Palpations: Abdomen is soft.      Tenderness: There is no abdominal tenderness.   Musculoskeletal: Normal range of motion.      Cervical back: Normal range of motion and neck supple. Skin:     General: Skin is warm and dry.   Neurological:      Mental Status: Alert and oriented to person, place, and time.          Results Review:   I reviewed the patient's new clinical results.    CBC    Results from last 7 days   Lab Units 02/14/22  0625   WBC 10*3/mm3 8.28   HEMOGLOBIN g/dL 13.4   PLATELETS 10*3/mm3 243     BMP   Results from last 7 days   Lab Units 02/14/22  0625   SODIUM mmol/L 137   POTASSIUM mmol/L 4.1   CHLORIDE mmol/L 101   CO2 mmol/L " 29.0   BUN mg/dL 13   CREATININE mg/dL 0.70   GLUCOSE mg/dL 109*     Cr Clearance Estimated Creatinine Clearance: 150.5 mL/min (by C-G formula based on SCr of 0.7 mg/dL).  Coag   Results from last 7 days   Lab Units 02/14/22  0625   INR  <0.93*   APTT seconds 23.1*     HbA1C   Lab Results   Component Value Date    HGBA1C 5.8 (H) 06/08/2020    HGBA1C 5.4 09/01/2019     Blood Glucose No results found for: POCGLU  Infection     CMP   Results from last 7 days   Lab Units 02/14/22  0625   SODIUM mmol/L 137   POTASSIUM mmol/L 4.1   CHLORIDE mmol/L 101   CO2 mmol/L 29.0   BUN mg/dL 13   CREATININE mg/dL 0.70   GLUCOSE mg/dL 109*     ABG    Results from last 7 days   Lab Units 02/18/22  1357   PH, ARTERIAL pH units 7.380   PCO2, ARTERIAL mm Hg 46.8   PO2 ART mm Hg 72.2*   O2 SATURATION ART % 93.8*   BASE EXCESS ART mmol/L 1.8     UA      CASSIA  No results found for: POCMETH, POCAMPHET, POCBARBITUR, POCBENZO, POCCOCAINE, POCOPIATES, POCOXYCODO, POCPHENCYC, POCPROPOXY, POCTHC, POCTRICYC  Lysis Labs   Results from last 7 days   Lab Units 02/14/22  0625   INR  <0.93*   APTT seconds 23.1*   HEMOGLOBIN g/dL 13.4   PLATELETS 10*3/mm3 243   CREATININE mg/dL 0.70     Radiology(recent) XR Chest 1 View    Result Date: 2/18/2022   1. Mild right infrahilar atelectasis. 2. Heart size appears larger. This may be accentuated by the portable technique and low volume inspiration.  Electronically Signed By-Susi Melendez MD On:2/18/2022 11:19 AM This report was finalized on [id number] by  Susi Melendez MD.    CT Chest Pulmonary Embolism    Result Date: 2/18/2022  1. No large central pulmonary embolus. Bolus timing limits assessment of the distal pulmonary arterial vasculature. 2. Right basilar airspace disease likely atelectasis. 3. Additional chronic findings above.  Electronically Signed By-Isaac Michaels MD On:2/18/2022 12:32 PM This report was finalized on [id number] by  Isaac Michaels MD.      Results from last 7 days   Lab Units  02/19/22  0655 02/18/22  1924 02/18/22  1102   CK TOTAL U/L  --   --  40   TROPONIN T ng/mL <0.010   < > <0.010    < > = values in this interval not displayed.       Imaging Results (Last 24 Hours)     Procedure Component Value Units Date/Time    CT Chest Pulmonary Embolism [[id number]] Collected: 02/18/22 1218     Updated: 02/18/22 1234    Narrative:         DATE OF EXAM:  2/18/2022 12:13 PM     PROCEDURE:  CT CHEST PULMONARY EMBOLISM-     INDICATIONS:   PE suspected, high prob     COMPARISON:   CT chest with contrast 6/12/2020     TECHNIQUE:  Routine transaxial slices were obtained through chest after  administration of intravenous 100 ml of Isovue 370. Reconstructed  coronal and sagittal images were also obtained. Automated exposure  control and iterative reconstruction methods were used.      FINDINGS:  Contrast bolus timing is suboptimal with partially limits complete  assessment. There is no evidence of central or lobar pulmonary embolus.  Segmental and subsegmental pulmonary arteries are not well assessed on  this exam. Heart size top normal. Thoracic aortic branch vasculature is  patent. The visualized soft tissues of the neck are grossly without  acute abnormality. Negative for pericardial effusion. Calcified  mediastinal and right hilar nodes related to granulomatous disease.     The trachea and mainstem bronchi are patent. No pneumothorax. There is  mild airspace disease at the right lower lobe likely atelectasis. No  pneumothorax. Trace right pleural effusion. No effusion on the left.     Upper abdomen demonstrates normal visualized portions of the liver,  spleen, adrenal glands. No free fluid in the upper abdomen. No  aggressive osseous lesion or fracture. There is exaggeration the  thoracic kyphosis with multilevel disc narrowing the thoracic spine.       Impression:      1. No large central pulmonary embolus. Bolus timing limits assessment of  the distal pulmonary arterial vasculature.  2. Right  basilar airspace disease likely atelectasis.  3. Additional chronic findings above.     Electronically Signed By-Isaac Michaels MD On:2/18/2022 12:32 PM  This report was finalized on [id number] by  Isaac Michaels MD.          EKG                I personally viewed and interpreted the patient's EKG/Telemetry data:    ECHOCARDIOGRAM:  6/14/2020   Interpretation Summary    Clinically difficult study therefore Lumason contrast was used.  Normal LV size and contractility EF of 60 to 65%  Normal RV size  Normal atrial size  Aortic valve, mitral valve, tricuspid valve appears structurally normal, mild mitral regurgitation seen.  No pericardial effusion seen.  Proximal aorta appears normal in size.      STRESS MYOVIEW:    TMT 2019 normal    CARDIAC CATHETERIZATION:    OTHER:         Assessment/Plan       Chest pain      Assessment:    Chest pain: Somewhat atypical for angina      -EKG with no acute ST or T wave segment abnormalities      -Troponin negative x3      -Multiple cardiovascular risk factors including hypertension, obesity, family history of CAD  Right shoulder injury  Hypertension  Obesity  History of Paroxysmal atrial fibrillation currently maintaining sinus rhythm  Frequent PVCs  Family history of coronary artery disease    Plan:    Lexiscan Myoview for risk stratification  Does not have symptoms to suggest unstable angina or recent MI  Stress Myoview will be reviewed  Aggressive risk factor modification recommended  We will comment on surgical risk after Lexiscan stress Myoview was reviewed    I discussed the patients findings and my recommendations with patient.    ROSENDO Olson  02/19/22  10:06 EST    Addendum:    Stress test was reviewed and interpreted by me, it was negative with no ischemia or infarction with normal ejection fraction.  She does have PVCs but they are benign and patient can be discharged home today and she is cleared to proceed with shoulder surgery.

## 2022-02-20 LAB
QT INTERVAL: 402 MS
QT INTERVAL: 421 MS

## 2022-02-20 NOTE — PLAN OF CARE
Problem: Chest Pain  Goal: Resolution of Chest Pain Symptoms  Outcome: Ongoing, Progressing   Goal Outcome Evaluation:            Plan is for patient to discharge home this evening if cleared by cardiology and surgeon.

## 2022-02-20 NOTE — PLAN OF CARE
Problem: Chest Pain  Goal: Resolution of Chest Pain Symptoms  2/20/2022 0009 by Desirae Levine, RN  Outcome: Met  2/20/2022 0008 by Desirae Levine, RN  Outcome: Ongoing, Progressing  2/20/2022 0007 by Desirae Levine, RN  Outcome: Ongoing, Progressing     Pt discharged home.

## 2022-02-20 NOTE — DISCHARGE SUMMARY
[facility]  DISCHARGE SUMMARY        Prepared For PCP:  Kelsey Sanchez APRN    Patient Name: Jo Asif  : 1980  MRN: [id number]      Date of Admission:   2022    Date of Discharge:  2022    Length of stay:  LOS: 0 days     Hospital Course     Presenting Problem:   Chest pain [R07.9]  Dyspnea, unspecified type [R06.00]  Chest pain, unspecified type [R07.9]      Active Hospital Problems   No active problems to display.      Resolved Hospital Problems    Diagnosis Date Resolved POA   • Chest pain [R07.9] 2022 Yes     Priority: High           Hospital Course:  Jo Asif is a 41 y.o. female who presented to the ER with chest pain which began as she was going to surgery for right shoulder repair. She was treated with ASA, ntg, and BB with resolution of pain.  She was admitted to observation with serial troponin negative x 2 and subsequent stress Myoview which was determined to be a low risk study. CT chest PE protocol did not show PE. She did not have a recurrence of pain. She was seen by cardiology and cleared for surgery which will be rescheduled as outpatient.  She will follow up with Dr. Matos in 2 weeks.             Recommendation for Outpatient Providers:             Reasons For Change In Medications and Indications for New Medications:        Day of Discharge     HPI:   41-year-old female presents to the ER with a chief complaint of severe sharp/stabbing chest pain in the left lateral rib area and shortness of breath which began after receiving anesthesia for scheduled right shoulder surgery.  The patient reports that she felt well earlier in the day without any recent complaints of dyspnea with exertion, cough, or subjective fever or chills.  The patient denies recent palpitations.  The patient does have a history of ablation in  and was released by EP cardiologist to her primary cardiologist with subsequent discontinuation of metoprolol as the patient  was not having any symptoms.  Patient does have a history of sleep apnea and reports she did fall asleep after receiving medication prior to her nerve block in the right shoulder.  The patient does report that she felt very anxious when she awoke after dozing off and that on awakening she could not catch her breath.  This a.m. the patient reports chest tightness without overt shortness of breath.  The patient denies nausea or diaphoresis    Vital Signs:   Temp:  [97.2 °F (36.2 °C)-98.3 °F (36.8 °C)] 98.2 °F (36.8 °C)  Heart Rate:  [54-81] 54  Resp:  [16-18] 17  BP: (129-154)/(69-78) 154/75     ROS:  Review of Systems   All other systems reviewed and are negative.      Physical Exam:  Physical Exam  Vitals and nursing note reviewed.   Constitutional:       General: She is not in acute distress.     Appearance: Normal appearance. She is obese. She is not ill-appearing.   HENT:      Head: Normocephalic and atraumatic.      Right Ear: External ear normal.      Left Ear: External ear normal.      Nose: Nose normal. No congestion or rhinorrhea.      Mouth/Throat:      Mouth: Mucous membranes are dry.   Eyes:      General: No scleral icterus.        Right eye: No discharge.         Left eye: No discharge.      Extraocular Movements: Extraocular movements intact.      Conjunctiva/sclera: Conjunctivae normal.      Pupils: Pupils are equal, round, and reactive to light.   Cardiovascular:      Rate and Rhythm: Normal rate and regular rhythm.      Pulses: Normal pulses.      Heart sounds: Normal heart sounds. No murmur heard.  No friction rub.   Pulmonary:      Effort: Pulmonary effort is normal.      Breath sounds: Normal breath sounds.   Abdominal:      General: Bowel sounds are normal.      Palpations: Abdomen is soft.   Musculoskeletal:         General: Normal range of motion.      Cervical back: Normal range of motion and neck supple.      Right lower leg: No edema.      Left lower leg: No edema.   Skin:     General: Skin  is warm and dry.      Capillary Refill: Capillary refill takes less than 2 seconds.   Neurological:      General: No focal deficit present.      Mental Status: She is alert and oriented to person, place, and time.   Psychiatric:         Mood and Affect: Mood normal.         Behavior: Behavior normal.         Thought Content: Thought content normal.         Judgment: Judgment normal.         Pertinent  and/or Most Recent Results     Results from last 7 days   Lab Units 02/14/22  0625   WBC 10*3/mm3 8.28   HEMOGLOBIN g/dL 13.4   HEMATOCRIT % 39.9   PLATELETS 10*3/mm3 243   SODIUM mmol/L 137   POTASSIUM mmol/L 4.1   CHLORIDE mmol/L 101   CO2 mmol/L 29.0   BUN mg/dL 13   CREATININE mg/dL 0.70   GLUCOSE mg/dL 109*   CALCIUM mg/dL 9.1     Results from last 7 days   Lab Units 02/14/22  0625   PROTIME Seconds 10.3   INR  <0.93*   APTT seconds 23.1*           Invalid input(s): TG, LDLCALC, LDLREALC  Results from last 7 days   Lab Units 02/19/22  0655 02/18/22  1924 02/18/22  1102   TROPONIN T ng/mL <0.010 <0.010 <0.010       Brief Urine Lab Results  (Last result in the past 365 days)      Color   Clarity   Blood   Leuk Est   Nitrite   Protein   CREAT   Urine HCG        02/18/22 0826               Negative             Microbiology Results Abnormal     None          XR Chest 1 View    Result Date: 2/18/2022  Impression:  1. Mild right infrahilar atelectasis. 2. Heart size appears larger. This may be accentuated by the portable technique and low volume inspiration.  Electronically Signed By-Susi Melendez MD On:2/18/2022 11:19 AM This report was finalized on [id number] by  Susi Melendez MD.    CT Chest Pulmonary Embolism    Result Date: 2/18/2022  Impression: 1. No large central pulmonary embolus. Bolus timing limits assessment of the distal pulmonary arterial vasculature. 2. Right basilar airspace disease likely atelectasis. 3. Additional chronic findings above.  Electronically Signed By-Isaac Michaels MD On:2/18/2022 12:32  PM This report was finalized on [id number] by  Isaac Michaels MD.      Results for orders placed during the hospital encounter of 01/24/20    Duplex Venous Lower Extremity - Left CAR    Interpretation Summary  · Normal left lower extremity venous duplex scan.      Results for orders placed during the hospital encounter of 01/24/20    Duplex Venous Lower Extremity - Left CAR    Interpretation Summary  · Normal left lower extremity venous duplex scan.      Results for orders placed during the hospital encounter of 06/12/20    Adult Transthoracic Echo Complete W/ Cont if Necessary Per Protocol    Interpretation Summary  Clinically difficult study therefore Lumason contrast was used.  Normal LV size and contractility EF of 60 to 65%  Normal RV size  Normal atrial size  Aortic valve, mitral valve, tricuspid valve appears structurally normal, mild mitral regurgitation seen.  No pericardial effusion seen.  Proximal aorta appears normal in size.              Test Results Pending at Discharge        Procedures Performed           Consults:   Consults     Date and Time Order Name Status Description    2/18/2022  5:10 PM Inpatient Cardiology Consult Completed             Discharge Details        Discharge Medications      Continue These Medications      Instructions Start Date   Ajovy 225 MG/1.5ML solution prefilled syringe  Generic drug: Fremanezumab-vfrm   Every 30 Days, For migraines      Diclofenac Sodium 1 % gel gel  Commonly known as: VOLTAREN   4 g, Topical, 4 Times Daily PRN      EPINEPHrine 0.3 MG/0.3ML solution auto-injector injection  Commonly known as: EPIPEN   0.3 mg, Intramuscular      EPINEPHrine 0.3 MG/0.3ML solution auto-injector injection  Commonly known as: EPIPEN   No dose, route, or frequency recorded.      HYDROcodone-acetaminophen 5-325 MG per tablet  Commonly known as: NORCO   1 tablet, Oral, Every 6 Hours PRN      levothyroxine 50 MCG tablet  Commonly known as: SYNTHROID, LEVOTHROID   50 mcg, Oral,  Daily, Take DOS      meloxicam 15 MG tablet  Commonly known as: MOBIC   15 mg, Oral, Daily, LD 2/10      naproxen 500 MG tablet  Commonly known as: NAPROSYN   500 mg, Oral, 2 Times Daily With Meals      oxyCODONE-acetaminophen 5-325 MG per tablet  Commonly known as: Percocet   1 tablet, Oral, Every 6 Hours PRN      Palynziq 20 MG/ML solution prefilled syringe  Generic drug: Pegvaliase-pqpz   10 mg, Subcutaneous, 3 Times Weekly, Monday, Wednesday and Friday      promethazine 12.5 MG tablet  Commonly known as: PHENERGAN   12.5 mg, Oral, Every 6 Hours PRN      vitamin B-12 100 MCG tablet  Commonly known as: CYANOCOBALAMIN   50 mcg, Oral, Daily, Hold DOS             Allergies   Allergen Reactions   • Amoxicillin Hives   • Cleocin [Clindamycin Hcl] Anaphylaxis   • Penicillins Hives         Discharge Disposition:  Home or Self Care    Diet:  Hospital:  Diet Order   Procedures   • Diet Cardiac; Healthy Heart         Discharge Activity: as tolerated         CODE STATUS:    Code Status and Medical Interventions:   Ordered at: 02/18/22 1703     Code Status (Patient has no pulse and is not breathing):    CPR (Attempt to Resuscitate)     Medical Interventions (Patient has pulse or is breathing):    Full Support         Follow-up Appointments  Future Appointments   Date Time Provider Department Center   2/21/2022 12:30 PM Kb Gaviria MD [facility]       Additional Instructions for the Follow-ups that You Need to Schedule     Ambulatory Referral to Sleep Medicine   As directed              Condition on Discharge:      Stable      This patient has been examined wearing appropriate Personal Protective Equipment. 02/19/22      Electronically signed by ROSENDO Boateng, 02/19/22, 7:21 PM EST.      Time: I spent  60  minutes on this discharge activity which included face-to-face encounter with the patient/reviewing the data in the system/coordination of the care with the nursing staff as well as  consultants/documentation/entering orders.

## 2022-02-21 ENCOUNTER — TELEPHONE (OUTPATIENT)
Dept: ORTHOPEDIC SURGERY | Facility: CLINIC | Age: 42
End: 2022-02-21

## 2022-02-21 NOTE — TELEPHONE ENCOUNTER
Hub staff attempted to follow warm transfer process and was unsuccessful     Caller: ARNAUD MCCOLLUM    Relationship to patient: SELF    Best call back number: [phone]    Patient is needing: PT WANTS TO KNOW WHAT SHE NEEDS TO DO AFTER HER SURGERY WAS CANCELED. PLEASE CALL HER BACK AT THE NUMBER ABOVE.

## 2022-02-21 NOTE — CASE MANAGEMENT/SOCIAL WORK
Case Management Discharge Note           Provided Post Acute Provider List?: N/A  Provided Post Acute Provider Quality & Resource List?: N/A         Final Discharge Disposition Code: 01 - home or self-care

## 2022-03-03 ENCOUNTER — OFFICE VISIT (OUTPATIENT)
Dept: ORTHOPEDIC SURGERY | Facility: CLINIC | Age: 42
End: 2022-03-03

## 2022-03-03 ENCOUNTER — PREP FOR SURGERY (OUTPATIENT)
Dept: OTHER | Facility: HOSPITAL | Age: 42
End: 2022-03-03

## 2022-03-03 VITALS
SYSTOLIC BLOOD PRESSURE: 169 MMHG | HEART RATE: 80 BPM | BODY MASS INDEX: 50.02 KG/M2 | HEIGHT: 64 IN | WEIGHT: 293 LBS | DIASTOLIC BLOOD PRESSURE: 83 MMHG

## 2022-03-03 DIAGNOSIS — M25.511 RIGHT SHOULDER PAIN, UNSPECIFIED CHRONICITY: Primary | ICD-10-CM

## 2022-03-03 PROCEDURE — 99214 OFFICE O/P EST MOD 30 MIN: CPT | Performed by: ORTHOPAEDIC SURGERY

## 2022-03-03 NOTE — PROGRESS NOTES
"     Patient ID: Jo Asif is a 41 y.o. female.  Right shoulder pain  Jo presents with continued right shoulder pain.  She had her surgery canceled due to acute hypoxia in February.  Overnight monitoring demonstrated no acute cardiopulmonary event and she is obtain clearance from her primary care physician as an outpatient    Review of Systems:  Right shoulder pain      Objective:    /83   Pulse 80   Ht 162.6 cm (64\")   Wt (!) 148 kg (326 lb)   LMP  (LMP Unknown)   BMI 55.96 kg/m²     Physical Examination:     She is in no distress breathing room oxygen.  Right shoulder demonstrates pain over the bicep groove passive elevation 160 abduction 130 external rotation 30 with pain weakness on Speed, [place], supraspinatus testing    Imaging:       Assessment:    Right shoulder partial cuff tear possible bicep tendinopathy    Plan:   At this point she would like to proceed with right shoulder arthroscopy possible cuff repair possible bicep tenotomy.  Discussed possibly not doing the block to minimize any chance of pulmonary issues.  Risks and benefits, specifically risks of bleeding, scar, infection, fracture, stiffness, retear, nerve, tendon or artery damage, the need for further surgery, DVT, and loss of life or limb and rehab were discussed. All questions were answered and addressed.      Procedures          Disclaimer: Part of this note may be an electronic transcription/translation of spoken language to printed text using the Dragon Dictation System  "

## 2022-03-09 ENCOUNTER — TELEPHONE (OUTPATIENT)
Dept: ORTHOPEDIC SURGERY | Facility: CLINIC | Age: 42
End: 2022-03-09

## 2022-03-09 NOTE — TELEPHONE ENCOUNTER
PATIENT WANTED TO UPDATE YOU ON WHAT DR. ORTEGA HAS PRESCRIBED HER.  DR. ORTEGA INCREASED THE LEVOTHYROXINE FROM 50 TO 75 AND THEN GAVE HER A NEW PRESCRIPTION FOR METFORMIN HCI .

## 2022-03-10 RX ORDER — METFORMIN HYDROCHLORIDE 750 MG/1
750 TABLET, EXTENDED RELEASE ORAL
COMMUNITY
End: 2022-07-12

## 2022-03-16 ENCOUNTER — LAB (OUTPATIENT)
Dept: LAB | Facility: HOSPITAL | Age: 42
End: 2022-03-16

## 2022-03-16 DIAGNOSIS — M25.511 RIGHT SHOULDER PAIN, UNSPECIFIED CHRONICITY: ICD-10-CM

## 2022-03-16 LAB
ANION GAP SERPL CALCULATED.3IONS-SCNC: 7 MMOL/L (ref 5–15)
BUN SERPL-MCNC: 10 MG/DL (ref 6–20)
BUN/CREAT SERPL: 17.5 (ref 7–25)
CALCIUM SPEC-SCNC: 8.8 MG/DL (ref 8.6–10.5)
CHLORIDE SERPL-SCNC: 104 MMOL/L (ref 98–107)
CO2 SERPL-SCNC: 26 MMOL/L (ref 22–29)
CREAT SERPL-MCNC: 0.57 MG/DL (ref 0.57–1)
DEPRECATED RDW RBC AUTO: 43 FL (ref 37–54)
EGFRCR SERPLBLD CKD-EPI 2021: 115.8 ML/MIN/1.73
ERYTHROCYTE [DISTWIDTH] IN BLOOD BY AUTOMATED COUNT: 12.3 % (ref 12.3–15.4)
GLUCOSE SERPL-MCNC: 104 MG/DL (ref 65–99)
HCT VFR BLD AUTO: 40.8 % (ref 34–46.6)
HGB BLD-MCNC: 13.6 G/DL (ref 12–15.9)
MCH RBC QN AUTO: 32.2 PG (ref 26.6–33)
MCHC RBC AUTO-ENTMCNC: 33.3 G/DL (ref 31.5–35.7)
MCV RBC AUTO: 96.5 FL (ref 79–97)
PLATELET # BLD AUTO: 232 10*3/MM3 (ref 140–450)
PMV BLD AUTO: 10.3 FL (ref 6–12)
POTASSIUM SERPL-SCNC: 3.9 MMOL/L (ref 3.5–5.2)
RBC # BLD AUTO: 4.23 10*6/MM3 (ref 3.77–5.28)
SARS-COV-2 ORF1AB RESP QL NAA+PROBE: NOT DETECTED
SODIUM SERPL-SCNC: 137 MMOL/L (ref 136–145)
WBC NRBC COR # BLD: 7.81 10*3/MM3 (ref 3.4–10.8)

## 2022-03-16 PROCEDURE — 85027 COMPLETE CBC AUTOMATED: CPT

## 2022-03-16 PROCEDURE — U0004 COV-19 TEST NON-CDC HGH THRU: HCPCS

## 2022-03-16 PROCEDURE — 80048 BASIC METABOLIC PNL TOTAL CA: CPT

## 2022-03-16 PROCEDURE — C9803 HOPD COVID-19 SPEC COLLECT: HCPCS

## 2022-03-17 ENCOUNTER — ANESTHESIA EVENT (OUTPATIENT)
Dept: PERIOP | Facility: HOSPITAL | Age: 42
End: 2022-03-17

## 2022-03-17 ENCOUNTER — OFFICE VISIT (OUTPATIENT)
Dept: ORTHOPEDIC SURGERY | Facility: CLINIC | Age: 42
End: 2022-03-17

## 2022-03-17 VITALS
HEIGHT: 64 IN | DIASTOLIC BLOOD PRESSURE: 93 MMHG | HEART RATE: 70 BPM | BODY MASS INDEX: 50.02 KG/M2 | WEIGHT: 293 LBS | SYSTOLIC BLOOD PRESSURE: 184 MMHG

## 2022-03-17 DIAGNOSIS — M17.11 PRIMARY LOCALIZED OSTEOARTHRITIS OF RIGHT KNEE: Primary | ICD-10-CM

## 2022-03-17 PROCEDURE — 20610 DRAIN/INJ JOINT/BURSA W/O US: CPT | Performed by: ORTHOPAEDIC SURGERY

## 2022-03-17 RX ORDER — LEVOTHYROXINE SODIUM 0.07 MG/1
75 TABLET ORAL
COMMUNITY
Start: 2022-03-09

## 2022-03-17 RX ORDER — OXYCODONE HYDROCHLORIDE AND ACETAMINOPHEN 5; 325 MG/1; MG/1
1 TABLET ORAL EVERY 6 HOURS PRN
Qty: 28 TABLET | Refills: 0 | Status: SHIPPED | OUTPATIENT
Start: 2022-03-17 | End: 2022-04-21

## 2022-03-17 RX ORDER — NAPROXEN 500 MG/1
500 TABLET ORAL 2 TIMES DAILY WITH MEALS
Qty: 28 TABLET | Refills: 0 | Status: SHIPPED | OUTPATIENT
Start: 2022-03-17 | End: 2022-04-21

## 2022-03-17 RX ORDER — CEPHALEXIN 500 MG/1
500 CAPSULE ORAL 4 TIMES DAILY
Qty: 4 CAPSULE | Refills: 0 | Status: SHIPPED | OUTPATIENT
Start: 2022-03-17 | End: 2022-03-18

## 2022-03-17 RX ORDER — PROMETHAZINE HYDROCHLORIDE 12.5 MG/1
12.5 TABLET ORAL EVERY 6 HOURS PRN
Qty: 21 TABLET | Refills: 1 | Status: SHIPPED | OUTPATIENT
Start: 2022-03-17 | End: 2022-04-21

## 2022-03-17 NOTE — H&P
-- Message is from the [facility]--    Patient is requesting a medication refill - the medication was not listed on the patient's active medication list.    Prescribing Provider: Dr. Vera Hansen    RX Name:  Lorsatan  Dose:  100 MG  Quantity:  90  Instruction(s):  1 time daily    Duration: 90 days    Pharmacy  [facility]/Pharmacy #[id number] [address].    Caller Information       Type Contact Phone    01/10/2019 11:21 AM Phone (Incoming) [facility]/PHARMACY #[id number] - [place] [id number] [facility]. (Pharmacy) [phone]          Alternative phone number: na    Turnaround time given to caller:   \"This message will be sent to [state Provider's name]. The clinical team will fulfill your request as soon as they review your message.\"   [facility]   HISTORY AND PHYSICAL    Patient Name: Jo Asif  : 1980  MRN: [id number]  Primary Care Physician:  Kelsey Sanchez APRN  Date of admission: (Not on file)    Subjective   Subjective     Chief Complaint: Right shoulder pain    This is a 41-year-old female with longstanding right shoulder pain presented for shoulder arthroscopy possible cuff repair.  Surgery about a month ago was canceled the day of surgery due to acute hypoxia.  Monitoring and work-up in the hospital is revealed no underlying event she has been stable since        Review of Systems   Cardiovascular: Negative for chest pain.   Musculoskeletal: Positive for arthralgias.        Personal History     Past Medical History:   Diagnosis Date   • Arrhythmia    • Asthma     no inhalers   • Atrial fibrillation (HCC)    • Disease of thyroid gland    • GERD (gastroesophageal reflux disease)    • Heart murmur    • Hypertension    • Migraine    • PCOS (polycystic ovarian syndrome)    • PKU (phenylketonuria) (HCC)    • Prediabetes    • Rotator cuff tear 2022    right   • Sleep apnea     no machine   • Torn meniscus 2021    right       Past Surgical History:   Procedure Laterality Date   • ABLATION OF DYSRHYTHMIC FOCUS     • CARDIAC ELECTROPHYSIOLOGY PROCEDURE N/A 6/10/2020    Procedure: EP/Ablation;  Surgeon: Joseph Andrews MD;  Location: [facility] CATH INVASIVE LOCATION;  Service: Cardiovascular;  Laterality: N/A;   • CARPAL TUNNEL RELEASE Right    • KNEE ARTHROSCOPY Right 3/26/2021    Procedure: KNEE ARTHROSCOPY with  partial medial lateral meniscectomy;  Surgeon: Kb Gaviria MD;  Location: [facility] MAIN OR;  Service: Orthopedics;  Laterality: Right;   • TONSILLECTOMY     • TUBAL ABDOMINAL LIGATION     • UVULECTOMY         Family History: family history includes Cancer in her father; Diabetes in her mother; Heart disease in her father. Otherwise pertinent FHx was reviewed and not pertinent to current  issue.    Social History:  reports that she quit smoking about 12 years ago. Her smoking use included cigarettes. She has never used smokeless tobacco. She reports that she does not drink alcohol and does not use drugs.    Home Medications:  Diclofenac Sodium, EPINEPHrine, Fremanezumab-vfrm, HYDROcodone-acetaminophen, Pegvaliase-pqpz, levothyroxine, meloxicam, metFORMIN ER, naproxen, oxyCODONE-acetaminophen, promethazine, and vitamin B-12    Allergies:  Allergies   Allergen Reactions   • Amoxicillin Hives   • Cleocin [Clindamycin Hcl] Anaphylaxis   • Penicillins Hives       Objective    Objective     Vitals:   Heart Rate:  [70] 70  BP: (184)/(93) 184/93    She is in no distress breathing room oxygen.  Right shoulder demonstrates pain over the bicep groove passive elevation 160 abduction 130 external rotation 30 with pain weakness on Speed, [place], supraspinatus testing     Imaging:         Assessment:    Right shoulder partial cuff tear possible bicep tendinopathy     Plan:   At this point she would like to proceed with right shoulder arthroscopy possible cuff repair possible bicep tenotomy.  Discussed possibly not doing the block to minimize any chance of pulmonary issues.  Risks and benefits, specifically risks of bleeding, scar, infection, fracture, stiffness, retear, nerve, tendon or artery damage, the need for further surgery, DVT, and loss of life or limb and rehab were discussed. All questions were answered and addressed    Kb Gaviria MD

## 2022-03-17 NOTE — PROGRESS NOTES
"     Patient ID: Jo Asif is a 41 y.o. female.  Right knee pain here for Visco      Objective:    BP (!) 184/93   Pulse 70   Ht 162.6 cm (64\")   Wt (!) 148 kg (326 lb)   LMP  (LMP Unknown)   BMI 55.96 kg/m²     Physical Examination:  Right knee has mild effusion no redness    Imaging:      Assessment:  Right knee degenerative joint disease    Plan:      Large Joint Arthrocentesis: R knee  Date/Time: 3/17/2022 8:54 AM  Consent given by: patient  Timeout: Immediately prior to procedure a time out was called to verify the correct patient, procedure, equipment, support staff and site/side marked as required   Supporting Documentation  Indications: pain   Procedure Details  Location: knee - R knee  Preparation: Patient was prepped and draped in the usual sterile fashion  Needle size: 22 G  Medications administered: 88 mg Hyaluronan 88 MG/4ML  Patient tolerance: patient tolerated the procedure well with no immediate complications        "

## 2022-03-18 ENCOUNTER — HOSPITAL ENCOUNTER (OUTPATIENT)
Facility: HOSPITAL | Age: 42
Setting detail: HOSPITAL OUTPATIENT SURGERY
Discharge: HOME | End: 2022-03-18
Attending: ORTHOPAEDIC SURGERY | Admitting: ORTHOPAEDIC SURGERY

## 2022-03-18 ENCOUNTER — ANESTHESIA (OUTPATIENT)
Dept: PERIOP | Facility: HOSPITAL | Age: 42
End: 2022-03-18

## 2022-03-18 VITALS
DIASTOLIC BLOOD PRESSURE: 56 MMHG | SYSTOLIC BLOOD PRESSURE: 104 MMHG | BODY MASS INDEX: 50.02 KG/M2 | HEART RATE: 66 BPM | TEMPERATURE: 97.6 F | WEIGHT: 293 LBS | RESPIRATION RATE: 16 BRPM | OXYGEN SATURATION: 94 % | HEIGHT: 64 IN

## 2022-03-18 DIAGNOSIS — M25.511 RIGHT SHOULDER PAIN, UNSPECIFIED CHRONICITY: Primary | ICD-10-CM

## 2022-03-18 LAB
B-HCG UR QL: NEGATIVE
GLUCOSE BLDC GLUCOMTR-MCNC: 118 MG/DL (ref 70–105)

## 2022-03-18 PROCEDURE — 29823 SHO ARTHRS SRG XTNSV DBRDMT: CPT | Performed by: NURSE PRACTITIONER

## 2022-03-18 PROCEDURE — 25010000002 DEXAMETHASONE PER 1 MG: Performed by: ANESTHESIOLOGIST ASSISTANT

## 2022-03-18 PROCEDURE — 25010000002 ONDANSETRON PER 1 MG: Performed by: ANESTHESIOLOGIST ASSISTANT

## 2022-03-18 PROCEDURE — 25010000002 CEFAZOLIN PER 500 MG

## 2022-03-18 PROCEDURE — 25010000002 KETOROLAC TROMETHAMINE PER 15 MG: Performed by: ORTHOPAEDIC SURGERY

## 2022-03-18 PROCEDURE — 25010000002 PROPOFOL 200 MG/20ML EMULSION: Performed by: ANESTHESIOLOGIST ASSISTANT

## 2022-03-18 PROCEDURE — 81025 URINE PREGNANCY TEST: CPT | Performed by: ANESTHESIOLOGY

## 2022-03-18 PROCEDURE — 82962 GLUCOSE BLOOD TEST: CPT

## 2022-03-18 PROCEDURE — 0 LIDOCAINE 1 % SOLUTION 20 ML VIAL: Performed by: ORTHOPAEDIC SURGERY

## 2022-03-18 PROCEDURE — 25010000002 EPINEPHRINE PER 0.1 MG: Performed by: ORTHOPAEDIC SURGERY

## 2022-03-18 PROCEDURE — 25010000002 HYDROMORPHONE PER 4 MG: Performed by: ANESTHESIOLOGIST ASSISTANT

## 2022-03-18 PROCEDURE — 25010000002 FENTANYL CITRATE (PF) 100 MCG/2ML SOLUTION: Performed by: ANESTHESIOLOGIST ASSISTANT

## 2022-03-18 PROCEDURE — 29823 SHO ARTHRS SRG XTNSV DBRDMT: CPT | Performed by: ORTHOPAEDIC SURGERY

## 2022-03-18 PROCEDURE — 25010000002 NEOSTIGMINE 5 MG/5ML SOLUTION: Performed by: ANESTHESIOLOGIST ASSISTANT

## 2022-03-18 RX ORDER — EPHEDRINE SULFATE 5 MG/ML
5 INJECTION INTRAVENOUS ONCE AS NEEDED
Status: DISCONTINUED | OUTPATIENT
Start: 2022-03-18 | End: 2022-03-18 | Stop reason: HOSPADM

## 2022-03-18 RX ORDER — FENTANYL CITRATE 50 UG/ML
50 INJECTION, SOLUTION INTRAMUSCULAR; INTRAVENOUS
Status: DISCONTINUED | OUTPATIENT
Start: 2022-03-18 | End: 2022-03-18 | Stop reason: HOSPADM

## 2022-03-18 RX ORDER — ROCURONIUM BROMIDE 10 MG/ML
INJECTION, SOLUTION INTRAVENOUS AS NEEDED
Status: DISCONTINUED | OUTPATIENT
Start: 2022-03-18 | End: 2022-03-18 | Stop reason: SURG

## 2022-03-18 RX ORDER — PROPOFOL 10 MG/ML
INJECTION, EMULSION INTRAVENOUS AS NEEDED
Status: DISCONTINUED | OUTPATIENT
Start: 2022-03-18 | End: 2022-03-18 | Stop reason: SURG

## 2022-03-18 RX ORDER — OXYCODONE HYDROCHLORIDE 5 MG/1
10 TABLET ORAL EVERY 4 HOURS PRN
Status: DISCONTINUED | OUTPATIENT
Start: 2022-03-18 | End: 2022-03-18 | Stop reason: HOSPADM

## 2022-03-18 RX ORDER — ACETAMINOPHEN 325 MG/1
650 TABLET ORAL ONCE AS NEEDED
Status: DISCONTINUED | OUTPATIENT
Start: 2022-03-18 | End: 2022-03-18 | Stop reason: HOSPADM

## 2022-03-18 RX ORDER — DIPHENHYDRAMINE HYDROCHLORIDE 50 MG/ML
12.5 INJECTION INTRAMUSCULAR; INTRAVENOUS
Status: DISCONTINUED | OUTPATIENT
Start: 2022-03-18 | End: 2022-03-18 | Stop reason: HOSPADM

## 2022-03-18 RX ORDER — FLUMAZENIL 0.1 MG/ML
0.5 INJECTION INTRAVENOUS AS NEEDED
Status: DISCONTINUED | OUTPATIENT
Start: 2022-03-18 | End: 2022-03-18 | Stop reason: HOSPADM

## 2022-03-18 RX ORDER — OXYCODONE HYDROCHLORIDE 5 MG/1
TABLET ORAL AS NEEDED
Status: DISCONTINUED | OUTPATIENT
Start: 2022-03-18 | End: 2022-03-18 | Stop reason: SURG

## 2022-03-18 RX ORDER — HYDROMORPHONE HCL 110MG/55ML
PATIENT CONTROLLED ANALGESIA SYRINGE INTRAVENOUS AS NEEDED
Status: DISCONTINUED | OUTPATIENT
Start: 2022-03-18 | End: 2022-03-18 | Stop reason: SURG

## 2022-03-18 RX ORDER — NEOSTIGMINE METHYLSULFATE 5 MG/5 ML
SYRINGE (ML) INTRAVENOUS AS NEEDED
Status: DISCONTINUED | OUTPATIENT
Start: 2022-03-18 | End: 2022-03-18 | Stop reason: SURG

## 2022-03-18 RX ORDER — OXYCODONE HYDROCHLORIDE 5 MG/1
5 TABLET ORAL ONCE AS NEEDED
Status: DISCONTINUED | OUTPATIENT
Start: 2022-03-18 | End: 2022-03-18 | Stop reason: HOSPADM

## 2022-03-18 RX ORDER — BUPIVACAINE HYDROCHLORIDE AND EPINEPHRINE 2.5; 5 MG/ML; UG/ML
INJECTION, SOLUTION EPIDURAL; INFILTRATION; INTRACAUDAL; PERINEURAL AS NEEDED
Status: DISCONTINUED | OUTPATIENT
Start: 2022-03-18 | End: 2022-03-18 | Stop reason: HOSPADM

## 2022-03-18 RX ORDER — DIPHENHYDRAMINE HCL 25 MG
25 CAPSULE ORAL
Status: DISCONTINUED | OUTPATIENT
Start: 2022-03-18 | End: 2022-03-18 | Stop reason: HOSPADM

## 2022-03-18 RX ORDER — HYDROMORPHONE HCL 110MG/55ML
0.5 PATIENT CONTROLLED ANALGESIA SYRINGE INTRAVENOUS
Status: DISCONTINUED | OUTPATIENT
Start: 2022-03-18 | End: 2022-03-18 | Stop reason: HOSPADM

## 2022-03-18 RX ORDER — ACETAMINOPHEN 650 MG/1
650 SUPPOSITORY RECTAL ONCE AS NEEDED
Status: DISCONTINUED | OUTPATIENT
Start: 2022-03-18 | End: 2022-03-18 | Stop reason: HOSPADM

## 2022-03-18 RX ORDER — ONDANSETRON 2 MG/ML
INJECTION INTRAMUSCULAR; INTRAVENOUS AS NEEDED
Status: DISCONTINUED | OUTPATIENT
Start: 2022-03-18 | End: 2022-03-18 | Stop reason: SURG

## 2022-03-18 RX ORDER — SODIUM CHLORIDE 0.9 % (FLUSH) 0.9 %
10 SYRINGE (ML) INJECTION AS NEEDED
Status: DISCONTINUED | OUTPATIENT
Start: 2022-03-18 | End: 2022-03-18 | Stop reason: HOSPADM

## 2022-03-18 RX ORDER — EPINEPHRINE 1 MG/ML
INJECTION, SOLUTION, CONCENTRATE INTRAVENOUS AS NEEDED
Status: DISCONTINUED | OUTPATIENT
Start: 2022-03-18 | End: 2022-03-18 | Stop reason: HOSPADM

## 2022-03-18 RX ORDER — MIDAZOLAM HYDROCHLORIDE 1 MG/ML
1 INJECTION INTRAMUSCULAR; INTRAVENOUS
Status: DISCONTINUED | OUTPATIENT
Start: 2022-03-18 | End: 2022-03-18 | Stop reason: HOSPADM

## 2022-03-18 RX ORDER — NALOXONE HCL 0.4 MG/ML
0.4 VIAL (ML) INJECTION AS NEEDED
Status: DISCONTINUED | OUTPATIENT
Start: 2022-03-18 | End: 2022-03-18 | Stop reason: HOSPADM

## 2022-03-18 RX ORDER — LORAZEPAM 2 MG/ML
1 INJECTION INTRAMUSCULAR
Status: DISCONTINUED | OUTPATIENT
Start: 2022-03-18 | End: 2022-03-18 | Stop reason: HOSPADM

## 2022-03-18 RX ORDER — CELECOXIB 200 MG/1
CAPSULE ORAL AS NEEDED
Status: DISCONTINUED | OUTPATIENT
Start: 2022-03-18 | End: 2022-03-18 | Stop reason: SURG

## 2022-03-18 RX ORDER — MEPERIDINE HYDROCHLORIDE 25 MG/ML
12.5 INJECTION INTRAMUSCULAR; INTRAVENOUS; SUBCUTANEOUS
Status: DISCONTINUED | OUTPATIENT
Start: 2022-03-18 | End: 2022-03-18 | Stop reason: HOSPADM

## 2022-03-18 RX ORDER — DEXAMETHASONE SODIUM PHOSPHATE 4 MG/ML
INJECTION, SOLUTION INTRA-ARTICULAR; INTRALESIONAL; INTRAMUSCULAR; INTRAVENOUS; SOFT TISSUE AS NEEDED
Status: DISCONTINUED | OUTPATIENT
Start: 2022-03-18 | End: 2022-03-18 | Stop reason: SURG

## 2022-03-18 RX ORDER — SODIUM CHLORIDE, SODIUM LACTATE, POTASSIUM CHLORIDE, CALCIUM CHLORIDE 600; 310; 30; 20 MG/100ML; MG/100ML; MG/100ML; MG/100ML
20 INJECTION, SOLUTION INTRAVENOUS CONTINUOUS
Status: DISCONTINUED | OUTPATIENT
Start: 2022-03-18 | End: 2022-03-18 | Stop reason: HOSPADM

## 2022-03-18 RX ORDER — ONDANSETRON 2 MG/ML
4 INJECTION INTRAMUSCULAR; INTRAVENOUS ONCE AS NEEDED
Status: DISCONTINUED | OUTPATIENT
Start: 2022-03-18 | End: 2022-03-18 | Stop reason: HOSPADM

## 2022-03-18 RX ORDER — ACETAMINOPHEN 500 MG
TABLET ORAL AS NEEDED
Status: DISCONTINUED | OUTPATIENT
Start: 2022-03-18 | End: 2022-03-18 | Stop reason: SURG

## 2022-03-18 RX ORDER — LIDOCAINE HYDROCHLORIDE 10 MG/ML
0.5 INJECTION, SOLUTION INFILTRATION; PERINEURAL ONCE AS NEEDED
Status: DISCONTINUED | OUTPATIENT
Start: 2022-03-18 | End: 2022-03-18 | Stop reason: HOSPADM

## 2022-03-18 RX ORDER — LABETALOL HYDROCHLORIDE 5 MG/ML
5 INJECTION, SOLUTION INTRAVENOUS
Status: DISCONTINUED | OUTPATIENT
Start: 2022-03-18 | End: 2022-03-18 | Stop reason: HOSPADM

## 2022-03-18 RX ORDER — CEFAZOLIN SODIUM IN 0.9 % NACL 3 G/100 ML
3 INTRAVENOUS SOLUTION, PIGGYBACK (ML) INTRAVENOUS ONCE
Status: COMPLETED | OUTPATIENT
Start: 2022-03-18 | End: 2022-03-18

## 2022-03-18 RX ORDER — IPRATROPIUM BROMIDE AND ALBUTEROL SULFATE 2.5; .5 MG/3ML; MG/3ML
3 SOLUTION RESPIRATORY (INHALATION) ONCE AS NEEDED
Status: DISCONTINUED | OUTPATIENT
Start: 2022-03-18 | End: 2022-03-18 | Stop reason: HOSPADM

## 2022-03-18 RX ORDER — GLYCOPYRROLATE 1 MG/5 ML
SYRINGE (ML) INTRAVENOUS AS NEEDED
Status: DISCONTINUED | OUTPATIENT
Start: 2022-03-18 | End: 2022-03-18 | Stop reason: SURG

## 2022-03-18 RX ORDER — ONDANSETRON 2 MG/ML
4 INJECTION INTRAMUSCULAR; INTRAVENOUS ONCE AS NEEDED
Status: COMPLETED | OUTPATIENT
Start: 2022-03-18 | End: 2022-03-18

## 2022-03-18 RX ORDER — FENTANYL CITRATE 50 UG/ML
INJECTION, SOLUTION INTRAMUSCULAR; INTRAVENOUS AS NEEDED
Status: DISCONTINUED | OUTPATIENT
Start: 2022-03-18 | End: 2022-03-18 | Stop reason: SURG

## 2022-03-18 RX ORDER — HYDRALAZINE HYDROCHLORIDE 20 MG/ML
5 INJECTION INTRAMUSCULAR; INTRAVENOUS
Status: DISCONTINUED | OUTPATIENT
Start: 2022-03-18 | End: 2022-03-18 | Stop reason: HOSPADM

## 2022-03-18 RX ORDER — LIDOCAINE HYDROCHLORIDE 10 MG/ML
INJECTION, SOLUTION EPIDURAL; INFILTRATION; INTRACAUDAL; PERINEURAL AS NEEDED
Status: DISCONTINUED | OUTPATIENT
Start: 2022-03-18 | End: 2022-03-18 | Stop reason: SURG

## 2022-03-18 RX ADMIN — HYDROMORPHONE HYDROCHLORIDE 1 MG: 2 INJECTION, SOLUTION INTRAMUSCULAR; INTRAVENOUS; SUBCUTANEOUS at 12:11

## 2022-03-18 RX ADMIN — ROCURONIUM BROMIDE 50 MG: 10 INJECTION INTRAVENOUS at 11:55

## 2022-03-18 RX ADMIN — Medication 1 MG: at 12:36

## 2022-03-18 RX ADMIN — Medication 5 MG: at 12:36

## 2022-03-18 RX ADMIN — OXYCODONE 10 MG: 5 TABLET ORAL at 11:37

## 2022-03-18 RX ADMIN — PROPOFOL 200 MG: 10 INJECTION, EMULSION INTRAVENOUS at 11:55

## 2022-03-18 RX ADMIN — LIDOCAINE HYDROCHLORIDE 50 MG: 10 INJECTION, SOLUTION EPIDURAL; INFILTRATION; INTRACAUDAL at 11:55

## 2022-03-18 RX ADMIN — ONDANSETRON 4 MG: 2 INJECTION INTRAMUSCULAR; INTRAVENOUS at 15:04

## 2022-03-18 RX ADMIN — HYDROMORPHONE HYDROCHLORIDE 0.5 MG: 2 INJECTION, SOLUTION INTRAMUSCULAR; INTRAVENOUS; SUBCUTANEOUS at 12:54

## 2022-03-18 RX ADMIN — SODIUM CHLORIDE, POTASSIUM CHLORIDE, SODIUM LACTATE AND CALCIUM CHLORIDE 20 ML/HR: 600; 310; 30; 20 INJECTION, SOLUTION INTRAVENOUS at 11:24

## 2022-03-18 RX ADMIN — HYDROMORPHONE HYDROCHLORIDE 0.5 MG: 2 INJECTION, SOLUTION INTRAMUSCULAR; INTRAVENOUS; SUBCUTANEOUS at 12:46

## 2022-03-18 RX ADMIN — ONDANSETRON 4 MG: 2 INJECTION INTRAMUSCULAR; INTRAVENOUS at 12:36

## 2022-03-18 RX ADMIN — FENTANYL CITRATE 100 MCG: 50 INJECTION, SOLUTION INTRAMUSCULAR; INTRAVENOUS at 11:55

## 2022-03-18 RX ADMIN — ACETAMINOPHEN 1000 MG: 500 TABLET, FILM COATED ORAL at 11:37

## 2022-03-18 RX ADMIN — Medication 3 G: at 11:59

## 2022-03-18 RX ADMIN — DEXAMETHASONE SODIUM PHOSPHATE 4 MG: 4 INJECTION, SOLUTION INTRAMUSCULAR; INTRAVENOUS at 12:06

## 2022-03-18 RX ADMIN — CELECOXIB 400 MG: 200 CAPSULE ORAL at 11:37

## 2022-03-18 NOTE — OP NOTE
SHOULDER ARTHROSCOPY WITH ROTATOR CUFF REPAIR  Procedure Report    Patient Name:  Jo Asif  YOB: 1980    Date of Surgery:  3/18/2022     Indications: This is a 41 y.o. female with pain to the right shoulder.  Imaging demonstrated rotator cuff tear.Treatment options were discussed.  They desired to proceed with shoulder arthroscopy with rotator cuff repair after discussing the risks including bleeding, scarring, infection, stiffness, nerve damage, tendon damage, artery damage, continued pain, DVT, loss of life or limb, and a need for further surgery.      Pre-op Diagnosis:   Right shoulder partial supraspinatus tear       Post-op Diagnosis:    Same plus labral tear    Procedure/CPT® Codes: 48525    Procedure(s):  SHOULDER ARTHROSCOPY WITH extensive debridement    Assistant: Ekaterina Rubio    was responsible for performing the following activities: Retraction, Suction, Irrigation, Suturing, Closing and Placing Dressing and their skilled assistance was necessary for the success of this case.         Anesthesia: General with Block    IV fluids: See anesthesia record    Estimated Blood Loss: minimal    Implants:    Nothing was implanted during the procedure      Complications: None    Specimens:none    Description of Procedure: The patient's operative site was marked.    They were brought to the operating room and placed  on the operating room table.  General anesthesia was administered. Antibiotics were dosed.  A timeout was taken, confirming the correct operative site and procedure.  Exam under anesthesia indicated full range of motion and no instability.  They were placed in a semilateral position.  An axillary roll and SCDs were placed.  The right shoulder was prepped and draped in the standard surgical fashion.  The shoulder was injected with local anesthetic and was placed into traction.  A posterior portal was created.  A camera was inserted.  The glenoid chondral surface was intact.  The  humerus surface was intact.  The subscapularis was intact.  The biceps was intact.  The labrum was torn circumferentially during the bicep anchor.  The undersurface of the cuff demonstrated partial anterior supraspinatus tear. A shaver was inserted.  The labrum debrided circumferentially including the bicep anchor, along with the anterior supraspinatus tear tear length was just less than a centimeter or 2 mm in depth.  The biceps was pulled into the shoulder and found to be intact.  The axillary pouch was free of synovitis or loose bodies. The instruments were placed in the subacromial space.  A full-thickness bursectomy was performed.  The CA ligament was intact.  No impingement was noted.  Dorsal surface of the cuff was intact The instruments were removed.  The wounds were closed with suture and Steri-Strips.    30 mg of Toradol and lidocaine were injected into the deltoid  and a sterile dressing was applied to the rest of the shoulder.  They were placed in a sling and taken to the recovery room.  There were no complications.  I was present for all portions.  All counts were correct.  Good capillary refill was noted to the hand.      Kb Gaviria MD     Date: 3/18/2022  Time: 12:41 EDT

## 2022-03-18 NOTE — ANESTHESIA PROCEDURE NOTES
" Airway  Urgency: elective    Date/Time: 3/18/2022 11:57 AM  End Time:3/18/2022 12:00 PM  Airway not difficult    General Information and Staff    Patient location during procedure: OR  Anesthesiologist: Jeremy Santizo MD  CRNA: Iris Phillips CAA    Indications and Patient Condition  Indications for airway management: airway protection    Preoxygenated: yes  Mask difficulty assessment: 1 - vent by mask    Final Airway Details  Final airway type: endotracheal airway      Successful airway: ETT  Cuffed: yes   Successful intubation technique: direct laryngoscopy  Facilitating devices/methods: cricoid pressure and Bougie  Endotracheal tube insertion site: oral  Blade: Tiffanie  Blade size: 3  ETT size (mm): 7.0  Cormack-Lehane Classification: grade IIb - view of arytenoids or posterior of glottis only  Placement verified by: chest auscultation, capnometry and palpation of cuff   Measured from: lips  ETT/EBT  to lips (cm): 21  Number of attempts at approach: 2  Assessment: lips, teeth, and gum same as pre-op and atraumatic intubation    Additional Comments  Pt did not have much neck extension, is very \"top-heavy\" and has a slightly anterior airway, so it was difficult to get a view, and LR was unsuccessful at the first attempt.  TR was able to get a better view and place the tube with a bougie.  Pt has poor dentition and gums were bleeding at the base of the teeth prior to intubation.  No trauma or bleeding occurred as a result of DL or intubation.            "

## 2022-03-18 NOTE — ANESTHESIA PREPROCEDURE EVALUATION
Anesthesia Evaluation     Patient summary reviewed and Nursing notes reviewed   no history of anesthetic complications:  NPO Solid Status: > 8 hours  NPO Liquid Status: > 8 hours           Airway   Dental      Pulmonary    (+) a smoker Former, asthma,sleep apnea,   Cardiovascular     ECG reviewed    (+) hypertension, valvular problems/murmurs MR and murmur, dysrhythmias Paroxysmal Atrial Fib, PVC, hyperlipidemia,       Neuro/Psych  (+) headaches,    GI/Hepatic/Renal/Endo    (+) morbid obesity, GERD,  thyroid problem hypothyroidism    Musculoskeletal     Abdominal    Substance History      OB/GYN          Other        ROS/Med Hx Other: Near syncope, palpitations, PKU, migraines, shoulder pain, knee pain, PCOS, pre DM    Echo  Clinically difficult study therefore Lumason contrast was used.  Normal LV size and contractility EF of 60 to 65%  Normal RV size  Normal atrial size  Aortic valve, mitral valve, tricuspid valve appears structurally normal, mild mitral regurgitation seen.  No pericardial effusion seen.  Proximal aorta appears normal in size.    Stress  · Findings consistent with a normal ECG stress test.  · Left ventricular ejection fraction is normal. (Calculated EF = 62%).  · Myocardial perfusion imaging indicates a normal myocardial perfusion study with no evidence of ischemia.  · Impressions are consistent with a low risk study.  · PVCs were noted during the stress test.    PSH  CARPAL TUNNEL RELEASE TONSILLECTOMY  TUBAL ABDOMINAL LIGATION UVULECTOMY  CARDIAC ELECTROPHYSIOLOGY PROCEDURE ABLATION OF DYSRHYTHMIC FOCUS  KNEE ARTHROSCOPY                   Anesthesia Plan    ASA 4     general   (Patient identified; pre-operative vital signs, all relevant labs/studies, complete medical/surgical/anesthetic history, full medication list, full allergy list, and NPO status obtained/reviewed; physical assessment performed; anesthetic options, side effects, potential complications, risks, and benefits discussed;  questions answered; written anesthesia consent obtained; patient cleared for procedure; anesthesia machine and equipment checked and functioning    No block - respiratory distress with last block)  intravenous induction     Anesthetic plan, all risks, benefits, and alternatives have been provided, discussed and informed consent has been obtained with: patient.    Plan discussed with CRNA and CAA.        CODE STATUS:

## 2022-03-18 NOTE — ANESTHESIA POSTPROCEDURE EVALUATION
Patient: Jo Asif    Procedure Summary     Date: 03/18/22 Room / Location: [facility] OR 12 / [facility] MAIN OR    Anesthesia Start: 1149 Anesthesia Stop: 1301    Procedure: SHOULDER ARTHROSCOPY WITH DEBRIDEMENT (Right Shoulder) Diagnosis:       Right shoulder pain, unspecified chronicity      (Right shoulder pain, unspecified chronicity [M25.511])    Surgeons: Kb Gaviria MD Provider: Jeremy Santizo MD    Anesthesia Type: general ASA Status: 4          Anesthesia Type: general    Vitals  Vitals Value Taken Time   /79 03/18/22 1315   Temp 97.3 °F (36.3 °C) 03/18/22 1259   Pulse 61 03/18/22 1317   Resp 11 03/18/22 1259   SpO2 100 % 03/18/22 1317   Vitals shown include unvalidated device data.        Post Anesthesia Care and Evaluation    Patient location during evaluation: PACU  Patient participation: complete - patient participated  Level of consciousness: awake  Pain scale: See nurse's notes for pain score.  Pain management: adequate  Airway patency: patent  Anesthetic complications: No anesthetic complications  PONV Status: none  Cardiovascular status: acceptable  Respiratory status: acceptable  Hydration status: acceptable    Comments: Patient seen and examined postoperatively; vital signs stable; SpO2 greater than or equal to 90%; cardiopulmonary status stable; nausea/vomiting adequately controlled; pain adequately controlled; no apparent anesthesia complications; patient discharged from anesthesia care when discharge criteria were met

## 2022-03-21 ENCOUNTER — OFFICE VISIT (OUTPATIENT)
Dept: ORTHOPEDIC SURGERY | Facility: CLINIC | Age: 42
End: 2022-03-21

## 2022-03-21 VITALS
DIASTOLIC BLOOD PRESSURE: 76 MMHG | HEIGHT: 64 IN | WEIGHT: 293 LBS | BODY MASS INDEX: 50.02 KG/M2 | HEART RATE: 55 BPM | SYSTOLIC BLOOD PRESSURE: 192 MMHG

## 2022-03-21 DIAGNOSIS — Z47.89 ORTHOPEDIC AFTERCARE: Primary | ICD-10-CM

## 2022-03-21 PROCEDURE — 99024 POSTOP FOLLOW-UP VISIT: CPT | Performed by: ORTHOPAEDIC SURGERY

## 2022-03-21 RX ORDER — BUDESONIDE AND FORMOTEROL FUMARATE DIHYDRATE 160; 4.5 UG/1; UG/1
2 AEROSOL RESPIRATORY (INHALATION)
COMMUNITY
End: 2022-08-09 | Stop reason: SDUPTHER

## 2022-03-21 NOTE — PROGRESS NOTES
Patient ID: Jo Asif is a 41 y.o. female.    3/18/22 right shoulder arthroscopy with extensive debridement  Pain controlled   objective:    There were no vitals taken for this visit.    Physical Examination:    Wounds are well approximated without infection.  Sensory and motor exam are intact in all distributions. Radial pulse is palpable and capillary refill is less than two seconds to all digits.    Imaging:      Assessment:  Doing well after shoulder arthroscopy    Plan:  Wounds were cleaned and redressed. Restrictions discussed.  Begin therapy and see me in 4 weeks.  Remove dressings in two weeks.    Procedures

## 2022-03-21 NOTE — PATIENT INSTRUCTIONS
Shoulder Arthroscopy: Post- Operative Visit Objectives    Review the operative findings, procedures and photos.  Make sure medications are effective and not causing problems.  Pain: Oxycodone or hydrocodone is for pain. You may take 1 tablet every 6 hours as necessary.  Some patients don’t require the use of these…in those circumstances just use Tylenol extra-strength 1 or 2 tablets every 4-6 hours.   Naproxen 500 mg. For pain and inflammation.  You should take 1 every twice a day.  Do not use Aleve, Ibuprofen, Motrin or Advil during this time.  If you have had any problems stop taking these medicines and please tell us!  Keflex (cephalexin). This is an antibiotic to be taken as a preventive medicine.  Take 1 pill every 6 hours for 1 day. If you have a penicillin allergy this will be replaced by other options.  Wound Care:  Today we will change your dressings and cover your wounds with a plastic covering called Tegaderm. This will allow you to shower immediately.  Remove the tegaderm and underlying dressings in two weeks then ok to get wet in a shower. No Baths or swimming until 4 weeks after surgery.  Keep a towel on the dressing while applying ice.  Exercises and Physical Therapy Remember the protocol is 3 phases:  Healing (6 wks)  Continue the use of the sling for 6 weeks; depending on procedure utilized this may be shorter. You can do gentle range of motion exercise of the elbow and wrist immediately with the arm at the side.  Formal physical therapy will usually start in two weeks.    Rehabilitative (6 wks) You begin a more aggressive phase of physical therapy at the 6 week philippe. Light strengthening and a continued emphasis on protecting the repair are important at this stage.  Restorative (4 wks)  Back to your sports and job activities gradually   Follow Up appointments Schedule Follow up visits as directed usually in 4 weeks. Physical therapy will be ordered today and you should receive a phone call or can  schedule yourself if appropriate.  Notes  Make sure you have all necessary notes and documentation for school or work.  Issues: Remember our goal is to make this process smooth and easy if there is any thing you need please ask us or call back [phone]

## 2022-04-12 RX ORDER — MELOXICAM 15 MG/1
15 TABLET ORAL DAILY PRN
Qty: 30 TABLET | Refills: 4 | Status: SHIPPED | OUTPATIENT
Start: 2022-04-12 | End: 2022-07-12

## 2022-04-18 ENCOUNTER — OFFICE VISIT (OUTPATIENT)
Dept: ORTHOPEDIC SURGERY | Facility: CLINIC | Age: 42
End: 2022-04-18

## 2022-04-18 VITALS
BODY MASS INDEX: 50.02 KG/M2 | DIASTOLIC BLOOD PRESSURE: 127 MMHG | HEART RATE: 78 BPM | SYSTOLIC BLOOD PRESSURE: 158 MMHG | HEIGHT: 64 IN | OXYGEN SATURATION: 100 % | WEIGHT: 293 LBS

## 2022-04-18 DIAGNOSIS — Z47.89 ORTHOPEDIC AFTERCARE: Primary | ICD-10-CM

## 2022-04-18 PROCEDURE — 99024 POSTOP FOLLOW-UP VISIT: CPT | Performed by: ORTHOPAEDIC SURGERY

## 2022-04-18 RX ORDER — ALBUTEROL SULFATE 90 UG/1
AEROSOL, METERED RESPIRATORY (INHALATION)
COMMUNITY
Start: 2022-04-01 | End: 2022-08-09 | Stop reason: SDUPTHER

## 2022-04-18 NOTE — PROGRESS NOTES
"     Patient ID: Jo Asif is a 42 y.o. female.    3/18/22 right shoulder arthroscopy with extensive debridement  Pain controlled     Objective:    BP (!) 158/127 (BP Location: Left arm, Patient Position: Sitting, Cuff Size: Large Adult)   Pulse 78   Ht 162.6 cm (64\")   Wt (!) 153 kg (338 lb)   SpO2 100%   BMI 58.02 kg/m²     Physical Examination:  Incisions are healed passive elevation 170 abduction 150 external rotation 50       Imaging:      Assessment:  Doing well after shoulder arthroscopy    Plan:  Begin light strengthening, continue low impact exercise for the knee will resubmit her bariatric referral see me in a month    Procedures  "

## 2022-04-21 ENCOUNTER — OFFICE VISIT (OUTPATIENT)
Dept: FAMILY MEDICINE CLINIC | Facility: CLINIC | Age: 42
End: 2022-04-21

## 2022-04-21 ENCOUNTER — PATIENT ROUNDING (BHMG ONLY) (OUTPATIENT)
Dept: FAMILY MEDICINE CLINIC | Facility: CLINIC | Age: 42
End: 2022-04-21

## 2022-04-21 VITALS
BODY MASS INDEX: 50.02 KG/M2 | SYSTOLIC BLOOD PRESSURE: 150 MMHG | OXYGEN SATURATION: 98 % | HEART RATE: 94 BPM | WEIGHT: 293 LBS | DIASTOLIC BLOOD PRESSURE: 80 MMHG | HEIGHT: 64 IN

## 2022-04-21 DIAGNOSIS — I10 PRIMARY HYPERTENSION: Primary | ICD-10-CM

## 2022-04-21 DIAGNOSIS — J30.89 NON-SEASONAL ALLERGIC RHINITIS, UNSPECIFIED TRIGGER: ICD-10-CM

## 2022-04-21 DIAGNOSIS — G43.709 CHRONIC MIGRAINE WITHOUT AURA WITHOUT STATUS MIGRAINOSUS, NOT INTRACTABLE: ICD-10-CM

## 2022-04-21 DIAGNOSIS — G47.33 OSA (OBSTRUCTIVE SLEEP APNEA): ICD-10-CM

## 2022-04-21 DIAGNOSIS — E03.9 ACQUIRED HYPOTHYROIDISM: ICD-10-CM

## 2022-04-21 DIAGNOSIS — Z86.79 HISTORY OF ATRIAL FIBRILLATION: ICD-10-CM

## 2022-04-21 DIAGNOSIS — F32.1 CURRENT MODERATE EPISODE OF MAJOR DEPRESSIVE DISORDER WITHOUT PRIOR EPISODE: ICD-10-CM

## 2022-04-21 DIAGNOSIS — F41.9 ANXIETY: ICD-10-CM

## 2022-04-21 DIAGNOSIS — K21.9 GASTROESOPHAGEAL REFLUX DISEASE WITHOUT ESOPHAGITIS: ICD-10-CM

## 2022-04-21 DIAGNOSIS — J45.20 MILD INTERMITTENT ASTHMA WITHOUT COMPLICATION: ICD-10-CM

## 2022-04-21 DIAGNOSIS — R73.03 PREDIABETES: ICD-10-CM

## 2022-04-21 DIAGNOSIS — E70.1 PKU (PHENYLKETONURIA): ICD-10-CM

## 2022-04-21 PROCEDURE — 99386 PREV VISIT NEW AGE 40-64: CPT | Performed by: NURSE PRACTITIONER

## 2022-04-21 RX ORDER — FLUOXETINE 10 MG/1
10 CAPSULE ORAL DAILY
Qty: 30 CAPSULE | Refills: 1 | Status: SHIPPED | OUTPATIENT
Start: 2022-04-21 | End: 2022-06-02 | Stop reason: SDUPTHER

## 2022-04-21 RX ORDER — HYDROXYZINE HYDROCHLORIDE 10 MG/1
10 TABLET, FILM COATED ORAL EVERY 8 HOURS PRN
Qty: 30 TABLET | Refills: 0 | Status: SHIPPED | OUTPATIENT
Start: 2022-04-21 | End: 2022-05-13 | Stop reason: SDUPTHER

## 2022-04-21 RX ORDER — LISINOPRIL AND HYDROCHLOROTHIAZIDE 12.5; 1 MG/1; MG/1
1 TABLET ORAL DAILY
Qty: 30 TABLET | Refills: 2 | Status: SHIPPED | OUTPATIENT
Start: 2022-04-21 | End: 2022-07-18

## 2022-04-21 RX ORDER — OMEPRAZOLE 40 MG/1
40 CAPSULE, DELAYED RELEASE ORAL DAILY
Qty: 30 CAPSULE | Refills: 2 | Status: SHIPPED | OUTPATIENT
Start: 2022-04-21 | End: 2022-05-13 | Stop reason: SDUPTHER

## 2022-04-21 NOTE — PROGRESS NOTES
"Chief Complaint  Annual Exam, Heartburn, Irritable Bowel Syndrome (Pt reports bowels are ranging from diarrhea to constipation, wondering if related to increase metformin), Med Refill (Pt would like rescue inhaler and needs other inhalers refilled), and Asthma    Subjective          Jo Asif presents to [facility] for   History of Present Illness     New patient presents today for annual exam and to establish care with new provider.  She has past medical history of asthma, atrial fibrillation, hypothyroidism, GERD, heart murmur, hypertension, migraines, PCOS, sleep apnea, arrhythmia and phenylketonuria.    Atrial fibrillation, saw Dr. Watkins years ago, has murmur. Saw Dr. Andrews for EP ablation 2020, she regained NSR and is no longer on anticoagulation. She was discharged from Dr. Watkins to follow only as needed.    Stress Test With Myocardial Perfusion One Day (02/19/2022 10:47)    Diabetes mellitus type 2, on metformin  mg daily.  She reports alternating diarrhea/constipation. denies any signs/symptoms of hyper/hypoglycemia, blurry vision, polydipsia, polyuria, nocturia, and unintentional weight loss. She follows with Dr. Bingham    GERD in past, she reports daily upset stomach, alternating bowels, cramping, tried pepto, otc omep, tums with some relief. She denies anxiety/stress \"but has a lot on her plate\"    PKU, on pegvaliase 10 mg 3 times weekly, follows with Tk Leslie    Migraines, on Ajovy and Ubrelvy, follows with Dr. Jacob    Hypothyroidism, on levothyroxine, last TSH reportedly normal.  Follows with Dr. Bingham    Allergy/Asthma, worse, c/o soa and steele, cough, recently started back on Symbicort and albuterol inhalers. Allergy tested several years ago w/ allergy to \"anything that grows out of the ground\" environmental triggers and foods.     Right shoulder pain, recently underwent right shoulder arthroscopic debridement per Dr. Gaviria, she was later admitted " d/t chest pain. Stress test wnl.     FRANK, follows with Dr. Jacob, working on new machine d/t recall.     Anxiety/depression, cries, overwhelmed,  trying to find a job.  See PHQ-9 below     mammo and pap per Dr. Riya Armas GYN, reports abn menses, uterine biopsy normal. Completed provera and cycles now seem to be more normal  -mammo reportedly normal last year      PHQ-9 Depression Screening  Little interest or pleasure in doing things? 2-->more than half the days   Feeling down, depressed, or hopeless? 2-->more than half the days   Trouble falling or staying asleep, or sleeping too much? 3-->nearly every day   Feeling tired or having little energy? 3-->nearly every day   Poor appetite or overeating? 2-->more than half the days   Feeling bad about yourself - or that you are a failure or have let yourself or your family down? 3-->nearly every day   Trouble concentrating on things, such as reading the newspaper or watching television? 0-->not at all   Moving or speaking so slowly that other people could have noticed? Or the opposite - being so fidgety or restless that you have been moving around a lot more than usual? 0-->not at all   Thoughts that you would be better off dead, or of hurting yourself in some way? 0-->not at all   PHQ-9 Total Score 15   If you checked off any problems, how difficult have these problems made it for you to do your work, take care of things at home, or get along with other people? somewhat difficult               The following portions of the patient's history were reviewed and updated as appropriate: allergies, current medications, past family history, past medical history, past social history, past surgical history and problem list.    Past Medical History:   Diagnosis Date   • Arrhythmia    • Asthma    • Atrial fibrillation (HCC)    • Disease of thyroid gland    • GERD (gastroesophageal reflux disease)    • Heart murmur    • Hypertension    • Migraine    • PCOS (polycystic  ovarian syndrome)    • PKU (phenylketonuria) ([facility])    • Prediabetes    • Rotator cuff tear 2022   • Sleep apnea    • Torn meniscus 2021     Past Surgical History:   Procedure Laterality Date   • ABLATION OF DYSRHYTHMIC FOCUS     • CARDIAC ELECTROPHYSIOLOGY PROCEDURE N/A 6/10/2020    Procedure: EP/Ablation;  Surgeon: Joseph Andrews MD;  Location: [facility] CATH INVASIVE LOCATION;  Service: Cardiovascular;  Laterality: N/A;   • CARPAL TUNNEL RELEASE Right    • KNEE ARTHROSCOPY Right 3/26/2021    Procedure: KNEE ARTHROSCOPY with  partial medial lateral meniscectomy;  Surgeon: Kb Gaviria MD;  Location: [facility] MAIN OR;  Service: Orthopedics;  Laterality: Right;   • SHOULDER ARTHROSCOPY W/ ROTATOR CUFF REPAIR Right 3/18/2022    Procedure: SHOULDER ARTHROSCOPY WITH DEBRIDEMENT;  Surgeon: Kb Gaviria MD;  Location: [facility] MAIN OR;  Service: Orthopedics;  Laterality: Right;   • TONSILLECTOMY     • TUBAL ABDOMINAL LIGATION     • UVULECTOMY       Family History   Problem Relation Age of Onset   • Diabetes Mother    • Heart disease Father    • Cancer Father      Social History     Tobacco Use   • Smoking status: Former Smoker     Packs/day: 0.10     Years: 6.00     Pack years: 0.60     Types: Cigarettes     Quit date: 2009     Years since quittin.6   • Smokeless tobacco: Never Used   Substance Use Topics   • Alcohol use: No       Current Outpatient Medications:   •  Ajovy 225 MG/1.5ML solution prefilled syringe, Every 30 (Thirty) Days. For migraines, Disp: , Rfl:   •  albuterol sulfate  (90 Base) MCG/ACT inhaler, , Disp: , Rfl:   •  budesonide-formoterol (SYMBICORT) 160-4.5 MCG/ACT inhaler, Inhale 2 puffs 2 (Two) Times a Day., Disp: , Rfl:   •  Diclofenac Sodium (VOLTAREN) 1 % gel gel, Apply 4 g topically to the appropriate area as directed 4 (Four) Times a Day As Needed. Stop 3-11, Disp: , Rfl:   •  EPINEPHrine (EPIPEN) 0.3 MG/0.3ML solution auto-injector injection, Inject 0.3  "mg into the appropriate muscle as directed by prescriber., Disp: , Rfl:   •  HYDROcodone-acetaminophen (NORCO) 5-325 MG per tablet, Take 1 tablet by mouth 3 (Three) Times a Day., Disp: , Rfl:   •  levothyroxine (SYNTHROID, LEVOTHROID) 75 MCG tablet, , Disp: , Rfl:   •  meloxicam (MOBIC) 15 MG tablet, Take 1 tablet by mouth Daily As Needed for Moderate Pain ., Disp: 30 tablet, Rfl: 4  •  metFORMIN ER (GLUCOPHAGE-XR) 750 MG 24 hr tablet, Take 750 mg by mouth Daily With Breakfast. To increase to BID 3-16-22, except  LD 3-16 am, Disp: , Rfl:   •  Pegvaliase-pqpz 20 MG/ML solution prefilled syringe, Inject 10 mg under the skin into the appropriate area as directed 3 (Three) Times a Week. Monday, Wednesday and Friday, Disp: , Rfl:   •  ubrogepant (ubrogepant) 100 MG tablet, Take  by mouth., Disp: , Rfl:   •  vitamin B-12 (CYANOCOBALAMIN) 100 MCG tablet, Take 50 mcg by mouth Daily. Stop 3-11, Disp: , Rfl:   •  FLUoxetine (PROzac) 10 MG capsule, Take 1 capsule by mouth Daily., Disp: 30 capsule, Rfl: 1  •  hydrOXYzine (ATARAX) 10 MG tablet, Take 1 tablet by mouth Every 8 (Eight) Hours As Needed for Anxiety., Disp: 30 tablet, Rfl: 0  •  lisinopril-hydrochlorothiazide (Zestoretic) 10-12.5 MG per tablet, Take 1 tablet by mouth Daily., Disp: 30 tablet, Rfl: 2  •  omeprazole (priLOSEC) 40 MG capsule, Take 1 capsule by mouth Daily., Disp: 30 capsule, Rfl: 2  •  tiotropium bromide monohydrate (SPIRIVA RESPIMAT) 2.5 MCG/ACT aerosol solution inhaler, Inhale 2 puffs Daily., Disp: 1 each, Rfl: 0    Objective   Vital Signs:   /80 (BP Location: Left arm, Patient Position: Sitting, Cuff Size: Thigh Adult)   Pulse 94   Ht 162.6 cm (64.02\")   Wt (!) 158 kg (348 lb)   SpO2 98%   BMI 59.70 kg/m²       Physical Exam  Vitals and nursing note reviewed.   Constitutional:       General: She is not in acute distress.     Appearance: She is well-developed. She is obese. She is not diaphoretic.   Eyes:      Pupils: Pupils are equal, round, " and reactive to light.   Neck:      Thyroid: No thyromegaly.      Vascular: No JVD.   Cardiovascular:      Rate and Rhythm: Normal rate and regular rhythm.      Heart sounds: Murmur (GII/VI) heard.   Pulmonary:      Effort: Pulmonary effort is normal. No respiratory distress.      Breath sounds: Normal breath sounds.   Abdominal:      General: Bowel sounds are normal. There is no distension.      Palpations: Abdomen is soft.      Tenderness: There is no abdominal tenderness.   Musculoskeletal:         General: Swelling (+1 pitting BLE) present. No tenderness. Normal range of motion.      Cervical back: Normal range of motion and neck supple.   Skin:     General: Skin is warm and dry.   Neurological:      Mental Status: She is alert and oriented to person, place, and time.      Sensory: No sensory deficit.   Psychiatric:         Attention and Perception: Attention normal.         Mood and Affect: Mood is anxious and depressed. Affect is tearful.         Speech: Speech normal.         Behavior: Behavior normal.         Thought Content: Thought content normal.         Judgment: Judgment normal.          Result Review :     No visits with results within 7 Day(s) from this visit.   Latest known visit with results is:   Admission on 03/18/2022, Discharged on 03/18/2022   Component Date Value Ref Range Status   • HCG, Urine QL 03/18/2022 Negative  Negative Final   • Glucose 03/18/2022 118 (A) 70 - 105 mg/dL Final    Serial Number: [id number]:  [id number]                       Assessment and Plan    Diagnoses and all orders for this visit:    1. Primary hypertension (Primary)  Comments:  Recommend start lisinopril with HCTZ for renal protection in a diabetic, for hypertension and edema.   Orders:  -     lisinopril-hydrochlorothiazide (Zestoretic) 10-12.5 MG per tablet; Take 1 tablet by mouth Daily.  Dispense: 30 tablet; Refill: 2    2. Gastroesophageal reflux disease without esophagitis  Comments:  Recommend trial of  Prilosec  Orders:  -     omeprazole (priLOSEC) 40 MG capsule; Take 1 capsule by mouth Daily.  Dispense: 30 capsule; Refill: 2    3. History of atrial fibrillation    4. Prediabetes    5. Acquired hypothyroidism    6. Chronic migraine without aura without status migrainosus, not intractable    7. Mild intermittent asthma without complication  Comments:  continue symbicort, albuterol. add Spiriva, consider Singulair.  Consider referral again to allergy/asthma specialist  Orders:  -     tiotropium bromide monohydrate (SPIRIVA RESPIMAT) 2.5 MCG/ACT aerosol solution inhaler; Inhale 2 puffs Daily.  Dispense: 1 each; Refill: 0    8. FRANK (obstructive sleep apnea)    9. Non-seasonal allergic rhinitis, unspecified trigger  Comments:  rec trial singulair    10. Anxiety  Comments:  Start Prozac daily and hydroxyzine as needed  Orders:  -     FLUoxetine (PROzac) 10 MG capsule; Take 1 capsule by mouth Daily.  Dispense: 30 capsule; Refill: 1  -     hydrOXYzine (ATARAX) 10 MG tablet; Take 1 tablet by mouth Every 8 (Eight) Hours As Needed for Anxiety.  Dispense: 30 tablet; Refill: 0    11. Current moderate episode of major depressive disorder without prior episode (HCC)  Comments:  See PHQ-9, recommend start Prozac.  Discussed DEWAYNE of SSRI with patient and possible side effects.  Return for recheck in 6 weeks  Orders:  -     FLUoxetine (PROzac) 10 MG capsule; Take 1 capsule by mouth Daily.  Dispense: 30 capsule; Refill: 1    12. PKU (phenylketonuria) (HCC)      -Keep follow-ups with Dr. Leslie, Dr. Jacob, Dr. Bingham and Dr. Riya Ramachandran as directed, will request records.   -Reportedly hemoglobin A1c less than 6, recommend patient discuss with change in reducing metformin dose due to diarrhea/constipation  -Patient had recent labs per Dr. Bingham for thyroid and diabetes, will review once obtained  -Age appropriate preventative counseling provided, including healthy lifestyle modifications and exercise      I spent 40 minutes caring for  Jo Asif on this date of service. This time includes time spent by me in the following activities: preparing for the visit, reviewing tests, performing a medically appropriate examination and/or evaluation , counseling and educating the patient/family/caregiver, ordering medications, tests, or procedures and documenting information in the medical record        Follow Up     Return in about 6 weeks (around 6/2/2022) for Recheck anxiety/depression (repeat KOFI/PHQ9), asthma/allergy, HTN.  Patient was given instructions and counseling regarding her condition or for health maintenance advice. Please see specific information pulled into the AVS if appropriate.        Part of this note may be an electronic transcription/translation of spoken language to printed text using the Dragon Dictation System

## 2022-04-27 DIAGNOSIS — J45.20 MILD INTERMITTENT ASTHMA WITHOUT COMPLICATION: ICD-10-CM

## 2022-04-27 RX ORDER — DICYCLOMINE HYDROCHLORIDE 10 MG/1
10 CAPSULE ORAL
Qty: 90 CAPSULE | Refills: 0 | Status: SHIPPED | OUTPATIENT
Start: 2022-04-27 | End: 2022-05-24

## 2022-05-02 DIAGNOSIS — R63.4 WEIGHT LOSS: ICD-10-CM

## 2022-05-02 DIAGNOSIS — R11.2 NAUSEA AND VOMITING, UNSPECIFIED VOMITING TYPE: ICD-10-CM

## 2022-05-02 DIAGNOSIS — R10.84 GENERALIZED ABDOMINAL CRAMPING: ICD-10-CM

## 2022-05-02 DIAGNOSIS — K21.9 GASTROESOPHAGEAL REFLUX DISEASE WITHOUT ESOPHAGITIS: Primary | ICD-10-CM

## 2022-05-02 RX ORDER — SUCRALFATE 1 G/1
1 TABLET ORAL 4 TIMES DAILY
Qty: 56 TABLET | Refills: 0 | Status: SHIPPED | OUTPATIENT
Start: 2022-05-02 | End: 2022-05-13

## 2022-05-13 DIAGNOSIS — F41.9 ANXIETY: ICD-10-CM

## 2022-05-13 DIAGNOSIS — K21.9 GASTROESOPHAGEAL REFLUX DISEASE WITHOUT ESOPHAGITIS: ICD-10-CM

## 2022-05-13 RX ORDER — OMEPRAZOLE 40 MG/1
40 CAPSULE, DELAYED RELEASE ORAL DAILY
Qty: 30 CAPSULE | Refills: 2 | Status: SHIPPED | OUTPATIENT
Start: 2022-05-13 | End: 2022-09-07 | Stop reason: SDUPTHER

## 2022-05-13 RX ORDER — HYDROXYZINE HYDROCHLORIDE 10 MG/1
10 TABLET, FILM COATED ORAL EVERY 8 HOURS PRN
Qty: 30 TABLET | Refills: 2 | Status: SHIPPED | OUTPATIENT
Start: 2022-05-13 | End: 2022-08-09 | Stop reason: SDUPTHER

## 2022-05-13 RX ORDER — SUCRALFATE 1 G/1
TABLET ORAL
Qty: 56 TABLET | Refills: 0 | Status: SHIPPED | OUTPATIENT
Start: 2022-05-13 | End: 2022-07-12

## 2022-05-15 ENCOUNTER — HOSPITAL ENCOUNTER (EMERGENCY)
Facility: HOSPITAL | Age: 42
Discharge: HOME | End: 2022-05-15
Attending: EMERGENCY MEDICINE | Admitting: EMERGENCY MEDICINE

## 2022-05-15 ENCOUNTER — APPOINTMENT (OUTPATIENT)
Dept: GENERAL RADIOLOGY | Facility: HOSPITAL | Age: 42
End: 2022-05-15

## 2022-05-15 VITALS
HEIGHT: 64 IN | RESPIRATION RATE: 16 BRPM | BODY MASS INDEX: 50.02 KG/M2 | TEMPERATURE: 97.8 F | DIASTOLIC BLOOD PRESSURE: 86 MMHG | HEART RATE: 83 BPM | OXYGEN SATURATION: 97 % | SYSTOLIC BLOOD PRESSURE: 155 MMHG | WEIGHT: 293 LBS

## 2022-05-15 DIAGNOSIS — S51.012A LACERATION OF LEFT ELBOW, INITIAL ENCOUNTER: Primary | ICD-10-CM

## 2022-05-15 DIAGNOSIS — S80.812A ABRASION, LEFT LOWER LEG, INITIAL ENCOUNTER: ICD-10-CM

## 2022-05-15 PROCEDURE — 73070 X-RAY EXAM OF ELBOW: CPT

## 2022-05-15 PROCEDURE — 90471 IMMUNIZATION ADMIN: CPT | Performed by: EMERGENCY MEDICINE

## 2022-05-15 PROCEDURE — 99283 EMERGENCY DEPT VISIT LOW MDM: CPT

## 2022-05-15 PROCEDURE — 25010000002 TETANUS-DIPHTH-ACELL PERTUSSIS 5-2.5-18.5 LF-MCG/0.5 SUSPENSION PREFILLED SYRINGE: Performed by: EMERGENCY MEDICINE

## 2022-05-15 PROCEDURE — 90715 TDAP VACCINE 7 YRS/> IM: CPT | Performed by: EMERGENCY MEDICINE

## 2022-05-15 RX ORDER — CEPHALEXIN 500 MG/1
500 CAPSULE ORAL 4 TIMES DAILY
Qty: 12 CAPSULE | Refills: 0 | Status: SHIPPED | OUTPATIENT
Start: 2022-05-15 | End: 2022-06-02

## 2022-05-15 RX ORDER — CEPHALEXIN 500 MG/1
500 CAPSULE ORAL ONCE
Status: COMPLETED | OUTPATIENT
Start: 2022-05-15 | End: 2022-05-15

## 2022-05-15 RX ADMIN — TETANUS TOXOID, REDUCED DIPHTHERIA TOXOID AND ACELLULAR PERTUSSIS VACCINE, ADSORBED 0.5 ML: 5; 2.5; 8; 8; 2.5 SUSPENSION INTRAMUSCULAR at 17:46

## 2022-05-15 RX ADMIN — CEPHALEXIN 500 MG: 500 CAPSULE ORAL at 20:16

## 2022-05-15 NOTE — ED PROVIDER NOTES
Subjective   History of Present Illness  42-year-old female presents after fell landing on concrete.  She sustained a injury to left elbow and left lower leg.  She has no complaints of head or neck pain.  No chest pain abdominal pain back pain or right extremity pain.  She has been ambulatory after the accident.  Review of Systems  Negative for head neck chest abdomen pain  Past Medical History:   Diagnosis Date   • Arrhythmia    • Asthma     no inhalers   • Atrial fibrillation (HCC)    • Disease of thyroid gland    • GERD (gastroesophageal reflux disease)    • Heart murmur    • Hypertension    • Migraine    • PCOS (polycystic ovarian syndrome)    • PKU (phenylketonuria) (HCC)    • Prediabetes    • Rotator cuff tear 03/2022    right   • Sleep apnea     no machine   • Torn meniscus 03/2021    right       Allergies   Allergen Reactions   • Amoxicillin Hives   • Cleocin [Clindamycin Hcl] Anaphylaxis   • Penicillins Hives       Past Surgical History:   Procedure Laterality Date   • ABLATION OF DYSRHYTHMIC FOCUS     • CARDIAC ELECTROPHYSIOLOGY PROCEDURE N/A 6/10/2020    Procedure: EP/Ablation;  Surgeon: Joseph Andrews MD;  Location: [facility] INVASIVE LOCATION;  Service: Cardiovascular;  Laterality: N/A;   • CARPAL TUNNEL RELEASE Right    • KNEE ARTHROSCOPY Right 3/26/2021    Procedure: KNEE ARTHROSCOPY with  partial medial lateral meniscectomy;  Surgeon: Kb Gaviria MD;  Location: [facility] OR;  Service: Orthopedics;  Laterality: Right;   • SHOULDER ARTHROSCOPY W/ ROTATOR CUFF REPAIR Right 3/18/2022    Procedure: SHOULDER ARTHROSCOPY WITH DEBRIDEMENT;  Surgeon: Kb Gaviria MD;  Location: [facility] MAIN OR;  Service: Orthopedics;  Laterality: Right;   • TONSILLECTOMY     • TUBAL ABDOMINAL LIGATION     • UVULECTOMY         Family History   Problem Relation Age of Onset   • Diabetes Mother    • Heart disease Father    • Cancer Father        Social History     Socioeconomic History   • Marital  status:    Tobacco Use   • Smoking status: Former Smoker     Packs/day: 0.10     Years: 6.00     Pack years: 0.60     Types: Cigarettes     Quit date: 2009     Years since quittin.7   • Smokeless tobacco: Never Used   Vaping Use   • Vaping Use: Never used   Substance and Sexual Activity   • Alcohol use: No   • Drug use: No   • Sexual activity: Defer     Prior to Admission medications    Medication Sig Start Date End Date Taking? Authorizing Provider   Ajovy 225 MG/1.5ML solution prefilled syringe Every 30 (Thirty) Days. For migraines 21   Andreina Jacob MD PhD   albuterol sulfate  (90 Base) MCG/ACT inhaler  22   Kelsey Sanchez APRN   budesonide-formoterol (SYMBICORT) 160-4.5 MCG/ACT inhaler Inhale 2 puffs 2 (Two) Times a Day.    Kelsey Sanchez APRN   Diclofenac Sodium (VOLTAREN) 1 % gel gel Apply 4 g topically to the appropriate area as directed 4 (Four) Times a Day As Needed. Stop 3-       dicyclomine (Bentyl) 10 MG capsule Take 1 capsule by mouth 3 (Three) Times a Day Before Meals. 22   Kimberlee Cavanaugh APRN   EPINEPHrine (EPIPEN) 0.3 MG/0.3ML solution auto-injector injection Inject 0.3 mg into the appropriate muscle as directed by prescriber. 22   Stacey Foley MD   FLUoxetine (PROzac) 10 MG capsule Take 1 capsule by mouth Daily. 22   Kimberlee Cavanaugh APRN   HYDROcodone-acetaminophen (NORCO) 5-325 MG per tablet Take 1 tablet by mouth 3 (Three) Times a Day.    Stacey Foley MD   hydrOXYzine (ATARAX) 10 MG tablet Take 1 tablet by mouth Every 8 (Eight) Hours As Needed for Anxiety. 22   Kimberlee Cavanaugh APRN   levothyroxine (SYNTHROID, LEVOTHROID) 75 MCG tablet  3/9/22   Stacey Foley MD   lisinopril-hydrochlorothiazide (Zestoretic) 10-12.5 MG per tablet Take 1 tablet by mouth Daily. 22   Kimberlee Cavanaugh APRN   meloxicam (MOBIC) 15 MG tablet Take 1 tablet by mouth Daily As Needed for Moderate Pain . 22   Kb Gaviria  MD Lee   metFORMIN ER (GLUCOPHAGE-XR) 750 MG 24 hr tablet Take 750 mg by mouth Daily With Breakfast. To increase to BID 3-16-22, except   LD 3-16 am    Stacey Foley MD   omeprazole (priLOSEC) 40 MG capsule Take 1 capsule by mouth Daily. 5/13/22   Kimberlee Cavanaugh APRN   Pegvaliase-pqpz 20 MG/ML solution prefilled syringe Inject 10 mg under the skin into the appropriate area as directed 3 (Three) Times a Week. Monday, Wednesday and Friday 1/29/19   Stacey Foley MD   sucralfate (CARAFATE) 1 g tablet TAKE ONE TABLET BY MOUTH FOUR TIMES A DAY FOR 14 DAYS 5/13/22   Kimberlee Cavanaugh APRN   tiotropium bromide monohydrate (SPIRIVA RESPIMAT) 2.5 MCG/ACT aerosol solution inhaler Inhale 2 puffs Daily. 4/27/22   Kimberlee Cavanaugh APRN   ubrogepant (ubrogepant) 100 MG tablet Take  by mouth.    Stacey Foley MD   vitamin B-12 (CYANOCOBALAMIN) 100 MCG tablet Take 50 mcg by mouth Daily. Stop 3-11    Stacey Foley MD           Objective   Physical Exam  42-year-old female awake alert.  Generally overweight.  No complaints of head neck or spine tenderness.  Chest clear nontender abdomen soft nontender.  She has no complaints of right arm or leg pain.  Examination left arm she has 2 cm laceration over lateral aspect of elbow.  She has some tenderness.  She complains of some pain with movement but does have intact movement.  She is neuro vas intact distally.  There is no significant shoulder or wrist tenderness.  Examination of left leg she has a very superficial abrasion lateral lower leg distal to knee.  Knee itself appears without laxity or effusion.  She is neurovascular intact she is able to bear weight.  Neuro exam [place] Coma Scale 15.  Laceration Repair    Date/Time: 5/15/2022 7:51 PM  Performed by: Isaias Shabazz MD  Authorized by: Isaias Shabazz MD     Consent:     Consent obtained:  Verbal    Risks discussed:  Infection  Anesthesia:     Anesthesia method:  Local infiltration    Local  "anesthetic:  Lidocaine 1% WITH epi  Laceration details:     Location:  Shoulder/arm    Shoulder/arm location:  L elbow    Length (cm):  2  Pre-procedure details:     Preparation:  Patient was prepped and draped in usual sterile fashion  Exploration:     Wound exploration: wound explored through full range of motion and entire depth of wound visualized    Treatment:     Area cleansed with:  Sharron    Amount of cleaning:  Standard    Irrigation solution:  Sterile saline    Debridement:  Minimal  Skin repair:     Repair method:  Sutures    Suture size:  4-0    Suture material:  Nylon    Suture technique:  Simple interrupted  Approximation:     Approximation:  Close  Post-procedure details:     Dressing:  Antibiotic ointment and sterile dressing  Comments:      Patient's wound was explored using magnification.  There was debridement of small area of subcutaneous fat and skin margins.               ED Course        XR ELBOW 2 VIEW LEFT    Result Date: 5/15/2022  No acute bony abnormality.  Electronically Signed By-Gus Keller MD On:5/15/2022 6:24 PM This report was finalized on [id number] by  Gus Keller MD.    Medications   cephalexin (KEFLEX) capsule 500 mg (has no administration in time range)   Tetanus-Diphth-Acell Pertussis (BOOSTRIX) injection 0.5 mL (0.5 mL Intramuscular Given 5/15/22 1746)     /86   Pulse 83   Temp 97.8 °F (36.6 °C) (Temporal)   Resp 16   Ht 162.6 cm (64\")   Wt (!) 154 kg (340 lb 6.2 oz)   LMP 05/04/2022   SpO2 97%   BMI 58.43 kg/m²                                              MDM  I reviewed x-ray of left elbow.  No bony abnormality or foreign body noted.  Patient's tetanus immunization is not current and this was updated.  Patient's findings were discussed with her.  Risk of infection with elbow wounds was discussed.  She was discharged with short course of Keflex.  Advised to follow-up with her prime provider for suture removal 2 weeks.  Watch for infection.  She " can use Tylenol, ibuprofen for pain.  Final diagnoses:   Laceration of left elbow, initial encounter   Abrasion, left lower leg, initial encounter       ED Disposition  ED Disposition     ED Disposition   Discharge    Condition   Stable    Comment   --             MaoKimberlee SOPHIA, APRN  [address]  [place] [address] [phone]    In 2 weeks  For suture removal         Medication List      New Prescriptions    cephalexin 500 MG capsule  Commonly known as: KEFLEX  Take 1 capsule by mouth 4 (Four) Times a Day.           Where to Get Your Medications      These medications were sent to [facility] [place] - [address] [place] [address] - [phone]  - [phone] [address] [place] [address]    Phone: [phone]   · cephalexin 500 MG capsule          Isaias Shabazz MD  05/15/22 1956

## 2022-05-15 NOTE — ED NOTES
Irrigated wound with Saline water. Pt had wound scrubbed with BD EZ scrub. Wound was irrigated with saline water than sprayed with MicroKlenz spray. Provider was notified.

## 2022-05-15 NOTE — DISCHARGE INSTRUCTIONS
May use Tylenol, ibuprofen for pain, may use ice to sores.  Apply antibiotic ointment to wound 2-3 times a day.  May clean with peroxide and water as needed.  Watch for infection, return new or worsening symptoms

## 2022-05-16 ENCOUNTER — OFFICE VISIT (OUTPATIENT)
Dept: ORTHOPEDIC SURGERY | Facility: CLINIC | Age: 42
End: 2022-05-16

## 2022-05-16 VITALS — BODY MASS INDEX: 50.02 KG/M2 | HEIGHT: 64 IN | WEIGHT: 293 LBS

## 2022-05-16 DIAGNOSIS — Z47.89 ORTHOPEDIC AFTERCARE: Primary | ICD-10-CM

## 2022-05-16 PROCEDURE — 99024 POSTOP FOLLOW-UP VISIT: CPT | Performed by: PHYSICIAN ASSISTANT

## 2022-05-24 RX ORDER — DICYCLOMINE HYDROCHLORIDE 10 MG/1
CAPSULE ORAL
Qty: 90 CAPSULE | Refills: 2 | Status: SHIPPED | OUTPATIENT
Start: 2022-05-24 | End: 2022-07-12

## 2022-05-26 RX ORDER — SUCRALFATE 1 G/1
TABLET ORAL
Qty: 56 TABLET | Refills: 0 | OUTPATIENT
Start: 2022-05-26

## 2022-06-02 ENCOUNTER — OFFICE VISIT (OUTPATIENT)
Dept: FAMILY MEDICINE CLINIC | Facility: CLINIC | Age: 42
End: 2022-06-02

## 2022-06-02 VITALS
HEART RATE: 81 BPM | DIASTOLIC BLOOD PRESSURE: 80 MMHG | OXYGEN SATURATION: 98 % | HEIGHT: 64 IN | SYSTOLIC BLOOD PRESSURE: 120 MMHG | WEIGHT: 293 LBS | BODY MASS INDEX: 50.02 KG/M2

## 2022-06-02 DIAGNOSIS — R73.03 PREDIABETES: ICD-10-CM

## 2022-06-02 DIAGNOSIS — G47.33 OSA (OBSTRUCTIVE SLEEP APNEA): ICD-10-CM

## 2022-06-02 DIAGNOSIS — I10 PRIMARY HYPERTENSION: ICD-10-CM

## 2022-06-02 DIAGNOSIS — F41.9 ANXIETY: ICD-10-CM

## 2022-06-02 DIAGNOSIS — F32.1 CURRENT MODERATE EPISODE OF MAJOR DEPRESSIVE DISORDER WITHOUT PRIOR EPISODE: ICD-10-CM

## 2022-06-02 DIAGNOSIS — B35.1 ONYCHOMYCOSIS OF LEFT GREAT TOE: Primary | ICD-10-CM

## 2022-06-02 DIAGNOSIS — S51.012A LACERATION OF LEFT ELBOW, INITIAL ENCOUNTER: ICD-10-CM

## 2022-06-02 DIAGNOSIS — J45.20 MILD INTERMITTENT ASTHMA WITHOUT COMPLICATION: ICD-10-CM

## 2022-06-02 PROCEDURE — 80053 COMPREHEN METABOLIC PANEL: CPT | Performed by: NURSE PRACTITIONER

## 2022-06-02 PROCEDURE — 85027 COMPLETE CBC AUTOMATED: CPT | Performed by: NURSE PRACTITIONER

## 2022-06-02 PROCEDURE — 80061 LIPID PANEL: CPT | Performed by: NURSE PRACTITIONER

## 2022-06-02 PROCEDURE — 99215 OFFICE O/P EST HI 40 MIN: CPT | Performed by: NURSE PRACTITIONER

## 2022-06-02 RX ORDER — FLUOXETINE 10 MG/1
10 CAPSULE ORAL DAILY
Qty: 90 CAPSULE | Refills: 1 | Status: SHIPPED | OUTPATIENT
Start: 2022-06-02 | End: 2022-12-16

## 2022-06-02 NOTE — PROGRESS NOTES
"Chief Complaint  Chief Complaint   Patient presents with   • Anxiety   • Depression   • Asthma   • Hypertension   • Follow-up     6 wks   • Other     Discuss suture removal on rt elbow   • Cough     Dry cough in middle of the night, pt is waking up with it.   • Nail Problem     Thick toenail           Subjective          Jo Asif presents to [facility] for   History of Present Illness     Patient is here for anxiety and depression follow-up she was started on Prozac and given hydroxyzine to use as needed 6 weeks ago.    Hypertension, she was started on lisinopril for renal protection in a diabetic, she does report a dry cough in the middle of the night, also has asthma which as been worse lately    patient presented to the ED on 5/15/2022 after fall onto concrete injuring her left elbow and left lower leg Tdap was updated, elbow laceration was sutured and she was told to follow-up for suture removal in 2 weeks.    Asthma, she continues Symbicort and albuterol, Spiriva was added at the last visit with consideration of addition of Singulair if minimal improvement    Allergy/Asthma, worse, c/o soa and steele, cough, recently started back on Symbicort and albuterol inhalers. Allergy tested several years ago w/ allergy to \"anything that grows out of the ground\" environmental triggers and foods          The following portions of the patient's history were reviewed and updated as appropriate: allergies, current medications, past family history, past medical history, past social history, past surgical history and problem list.    Past Medical History:   Diagnosis Date   • Arrhythmia    • Asthma    • Atrial fibrillation ([facility])    • Disease of thyroid gland    • GERD (gastroesophageal reflux disease)    • Heart murmur    • Hypertension    • Migraine    • PCOS (polycystic ovarian syndrome)    • PKU (phenylketonuria) ([facility])    • Prediabetes    • Rotator cuff tear 03/2022   • Sleep apnea    • " Torn meniscus 2021     Past Surgical History:   Procedure Laterality Date   • ABLATION OF DYSRHYTHMIC FOCUS     • CARDIAC ELECTROPHYSIOLOGY PROCEDURE N/A 6/10/2020    Procedure: EP/Ablation;  Surgeon: Joseph Andrews MD;  Location: [facility] INVASIVE LOCATION;  Service: Cardiovascular;  Laterality: N/A;   • CARPAL TUNNEL RELEASE Right    • KNEE ARTHROSCOPY Right 3/26/2021    Procedure: KNEE ARTHROSCOPY with  partial medial lateral meniscectomy;  Surgeon: Kb Gaviria MD;  Location: [facility] MAIN OR;  Service: Orthopedics;  Laterality: Right;   • SHOULDER ARTHROSCOPY W/ ROTATOR CUFF REPAIR Right 3/18/2022    Procedure: SHOULDER ARTHROSCOPY WITH DEBRIDEMENT;  Surgeon: Kb Gaviria MD;  Location: [facility] MAIN OR;  Service: Orthopedics;  Laterality: Right;   • TONSILLECTOMY     • TUBAL ABDOMINAL LIGATION     • UVULECTOMY       Family History   Problem Relation Age of Onset   • Diabetes Mother    • Heart disease Father    • Cancer Father      Social History     Tobacco Use   • Smoking status: Former Smoker     Packs/day: 0.10     Years: 6.00     Pack years: 0.60     Types: Cigarettes     Quit date: 2009     Years since quittin.7   • Smokeless tobacco: Never Used   Substance Use Topics   • Alcohol use: No       Current Outpatient Medications:   •  Ajovy 225 MG/1.5ML solution prefilled syringe, Every 30 (Thirty) Days. For migraines, Disp: , Rfl:   •  albuterol sulfate  (90 Base) MCG/ACT inhaler, , Disp: , Rfl:   •  budesonide-formoterol (SYMBICORT) 160-4.5 MCG/ACT inhaler, Inhale 2 puffs 2 (Two) Times a Day., Disp: , Rfl:   •  Diclofenac Sodium (VOLTAREN) 1 % gel gel, Apply 4 g topically to the appropriate area as directed 4 (Four) Times a Day As Needed. Stop 3-11, Disp: , Rfl:   •  dicyclomine (BENTYL) 10 MG capsule, TAKE 1 CAPSULE BY MOUTH THREE TIMES A DAY BEFORE MEALS, Disp: 90 capsule, Rfl: 2  •  EPINEPHrine (EPIPEN) 0.3 MG/0.3ML solution auto-injector injection, Inject 0.3  "mg into the appropriate muscle as directed by prescriber., Disp: , Rfl:   •  FLUoxetine (PROzac) 10 MG capsule, Take 1 capsule by mouth Daily., Disp: 90 capsule, Rfl: 1  •  HYDROcodone-acetaminophen (NORCO) 5-325 MG per tablet, Take 1 tablet by mouth 3 (Three) Times a Day. prn, Disp: , Rfl:   •  hydrOXYzine (ATARAX) 10 MG tablet, Take 1 tablet by mouth Every 8 (Eight) Hours As Needed for Anxiety., Disp: 30 tablet, Rfl: 2  •  levothyroxine (SYNTHROID, LEVOTHROID) 75 MCG tablet, , Disp: , Rfl:   •  lisinopril-hydrochlorothiazide (Zestoretic) 10-12.5 MG per tablet, Take 1 tablet by mouth Daily., Disp: 30 tablet, Rfl: 2  •  meloxicam (MOBIC) 15 MG tablet, Take 1 tablet by mouth Daily As Needed for Moderate Pain ., Disp: 30 tablet, Rfl: 4  •  metFORMIN ER (GLUCOPHAGE-XR) 750 MG 24 hr tablet, Take 750 mg by mouth Daily With Breakfast. To increase to BID 3-16-22, except  LD 3-16 am, Disp: , Rfl:   •  omeprazole (priLOSEC) 40 MG capsule, Take 1 capsule by mouth Daily., Disp: 30 capsule, Rfl: 2  •  Pegvaliase-pqpz 20 MG/ML solution prefilled syringe, Inject 10 mg under the skin into the appropriate area as directed 3 (Three) Times a Week. Monday, Wednesday and Friday, Disp: , Rfl:   •  sucralfate (CARAFATE) 1 g tablet, TAKE ONE TABLET BY MOUTH FOUR TIMES A DAY FOR 14 DAYS, Disp: 56 tablet, Rfl: 0  •  tiotropium bromide monohydrate (SPIRIVA RESPIMAT) 2.5 MCG/ACT aerosol solution inhaler, Inhale 2 puffs Daily., Disp: 4 g, Rfl: 5  •  ubrogepant (UBRELVY) 100 MG tablet, Take  by mouth., Disp: , Rfl:   •  vitamin B-12 (CYANOCOBALAMIN) 100 MCG tablet, Take 50 mcg by mouth Daily. Stop 3-11, Disp: , Rfl:     Objective   Vital Signs:   /80 (BP Location: Left arm, Patient Position: Sitting, Cuff Size: Adult)   Pulse 81   Ht 162.6 cm (64.02\")   Wt (!) 153 kg (338 lb 6.4 oz)   SpO2 98%   BMI 58.06 kg/m²           Physical Exam  Vitals and nursing note reviewed.   Constitutional:       General: She is not in acute distress.    "  Appearance: She is well-developed. She is obese. She is not diaphoretic.   Eyes:      Pupils: Pupils are equal, round, and reactive to light.   Neck:      Thyroid: No thyromegaly.      Vascular: No JVD.   Cardiovascular:      Rate and Rhythm: Normal rate and regular rhythm.      Heart sounds: Murmur (GII/VI) heard.   Pulmonary:      Effort: Pulmonary effort is normal. No respiratory distress.      Breath sounds: Normal breath sounds.   Abdominal:      General: Bowel sounds are normal. There is no distension.      Palpations: Abdomen is soft.      Tenderness: There is no abdominal tenderness.   Musculoskeletal:         General: Swelling (+1 pitting BLE) present. No tenderness. Normal range of motion.      Cervical back: Normal range of motion and neck supple.   Skin:     General: Skin is warm and dry.      Comments: distal lac of left elbow healed, prox still slightly open, 1 suture removed. x2 left in place.   Thick yellow Left great toenail   Neurological:      Mental Status: She is alert and oriented to person, place, and time.      Sensory: No sensory deficit.   Psychiatric:         Attention and Perception: Attention normal.         Mood and Affect: Mood is not anxious or depressed. Affect is not tearful.         Speech: Speech normal.         Behavior: Behavior normal.         Thought Content: Thought content normal.         Judgment: Judgment normal.          Result Review :     Office Visit on 06/02/2022   Component Date Value Ref Range Status   • Glucose 06/02/2022 100 (A) 65 - 99 mg/dL Final   • BUN 06/02/2022 15  6 - 20 mg/dL Final   • Creatinine 06/02/2022 0.56 (A) 0.57 - 1.00 mg/dL Final   • Sodium 06/02/2022 137  136 - 145 mmol/L Final   • Potassium 06/02/2022 4.4  3.5 - 5.2 mmol/L Final   • Chloride 06/02/2022 102  98 - 107 mmol/L Final   • CO2 06/02/2022 21.9 (A) 22.0 - 29.0 mmol/L Final   • Calcium 06/02/2022 8.8  8.6 - 10.5 mg/dL Final   • Total Protein 06/02/2022 6.7  6.0 - 8.5 g/dL Final   •  Albumin 06/02/2022 4.20  3.50 - 5.20 g/dL Final   • ALT (SGPT) 06/02/2022 74 (A) 1 - 33 U/L Final   • AST (SGOT) 06/02/2022 61 (A) 1 - 32 U/L Final   • Alkaline Phosphatase 06/02/2022 56  39 - 117 U/L Final   • Total Bilirubin 06/02/2022 0.4  0.0 - 1.2 mg/dL Final   • Globulin 06/02/2022 2.5  gm/dL Final   • A/G Ratio 06/02/2022 1.7  g/dL Final   • BUN/Creatinine Ratio 06/02/2022 26.8 (A) 7.0 - 25.0 Final   • Anion Gap 06/02/2022 13.1  5.0 - 15.0 mmol/L Final   • eGFR 06/02/2022 115.6  >60.0 mL/min/1.73 Final    National Kidney Foundation and American Society of Nephrology (ASN) Task Force recommended calculation based on the Chronic Kidney Disease Epidemiology Collaboration (CKD-EPI) equation refit without adjustment for race.   • Total Cholesterol 06/02/2022 198  0 - 200 mg/dL Final   • Triglycerides 06/02/2022 167 (A) 0 - 150 mg/dL Final   • HDL Cholesterol 06/02/2022 42  40 - 60 mg/dL Final   • LDL Cholesterol  06/02/2022 126 (A) 0 - 100 mg/dL Final   • VLDL Cholesterol 06/02/2022 30  5 - 40 mg/dL Final   • LDL/HDL Ratio 06/02/2022 2.92   Final   • WBC 06/02/2022 7.16  3.40 - 10.80 10*3/mm3 Final   • RBC 06/02/2022 4.37  3.77 - 5.28 10*6/mm3 Final   • Hemoglobin 06/02/2022 13.9  12.0 - 15.9 g/dL Final   • Hematocrit 06/02/2022 40.9  34.0 - 46.6 % Final   • MCV 06/02/2022 93.6  79.0 - 97.0 fL Final   • MCH 06/02/2022 31.8  26.6 - 33.0 pg Final   • MCHC 06/02/2022 34.0  31.5 - 35.7 g/dL Final   • RDW 06/02/2022 13.1  12.3 - 15.4 % Final   • RDW-SD 06/02/2022 45.4  37.0 - 54.0 fl Final   • MPV 06/02/2022 11.0  6.0 - 12.0 fL Final   • Platelets 06/02/2022 255  140 - 450 10*3/mm3 Final            Suture Removal    Date/Time: 6/2/2022 10:06 AM  Performed by: Kimberlee Cavanaugh APRN  Authorized by: Kimberlee Cavanaugh APRN   Body area: upper extremity  Location details: left elbow  Wound Appearance: clean  Sutures Removed: 1  Patient tolerance: patient tolerated the procedure well with no immediate complications            Class 3 Severe Obesity (BMI >=40). Obesity-related health conditions include the following: obstructive sleep apnea, hypertension, diabetes mellitus, dyslipidemias and GERD. Obesity is unchanged. BMI is is above average; BMI management plan is completed. We discussed portion control and increasing exercise.           Assessment and Plan    Diagnoses and all orders for this visit:    1. Onychomycosis of left great toe (Primary)  -     Comprehensive Metabolic Panel  -     CBC (No Diff)    2. Laceration of left elbow, initial encounter  -     Suture Removal    3. FRANK (obstructive sleep apnea)  Comments:  resumed cpap, cough developed, stopped and has not restarted since, cough still present.     4. Prediabetes  Comments:  cont with Dr. pugh    5. Mild intermittent asthma without complication    6. Primary hypertension  Comments:  cont lisinopril, monitor for worsening of cough. likley more so r/t asthma. will change or d/c if cough worsens   Orders:  -     Lipid Panel    7. Anxiety  Comments:  Symptoms improving, continue and refill Prozac daily and hydroxyzine as needed  Orders:  -     FLUoxetine (PROzac) 10 MG capsule; Take 1 capsule by mouth Daily.  Dispense: 90 capsule; Refill: 1    8. Current moderate episode of major depressive disorder without prior episode (HCC)  Comments:  Improved on Prozac, continue and refill.   Orders:  -     FLUoxetine (PROzac) 10 MG capsule; Take 1 capsule by mouth Daily.  Dispense: 90 capsule; Refill: 1       -start terbinafine if LFT's stable, cmp today  -Start Spiriva 1 puff a.m. and p.m. notify if cough does not improve  -Suture removal of x1 left elbow suture today, patient will return on Monday for remaining sutures to be removed  -anxiety improved, cont prozac and hydrox prn  -Still need records from [facility] in [place], requested x3  -Removal of x1 suture, return on Monday 6/6 for removal of other two sutures  -Cough ?if 2/2 lisinopril vs asthma  -Dr. Leslie,   Dr. Zachery Jacob and Dr. Riya Ramachandran as directed, will request records.   -Reportedly hemoglobin A1c less than 6, recommend patient discuss with change in reducing metformin dose due to diarrhea/constipation  -Patient had recent labs per Dr. Bingham for thyroid and diabetes,  will request again and  review once obtained      I spent 40 minutes caring for Jo Asif on this date of service. This time includes time spent by me in the following activities: preparing for the visit, reviewing tests, performing a medically appropriate examination and/or evaluation , counseling and educating the patient/family/caregiver, ordering medications, tests, or procedures and documenting information in the medical record        Follow Up     Return in about 3 months (around 9/2/2022) for Recheck HTN, asthma. pt needs to return Monday 6/6 for suture removal-lab schedule only.  Patient was given instructions and counseling regarding her condition or for health maintenance advice. Please see specific information pulled into the AVS if appropriate.        Part of this note may be an electronic transcription/translation of spoken language to printed text using the Dragon Dictation System

## 2022-06-03 LAB
ALBUMIN SERPL-MCNC: 4.2 G/DL (ref 3.5–5.2)
ALBUMIN/GLOB SERPL: 1.7 G/DL
ALP SERPL-CCNC: 56 U/L (ref 39–117)
ALT SERPL W P-5'-P-CCNC: 74 U/L (ref 1–33)
ANION GAP SERPL CALCULATED.3IONS-SCNC: 13.1 MMOL/L (ref 5–15)
AST SERPL-CCNC: 61 U/L (ref 1–32)
BILIRUB SERPL-MCNC: 0.4 MG/DL (ref 0–1.2)
BUN SERPL-MCNC: 15 MG/DL (ref 6–20)
BUN/CREAT SERPL: 26.8 (ref 7–25)
CALCIUM SPEC-SCNC: 8.8 MG/DL (ref 8.6–10.5)
CHLORIDE SERPL-SCNC: 102 MMOL/L (ref 98–107)
CHOLEST SERPL-MCNC: 198 MG/DL (ref 0–200)
CO2 SERPL-SCNC: 21.9 MMOL/L (ref 22–29)
CREAT SERPL-MCNC: 0.56 MG/DL (ref 0.57–1)
DEPRECATED RDW RBC AUTO: 45.4 FL (ref 37–54)
EGFRCR SERPLBLD CKD-EPI 2021: 115.6 ML/MIN/1.73
ERYTHROCYTE [DISTWIDTH] IN BLOOD BY AUTOMATED COUNT: 13.1 % (ref 12.3–15.4)
GLOBULIN UR ELPH-MCNC: 2.5 GM/DL
GLUCOSE SERPL-MCNC: 100 MG/DL (ref 65–99)
HCT VFR BLD AUTO: 40.9 % (ref 34–46.6)
HDLC SERPL-MCNC: 42 MG/DL (ref 40–60)
HGB BLD-MCNC: 13.9 G/DL (ref 12–15.9)
LDLC SERPL CALC-MCNC: 126 MG/DL (ref 0–100)
LDLC/HDLC SERPL: 2.92 {RATIO}
MCH RBC QN AUTO: 31.8 PG (ref 26.6–33)
MCHC RBC AUTO-ENTMCNC: 34 G/DL (ref 31.5–35.7)
MCV RBC AUTO: 93.6 FL (ref 79–97)
PLATELET # BLD AUTO: 255 10*3/MM3 (ref 140–450)
PMV BLD AUTO: 11 FL (ref 6–12)
POTASSIUM SERPL-SCNC: 4.4 MMOL/L (ref 3.5–5.2)
PROT SERPL-MCNC: 6.7 G/DL (ref 6–8.5)
RBC # BLD AUTO: 4.37 10*6/MM3 (ref 3.77–5.28)
SODIUM SERPL-SCNC: 137 MMOL/L (ref 136–145)
TRIGL SERPL-MCNC: 167 MG/DL (ref 0–150)
VLDLC SERPL-MCNC: 30 MG/DL (ref 5–40)
WBC NRBC COR # BLD: 7.16 10*3/MM3 (ref 3.4–10.8)

## 2022-06-06 ENCOUNTER — LAB (OUTPATIENT)
Dept: FAMILY MEDICINE CLINIC | Facility: CLINIC | Age: 42
End: 2022-06-06

## 2022-06-06 DIAGNOSIS — S51.012A LACERATION OF LEFT ELBOW, INITIAL ENCOUNTER: Primary | ICD-10-CM

## 2022-06-10 ENCOUNTER — TELEPHONE (OUTPATIENT)
Dept: FAMILY MEDICINE CLINIC | Facility: CLINIC | Age: 42
End: 2022-06-10

## 2022-06-10 NOTE — TELEPHONE ENCOUNTER
Caller: Jo Asif    Relationship to patient: Self    Best call back number:[phone]     Patient is needing: PATIENT IS WANTING HER MOST RECENT LABS THAT SHOW LIVER FUNCTION FAXED OVER TO [facility] ON [address] SHE WAS REFERRED TO. FAX NUMBER: [phone]

## 2022-06-16 ENCOUNTER — TELEPHONE (OUTPATIENT)
Dept: FAMILY MEDICINE CLINIC | Facility: CLINIC | Age: 42
End: 2022-06-16

## 2022-06-16 NOTE — TELEPHONE ENCOUNTER
PATIENT STATES: THAT SHE WOULD LIKE HER LAST LABS FAXED -[phone] JAMIE      PATIENT CAN BE REACHED ON: [phone]

## 2022-06-17 RX ORDER — MONTELUKAST SODIUM 10 MG/1
10 TABLET ORAL NIGHTLY
Qty: 90 TABLET | Refills: 1 | Status: SHIPPED | OUTPATIENT
Start: 2022-06-17 | End: 2022-12-16

## 2022-06-22 ENCOUNTER — OFFICE (OUTPATIENT)
Dept: URBAN - METROPOLITAN AREA CLINIC 64 | Facility: CLINIC | Age: 42
End: 2022-06-22

## 2022-06-22 VITALS
HEART RATE: 86 BPM | HEIGHT: 64 IN | SYSTOLIC BLOOD PRESSURE: 160 MMHG | WEIGHT: 293 LBS | DIASTOLIC BLOOD PRESSURE: 97 MMHG

## 2022-06-22 DIAGNOSIS — R10.9 UNSPECIFIED ABDOMINAL PAIN: ICD-10-CM

## 2022-06-22 DIAGNOSIS — R11.2 NAUSEA WITH VOMITING, UNSPECIFIED: ICD-10-CM

## 2022-06-22 DIAGNOSIS — K21.9 GASTRO-ESOPHAGEAL REFLUX DISEASE WITHOUT ESOPHAGITIS: ICD-10-CM

## 2022-06-22 PROCEDURE — 99204 OFFICE O/P NEW MOD 45 MIN: CPT | Performed by: NURSE PRACTITIONER

## 2022-06-23 ENCOUNTER — TRANSCRIBE ORDERS (OUTPATIENT)
Dept: ADMINISTRATIVE | Facility: HOSPITAL | Age: 42
End: 2022-06-23

## 2022-06-23 DIAGNOSIS — R79.89 ELEVATED LFTS: Primary | ICD-10-CM

## 2022-06-23 RX ORDER — OXYCODONE HYDROCHLORIDE 5 MG/1
5 CAPSULE ORAL EVERY 4 HOURS PRN
COMMUNITY
End: 2022-08-17 | Stop reason: SDUPTHER

## 2022-06-28 ENCOUNTER — LAB (OUTPATIENT)
Dept: LAB | Facility: HOSPITAL | Age: 42
End: 2022-06-28

## 2022-06-28 LAB — SARS-COV-2 ORF1AB RESP QL NAA+PROBE: NOT DETECTED

## 2022-06-28 PROCEDURE — U0004 COV-19 TEST NON-CDC HGH THRU: HCPCS

## 2022-06-28 PROCEDURE — C9803 HOPD COVID-19 SPEC COLLECT: HCPCS

## 2022-06-29 ENCOUNTER — ANESTHESIA EVENT (OUTPATIENT)
Dept: GASTROENTEROLOGY | Facility: HOSPITAL | Age: 42
End: 2022-06-29

## 2022-06-30 ENCOUNTER — ANESTHESIA (OUTPATIENT)
Dept: GASTROENTEROLOGY | Facility: HOSPITAL | Age: 42
End: 2022-06-30

## 2022-06-30 ENCOUNTER — ON CAMPUS - OUTPATIENT (OUTPATIENT)
Dept: URBAN - METROPOLITAN AREA HOSPITAL 85 | Facility: HOSPITAL | Age: 42
End: 2022-06-30

## 2022-06-30 ENCOUNTER — HOSPITAL ENCOUNTER (OUTPATIENT)
Facility: HOSPITAL | Age: 42
Setting detail: HOSPITAL OUTPATIENT SURGERY
Discharge: HOME | End: 2022-06-30
Attending: INTERNAL MEDICINE | Admitting: INTERNAL MEDICINE

## 2022-06-30 VITALS
OXYGEN SATURATION: 95 % | DIASTOLIC BLOOD PRESSURE: 53 MMHG | HEIGHT: 64 IN | TEMPERATURE: 98.2 F | WEIGHT: 293 LBS | HEART RATE: 75 BPM | BODY MASS INDEX: 50.02 KG/M2 | SYSTOLIC BLOOD PRESSURE: 121 MMHG | RESPIRATION RATE: 16 BRPM

## 2022-06-30 DIAGNOSIS — R19.4 CHANGE IN BOWEL HABIT: ICD-10-CM

## 2022-06-30 DIAGNOSIS — K22.2 ESOPHAGEAL OBSTRUCTION: ICD-10-CM

## 2022-06-30 DIAGNOSIS — R19.7 DIARRHEA: ICD-10-CM

## 2022-06-30 DIAGNOSIS — K21.9 GERD (GASTROESOPHAGEAL REFLUX DISEASE): ICD-10-CM

## 2022-06-30 DIAGNOSIS — K31.89 OTHER DISEASES OF STOMACH AND DUODENUM: ICD-10-CM

## 2022-06-30 DIAGNOSIS — D12.2 BENIGN NEOPLASM OF ASCENDING COLON: ICD-10-CM

## 2022-06-30 DIAGNOSIS — R13.10 DYSPHAGIA: ICD-10-CM

## 2022-06-30 DIAGNOSIS — R13.10 DYSPHAGIA, UNSPECIFIED: ICD-10-CM

## 2022-06-30 PROCEDURE — 45385 COLONOSCOPY W/LESION REMOVAL: CPT | Performed by: INTERNAL MEDICINE

## 2022-06-30 PROCEDURE — 45380 COLONOSCOPY AND BIOPSY: CPT | Performed by: INTERNAL MEDICINE

## 2022-06-30 PROCEDURE — 25010000002 PROPOFOL 200 MG/20ML EMULSION: Performed by: STUDENT IN AN ORGANIZED HEALTH CARE EDUCATION/TRAINING PROGRAM

## 2022-06-30 PROCEDURE — 43450 DILATE ESOPHAGUS 1/MULT PASS: CPT | Performed by: INTERNAL MEDICINE

## 2022-06-30 PROCEDURE — 88305 TISSUE EXAM BY PATHOLOGIST: CPT | Performed by: INTERNAL MEDICINE

## 2022-06-30 PROCEDURE — 43239 EGD BIOPSY SINGLE/MULTIPLE: CPT | Performed by: INTERNAL MEDICINE

## 2022-06-30 RX ORDER — SODIUM CHLORIDE 0.9 % (FLUSH) 0.9 %
10 SYRINGE (ML) INJECTION EVERY 12 HOURS SCHEDULED
Status: DISCONTINUED | OUTPATIENT
Start: 2022-06-30 | End: 2022-06-30 | Stop reason: HOSPADM

## 2022-06-30 RX ORDER — PROPOFOL 10 MG/ML
INJECTION, EMULSION INTRAVENOUS AS NEEDED
Status: DISCONTINUED | OUTPATIENT
Start: 2022-06-30 | End: 2022-06-30 | Stop reason: SURG

## 2022-06-30 RX ORDER — SODIUM CHLORIDE 0.9 % (FLUSH) 0.9 %
10 SYRINGE (ML) INJECTION AS NEEDED
Status: DISCONTINUED | OUTPATIENT
Start: 2022-06-30 | End: 2022-06-30 | Stop reason: HOSPADM

## 2022-06-30 RX ORDER — MIDAZOLAM HYDROCHLORIDE 1 MG/ML
1 INJECTION INTRAMUSCULAR; INTRAVENOUS
Status: DISCONTINUED | OUTPATIENT
Start: 2022-06-30 | End: 2022-06-30 | Stop reason: HOSPADM

## 2022-06-30 RX ORDER — SODIUM CHLORIDE 9 MG/ML
9 INJECTION, SOLUTION INTRAVENOUS CONTINUOUS PRN
Status: DISCONTINUED | OUTPATIENT
Start: 2022-06-30 | End: 2022-06-30 | Stop reason: HOSPADM

## 2022-06-30 RX ORDER — LIDOCAINE HYDROCHLORIDE 20 MG/ML
INJECTION, SOLUTION EPIDURAL; INFILTRATION; INTRACAUDAL; PERINEURAL AS NEEDED
Status: DISCONTINUED | OUTPATIENT
Start: 2022-06-30 | End: 2022-06-30 | Stop reason: SURG

## 2022-06-30 RX ORDER — ONDANSETRON 2 MG/ML
4 INJECTION INTRAMUSCULAR; INTRAVENOUS ONCE AS NEEDED
Status: DISCONTINUED | OUTPATIENT
Start: 2022-06-30 | End: 2022-06-30 | Stop reason: HOSPADM

## 2022-06-30 RX ADMIN — SODIUM CHLORIDE: 0.9 INJECTION, SOLUTION INTRAVENOUS at 08:27

## 2022-06-30 RX ADMIN — PROPOFOL 600 MG: 10 INJECTION, EMULSION INTRAVENOUS at 08:30

## 2022-06-30 RX ADMIN — LIDOCAINE HYDROCHLORIDE 50 MG: 20 INJECTION, SOLUTION EPIDURAL; INFILTRATION; INTRACAUDAL; PERINEURAL at 08:27

## 2022-06-30 NOTE — ANESTHESIA POSTPROCEDURE EVALUATION
Patient: Jo Asif    Procedure Summary     Date: 06/30/22 Room / Location: [facility] END[facility]    Anesthesia Start: 0825 Anesthesia Stop: 0903    Procedures:       ESOPHAGOGASTRODUODENOSCOPY with esophagus, gastric and duodenal biopsies (N/A )      COLONOSCOPY with random biopsies R/O microscopic colitis, polypectomy x 1 (N/A ) Diagnosis:       GERD (gastroesophageal reflux disease)      Dysphagia      Diarrhea      Change in bowel habit      (GERD (gastroesophageal reflux disease) [K21.9])      (Dysphagia [R13.10])      (Diarrhea [R19.7])      (Change in bowel habit [R19.4])    Surgeons: Jeffrey Echevarria MD Provider: Atul Garner MD    Anesthesia Type: MAC ASA Status: 4          Anesthesia Type: MAC    Vitals  Vitals Value Taken Time   /53 06/30/22 0911   Temp     Pulse 74 06/30/22 0911   Resp 16 06/30/22 0903   SpO2 97 % 06/30/22 0911   Vitals shown include unvalidated device data.        Post Anesthesia Care and Evaluation    Patient location during evaluation: PACU  Patient participation: complete - patient participated  Level of consciousness: awake  Pain score: 0  Pain management: adequate    Airway patency: patent  Anesthetic complications: No anesthetic complications  PONV Status: none  Cardiovascular status: acceptable  Respiratory status: acceptable  Hydration status: acceptable    Comments: Patient seen and examined postoperatively; vital signs stable; SpO2 greater than or equal to 90%; cardiopulmonary status stable; nausea/vomiting adequately controlled; pain adequately controlled; no apparent anesthesia complications; patient discharged from anesthesia care when discharge criteria were met

## 2022-06-30 NOTE — ANESTHESIA PREPROCEDURE EVALUATION
Anesthesia Evaluation     Patient summary reviewed and Nursing notes reviewed   no history of anesthetic complications:  NPO Solid Status: > 8 hours  NPO Liquid Status: > 2 hours           Airway   Mallampati: II  TM distance: >3 FB  Neck ROM: full  Possible difficult intubation  Dental - normal exam     Pulmonary - normal exam   (+) asthma,sleep apnea on CPAP,   Cardiovascular - normal exam    ECG reviewed    (+) hypertension, dysrhythmias, hyperlipidemia,     ROS comment: Stress test 2/19/22:  Interpretation Summary    ·Findings consistent with a normal ECG stress test.  ·Left ventricular ejection fraction is normal. (Calculated EF = 62%).  ·Myocardial perfusion imaging indicates a normal myocardial perfusion study with no evidence of ischemia.  ·Impressions are consistent with a low risk study.  ·PVCs were noted during the stress test.        Neuro/Psych  (+) headaches, psychiatric history Anxiety and Depression,    GI/Hepatic/Renal/Endo    (+) obesity, morbid obesity, GERD poorly controlled,  thyroid problem hypothyroidism    Musculoskeletal (-) negative ROS    Abdominal   (+) obese,    Substance History - negative use     OB/GYN negative ob/gyn ROS         Other - negative ROS                       Anesthesia Plan    ASA 4     MAC   total IV anesthesia  intravenous induction     Anesthetic plan, risks, benefits, and alternatives have been provided, discussed and informed consent has been obtained with: patient.        CODE STATUS:

## 2022-07-01 LAB
LAB AP CASE REPORT: NORMAL
PATH REPORT.FINAL DX SPEC: NORMAL
PATH REPORT.GROSS SPEC: NORMAL

## 2022-07-07 ENCOUNTER — HOSPITAL ENCOUNTER (OUTPATIENT)
Dept: ULTRASOUND IMAGING | Facility: HOSPITAL | Age: 42
Discharge: HOME | End: 2022-07-07
Admitting: NURSE PRACTITIONER

## 2022-07-07 DIAGNOSIS — R79.89 ELEVATED LFTS: ICD-10-CM

## 2022-07-07 PROCEDURE — 76705 ECHO EXAM OF ABDOMEN: CPT

## 2022-07-12 ENCOUNTER — OFFICE VISIT (OUTPATIENT)
Dept: ORTHOPEDIC SURGERY | Facility: CLINIC | Age: 42
End: 2022-07-12

## 2022-07-12 VITALS
HEIGHT: 64 IN | HEART RATE: 84 BPM | WEIGHT: 293 LBS | DIASTOLIC BLOOD PRESSURE: 71 MMHG | BODY MASS INDEX: 50.02 KG/M2 | SYSTOLIC BLOOD PRESSURE: 140 MMHG

## 2022-07-12 DIAGNOSIS — M25.511 RIGHT SHOULDER PAIN, UNSPECIFIED CHRONICITY: Primary | ICD-10-CM

## 2022-07-12 PROCEDURE — 99212 OFFICE O/P EST SF 10 MIN: CPT | Performed by: ORTHOPAEDIC SURGERY

## 2022-07-12 RX ORDER — BLOOD SUGAR DIAGNOSTIC
STRIP MISCELLANEOUS
COMMUNITY
Start: 2022-07-08

## 2022-07-12 RX ORDER — FREMANEZUMAB-VFRM 225 MG/1.5ML
INJECTION SUBCUTANEOUS
COMMUNITY
Start: 2022-06-20 | End: 2022-08-17 | Stop reason: SDUPTHER

## 2022-07-12 RX ORDER — LANCETS
EACH MISCELLANEOUS
COMMUNITY
Start: 2022-07-08

## 2022-07-12 RX ORDER — BLOOD-GLUCOSE METER
EACH MISCELLANEOUS
COMMUNITY
Start: 2022-07-08

## 2022-07-12 RX ORDER — SEMAGLUTIDE 1.34 MG/ML
INJECTION, SOLUTION SUBCUTANEOUS
COMMUNITY
Start: 2022-07-08 | End: 2022-09-07

## 2022-07-12 RX ORDER — PEGVALIASE-PQPZ 10 MG/.5ML
INJECTION, SOLUTION SUBCUTANEOUS 3 TIMES WEEKLY
COMMUNITY
Start: 2022-06-28

## 2022-07-12 RX ORDER — OXYCODONE HYDROCHLORIDE 5 MG/1
5 TABLET ORAL 2 TIMES DAILY PRN
COMMUNITY
Start: 2022-06-21 | End: 2023-02-02

## 2022-07-12 NOTE — PROGRESS NOTES
"     Patient ID: Jo Asif is a 42 y.o. female.  Right shoulder pain  3/18/22 right shoulder arthroscopy with extensive debridement  Pain resolved     Review of Systems:    Right shoulder pain resolved    Objective:    /71   Pulse 84   Ht 162.6 cm (64\")   Wt (!) 151 kg (333 lb)   LMP 06/22/2022   BMI 57.16 kg/m²     Physical Examination:  Right shoulder healed incisions no tenderness active elevation 180 abduction 150 external rotation 60 internal rotation L5 with a negative Speed [place] supraspinatus test       Imaging:       Assessment:    Doing well after shoulder arthroscopy    Plan:   Activity as tolerated and see me as needed      Procedures          Disclaimer: Part of this note may be an electronic transcription/translation of spoken language to printed text using the Dragon Dictation System  "

## 2022-07-17 DIAGNOSIS — I10 PRIMARY HYPERTENSION: ICD-10-CM

## 2022-07-18 RX ORDER — LISINOPRIL AND HYDROCHLOROTHIAZIDE 12.5; 1 MG/1; MG/1
TABLET ORAL
Qty: 30 TABLET | Refills: 2 | Status: SHIPPED | OUTPATIENT
Start: 2022-07-18 | End: 2022-09-07 | Stop reason: SDUPTHER

## 2022-08-04 ENCOUNTER — OFFICE (OUTPATIENT)
Dept: URBAN - METROPOLITAN AREA CLINIC 64 | Facility: CLINIC | Age: 42
End: 2022-08-04

## 2022-08-04 VITALS
SYSTOLIC BLOOD PRESSURE: 112 MMHG | WEIGHT: 293 LBS | HEART RATE: 80 BPM | HEIGHT: 64 IN | DIASTOLIC BLOOD PRESSURE: 75 MMHG

## 2022-08-04 DIAGNOSIS — R10.11 RIGHT UPPER QUADRANT PAIN: ICD-10-CM

## 2022-08-04 PROCEDURE — 99214 OFFICE O/P EST MOD 30 MIN: CPT | Performed by: NURSE PRACTITIONER

## 2022-08-09 DIAGNOSIS — F41.9 ANXIETY: ICD-10-CM

## 2022-08-09 RX ORDER — ALBUTEROL SULFATE 90 UG/1
AEROSOL, METERED RESPIRATORY (INHALATION)
OUTPATIENT
Start: 2022-08-09

## 2022-08-09 RX ORDER — BUDESONIDE AND FORMOTEROL FUMARATE DIHYDRATE 160; 4.5 UG/1; UG/1
2 AEROSOL RESPIRATORY (INHALATION)
Qty: 10.2 G | Refills: 11 | Status: SHIPPED | OUTPATIENT
Start: 2022-08-09 | End: 2022-08-24 | Stop reason: CLARIF

## 2022-08-09 RX ORDER — BUDESONIDE AND FORMOTEROL FUMARATE DIHYDRATE 160; 4.5 UG/1; UG/1
2 AEROSOL RESPIRATORY (INHALATION)
OUTPATIENT
Start: 2022-08-09

## 2022-08-09 RX ORDER — ALBUTEROL SULFATE 90 UG/1
2 AEROSOL, METERED RESPIRATORY (INHALATION) EVERY 6 HOURS PRN
Qty: 18 G | Refills: 11 | Status: SHIPPED | OUTPATIENT
Start: 2022-08-09

## 2022-08-09 RX ORDER — HYDROXYZINE HYDROCHLORIDE 10 MG/1
10 TABLET, FILM COATED ORAL EVERY 8 HOURS PRN
Qty: 30 TABLET | Refills: 2 | Status: SHIPPED | OUTPATIENT
Start: 2022-08-09 | End: 2022-09-07 | Stop reason: SDUPTHER

## 2022-08-09 NOTE — TELEPHONE ENCOUNTER
Rx Refill Note  Requested Prescriptions     Pending Prescriptions Disp Refills   • hydrOXYzine (ATARAX) 10 MG tablet 30 tablet 2     Sig: Take 1 tablet by mouth Every 8 (Eight) Hours As Needed for Anxiety.   • albuterol sulfate  (90 Base) MCG/ACT inhaler     • budesonide-formoterol (SYMBICORT) 160-4.5 MCG/ACT inhaler       Sig: Inhale 2 puffs 2 (Two) Times a Day.      Last office visit with prescribing clinician: 6/2/2022      Next office visit with prescribing clinician: 9/7/2022            Radha Rhodes MA  08/09/22, 10:40 EDT

## 2022-08-13 ENCOUNTER — HOSPITAL ENCOUNTER (OUTPATIENT)
Dept: GENERAL RADIOLOGY | Facility: HOSPITAL | Age: 42
Discharge: HOME | End: 2022-08-13
Admitting: NURSE PRACTITIONER

## 2022-08-13 ENCOUNTER — TRANSCRIBE ORDERS (OUTPATIENT)
Dept: ADMINISTRATIVE | Facility: HOSPITAL | Age: 42
End: 2022-08-13

## 2022-08-13 DIAGNOSIS — R19.7 DIARRHEA, UNSPECIFIED TYPE: ICD-10-CM

## 2022-08-13 DIAGNOSIS — R19.7 DIARRHEA, UNSPECIFIED TYPE: Primary | ICD-10-CM

## 2022-08-13 DIAGNOSIS — R10.11 RUQ ABDOMINAL PAIN: ICD-10-CM

## 2022-08-13 PROCEDURE — 74018 RADEX ABDOMEN 1 VIEW: CPT

## 2022-08-16 ENCOUNTER — TELEPHONE (OUTPATIENT)
Dept: FAMILY MEDICINE CLINIC | Facility: CLINIC | Age: 42
End: 2022-08-16

## 2022-08-16 NOTE — TELEPHONE ENCOUNTER
Caller: Jo Asif    Relationship: Self    Best call back number: [phone]     What is the best time to reach you: ANY    Who are you requesting to speak with (clinical staff, provider,  specific staff member): ROSENDO GRIMALDO    What was the call regarding: LEAVE OF [employer] WORK [employer]MS    Do you require a callback: YES

## 2022-08-16 NOTE — TELEPHONE ENCOUNTER
Pt reports that she would like intermittent time off for sciatica and migraines. Are you ok with filling those out? Or should she schedule an appt to discuss first?  She has appt on 9/7

## 2022-08-17 ENCOUNTER — OFFICE VISIT (OUTPATIENT)
Dept: FAMILY MEDICINE CLINIC | Facility: CLINIC | Age: 42
End: 2022-08-17

## 2022-08-17 VITALS
SYSTOLIC BLOOD PRESSURE: 142 MMHG | BODY MASS INDEX: 50.02 KG/M2 | HEART RATE: 82 BPM | DIASTOLIC BLOOD PRESSURE: 94 MMHG | HEIGHT: 64 IN | OXYGEN SATURATION: 98 % | WEIGHT: 293 LBS

## 2022-08-17 DIAGNOSIS — M25.561 RIGHT KNEE PAIN, UNSPECIFIED CHRONICITY: ICD-10-CM

## 2022-08-17 DIAGNOSIS — K59.09 CHRONIC CONSTIPATION: ICD-10-CM

## 2022-08-17 DIAGNOSIS — G89.29 CHRONIC RIGHT SHOULDER PAIN: ICD-10-CM

## 2022-08-17 DIAGNOSIS — G43.909 MIGRAINE WITHOUT STATUS MIGRAINOSUS, NOT INTRACTABLE, UNSPECIFIED MIGRAINE TYPE: ICD-10-CM

## 2022-08-17 DIAGNOSIS — M25.511 CHRONIC RIGHT SHOULDER PAIN: ICD-10-CM

## 2022-08-17 DIAGNOSIS — M54.32 SCIATICA OF LEFT SIDE: Primary | ICD-10-CM

## 2022-08-17 PROCEDURE — 99214 OFFICE O/P EST MOD 30 MIN: CPT | Performed by: NURSE PRACTITIONER

## 2022-08-17 PROCEDURE — 96372 THER/PROPH/DIAG INJ SC/IM: CPT | Performed by: NURSE PRACTITIONER

## 2022-08-17 RX ORDER — METHYLPREDNISOLONE SODIUM SUCCINATE 125 MG/2ML
125 INJECTION, POWDER, LYOPHILIZED, FOR SOLUTION INTRAMUSCULAR; INTRAVENOUS ONCE
Status: COMPLETED | OUTPATIENT
Start: 2022-08-17 | End: 2022-08-17

## 2022-08-17 RX ORDER — NAPROXEN 500 MG/1
500 TABLET ORAL 2 TIMES DAILY PRN
Qty: 60 TABLET | Refills: 2 | Status: SHIPPED | OUTPATIENT
Start: 2022-08-17 | End: 2022-09-07 | Stop reason: SDUPTHER

## 2022-08-17 RX ORDER — KETOROLAC TROMETHAMINE 30 MG/ML
30 INJECTION, SOLUTION INTRAMUSCULAR; INTRAVENOUS ONCE
Status: COMPLETED | OUTPATIENT
Start: 2022-08-17 | End: 2022-08-17

## 2022-08-17 RX ADMIN — METHYLPREDNISOLONE SODIUM SUCCINATE 125 MG: 125 INJECTION, POWDER, LYOPHILIZED, FOR SOLUTION INTRAMUSCULAR; INTRAVENOUS at 16:53

## 2022-08-17 RX ADMIN — KETOROLAC TROMETHAMINE 30 MG: 30 INJECTION, SOLUTION INTRAMUSCULAR; INTRAVENOUS at 16:25

## 2022-08-17 NOTE — ASSESSMENT & PLAN NOTE
1.  Solu-Medrol and Toradol today IM.   2.  Naproxen twice daily.  3.  Start exercises provided  4.  Alternate heat/ice  5.  Okay for work note from 8/15-8/18, can return on 8/18 if s/s improved.   6.  Patient to notify if symptoms do not improve, consider x-rays.

## 2022-08-17 NOTE — PROGRESS NOTES
Chief Complaint  Chief Complaint   Patient presents with   • Sciatica     Pt reports it's the lt side, from lower back to the ankle.  Pt reports it started yesterday morning.  Pt had a KUB recently that mentioned lumbar spine   • Headache     Discuss LA           Subjective          Jo Asif presents to [facility] for   History of Present Illness     Patient presents today with complaints of sciatica, this is a chronic and intermittent problem.  Has happened before in the past, but never as severe as current.  She reports getting out of bed 2 days ago and had shooting pain in the left low back, buttocks to the back of the leg and to the ankle.  Symptoms have not improved with naproxen, today is worse with any movement especially standing, sitting and lying flat is the only position she can receive relief.  KUB 8/13/2022 mentions degenerative changes of the lower lumbar spine.    Migraine headaches, patient is on Ajovy monthly and Ubrelvy as needed for breakthrough migraines per neurology, Dr. Jacob.  She reports having 1 migraine per month now on current regimen, however when migraine does develop and is not resolved by Ubrelvy she is unable to function and/or work.    Following with pain mgmt for chronic right shoulder and knee pain, hydrocodone discontinued due to elevated liver function, patient currently on oxycodone for last 2 months, complains of abdominal pain and constipation.    Following with gastro for elev lft's, she reports she will be having cholecystectomy soon d/t stones, liver work up otherwise normal. She is more constipated after taking benefiber, now taking miralax but no bm in several days, has also tried Colace.     She has missed 2 days of work this week due to sciatica and has missed a few days of work in the past due to migraine headaches, is requesting [facility] paperwork to completed in November when eligible, for now requesting intermittent  personal leave of absence for employer documentation purposes.          The following portions of the patient's history were reviewed and updated as appropriate: allergies, current medications, past family history, past medical history, past social history, past surgical history and problem list.    Past Medical History:   Diagnosis Date   • Arrhythmia    • Asthma    • Atrial fibrillation (HCC)    • Depression    • Disease of thyroid gland    • GERD (gastroesophageal reflux disease)    • Heart murmur    • Hypertension    • Migraine    • PCOS (polycystic ovarian syndrome)    • PKU (phenylketonuria) (HCC)    • Prediabetes    • Rotator cuff tear 03/2022   • Sleep apnea    • Torn meniscus 03/2021     Past Surgical History:   Procedure Laterality Date   • ABLATION OF DYSRHYTHMIC FOCUS     • CARDIAC ELECTROPHYSIOLOGY PROCEDURE N/A 6/10/2020    Procedure: EP/Ablation;  Surgeon: Joseph Andrews MD;  Location: [facility] CATH INVASIVE LOCATION;  Service: Cardiovascular;  Laterality: N/A;   • CARPAL TUNNEL RELEASE Right    • COLONOSCOPY N/A 6/30/2022    Procedure: COLONOSCOPY with random biopsies R/O microscopic colitis, polypectomy x 1;  Surgeon: Jeffrey Echevarria MD;  Location: [facility] ENDOSCOPY;  Service: Gastroenterology;  Laterality: N/A;   • ENDOSCOPY N/A 6/30/2022    Procedure: ESOPHAGOGASTRODUODENOSCOPY with esophagus, gastric and duodenal biopsies;  Surgeon: Jeffrey Echevarria MD;  Location: [facility] ENDOSCOPY;  Service: Gastroenterology;  Laterality: N/A;   • KNEE ARTHROSCOPY Right 3/26/2021    Procedure: KNEE ARTHROSCOPY with  partial medial lateral meniscectomy;  Surgeon: Kb Gaviria MD;  Location: [facility] MAIN OR;  Service: Orthopedics;  Laterality: Right;   • SHOULDER ARTHROSCOPY W/ ROTATOR CUFF REPAIR Right 3/18/2022    Procedure: SHOULDER ARTHROSCOPY WITH DEBRIDEMENT;  Surgeon: Kb Gaviria MD;  Location: [facility] MAIN OR;  Service: Orthopedics;  Laterality: Right;   •  TONSILLECTOMY     • TUBAL ABDOMINAL LIGATION     • UVULECTOMY       Family History   Problem Relation Age of Onset   • Diabetes Mother    • Heart disease Father    • Cancer Father      Social History     Tobacco Use   • Smoking status: Former Smoker     Packs/day: 0.10     Years: 6.00     Pack years: 0.60     Types: Cigarettes     Quit date: 2009     Years since quittin.9   • Smokeless tobacco: Never Used   Substance Use Topics   • Alcohol use: No       Current Outpatient Medications:   •  Accu-Chek Guide test strip, , Disp: , Rfl:   •  Accu-Chek Softclix Lancets lancets, , Disp: , Rfl:   •  Ajovy 225 MG/1.5ML solution prefilled syringe, Every 30 (Thirty) Days. For migraines, Disp: , Rfl:   •  albuterol sulfate  (90 Base) MCG/ACT inhaler, Inhale 2 puffs Every 6 (Six) Hours As Needed for Wheezing or Shortness of Air., Disp: 18 g, Rfl: 11  •  Blood Glucose Monitoring Suppl (Accu-Chek Guide Me) w/Device kit, , Disp: , Rfl:   •  budesonide-formoterol (SYMBICORT) 160-4.5 MCG/ACT inhaler, Inhale 2 puffs 2 (Two) Times a Day., Disp: 10.2 g, Rfl: 11  •  EPINEPHrine (EPIPEN) 0.3 MG/0.3ML solution auto-injector injection, Inject 0.3 mg into the appropriate muscle as directed by prescriber., Disp: , Rfl:   •  FLUoxetine (PROzac) 10 MG capsule, Take 1 capsule by mouth Daily., Disp: 90 capsule, Rfl: 1  •  hydrOXYzine (ATARAX) 10 MG tablet, Take 1 tablet by mouth Every 8 (Eight) Hours As Needed for Anxiety., Disp: 30 tablet, Rfl: 2  •  levothyroxine (SYNTHROID, LEVOTHROID) 75 MCG tablet, , Disp: , Rfl:   •  lisinopril-hydrochlorothiazide (PRINZIDE,ZESTORETIC) 10-12.5 MG per tablet, TAKE ONE TABLET BY MOUTH DAILY, Disp: 30 tablet, Rfl: 2  •  montelukast (Singulair) 10 MG tablet, Take 1 tablet by mouth Every Night., Disp: 90 tablet, Rfl: 1  •  omeprazole (priLOSEC) 40 MG capsule, Take 1 capsule by mouth Daily., Disp: 30 capsule, Rfl: 2  •  oxyCODONE (ROXICODONE) 5 MG immediate release tablet, , Disp: , Rfl:   •   "Ozempic, 0.25 or 0.5 MG/DOSE, 2 MG/1.5ML solution pen-injector, , Disp: , Rfl:   •  Palynziq 10 MG/0.5ML solution prefilled syringe, , Disp: , Rfl:   •  Semaglutide (OZEMPIC, 0.25 OR 0.5 MG/DOSE, SC), Inject  under the skin into the appropriate area as directed., Disp: , Rfl:   •  tiotropium bromide monohydrate (SPIRIVA RESPIMAT) 2.5 MCG/ACT aerosol solution inhaler, Inhale 2 puffs Daily., Disp: 4 g, Rfl: 5  •  ubrogepant (UBRELVY) 100 MG tablet, Take  by mouth., Disp: , Rfl:   •  linaclotide (Linzess) 145 MCG capsule capsule, Take 1 capsule by mouth Every Morning Before Breakfast., Disp: 4 capsule, Rfl: 0  •  naproxen (Naprosyn) 500 MG tablet, Take 1 tablet by mouth 2 (Two) Times a Day As Needed for Moderate Pain ., Disp: 60 tablet, Rfl: 2  No current facility-administered medications for this visit.    Objective   Vital Signs:   /94 (BP Location: Left arm, Patient Position: Sitting, Cuff Size: Large Adult)   Pulse 82   Ht 162.6 cm (64.02\")   Wt (!) 146 kg (321 lb 12.8 oz)   SpO2 98%   BMI 55.21 kg/m²           Physical Exam  Vitals and nursing note reviewed.   Constitutional:       General: She is not in acute distress.     Appearance: She is well-developed. She is not diaphoretic.   Neck:      Thyroid: No thyromegaly.      Vascular: No JVD.   Musculoskeletal:         General: Tenderness (severe L sciatic region ttp with radiation of pain into left posterior leg to ankle, severe limited range of motion.  No mid lumbar spine tenderness) present. Normal range of motion.   Skin:     General: Skin is warm and dry.   Neurological:      Mental Status: She is alert and oriented to person, place, and time.      Sensory: No sensory deficit.   Psychiatric:         Behavior: Behavior normal.         Thought Content: Thought content normal.         Judgment: Judgment normal.          Result Review :     No visits with results within 7 Day(s) from this visit.   Latest known visit with results is:   Admission on " 06/30/2022, Discharged on 06/30/2022   Component Date Value Ref Range Status   • Case Report 06/30/2022    Final                    Value:Surgical Pathology Report                         Case: [id number]                                  Authorizing Provider:  Jeffrey Echevarria, Collected:           06/30/2022 08:34 AM                                 MD                                                                           Ordering Location:     [facility]  Received:            06/30/2022 11:40 AM                                 SUITES                                                                       Pathologist:           Ramon Pastor MD                                                            Specimens:   1) - Small Intestine, duodenum biopsy R/O celiac                                                    2) - Gastric, Body, biopsy nodular gastritis                                                        3) - Esophagus, Mid, mid esophagus R/O EOE                                                          4) - Large Intestine, Right / Ascending Colon, ascending colon polyp                                                          5) - Large Intestine, Left / Descending Colon, random colon biopsies R/O microscopic                colitis                                                                                   • Final Diagnosis 06/30/2022    Final                    Value:This result contains rich text formatting which cannot be displayed here.   • Gross Description 06/30/2022    Final                    Value:This result contains rich text formatting which cannot be displayed here.                  Class 3 Severe Obesity (BMI >=40). Obesity-related health conditions include the following: osteoarthritis. Obesity is worsening. BMI is is above average; BMI management plan is completed. We discussed portion control and increasing exercise.           Assessment and Plan {CC Problem  List  Visit Diagnosis  ROS  Review (Popup)  [place]  BestPractice  Medications  SmartSets  SnapShot Encounters  Media :23}   Diagnoses and all orders for this visit:    1. Sciatica of left side (Primary)  Assessment & Plan:  1.  Solu-Medrol and Toradol today IM.   2.  Naproxen twice daily.  3.  Start exercises provided  4.  Alternate heat/ice  5.  Okay for work note from 8/15-8/18, can return on 8/18 if s/s improved.   6.  Patient to notify if symptoms do not improve, consider x-rays.     Orders:  -     methylPREDNISolone sodium succinate (SOLU-Medrol) injection 125 mg  -     ketorolac (TORADOL) injection 30 mg    2. Migraine without status migrainosus, not intractable, unspecified migraine type  Comments:  Essentially stable on Ajovy and Ubrelvy, having 1/month, however unable to work w/ migraine. ok for FMLA and/or [employer] paperwork when received.     3. Chronic constipation  Comments:  Patient has tried OTC stool softeners-colace, miralax and Benefiber.  Reviewed KUB with mod stool burden, provided Linzess samples, notify if effective.     4. Right knee pain, unspecified chronicity  Comments:  Continue to follow with pain management for chronic right knee and right shoulder pain, currently on oxycodone    5. Chronic right shoulder pain    Other orders  -     naproxen (Naprosyn) 500 MG tablet; Take 1 tablet by mouth 2 (Two) Times a Day As Needed for Moderate Pain .  Dispense: 60 tablet; Refill: 2  -     linaclotide (Linzess) 145 MCG capsule capsule; Take 1 capsule by mouth Every Morning Before Breakfast.  Dispense: 4 capsule; Refill: 0    Will complete leave of absence paperwork when received for patient's employer.  FMLA paperwork okay to complete in November when patient is eligible for 1-2 days/mo.       I spent 30 minutes caring for Jo Asif on this date of service. This time includes time spent by me in the following activities: preparing for the visit, reviewing tests,  performing a medically appropriate examination and/or evaluation , counseling and educating the patient/family/caregiver, ordering medications, tests, or procedures and documenting information in the medical record        Follow Up     Return if symptoms worsen or fail to improve in 2-3 days, for Next scheduled follow up.  Patient was given instructions and counseling regarding her condition or for health maintenance advice. Please see specific information pulled into the AVS if appropriate.        Part of this note may be an electronic transcription/translation of spoken language to printed text using the Dragon Dictation System

## 2022-08-18 ENCOUNTER — TELEPHONE (OUTPATIENT)
Dept: FAMILY MEDICINE CLINIC | Facility: CLINIC | Age: 42
End: 2022-08-18

## 2022-08-18 RX ORDER — METHYLPREDNISOLONE 4 MG/1
TABLET ORAL
Qty: 21 TABLET | Refills: 0 | Status: SHIPPED | OUTPATIENT
Start: 2022-08-18 | End: 2022-08-23 | Stop reason: SDUPTHER

## 2022-08-18 NOTE — TELEPHONE ENCOUNTER
Yes, I did go ahead and send Medrol Dosepak that she can start if pain continues to persist.  Thank you

## 2022-08-18 NOTE — TELEPHONE ENCOUNTER
Pt reports that her pain was gone this morning, but when she got up to use the restroom a bit after she woke up the pain shot down her leg again.  She asked if you want to go ahead and rx the steroid pack?

## 2022-08-18 NOTE — TELEPHONE ENCOUNTER
Caller: Jo Asif    Relationship to patient: Self    Best call back number: [phone]    Patient is needing: PATIENT IS CALLING BACK AS [employer] BY [facility] YESTERDAY 08.17.22 DUE TO STILL HAVING SCIATIC PAIN. DOCTOR NOTE STATES RETURN TO WORK 08.18.22 AND PATIENT COULDN'T GO TO WORK TODAY AS [employer]. WAS TOLD SHE IS ABLE TO RETURN TO WORK TOMORROW 08.19.22 IF THE PAIN HAS PASSED

## 2022-08-23 ENCOUNTER — HOSPITAL ENCOUNTER (OUTPATIENT)
Dept: GENERAL RADIOLOGY | Facility: HOSPITAL | Age: 42
Discharge: HOME | End: 2022-08-23
Admitting: NURSE PRACTITIONER

## 2022-08-23 ENCOUNTER — TELEPHONE (OUTPATIENT)
Dept: FAMILY MEDICINE CLINIC | Facility: CLINIC | Age: 42
End: 2022-08-23

## 2022-08-23 DIAGNOSIS — M54.32 SCIATICA OF LEFT SIDE: Primary | ICD-10-CM

## 2022-08-23 PROCEDURE — 72110 X-RAY EXAM L-2 SPINE 4/>VWS: CPT

## 2022-08-23 RX ORDER — DICYCLOMINE HYDROCHLORIDE 10 MG/1
CAPSULE ORAL
Qty: 90 CAPSULE | Refills: 1 | Status: SHIPPED | OUTPATIENT
Start: 2022-08-23 | End: 2022-08-29

## 2022-08-23 RX ORDER — METHYLPREDNISOLONE 4 MG/1
TABLET ORAL
Qty: 21 TABLET | Refills: 0 | Status: SHIPPED | OUTPATIENT
Start: 2022-08-23 | End: 2022-09-07

## 2022-08-23 NOTE — TELEPHONE ENCOUNTER
Pt's insurance company is wanting a PA for the Symbicort. They prefer Breo Ellipta, Advair HFA, Wixela, or Fluticasone-Salmeterol. Would you like to change inhaler or have me try to get a PA on the Symbicort. Please advise.

## 2022-08-23 NOTE — TELEPHONE ENCOUNTER
Pt called in this morning regarding sciatica pain. She said it  was fine over the weekend and she thought it had perhaps resolved but yesterday/last night and today she has been miserable. She took the last of her dose pack today. She said she went to pain management today and didn't do pain medication since it wasn't working for the sciatica, but is calling in Gabapentin for her. She was not aware of the dosage at time of call. She also said that pain management recommended an MRI, so she is wondering about an order for that. Patient is asking if she can get a work note for today as she has missed work due to the pain. Plase advise.

## 2022-08-23 NOTE — TELEPHONE ENCOUNTER
I have sent her another Medrol Dosepak and ordered x-ray of the lumbar spine to be completed at [facility].  We will need to start with an x-ray prior to MRI.  Gabapentin should help as well, I do recommend she take it.  Yes, okay for work note

## 2022-08-24 DIAGNOSIS — M43.16 ANTEROLISTHESIS OF LUMBAR SPINE: ICD-10-CM

## 2022-08-24 DIAGNOSIS — M54.32 SCIATICA OF LEFT SIDE: Primary | ICD-10-CM

## 2022-08-24 RX ORDER — FLUTICASONE PROPIONATE AND SALMETEROL XINAFOATE 230; 21 UG/1; UG/1
2 AEROSOL, METERED RESPIRATORY (INHALATION)
Qty: 12 G | Refills: 11 | Status: SHIPPED | OUTPATIENT
Start: 2022-08-24

## 2022-08-25 ENCOUNTER — TELEPHONE (OUTPATIENT)
Dept: FAMILY MEDICINE CLINIC | Facility: CLINIC | Age: 42
End: 2022-08-25

## 2022-08-25 NOTE — TELEPHONE ENCOUNTER
Kimberlee Mao ordered solu-medrol 125 mg and toradol 30 mg injection on 8/17/22.  Pt was given rocephin 1 g injection and toradol 30 mg injection (rocephin given in error, should've been the solu-medrol).  Pt was monitored for 15 mins after the injections were given, she had no reaction.  Error was discovered after pt left facility.  PCP was notified immediately, pt is not allergic to the medication.  Pt was notified and instructed to return to the facility for the solu-medrol injection which was administered.  PCP spoke to pt, pt seemed to not be disturbed by the mistake, she was just in pain.  I spoke to pt the next day and she still was in pain but did not have a reaction to the medication error.  Safe report was entered the same day of the incident.  We have been in contact with pt here and there since the medication error and she never had a reaction to the medication error.

## 2022-08-26 ENCOUNTER — TELEPHONE (OUTPATIENT)
Dept: FAMILY MEDICINE CLINIC | Facility: CLINIC | Age: 42
End: 2022-08-26

## 2022-08-26 NOTE — TELEPHONE ENCOUNTER
Caller: Jo Asif    Relationship: Self    Best call back number: [phone]     Do you require a callback: YES--EMPLOYER HAS RETURNED PAPERWORK. THEY NEED SPECIFIC TIME FRAME FOR INTERMITTENT LEAVE.     THE INITIAL DATE IS  8/16-THEN HAVE IT THROUGH HOW EVER LONG PROVIDER THINKS SHE NEEDS --  (MAYBE END OF THE YEAR BECAUSE FMLA WILL KICK IN)    THEY HAVE 7 DAYS TO RETURN.     PLEASE CALL AND [facility].

## 2022-08-26 NOTE — TELEPHONE ENCOUNTER
I put a date on paperwork, I believe 3mo, it was 8/16 through November 16 (I think), I know the date was in november... bc she will be able to get FMLA in November.

## 2022-08-29 ENCOUNTER — OFFICE VISIT (OUTPATIENT)
Dept: SURGERY | Facility: CLINIC | Age: 42
End: 2022-08-29

## 2022-08-29 ENCOUNTER — TELEPHONE (OUTPATIENT)
Dept: FAMILY MEDICINE CLINIC | Facility: CLINIC | Age: 42
End: 2022-08-29

## 2022-08-29 VITALS
WEIGHT: 293 LBS | OXYGEN SATURATION: 96 % | TEMPERATURE: 97.1 F | HEART RATE: 70 BPM | BODY MASS INDEX: 50.02 KG/M2 | HEIGHT: 64 IN | SYSTOLIC BLOOD PRESSURE: 148 MMHG | DIASTOLIC BLOOD PRESSURE: 94 MMHG

## 2022-08-29 DIAGNOSIS — K80.50 BILIARY COLIC: Primary | ICD-10-CM

## 2022-08-29 PROCEDURE — 99204 OFFICE O/P NEW MOD 45 MIN: CPT | Performed by: SURGERY

## 2022-08-29 RX ORDER — GABAPENTIN 100 MG/1
CAPSULE ORAL
COMMUNITY
Start: 2022-08-23 | End: 2022-09-07 | Stop reason: SDUPTHER

## 2022-08-29 NOTE — TELEPHONE ENCOUNTER
Paperwork was given to Yara this morning, I have revised it to February.  The prior Auth has been completed for [facility] for the MRI… I have sent patient a message back and would prefer she attempt the MRI there, I can give her an antianxiety medication prior to the procedure if needed, or likely will need to resubmit PA for open sided

## 2022-08-29 NOTE — PROGRESS NOTES
CHIEF COMPLAINT:    Gallstones    HISTORY OF PRESENT ILLNESS:    Jo Asif is a 42 y.o. female who has had fairly extensive gastroenterology work-up recently for intermittent constipation and diarrhea.  This has included upper and lower endoscopy as well as gallbladder ultrasound.  On ultrasound of her gallbladder she was noted to have small stones within the gallbladder.  She was sent to me for further discussion of possible cholecystectomy.    She does have occasional right upper quadrant pain after meals which is self-limited.    Past Medical History:   Diagnosis Date   • Arrhythmia    • Asthma     no inhalers   • Atrial fibrillation (HCC)    • Depression    • Disease of thyroid gland    • GERD (gastroesophageal reflux disease)    • Heart murmur    • Hypertension    • Migraine    • PCOS (polycystic ovarian syndrome)    • PKU (phenylketonuria) (HCC)    • Prediabetes    • Rotator cuff tear 03/2022    right   • Sleep apnea     no machine   • Torn meniscus 03/2021    right       Past Surgical History:   Procedure Laterality Date   • ABLATION OF DYSRHYTHMIC FOCUS     • CARDIAC ELECTROPHYSIOLOGY PROCEDURE N/A 6/10/2020    Procedure: EP/Ablation;  Surgeon: Joseph Andrews MD;  Location: [facility] CATH INVASIVE LOCATION;  Service: Cardiovascular;  Laterality: N/A;   • CARPAL TUNNEL RELEASE Right    • COLONOSCOPY N/A 6/30/2022    Procedure: COLONOSCOPY with random biopsies R/O microscopic colitis, polypectomy x 1;  Surgeon: Jeffrey Echevarria MD;  Location: [facility] ENDOSCOPY;  Service: Gastroenterology;  Laterality: N/A;   • ENDOSCOPY N/A 6/30/2022    Procedure: ESOPHAGOGASTRODUODENOSCOPY with esophagus, gastric and duodenal biopsies;  Surgeon: Jeffrey Echevarria MD;  Location: [facility] ENDOSCOPY;  Service: Gastroenterology;  Laterality: N/A;   • KNEE ARTHROSCOPY Right 3/26/2021    Procedure: KNEE ARTHROSCOPY with  partial medial lateral meniscectomy;  Surgeon: Kb Gaviria MD;   Location: [facility] MAIN OR;  Service: Orthopedics;  Laterality: Right;   • SHOULDER ARTHROSCOPY W/ ROTATOR CUFF REPAIR Right 3/18/2022    Procedure: SHOULDER ARTHROSCOPY WITH DEBRIDEMENT;  Surgeon: Kb Gaviria MD;  Location: [facility] MAIN OR;  Service: Orthopedics;  Laterality: Right;   • TONSILLECTOMY     • TUBAL ABDOMINAL LIGATION     • UVULECTOMY         Prior to Admission medications    Medication Sig Start Date End Date Taking? Authorizing Provider   Accu-Chek Guide test strip  7/8/22  Yes Stacey Foley MD   Accu-Chek Softclix Lancets lancets  7/8/22  Yes Stacey Foley MD   Ajovy 225 MG/1.5ML solution prefilled syringe Every 30 (Thirty) Days. For migraines 1/31/21  Yes Andreina Jacob MD PhD   albuterol sulfate  (90 Base) MCG/ACT inhaler Inhale 2 puffs Every 6 (Six) Hours As Needed for Wheezing or Shortness of Air. 8/9/22  Yes Kimberlee Cavanaugh APRN   Blood Glucose Monitoring Suppl (Accu-Chek Guide Me) w/Device kit  7/8/22  Yes Stacey Foley MD   EPINEPHrine (EPIPEN) 0.3 MG/0.3ML solution auto-injector injection Inject 0.3 mg into the appropriate muscle as directed by prescriber. 1/14/22  Yes Stacey Foley MD   FLUoxetine (PROzac) 10 MG capsule Take 1 capsule by mouth Daily. 6/2/22  Yes Kimberlee Cavanaugh APRN   fluticasone-salmeterol (Advair HFA) 230-21 MCG/ACT inhaler Inhale 2 puffs 2 (Two) Times a Day. 8/24/22  Yes Kimberlee Cavanaugh APRN   gabapentin (NEURONTIN) 100 MG capsule  8/23/22  Yes Stacey Foley MD   hydrOXYzine (ATARAX) 10 MG tablet Take 1 tablet by mouth Every 8 (Eight) Hours As Needed for Anxiety. 8/9/22  Yes Kimberlee Cavanaugh APRN   levothyroxine (SYNTHROID, LEVOTHROID) 75 MCG tablet  3/9/22  Yes Stacey Foley MD   lisinopril-hydrochlorothiazide (PRINZIDE,ZESTORETIC) 10-12.5 MG per tablet TAKE ONE TABLET BY MOUTH DAILY 7/18/22  Yes Kimberlee Cavanaugh APRN   methylPREDNISolone (MEDROL) 4 MG dose pack Take as directed on package instructions.  22  Yes Kimberlee Cavanaugh APRN   montelukast (Singulair) 10 MG tablet Take 1 tablet by mouth Every Night. 22  Yes Kimberlee Cavanaugh APRN   naproxen (Naprosyn) 500 MG tablet Take 1 tablet by mouth 2 (Two) Times a Day As Needed for Moderate Pain . 22  Yes Kimberlee Cavanaugh APRN   omeprazole (priLOSEC) 40 MG capsule Take 1 capsule by mouth Daily. 22  Yes Kimberlee Cavanaugh APRN   oxyCODONE (ROXICODONE) 5 MG immediate release tablet  22  Yes ProviderStacey MD   Ozempic, 0.25 or 0.5 MG/DOSE, 2 MG/1.5ML solution pen-injector  22  Yes ProviderStacey MD   Palynziq 10 MG/0.5ML solution prefilled syringe  22  Yes ProviderStacey MD   Semaglutide (OZEMPIC, 0.25 OR 0.5 MG/DOSE, SC) Inject  under the skin into the appropriate area as directed.   Yes Provider, MD Stacey   ubrogepant (UBRELVY) 100 MG tablet Take  by mouth.   Yes Provider, MD Stacey   dicyclomine (BENTYL) 10 MG capsule TAKE ONE CAPSULE BY MOUTH THREE TIMES A DAY BEFORE MEALS 22   Kimberlee Cavanaugh APRN   linaclotide (Linzess) 145 MCG capsule capsule Take 1 capsule by mouth Every Morning Before Breakfast. 22   Kimberlee Cavanaugh APRN   tiotropium bromide monohydrate (SPIRIVA RESPIMAT) 2.5 MCG/ACT aerosol solution inhaler Inhale 2 puffs Daily. 22  Kimberlee Cavanaugh APRN       Allergies   Allergen Reactions   • Amoxicillin Hives   • Cleocin [Clindamycin Hcl] Anaphylaxis   • Penicillins Hives       Family History   Problem Relation Age of Onset   • Depression Mother    • Hypertension Mother    • Diabetes Mother    • Arthritis Father    • Hypertension Father    • Diabetes Father    • Heart disease Father    • Cancer Father        Social History     Socioeconomic History   • Marital status:    Tobacco Use   • Smoking status: Former Smoker     Packs/day: 0.10     Years: 6.00     Pack years: 0.60     Types: Cigarettes     Quit date: 2009     Years since quittin.0   • Smokeless tobacco:  "Never Used   Vaping Use   • Vaping Use: Never used   Substance and Sexual Activity   • Alcohol use: No   • Drug use: No   • Sexual activity: Defer       Review of Systems   Gastrointestinal: Positive for abdominal pain, constipation and diarrhea.       Objective     /94   Pulse 70   Temp 97.1 °F (36.2 °C) (Infrared)   Ht 162.6 cm (64\")   Wt (!) 144 kg (317 lb)   SpO2 96%   BMI 54.41 kg/m²     Physical Exam  Constitutional:       General: She is not in acute distress.     Appearance: Normal appearance. She is obese. She is not ill-appearing, toxic-appearing or diaphoretic.   HENT:      Head: Normocephalic and atraumatic.   Eyes:      General: No scleral icterus.        Right eye: No discharge.         Left eye: No discharge.      Extraocular Movements: Extraocular movements intact.      Conjunctiva/sclera: Conjunctivae normal.   Pulmonary:      Effort: Pulmonary effort is normal. No respiratory distress.   Abdominal:      General: There is no distension.      Palpations: Abdomen is soft. There is no mass.      Tenderness: There is no abdominal tenderness. There is no guarding or rebound.      Hernia: No hernia is present.   Skin:     General: Skin is warm.      Coloration: Skin is not jaundiced.   Neurological:      General: No focal deficit present.      Mental Status: She is alert and oriented to person, place, and time.   Psychiatric:         Mood and Affect: Mood normal.         Behavior: Behavior normal.         Thought Content: Thought content normal.         Judgment: Judgment normal.         DIAGNOSTIC DATA:    Ultrasound images and report reviewed showing cholelithiasis without evidence of cholecystitis    Labs from June 2022 were reviewed showing normal white blood cell count of 7.16, mild elevation of ALT at 74, AST at 61, bilirubin normal 0.4    ASSESSMENT:    Gallstones with possible biliary colic    PLAN:    It was recommended to her by gastroenterology to have her gallbladder removed.  I " believe this is reasonable given that she has had intermittent right upper quadrant symptoms referable to her gallbladder.  We did discuss that with all of her other GI issues including intermittent diarrhea this likely will be worsened by removal of the gallbladder.  She understands this.  She understands that she may require medication to treat postcholecystectomy diarrhea following gallbladder removal.  I discussed with her and recommended that she undergo robotic cholecystectomy given her elevated BMI.  The risks and benefits of robotic assisted laparoscopic cholecystectomy were discussed.  She has been scheduled.          This document has been electronically signed by Alf Whalen MD on August 29, 2022 09:46 EDT

## 2022-08-29 NOTE — TELEPHONE ENCOUNTER
Caller: Jo Asif    Relationship: Self    Best call back number: [phone]    What orders are you requesting (i.e. lab or imaging):  OPEN SIDED MRI    In what timeframe would the patient need to come in: AS SOON AS POSSIBLE    Where will you receive your lab/imaging services: OPEN [facility]IDED MRI,[address]  PHONE [phone]       Additional notes: PATIENT STATED THAT SHE IS UNABLE TO BE ENCLOSED AND HAS FOUND AN OPEN SIDED MRI NEAR HER HOME.    PATIENT REQUESTING TO REFAX THE ORDER TO THE ABOVE     PATIENT STATED THE DATE ON THE FMLA PAPERWORK WAS SENT BACK ON Thursday AND NEEDED A DATE ON IT.  PATIENT REQUESTING TO HAVE DATE ADDED AND PAPERWORK SENT.

## 2022-09-02 ENCOUNTER — PATIENT ROUNDING (BHMG ONLY) (OUTPATIENT)
Dept: SURGERY | Facility: CLINIC | Age: 42
End: 2022-09-02

## 2022-09-02 NOTE — PROGRESS NOTES
September 2, 2022    Hello, may I speak with Jo Asif?    My name is JESSICA     I am  with [facility]  [address] [place] [address].    Before we get started may I verify your date of birth? 1980    I am calling to officially welcome you to our practice and ask about your recent visit. Is this a good time to talk? yes    Tell me about your visit with us. What things went well? PROVIDER WAS GOOD AND OFFICE        We're always looking for ways to make our patients' experiences even better. Do you have recommendations on ways we may improve?  no    Overall were you satisfied with your first visit to our practice? yes       I appreciate you taking the time to speak with me today. Is there anything else I can do for you? no      Thank you, and have a great day.

## 2022-09-07 ENCOUNTER — OFFICE VISIT (OUTPATIENT)
Dept: FAMILY MEDICINE CLINIC | Facility: CLINIC | Age: 42
End: 2022-09-07

## 2022-09-07 VITALS
WEIGHT: 293 LBS | HEART RATE: 81 BPM | OXYGEN SATURATION: 97 % | SYSTOLIC BLOOD PRESSURE: 126 MMHG | BODY MASS INDEX: 50.02 KG/M2 | HEIGHT: 64 IN | TEMPERATURE: 98.7 F | DIASTOLIC BLOOD PRESSURE: 78 MMHG

## 2022-09-07 DIAGNOSIS — K59.09 CHRONIC CONSTIPATION: ICD-10-CM

## 2022-09-07 DIAGNOSIS — E03.9 ACQUIRED HYPOTHYROIDISM: ICD-10-CM

## 2022-09-07 DIAGNOSIS — G47.33 OSA (OBSTRUCTIVE SLEEP APNEA): ICD-10-CM

## 2022-09-07 DIAGNOSIS — F41.9 ANXIETY: ICD-10-CM

## 2022-09-07 DIAGNOSIS — F32.1 CURRENT MODERATE EPISODE OF MAJOR DEPRESSIVE DISORDER WITHOUT PRIOR EPISODE: ICD-10-CM

## 2022-09-07 DIAGNOSIS — M54.32 SCIATICA OF LEFT SIDE: ICD-10-CM

## 2022-09-07 DIAGNOSIS — K21.9 GASTROESOPHAGEAL REFLUX DISEASE WITHOUT ESOPHAGITIS: ICD-10-CM

## 2022-09-07 DIAGNOSIS — I10 PRIMARY HYPERTENSION: Primary | ICD-10-CM

## 2022-09-07 DIAGNOSIS — J45.20 MILD INTERMITTENT ASTHMA WITHOUT COMPLICATION: ICD-10-CM

## 2022-09-07 DIAGNOSIS — E70.1 PKU (PHENYLKETONURIA): ICD-10-CM

## 2022-09-07 DIAGNOSIS — K80.20 CALCULUS OF GALLBLADDER WITHOUT CHOLECYSTITIS WITHOUT OBSTRUCTION: ICD-10-CM

## 2022-09-07 DIAGNOSIS — G43.909 MIGRAINE WITHOUT STATUS MIGRAINOSUS, NOT INTRACTABLE, UNSPECIFIED MIGRAINE TYPE: ICD-10-CM

## 2022-09-07 DIAGNOSIS — R73.03 PREDIABETES: ICD-10-CM

## 2022-09-07 DIAGNOSIS — Z00.00 PREVENTATIVE HEALTH CARE: ICD-10-CM

## 2022-09-07 PROCEDURE — 99214 OFFICE O/P EST MOD 30 MIN: CPT | Performed by: NURSE PRACTITIONER

## 2022-09-07 RX ORDER — OMEPRAZOLE 40 MG/1
40 CAPSULE, DELAYED RELEASE ORAL DAILY
Qty: 90 CAPSULE | Refills: 1 | Status: SHIPPED | OUTPATIENT
Start: 2022-09-07

## 2022-09-07 RX ORDER — NAPROXEN 500 MG/1
500 TABLET ORAL 2 TIMES DAILY PRN
Qty: 180 TABLET | Refills: 1 | Status: SHIPPED | OUTPATIENT
Start: 2022-09-07 | End: 2023-02-02 | Stop reason: SDUPTHER

## 2022-09-07 RX ORDER — HYDROXYZINE HYDROCHLORIDE 10 MG/1
10 TABLET, FILM COATED ORAL EVERY 8 HOURS PRN
Qty: 180 TABLET | Refills: 1 | Status: SHIPPED | OUTPATIENT
Start: 2022-09-07 | End: 2023-02-02 | Stop reason: SDUPTHER

## 2022-09-07 RX ORDER — SEMAGLUTIDE 1.34 MG/ML
1 INJECTION, SOLUTION SUBCUTANEOUS WEEKLY
COMMUNITY
Start: 2022-09-02

## 2022-09-07 RX ORDER — GABAPENTIN 100 MG/1
100 CAPSULE ORAL 3 TIMES DAILY
COMMUNITY
Start: 2022-08-16 | End: 2023-03-08

## 2022-09-07 RX ORDER — LISINOPRIL AND HYDROCHLOROTHIAZIDE 12.5; 1 MG/1; MG/1
1 TABLET ORAL DAILY
Qty: 90 TABLET | Refills: 1 | Status: SHIPPED | OUTPATIENT
Start: 2022-09-07

## 2022-09-07 NOTE — PROGRESS NOTES
Chief Complaint  Chief Complaint   Patient presents with   • Hypertension   • Diabetes   • Anxiety   • Depression   • Follow-up     3 month F/U   • Sciatica     Patient pain is rated about a 5 right now. Worse is in the morning.   • Biometric Screening           Subjective          Jo Asif presents to [facility] for   History of Present Illness     HTN, patient had previously developed cough, questionable if related to asthma versus lisinopril, however since switching symbicort to advair cough has resolved. She is taking medications daily as directed, denies chest pain, headache, shortness of air, palpitations and swelling of extremities.     GERD, elevated LFTs, recent ultrasound shows cholelithiasis without cholecystitis, following with general surgery and planning cholecystectomy.  She follows also with gastroenterology, EGD and colonoscopy completed 6/30/2022 polyps and esophageal tissue negative, no H. pylori.  Stable on medication, denies nausea, vomiting, abdominal pain and diarrhea. She has been taking linzess every other day and effective.     Pre-Diabetes mellitus and hypothyroidism, patient follows with Dr. Bingham, endocrinology. 5.8 last hgba1c, on ozempic mostly for weight loss    Anxiety/depression, on Prozac and hydroxyzine    Sciatica, symptoms have not  Improved, has developed numbness into LLE and foot. Pain mgmt started gabapentin 100mg TID, Dr. Noonan. She cont oxycodone only for severe pain, has been 2 weeks.  FMLA approved to feb, MRI sched, trying to switch to open sided.     Migraines, follows with neuro. Stable on meds, FMLA approved to feb.     FRANK, cough developed after resuming CPAP, improved now with advair and continues spiriva, planning to retry cpap soon.     Allergy/asthma, improved on advair vs symbicort, using spriva daily and albuterol prn    She has labs with Dr. Bingham yearly        The following portions of the patient's history were  reviewed and updated as appropriate: allergies, current medications, past family history, past medical history, past social history, past surgical history and problem list.    Past Medical History:   Diagnosis Date   • Abdominal pain 09/2022   • Allergic    • Anxiety    • Arrhythmia    • Asthma    • Atrial fibrillation (HCC)    • Cholelithiasis    • Depression    • Disease of thyroid gland    • GERD (gastroesophageal reflux disease)    • Heart murmur    • History of medical problems    • HL (hearing loss)    • Hypertension    • Hypothyroidism    • Iron disorder    • Irritable bowel syndrome    • Migraine    • Morbid obesity (HCC)    • Obesity    • PCOS (polycystic ovarian syndrome)    • PKU (phenylketonuria) (HCC)    • Prediabetes    • Rotator cuff tear 03/2022   • Sleep apnea    • Torn meniscus 03/2021     Past Surgical History:   Procedure Laterality Date   • ABLATION OF DYSRHYTHMIC FOCUS     • CARDIAC CATHETERIZATION     • CARDIAC ELECTROPHYSIOLOGY PROCEDURE N/A 06/10/2020    Procedure: EP/Ablation;  Surgeon: Joseph Andrews MD;  Location: [facility] CATH INVASIVE LOCATION;  Service: Cardiovascular;  Laterality: N/A;   • CARPAL TUNNEL RELEASE Right    • COLONOSCOPY N/A 06/30/2022    Procedure: COLONOSCOPY with random biopsies R/O microscopic colitis, polypectomy x 1;  Surgeon: Jeffrey Echevarria MD;  Location: [facility] ENDOSCOPY;  Service: Gastroenterology;  Laterality: N/A;   • ENDOSCOPY N/A 06/30/2022    Procedure: ESOPHAGOGASTRODUODENOSCOPY with esophagus, gastric and duodenal biopsies;  Surgeon: Jeffrey Echevarria MD;  Location: [facility] ENDOSCOPY;  Service: Gastroenterology;  Laterality: N/A;   • KNEE ARTHROSCOPY Right 03/26/2021    Procedure: KNEE ARTHROSCOPY with  partial medial lateral meniscectomy;  Surgeon: Kb Gaviria MD;  Location: [facility] MAIN OR;  Service: Orthopedics;  Laterality: Right;   • SHOULDER ARTHROSCOPY W/ ROTATOR CUFF REPAIR Right 03/18/2022    Procedure: SHOULDER  ARTHROSCOPY WITH DEBRIDEMENT;  Surgeon: Kb Gaviria MD;  Location: [facility] MAIN OR;  Service: Orthopedics;  Laterality: Right;   • TONSILLECTOMY     • TUBAL ABDOMINAL LIGATION     • UVULECTOMY       Family History   Problem Relation Age of Onset   • Depression Mother    • Hypertension Mother    • Diabetes Mother    • Cancer Mother         Breast   • Hyperlipidemia Mother    • Arthritis Father    • Hypertension Father    • Diabetes Father    • Heart disease Father    • Cancer Father         Lung   • Early death Father    • Hyperlipidemia Father    • COPD Maternal Grandfather    • Diabetes Maternal Grandmother    • Hyperlipidemia Maternal Grandmother    • Cancer Paternal Grandfather    • Diabetes Paternal Grandmother    • Hyperlipidemia Paternal Grandmother      Social History     Tobacco Use   • Smoking status: Former Smoker     Packs/day: 0.10     Years: 6.00     Pack years: 0.60     Types: Cigarettes     Quit date: 2009     Years since quittin.0   • Smokeless tobacco: Never Used   • Tobacco comment: Rarely smoked   Substance Use Topics   • Alcohol use: No       Current Outpatient Medications:   •  Accu-Chek Guide test strip, , Disp: , Rfl:   •  Accu-Chek Softclix Lancets lancets, , Disp: , Rfl:   •  Ajovy 225 MG/1.5ML solution prefilled syringe, Every 30 (Thirty) Days. For migraines, Disp: , Rfl:   •  albuterol sulfate  (90 Base) MCG/ACT inhaler, Inhale 2 puffs Every 6 (Six) Hours As Needed for Wheezing or Shortness of Air., Disp: 18 g, Rfl: 11  •  Blood Glucose Monitoring Suppl (Accu-Chek Guide Me) w/Device kit, , Disp: , Rfl:   •  EPINEPHrine (EPIPEN) 0.3 MG/0.3ML solution auto-injector injection, Inject 0.3 mg into the appropriate muscle as directed by prescriber., Disp: , Rfl:   •  FLUoxetine (PROzac) 10 MG capsule, Take 1 capsule by mouth Daily., Disp: 90 capsule, Rfl: 1  •  fluticasone-salmeterol (Advair HFA) 230-21 MCG/ACT inhaler, Inhale 2 puffs 2 (Two) Times a Day., Disp: 12 g,  "Rfl: 11  •  gabapentin (NEURONTIN) 100 MG capsule, Take 100 mg by mouth 3 (Three) Times a Day., Disp: , Rfl:   •  hydrOXYzine (ATARAX) 10 MG tablet, Take 1 tablet by mouth Every 8 (Eight) Hours As Needed for Anxiety., Disp: 180 tablet, Rfl: 1  •  levothyroxine (SYNTHROID, LEVOTHROID) 75 MCG tablet, Take 75 mcg by mouth Every Morning., Disp: , Rfl:   •  linaclotide (Linzess) 72 MCG capsule capsule, Take 1 capsule by mouth Every Morning Before Breakfast., Disp: 90 capsule, Rfl: 1  •  lisinopril-hydrochlorothiazide (PRINZIDE,ZESTORETIC) 10-12.5 MG per tablet, Take 1 tablet by mouth Daily., Disp: 90 tablet, Rfl: 1  •  montelukast (Singulair) 10 MG tablet, Take 1 tablet by mouth Every Night., Disp: 90 tablet, Rfl: 1  •  naproxen (Naprosyn) 500 MG tablet, Take 1 tablet by mouth 2 (Two) Times a Day As Needed for Moderate Pain., Disp: 180 tablet, Rfl: 1  •  omeprazole (priLOSEC) 40 MG capsule, Take 1 capsule by mouth Daily., Disp: 90 capsule, Rfl: 1  •  Ozempic, 1 MG/DOSE, 4 MG/3ML solution pen-injector, Inject 1 mg as directed 1 (One) Time Per Week. Fri, Disp: , Rfl:   •  Palynziq 10 MG/0.5ML solution prefilled syringe, 3 (Three) Times a Week. [facility], Disp: , Rfl:   •  ubrogepant (UBRELVY) 100 MG tablet, Take 100 mg by mouth 1 (One) Time As Needed., Disp: , Rfl:   •  oxyCODONE (ROXICODONE) 5 MG immediate release tablet, , Disp: , Rfl:   •  tiotropium bromide monohydrate (SPIRIVA RESPIMAT) 2.5 MCG/ACT aerosol solution inhaler, Inhale 2 puffs Daily., Disp: 12 g, Rfl: 3  •  tiotropium bromide monohydrate (Spiriva Respimat) 2.5 MCG/ACT aerosol solution inhaler, Inhale 2 puffs Daily., Disp: , Rfl:     Objective   Vital Signs:   /78 (BP Location: Left arm, Patient Position: Sitting, Cuff Size: Large Adult)   Pulse 81   Temp 98.7 °F (37.1 °C) (Temporal)   Ht 162.6 cm (64\")   Wt (!) 144 kg (317 lb)   SpO2 97%   BMI 54.41 kg/m²           Physical Exam  Vitals and nursing note reviewed.   Constitutional:       General: She " is not in acute distress.     Appearance: She is well-developed. She is obese. She is not diaphoretic.   Neck:      Thyroid: No thyromegaly.      Vascular: No JVD.   Musculoskeletal:         General: Tenderness (severe L sciatic region ttp with radiation of pain into left posterior leg to ankle, severe limited range of motion.  No mid lumbar spine tenderness) present. Normal range of motion.   Skin:     General: Skin is warm and dry.   Neurological:      Mental Status: She is alert and oriented to person, place, and time.      Sensory: Sensory deficit (L leg) present.   Psychiatric:         Behavior: Behavior normal.         Thought Content: Thought content normal.         Judgment: Judgment normal.          Result Review :     No visits with results within 7 Day(s) from this visit.   Latest known visit with results is:   Admission on 06/30/2022, Discharged on 06/30/2022   Component Date Value Ref Range Status   • Case Report 06/30/2022    Final                    Value:Surgical Pathology Report                         Case: [id number]                                  Authorizing Provider:  Jeffrey Echevarria, Collected:           06/30/2022 08:34 AM                                 MD                                                                           Ordering Location:     [facility]  Received:            06/30/2022 11:40 AM                                 SUITES                                                                       Pathologist:           Ramon Pastor MD                                                            Specimens:   1) - Small Intestine, duodenum biopsy R/O celiac                                                    2) - Gastric, Body, biopsy nodular gastritis                                                        3) - Esophagus, Mid, mid esophagus R/O EOE                                                          4) - Large Intestine, Right / Ascending Colon,  ascending colon polyp                                                          5) - Large Intestine, Left / Descending Colon, random colon biopsies R/O microscopic                colitis                                                                                   • Final Diagnosis 06/30/2022    Final                    Value:This result contains rich text formatting which cannot be displayed here.   • Gross Description 06/30/2022    Final                    Value:This result contains rich text formatting which cannot be displayed here.                  Class 3 Severe Obesity (BMI >=40). Obesity-related health conditions include the following: obstructive sleep apnea, hypertension and dyslipidemias. Obesity is unchanged. BMI is is above average; BMI management plan is completed. We discussed portion control and increasing exercise.           Assessment and Plan    Diagnoses and all orders for this visit:    1. Primary hypertension (Primary)  Comments:  cont lisinopril with HCTZ for renal protection in a diabetic, for hypertension and edema.   Orders:  -     lisinopril-hydrochlorothiazide (PRINZIDE,ZESTORETIC) 10-12.5 MG per tablet; Take 1 tablet by mouth Daily.  Dispense: 90 tablet; Refill: 1    2. Anxiety  Comments:  cont and refill Prozac daily and hydroxyzine as needed  Orders:  -     hydrOXYzine (ATARAX) 10 MG tablet; Take 1 tablet by mouth Every 8 (Eight) Hours As Needed for Anxiety.  Dispense: 180 tablet; Refill: 1    3. Gastroesophageal reflux disease without esophagitis  Comments:  stable, cont/rf Prilosec  Orders:  -     omeprazole (priLOSEC) 40 MG capsule; Take 1 capsule by mouth Daily.  Dispense: 90 capsule; Refill: 1    4. Sciatica of left side  Comments:  MRI planned, cont kitty, naproxen, oxy for severe pain. consider refer to [facility] and send MRI to pain mgmt for poss injections    5. Migraine without status migrainosus, not intractable, unspecified migraine type  Comments:  continue with neuro as  directed.     6. Chronic constipation  Comments:  145 effective, but having to take qod, rec dec linzess to 72 and try taking daily    7. FRANK (obstructive sleep apnea)  Comments:  rec resume cpap    8. Mild intermittent asthma without complication  Comments:  stable, cont advair and spiriva, albuterol prn    9. Acquired hypothyroidism  Comments:  f/u endo as directed    10. Current moderate episode of major depressive disorder without prior episode ([facility])    11. PKU (phenylketonuria) ([facility])    12. Calculus of gallbladder without cholecystitis without obstruction  Comments:  choley planned per gen surgery soon    13. Prediabetes  Comments:  f/u endo as directed. cont ozempic for weight loss as well.     Other orders  -     linaclotide (Linzess) 72 MCG capsule capsule; Take 1 capsule by mouth Every Morning Before Breakfast.  Dispense: 90 capsule; Refill: 1  -     naproxen (Naprosyn) 500 MG tablet; Take 1 tablet by mouth 2 (Two) Times a Day As Needed for Moderate Pain.  Dispense: 180 tablet; Refill: 1        I spent 30 minutes caring for Jo Asif on this date of service. This time includes time spent by me in the following activities: preparing for the visit, reviewing tests, performing a medically appropriate examination and/or evaluation , counseling and educating the patient/family/caregiver, ordering medications, tests, or procedures and documenting information in the medical record        Follow Up     Return in about 6 months (around 3/7/2023) for Recheck, Annual physical.  Patient was given instructions and counseling regarding her condition or for health maintenance advice. Please see specific information pulled into the AVS if appropriate.        Part of this note may be an electronic transcription/translation of spoken language to printed text using the Dragon Dictation System

## 2022-09-08 DIAGNOSIS — J45.20 MILD INTERMITTENT ASTHMA WITHOUT COMPLICATION: ICD-10-CM

## 2022-09-09 RX ORDER — TIOTROPIUM BROMIDE INHALATION SPRAY 3.12 UG/1
2 SPRAY, METERED RESPIRATORY (INHALATION)
Status: ON HOLD | COMMUNITY
End: 2022-09-20 | Stop reason: ALTCHOICE

## 2022-09-13 ENCOUNTER — LAB (OUTPATIENT)
Dept: LAB | Facility: HOSPITAL | Age: 42
End: 2022-09-13

## 2022-09-13 ENCOUNTER — HOSPITAL ENCOUNTER (OUTPATIENT)
Dept: CARDIOLOGY | Facility: HOSPITAL | Age: 42
Discharge: HOME | End: 2022-09-13

## 2022-09-13 LAB
ANION GAP SERPL CALCULATED.3IONS-SCNC: 8.7 MMOL/L (ref 5–15)
BUN SERPL-MCNC: 10 MG/DL (ref 6–20)
BUN/CREAT SERPL: 15.2 (ref 7–25)
CALCIUM SPEC-SCNC: 9 MG/DL (ref 8.6–10.5)
CHLORIDE SERPL-SCNC: 101 MMOL/L (ref 98–107)
CO2 SERPL-SCNC: 29.3 MMOL/L (ref 22–29)
CREAT SERPL-MCNC: 0.66 MG/DL (ref 0.57–1)
DEPRECATED RDW RBC AUTO: 42.9 FL (ref 37–54)
EGFRCR SERPLBLD CKD-EPI 2021: 111.1 ML/MIN/1.73
ERYTHROCYTE [DISTWIDTH] IN BLOOD BY AUTOMATED COUNT: 12.4 % (ref 12.3–15.4)
GLUCOSE SERPL-MCNC: 102 MG/DL (ref 65–99)
HCT VFR BLD AUTO: 39 % (ref 34–46.6)
HGB BLD-MCNC: 13.2 G/DL (ref 12–15.9)
MCH RBC QN AUTO: 31.3 PG (ref 26.6–33)
MCHC RBC AUTO-ENTMCNC: 33.8 G/DL (ref 31.5–35.7)
MCV RBC AUTO: 92.4 FL (ref 79–97)
PLATELET # BLD AUTO: 243 10*3/MM3 (ref 140–450)
PMV BLD AUTO: 9.7 FL (ref 6–12)
POTASSIUM SERPL-SCNC: 3.7 MMOL/L (ref 3.5–5.2)
RBC # BLD AUTO: 4.22 10*6/MM3 (ref 3.77–5.28)
SODIUM SERPL-SCNC: 139 MMOL/L (ref 136–145)
WBC NRBC COR # BLD: 5.35 10*3/MM3 (ref 3.4–10.8)

## 2022-09-13 PROCEDURE — 80048 BASIC METABOLIC PNL TOTAL CA: CPT

## 2022-09-13 PROCEDURE — 93005 ELECTROCARDIOGRAM TRACING: CPT | Performed by: SURGERY

## 2022-09-13 PROCEDURE — 85027 COMPLETE CBC AUTOMATED: CPT

## 2022-09-13 PROCEDURE — 93010 ELECTROCARDIOGRAM REPORT: CPT | Performed by: INTERNAL MEDICINE

## 2022-09-14 ENCOUNTER — TELEPHONE (OUTPATIENT)
Dept: FAMILY MEDICINE CLINIC | Facility: CLINIC | Age: 42
End: 2022-09-14

## 2022-09-14 DIAGNOSIS — M47.816 FACET ARTHROPATHY, LUMBAR: ICD-10-CM

## 2022-09-14 DIAGNOSIS — M48.062 SPINAL STENOSIS OF LUMBAR REGION WITH NEUROGENIC CLAUDICATION: ICD-10-CM

## 2022-09-14 DIAGNOSIS — M51.36 L4-L5 DISC BULGE: ICD-10-CM

## 2022-09-14 DIAGNOSIS — M54.32 SCIATICA OF LEFT SIDE: Primary | ICD-10-CM

## 2022-09-14 DIAGNOSIS — M43.16 ANTEROLISTHESIS OF LUMBAR SPINE: ICD-10-CM

## 2022-09-14 DIAGNOSIS — M51.36 LUMBAR DEGENERATIVE DISC DISEASE: Primary | ICD-10-CM

## 2022-09-14 NOTE — TELEPHONE ENCOUNTER
Unfortunately she will only be able to take Tylenol for pain, no NSAIDs.  I will be looking for the open sided MRI report

## 2022-09-14 NOTE — TELEPHONE ENCOUNTER
Caller: Jo Asif    Relationship to patient: Self    Best call back number: [phone]    Patient is needing: PATIENT IS CALLING TO ADVISE MS GRIMALDO THAT SHE COMPLETED THE MRI AT [facility] TODAY.  SHE STATES SHE WAS TOLD THERE RESULTS SHOULD REACH MS GRIMALDO WITHIN 24 TO 48 HOURS.  SHE ALSO STATES SHE HAS A DISK.    SHE IS ALSO STATING SHE HAD TO STOP HER NAPROXEN ON 09/13/22, DUE TO UPCOMING SURGERY.  SHE IS WANTING TO KNOW WHAT SHE CAN TAKE FOR THE BACK PAIN.    [place] [address] [place] [address] - [phone]  - [phone]  [phone]    PLEASE ADVISE.

## 2022-09-19 ENCOUNTER — ANESTHESIA EVENT (OUTPATIENT)
Dept: PERIOP | Facility: HOSPITAL | Age: 42
End: 2022-09-19

## 2022-09-19 LAB — QT INTERVAL: 396 MS

## 2022-09-20 ENCOUNTER — HOSPITAL ENCOUNTER (OUTPATIENT)
Facility: HOSPITAL | Age: 42
Setting detail: HOSPITAL OUTPATIENT SURGERY
Discharge: HOME | End: 2022-09-20
Attending: SURGERY | Admitting: SURGERY

## 2022-09-20 ENCOUNTER — ANESTHESIA (OUTPATIENT)
Dept: PERIOP | Facility: HOSPITAL | Age: 42
End: 2022-09-20

## 2022-09-20 VITALS
TEMPERATURE: 97.4 F | HEIGHT: 64 IN | BODY MASS INDEX: 50.02 KG/M2 | WEIGHT: 293 LBS | SYSTOLIC BLOOD PRESSURE: 116 MMHG | RESPIRATION RATE: 16 BRPM | DIASTOLIC BLOOD PRESSURE: 68 MMHG | HEART RATE: 93 BPM | OXYGEN SATURATION: 95 %

## 2022-09-20 DIAGNOSIS — K80.50 BILIARY COLIC: ICD-10-CM

## 2022-09-20 LAB
B-HCG UR QL: NEGATIVE
GLUCOSE BLDC GLUCOMTR-MCNC: 131 MG/DL (ref 70–105)

## 2022-09-20 PROCEDURE — 81025 URINE PREGNANCY TEST: CPT | Performed by: ANESTHESIOLOGY

## 2022-09-20 PROCEDURE — 82962 GLUCOSE BLOOD TEST: CPT

## 2022-09-20 PROCEDURE — 47562 LAPAROSCOPIC CHOLECYSTECTOMY: CPT | Performed by: SURGERY

## 2022-09-20 PROCEDURE — 25010000002 ONDANSETRON PER 1 MG: Performed by: NURSE ANESTHETIST, CERTIFIED REGISTERED

## 2022-09-20 PROCEDURE — 25010000002 PROPOFOL 10 MG/ML EMULSION: Performed by: NURSE ANESTHETIST, CERTIFIED REGISTERED

## 2022-09-20 PROCEDURE — 25010000002 DEXAMETHASONE SODIUM PHOSPHATE 20 MG/5ML SOLUTION: Performed by: NURSE ANESTHETIST, CERTIFIED REGISTERED

## 2022-09-20 PROCEDURE — 25010000002 HYDROMORPHONE PER 4 MG: Performed by: NURSE ANESTHETIST, CERTIFIED REGISTERED

## 2022-09-20 PROCEDURE — S2900 ROBOTIC SURGICAL SYSTEM: HCPCS | Performed by: SURGERY

## 2022-09-20 PROCEDURE — 88304 TISSUE EXAM BY PATHOLOGIST: CPT | Performed by: SURGERY

## 2022-09-20 PROCEDURE — 25010000002 HYDROMORPHONE 1 MG/ML SOLUTION: Performed by: NURSE ANESTHETIST, CERTIFIED REGISTERED

## 2022-09-20 PROCEDURE — 25010000002 FENTANYL CITRATE (PF) 100 MCG/2ML SOLUTION: Performed by: NURSE ANESTHETIST, CERTIFIED REGISTERED

## 2022-09-20 DEVICE — CLIP LIG HEMOLOK PA LG 6CT PRP: Type: IMPLANTABLE DEVICE | Site: ABDOMEN | Status: FUNCTIONAL

## 2022-09-20 RX ORDER — HYDROMORPHONE HCL 110MG/55ML
PATIENT CONTROLLED ANALGESIA SYRINGE INTRAVENOUS AS NEEDED
Status: DISCONTINUED | OUTPATIENT
Start: 2022-09-20 | End: 2022-09-20 | Stop reason: SURG

## 2022-09-20 RX ORDER — PROMETHAZINE HYDROCHLORIDE 25 MG/1
25 SUPPOSITORY RECTAL ONCE AS NEEDED
Status: DISCONTINUED | OUTPATIENT
Start: 2022-09-20 | End: 2022-09-20 | Stop reason: HOSPADM

## 2022-09-20 RX ORDER — INDOCYANINE GREEN AND WATER 25 MG
5 KIT INJECTION ONCE
Status: COMPLETED | OUTPATIENT
Start: 2022-09-20 | End: 2022-09-20

## 2022-09-20 RX ORDER — ACETAMINOPHEN 650 MG/1
650 SUPPOSITORY RECTAL ONCE AS NEEDED
Status: DISCONTINUED | OUTPATIENT
Start: 2022-09-20 | End: 2022-09-20 | Stop reason: HOSPADM

## 2022-09-20 RX ORDER — PROCHLORPERAZINE EDISYLATE 5 MG/ML
10 INJECTION INTRAMUSCULAR; INTRAVENOUS ONCE AS NEEDED
Status: DISCONTINUED | OUTPATIENT
Start: 2022-09-20 | End: 2022-09-20 | Stop reason: HOSPADM

## 2022-09-20 RX ORDER — ONDANSETRON 2 MG/ML
INJECTION INTRAMUSCULAR; INTRAVENOUS AS NEEDED
Status: DISCONTINUED | OUTPATIENT
Start: 2022-09-20 | End: 2022-09-20 | Stop reason: SURG

## 2022-09-20 RX ORDER — SODIUM CHLORIDE 0.9 % (FLUSH) 0.9 %
10 SYRINGE (ML) INJECTION EVERY 12 HOURS SCHEDULED
Status: DISCONTINUED | OUTPATIENT
Start: 2022-09-20 | End: 2022-09-20 | Stop reason: HOSPADM

## 2022-09-20 RX ORDER — HYDROCODONE BITARTRATE AND ACETAMINOPHEN 7.5; 325 MG/1; MG/1
1 TABLET ORAL EVERY 6 HOURS PRN
Qty: 20 TABLET | Refills: 0 | Status: SHIPPED | OUTPATIENT
Start: 2022-09-20 | End: 2022-09-23 | Stop reason: ALTCHOICE

## 2022-09-20 RX ORDER — PROPOFOL 10 MG/ML
VIAL (ML) INTRAVENOUS AS NEEDED
Status: DISCONTINUED | OUTPATIENT
Start: 2022-09-20 | End: 2022-09-20 | Stop reason: SURG

## 2022-09-20 RX ORDER — BUPIVACAINE HYDROCHLORIDE AND EPINEPHRINE 2.5; 5 MG/ML; UG/ML
INJECTION, SOLUTION EPIDURAL; INFILTRATION; INTRACAUDAL; PERINEURAL AS NEEDED
Status: DISCONTINUED | OUTPATIENT
Start: 2022-09-20 | End: 2022-09-20 | Stop reason: HOSPADM

## 2022-09-20 RX ORDER — LIDOCAINE HYDROCHLORIDE 20 MG/ML
INJECTION, SOLUTION EPIDURAL; INFILTRATION; INTRACAUDAL; PERINEURAL AS NEEDED
Status: DISCONTINUED | OUTPATIENT
Start: 2022-09-20 | End: 2022-09-20 | Stop reason: SURG

## 2022-09-20 RX ORDER — DEXAMETHASONE SODIUM PHOSPHATE 4 MG/ML
INJECTION, SOLUTION INTRA-ARTICULAR; INTRALESIONAL; INTRAMUSCULAR; INTRAVENOUS; SOFT TISSUE AS NEEDED
Status: DISCONTINUED | OUTPATIENT
Start: 2022-09-20 | End: 2022-09-20 | Stop reason: SURG

## 2022-09-20 RX ORDER — ROCURONIUM BROMIDE 10 MG/ML
INJECTION, SOLUTION INTRAVENOUS AS NEEDED
Status: DISCONTINUED | OUTPATIENT
Start: 2022-09-20 | End: 2022-09-20 | Stop reason: SURG

## 2022-09-20 RX ORDER — ACETAMINOPHEN 325 MG/1
650 TABLET ORAL ONCE AS NEEDED
Status: DISCONTINUED | OUTPATIENT
Start: 2022-09-20 | End: 2022-09-20 | Stop reason: HOSPADM

## 2022-09-20 RX ORDER — SODIUM CHLORIDE 0.9 % (FLUSH) 0.9 %
10 SYRINGE (ML) INJECTION AS NEEDED
Status: DISCONTINUED | OUTPATIENT
Start: 2022-09-20 | End: 2022-09-20 | Stop reason: HOSPADM

## 2022-09-20 RX ORDER — ESMOLOL HYDROCHLORIDE 10 MG/ML
INJECTION INTRAVENOUS AS NEEDED
Status: DISCONTINUED | OUTPATIENT
Start: 2022-09-20 | End: 2022-09-20 | Stop reason: SURG

## 2022-09-20 RX ORDER — ONDANSETRON 2 MG/ML
4 INJECTION INTRAMUSCULAR; INTRAVENOUS ONCE AS NEEDED
Status: DISCONTINUED | OUTPATIENT
Start: 2022-09-20 | End: 2022-09-20 | Stop reason: HOSPADM

## 2022-09-20 RX ORDER — SODIUM CHLORIDE, SODIUM LACTATE, POTASSIUM CHLORIDE, CALCIUM CHLORIDE 600; 310; 30; 20 MG/100ML; MG/100ML; MG/100ML; MG/100ML
9 INJECTION, SOLUTION INTRAVENOUS CONTINUOUS PRN
Status: DISCONTINUED | OUTPATIENT
Start: 2022-09-20 | End: 2022-09-20 | Stop reason: HOSPADM

## 2022-09-20 RX ORDER — OXYCODONE HYDROCHLORIDE 5 MG/1
7.5 TABLET ORAL ONCE AS NEEDED
Status: COMPLETED | OUTPATIENT
Start: 2022-09-20 | End: 2022-09-20

## 2022-09-20 RX ORDER — PROMETHAZINE HYDROCHLORIDE 25 MG/1
25 TABLET ORAL ONCE AS NEEDED
Status: DISCONTINUED | OUTPATIENT
Start: 2022-09-20 | End: 2022-09-20 | Stop reason: HOSPADM

## 2022-09-20 RX ORDER — FENTANYL CITRATE 50 UG/ML
INJECTION, SOLUTION INTRAMUSCULAR; INTRAVENOUS AS NEEDED
Status: DISCONTINUED | OUTPATIENT
Start: 2022-09-20 | End: 2022-09-20 | Stop reason: SURG

## 2022-09-20 RX ORDER — OXYCODONE HYDROCHLORIDE 5 MG/1
5 TABLET ORAL EVERY 6 HOURS PRN
Qty: 20 TABLET | Refills: 0 | Status: SHIPPED | OUTPATIENT
Start: 2022-09-20 | End: 2023-03-08

## 2022-09-20 RX ADMIN — ESMOLOL HYDROCHLORIDE 20 MG: 10 INJECTION, SOLUTION INTRAVENOUS at 11:25

## 2022-09-20 RX ADMIN — PROPOFOL 200 MG: 10 INJECTION, EMULSION INTRAVENOUS at 10:12

## 2022-09-20 RX ADMIN — SUGAMMADEX 400 MG: 100 INJECTION, SOLUTION INTRAVENOUS at 11:32

## 2022-09-20 RX ADMIN — ONDANSETRON 4 MG: 2 INJECTION INTRAMUSCULAR; INTRAVENOUS at 11:25

## 2022-09-20 RX ADMIN — AZTREONAM 2 G: 2 INJECTION, POWDER, LYOPHILIZED, FOR SOLUTION INTRAMUSCULAR; INTRAVENOUS at 10:17

## 2022-09-20 RX ADMIN — ESMOLOL HYDROCHLORIDE 30 MG: 10 INJECTION, SOLUTION INTRAVENOUS at 11:30

## 2022-09-20 RX ADMIN — ROCURONIUM BROMIDE 50 MG: 10 INJECTION, SOLUTION INTRAVENOUS at 10:12

## 2022-09-20 RX ADMIN — OXYCODONE 7.5 MG: 5 TABLET ORAL at 12:58

## 2022-09-20 RX ADMIN — FENTANYL CITRATE 100 MCG: 50 INJECTION, SOLUTION INTRAMUSCULAR; INTRAVENOUS at 10:05

## 2022-09-20 RX ADMIN — HYDROMORPHONE HYDROCHLORIDE 0.25 MG: 1 INJECTION, SOLUTION INTRAMUSCULAR; INTRAVENOUS; SUBCUTANEOUS at 12:45

## 2022-09-20 RX ADMIN — SODIUM CHLORIDE, POTASSIUM CHLORIDE, SODIUM LACTATE AND CALCIUM CHLORIDE 9 ML/HR: 600; 310; 30; 20 INJECTION, SOLUTION INTRAVENOUS at 08:22

## 2022-09-20 RX ADMIN — HYDROMORPHONE HYDROCHLORIDE 0.5 MG: 1 INJECTION, SOLUTION INTRAMUSCULAR; INTRAVENOUS; SUBCUTANEOUS at 13:00

## 2022-09-20 RX ADMIN — DEXAMETHASONE SODIUM PHOSPHATE 10 MG: 4 INJECTION, SOLUTION INTRAMUSCULAR; INTRAVENOUS at 10:19

## 2022-09-20 RX ADMIN — LIDOCAINE HYDROCHLORIDE 100 MG: 20 INJECTION, SOLUTION EPIDURAL; INFILTRATION; INTRACAUDAL; PERINEURAL at 10:12

## 2022-09-20 RX ADMIN — HYDROMORPHONE HYDROCHLORIDE 1 MG: 2 INJECTION, SOLUTION INTRAMUSCULAR; INTRAVENOUS; SUBCUTANEOUS at 10:49

## 2022-09-20 RX ADMIN — INDOCYANINE GREEN AND WATER 5 MG: KIT at 08:41

## 2022-09-20 RX ADMIN — HYDROMORPHONE HYDROCHLORIDE 1 MG: 2 INJECTION, SOLUTION INTRAMUSCULAR; INTRAVENOUS; SUBCUTANEOUS at 11:12

## 2022-09-20 RX ADMIN — HYDROMORPHONE HYDROCHLORIDE 0.25 MG: 1 INJECTION, SOLUTION INTRAMUSCULAR; INTRAVENOUS; SUBCUTANEOUS at 12:26

## 2022-09-20 NOTE — INTERVAL H&P NOTE
H&P reviewed. The patient was examined and there are no changes to the H&P.          This document has been electronically signed by Alf Whalen MD on September 20, 2022 09:34 EDT

## 2022-09-20 NOTE — BRIEF OP NOTE
CHOLECYSTECTOMY LAPAROSCOPIC WITH DAVINCI ROBOT  Progress Note    Jo Asif  9/20/2022    Pre-op Diagnosis:   Biliary colic [K80.50]       Post-Op Diagnosis Codes:     * Biliary colic [K80.50]    Procedure/CPT® Codes:        Procedure(s):  CHOLECYSTECTOMY LAPAROSCOPIC WITH DAVINCI ROBOT        Surgeon(s):  Alf Whalen MD    Anesthesia: General    Staff:   Circulator: Nicole Mcgowan RN; Shereen Geronimo RN  Scrub Person: Michelle Gramajo  Assistant: Flory Newberry  Assistant: Flory Newberry      Estimated Blood Loss: minimal    Urine Voided: * No values recorded between 9/20/2022 10:04 AM and 9/20/2022 11:37 AM *    Specimens:                Specimens     ID Source Type Tests Collected By Collected At Frozen?    A Gallbladder Tissue · TISSUE PATHOLOGY EXAM   Alf Whalen MD 9/20/22 1035     This specimen was not marked as sent.                Drains: * No LDAs found *    Findings: cholelithiasis and cholesterolosis of gallbladder        Complications: none    Assistant: Flory Newberry  was responsible for performing the following activities: insertion and exchange of robotic instruments, closure of skin and their skilled assistance was necessary for the success of this case.          This document has been electronically signed by Alf Whalen MD on September 20, 2022 11:42 EDT      Alf Whalen MD     Date: 9/20/2022  Time: 11:41 EDT

## 2022-09-20 NOTE — ANESTHESIA PROCEDURE NOTES
Airway  Urgency: elective    Date/Time: 9/20/2022 10:15 AM  Airway not difficult    General Information and Staff    Patient location during procedure: OR  Anesthesiologist: Jeremy Santizo MD  CRNA/CAA: Fariba Garcia CRNA    Indications and Patient Condition  Indications for airway management: airway protection    Preoxygenated: yes  Mask difficulty assessment: 2 - vent by mask + OA or adjuvant +/- NMBA    Final Airway Details  Final airway type: endotracheal airway      Successful airway: ETT  Cuffed: yes   Successful intubation technique: direct laryngoscopy  Facilitating devices/methods: intubating stylet  Endotracheal tube insertion site: oral  Blade: Tiffanie  Blade size: 3  ETT size (mm): 7.5  Cormack-Lehane Classification: grade IIb - view of arytenoids or posterior of glottis only  Placement verified by: chest auscultation and capnometry   Measured from: lips  ETT/EBT  to lips (cm): 22  Number of attempts at approach: 1  Assessment: lips, teeth, and gum same as pre-op and atraumatic intubation

## 2022-09-20 NOTE — ANESTHESIA POSTPROCEDURE EVALUATION
Patient: Jo Asif    Procedure Summary     Date: 09/20/22 Room / Location: [facility] OR 09 / [facility] MAIN OR    Anesthesia Start: 1004 Anesthesia Stop: 1155    Procedure: CHOLECYSTECTOMY LAPAROSCOPIC WITH DAVINCI ROBOT (N/A Abdomen) Diagnosis:       Biliary colic      (Biliary colic [K80.50])    Surgeons: Alf Whalen MD Provider: Jeremy Santizo MD    Anesthesia Type: general ASA Status: 4          Anesthesia Type: general    Vitals  Vitals Value Taken Time   /53 09/20/22 1250   Temp 98.6 °F (37 °C) 09/20/22 1152   Pulse 95 09/20/22 1253   Resp 11 09/20/22 1237   SpO2 94 % 09/20/22 1253   Vitals shown include unvalidated device data.        Post Anesthesia Care and Evaluation    Patient location during evaluation: PACU  Patient participation: complete - patient participated  Level of consciousness: awake  Pain scale: See nurse's notes for pain score.  Pain management: adequate    Airway patency: patent  Anesthetic complications: No anesthetic complications  PONV Status: none  Cardiovascular status: acceptable  Respiratory status: acceptable and spontaneous ventilation  Hydration status: acceptable    Comments: Patient seen and examined postoperatively; vital signs stable; SpO2 greater than or equal to 90%; cardiopulmonary status stable; nausea/vomiting adequately controlled; pain adequately controlled; no apparent anesthesia complications; patient discharged from anesthesia care when discharge criteria were met

## 2022-09-20 NOTE — ANESTHESIA PREPROCEDURE EVALUATION
Anesthesia Evaluation     Patient summary reviewed and Nursing notes reviewed   no history of anesthetic complications:  NPO Solid Status: > 8 hours  NPO Liquid Status: > 8 hours           Airway   Dental      Pulmonary    (+) a smoker Former, asthma,sleep apnea,   Cardiovascular     ECG reviewed    (+) hypertension, valvular problems/murmurs MR and murmur, dysrhythmias Paroxysmal Atrial Fib, hyperlipidemia,       Neuro/Psych  (+) headaches, numbness, psychiatric history Anxiety and Depression,    GI/Hepatic/Renal/Endo    (+) morbid obesity, GERD,  liver disease history of elevated LFT, thyroid problem hypothyroidism    Musculoskeletal     (+) chronic pain,   Abdominal    Substance History      OB/GYN          Other        ROS/Med Hx Other: Biliary colic, migraines, allergies, palpitations, near syncope, PKU, sciatica, dysphagia, pre-DM, knee and shoulder pain, iron d/o, abd pain, cholelithiasis, PCOS, IBS    H/O Afib - s/p ablation    Echo  Clinically difficult study therefore Lumason contrast was used.  Normal LV size and contractility EF of 60 to 65%  Normal RV size  Normal atrial size  Aortic valve, mitral valve, tricuspid valve appears structurally normal, mild mitral regurgitation seen.  No pericardial effusion seen.  Proximal aorta appears normal in size.    Stress  · Findings consistent with a normal ECG stress test.  · Left ventricular ejection fraction is normal. (Calculated EF = 62%).  · Myocardial perfusion imaging indicates a normal myocardial perfusion study with no evidence of ischemia.  · Impressions are consistent with a low risk study.  · PVCs were noted during the stress test.        PSH  CARPAL TUNNEL RELEASE TONSILLECTOMY  TUBAL ABDOMINAL LIGATION UVULECTOMY  CARDIAC ELECTROPHYSIOLOGY PROCEDURE ABLATION OF DYSRHYTHMIC FOCUS  KNEE ARTHROSCOPY SHOULDER ARTHROSCOPY W/ ROTATOR CUFF REPAIR  ENDOSCOPY COLONOSCOPY  CARDIAC CATHETERIZATION                 Anesthesia Plan    ASA 4     general      (Patient identified; pre-operative vital signs, all relevant labs/studies, complete medical/surgical/anesthetic history, full medication list, full allergy list, and NPO status obtained/reviewed; physical assessment performed; anesthetic options, side effects, potential complications, risks, and benefits discussed; questions answered; written anesthesia consent obtained; patient cleared for procedure; anesthesia machine and equipment checked and functioning)  intravenous induction     Anesthetic plan, risks, benefits, and alternatives have been provided, discussed and informed consent has been obtained with: patient.    Plan discussed with CRNA and CAA.        CODE STATUS:

## 2022-09-21 ENCOUNTER — TELEPHONE (OUTPATIENT)
Dept: SURGERY | Facility: CLINIC | Age: 42
End: 2022-09-21

## 2022-09-21 LAB
LAB AP CASE REPORT: NORMAL
PATH REPORT.FINAL DX SPEC: NORMAL
PATH REPORT.GROSS SPEC: NORMAL

## 2022-09-21 NOTE — TELEPHONE ENCOUNTER
PO call made, pt is doing well, PO appt made. Pt also requested a note for her work to return on 9/26/22, Letter ready for .  kb

## 2022-09-21 NOTE — TELEPHONE ENCOUNTER
Caller: Jo Asif    Relationship: Self    Best call back number: [phone]    What form or medical record are you requesting: WORK NOTE    Who is requesting this form or medical record from you: EMPLOYER    How would you like to receive the form or medical records (pick-up, mail, fax):PICKUP    Timeframe paperwork needed: WILL  TODAY OR TOMORROW    Additional notes: N/A

## 2022-09-22 NOTE — PROGRESS NOTES
"Subjective   History of Present Illness: Jo Asif is a 42 y.o. female is being seen for consultation today at the request of ROSENDO Dwyer for back and left hip and leg pain.  Patient says she has been experiencing left leg pain for the past month.  She is taken 2 steroid Dosepaks without significant lasting improvement.  Patient describes the pain as starting in her buttock and radiating down her left lower extremity to her anterior shin and top of her foot.  She has not had any issues like this before.  She does not have significant low back pain.  No weakness numbness.    Chief Complaint   Patient presents with   • Back Pain   • Leg Pain          Previous Treatment: Oxycodone, Naproxen, Gabapentin    The following portions of the patient's history were reviewed and updated as appropriate: allergies, current medications, past family history, past medical history, past social history, past surgical history and problem list.    Review of Systems   Constitutional: Positive for activity change.   HENT: Negative.    Eyes: Negative.    Respiratory: Negative for chest tightness and shortness of breath.    Cardiovascular: Negative for chest pain.   Gastrointestinal: Negative.    Endocrine: Negative.    Genitourinary: Negative.    Musculoskeletal: Positive for arthralgias (Hip pain), back pain, gait problem and myalgias.   Skin: Negative.    Allergic/Immunologic: Negative.    Neurological: Positive for numbness.   Hematological: Negative.    Psychiatric/Behavioral: Negative.        Objective     /86   Pulse 66   Temp 97.5 °F (36.4 °C)   Resp 18   Ht 162.6 cm (64\")   Wt (!) 142 kg (313 lb)   SpO2 98%   BMI 53.73 kg/m²    Body mass index is 53.73 kg/m².      Neurologic Exam     Mental Status   Oriented to person, place, and time.     Motor Exam     Strength   Strength 5/5 throughout.     Sensory Exam   Light touch normal.       Assessment & Plan   Independent Review of Radiographic Studies:    "   I personally reviewed and interpreted the images from the following studies.    MRI lumbar spine: Grade 1 spondylolisthesis L4-5 with moderate central and foraminal stenosis.  Degenerative changes otherwise without any high-grade central or foraminal stenosis    Medical Decision Making:      Jo Asif is a 42 y.o. female with 1 month history of radiculopathy and spondylolisthesis at L4-5.  Recommended the patient begins physical therapy as well as undergo lumbar epidural steroid injection.  Patient's current BMI is 53, and is not a candidate for surgery unless her BMI is below 40.  She is informed of this.  She also understands that weight loss could significantly reduce her symptoms.  I will see her back as needed if conservative measures fail.      Diagnoses and all orders for this visit:    1. Spondylolisthesis of lumbar region (Primary)    2. Lumbar radiculopathy      No follow-ups on file.    This patient was examined wearing appropriate personal protective equipment.                      Dr. Francisco Demarco IV    09/23/22  10:14 EDT

## 2022-09-23 ENCOUNTER — APPOINTMENT (OUTPATIENT)
Dept: MRI IMAGING | Facility: HOSPITAL | Age: 42
End: 2022-09-23

## 2022-09-23 ENCOUNTER — OFFICE VISIT (OUTPATIENT)
Dept: NEUROSURGERY | Facility: CLINIC | Age: 42
End: 2022-09-23

## 2022-09-23 VITALS
BODY MASS INDEX: 50.02 KG/M2 | HEART RATE: 66 BPM | DIASTOLIC BLOOD PRESSURE: 86 MMHG | SYSTOLIC BLOOD PRESSURE: 124 MMHG | HEIGHT: 64 IN | OXYGEN SATURATION: 98 % | RESPIRATION RATE: 18 BRPM | TEMPERATURE: 97.5 F | WEIGHT: 293 LBS

## 2022-09-23 DIAGNOSIS — M54.16 LUMBAR RADICULOPATHY: ICD-10-CM

## 2022-09-23 DIAGNOSIS — M43.16 SPONDYLOLISTHESIS OF LUMBAR REGION: Primary | ICD-10-CM

## 2022-09-23 PROCEDURE — 99204 OFFICE O/P NEW MOD 45 MIN: CPT | Performed by: NEUROLOGICAL SURGERY

## 2022-09-28 NOTE — OP NOTE
Operative Note    Jo Asif  9/20/2022    Pre-op Diagnosis:   Biliary colic [K80.50]    Post-op Diagnosis:     Post-Op Diagnosis Codes:     * Biliary colic [K80.50]    Procedure/CPT® Codes:      Procedure(s):  CHOLECYSTECTOMY LAPAROSCOPIC WITH DAVINCI ROBOT    Surgeon(s):  Alf Whalen MD    Anesthesia: General    Staff:   Circulator: Nicole Mcgowan RN; Shereen Geronimo RN  Scrub Person: Michelle Gramajo  Assistant: Flory Newberry    Estimated Blood Loss: minimal    Specimens:                ID Type Source Tests Collected by Time   A :  Tissue Gallbladder TISSUE PATHOLOGY EXAM Alf Whalen MD 9/20/2022 1035         Drains: * No LDAs found *    Findings: cholelithiasis and cholesterolosis of gallbladder    Complications: none    Indication: Biliary Colic    Operative Note:    The patient was seen and consent was obtained preoperatively.  She was then brought to the operating room and placed in supine position on the OR table.  General anesthetic was administered and the patient was orotracheally intubated without incident.  A preoperative briefing was performed.  The abdomen was prepped and draped in normal sterile fashion.  A timeout was then performed.    Following timeout an 8 mm optical robotic trocar was used to enter the patient's abdomen in the left upper quadrant after injection of local anesthetic.  The abdomen was then insufflated without difficulty.  Additional trochars were then inserted under direct visualization and after injection of local anesthetic.  A 12 mm trocar was inserted in the left rectus musculature, 2 8 mm trochars were inserted in the right rectus and right anterior axillary line positions.  The patient was then placed in steep reverse Trendelenburg and right side up position.  The robot was then docked and I assumed control of the consult.    On initial inspection of the right upper quadrant was noted that the patient's liver appeared to be  significantly enlarged and steatotic.  The left upper quadrant probe grasper was used to elevate the right edge of the liver upwards revealing the gallbladder.  The gallbladder was grasped and elevated upward.  Adhesions of the omentum to the gallbladder were then taken down using hook electrocautery.  The gallbladder was then able to be elevated further upwards.  However I was still not able to see the bottom of the gallbladder as it appeared to be tracking up and underneath of the liver.  Therefore, the peritoneum at the medial lateral aspect of the gallbladder neck was divided.  Blunt dissection was undertaken however again I was still not able to reach beyond the neck of the gallbladder owing to the patient's anatomy.    Therefore, at this point I decided to take the gallbladder using a dome down technique.  The right lobe of the liver was elevated upward.  The peritoneum overlying the fundus of the gallbladder was opened with electrocautery.The gallbladder was then retracted out away from the liver and  from its fossa using electrocautery down to the area of the gallbladder neck.  An artery was visualized going up to the gallbladder.  This was dissected away from the surrounding fatty tissue clipped and divided.  At this point I was able to elevate the gallladder out further from the liver.  I was able to visualize the cystic duct.  It was clipped doubly on the downside and singly on the upside.  It was then divided using cautery.  The gallbladder was then placed into the bag and retrieved through the 12 mm port site.  The operative bed was inspected and found to be hemostatic.  The abdomen was then desufflated and the ports were removed.  The skin was closed with absorbable suture.    Assistant: Flory Newberry  was responsible for performing the following activities: insertion and exchange of robotic instruments, closure of skin and their skilled assistance was necessary for the success of this  case.        This document has been electronically signed by Alf Whalen MD on September 28, 2022 12:36 EDT      Alf Whalen MD     Date: 9/28/2022  Time: 12:30 EDT

## 2022-10-06 ENCOUNTER — OFFICE VISIT (OUTPATIENT)
Dept: SURGERY | Facility: CLINIC | Age: 42
End: 2022-10-06

## 2022-10-06 VITALS
HEIGHT: 64 IN | SYSTOLIC BLOOD PRESSURE: 140 MMHG | BODY MASS INDEX: 50.02 KG/M2 | TEMPERATURE: 97.1 F | HEART RATE: 66 BPM | DIASTOLIC BLOOD PRESSURE: 89 MMHG | OXYGEN SATURATION: 98 % | WEIGHT: 293 LBS

## 2022-10-06 DIAGNOSIS — Z09 ENCOUNTER FOR FOLLOW-UP: Primary | ICD-10-CM

## 2022-10-06 PROCEDURE — 99024 POSTOP FOLLOW-UP VISIT: CPT | Performed by: SURGERY

## 2022-10-06 NOTE — PROGRESS NOTES
CHIEF COMPLAINT:    Chief Complaint   Patient presents with   • Post-op Follow-up     Robotic Lap Clemencia 9/20/22        HISTORY OF PRESENT ILLNESS:    Jo Asif is a 42 y.o. female who underwent robotic cholecystectomy on 9/20/2022.  She returns today for follow-up.  Pathology showed chronic cholecystitis with stones and cholesterolosis.  She has no complaints postoperatively.  She has been eating and drinking without issue and having her baseline bowel function.    EXAM:  Vitals:    10/06/22 0755   BP: 140/89   Pulse: 66   Temp: 97.1 °F (36.2 °C)   SpO2: 98%         Abdomen soft, incisions healing appropriately    ASSESSMENT:    Status post cholecystectomy    PLAN:    Overall doing well.  Can return to normal activities.  See me as needed.          This document has been electronically signed by Alf Whalen MD on October 6, 2022 08:05 EDT

## 2022-10-11 ENCOUNTER — TREATMENT (OUTPATIENT)
Dept: PHYSICAL THERAPY | Facility: CLINIC | Age: 42
End: 2022-10-11

## 2022-10-11 DIAGNOSIS — M43.16 SPONDYLOLISTHESIS OF LUMBAR REGION: Primary | ICD-10-CM

## 2022-10-11 DIAGNOSIS — M99.04 SEGMENTAL AND SOMATIC DYSFUNCTION OF SACRAL REGION: ICD-10-CM

## 2022-10-11 DIAGNOSIS — M99.05 SEGMENTAL AND SOMATIC DYSFUNCTION OF PELVIC REGION: ICD-10-CM

## 2022-10-11 DIAGNOSIS — M54.16 RADICULOPATHY, LUMBAR REGION: ICD-10-CM

## 2022-10-11 PROCEDURE — 97014 ELECTRIC STIMULATION THERAPY: CPT | Performed by: PHYSICAL THERAPIST

## 2022-10-11 PROCEDURE — 97162 PT EVAL MOD COMPLEX 30 MIN: CPT | Performed by: PHYSICAL THERAPIST

## 2022-10-11 PROCEDURE — 97112 NEUROMUSCULAR REEDUCATION: CPT | Performed by: PHYSICAL THERAPIST

## 2022-10-11 NOTE — PROGRESS NOTES
Awake and alert, her son at her bedside. IV placed, labs sent, medicated for pain as ordered, ecchymotic area noted on her right flank area s/p fall. Presently patient able to ambulated without difficulty. Will continue to monitor. Physical Therapy Initial Evaluation and Plan of Care    Patient: Jo Asif   : 1980  Diagnosis/ICD-10 Code:  Spondylolisthesis of lumbar region [M43.16]  Referring practitioner: Francisco Demarco IV, MD  Date of Initial Visit: 10/11/2022  Today's Date: 10/11/2022  Patient seen for 1 sessions           Subjective Questionnaire: Oswestry: 22  = 44% limited      Subjective Evaluation    History of Present Illness  Mechanism of injury: Pt was referred to Dr. Dav SINCLAIR by her PCP for back and L hip pain which began almost 2 months ago. Pt woke up with pain in the L LB and down the L LE. Pt states she had to stay in bed with her back extended on her side to relieve it. Pt took 2 steroid dose packs without any significant/prolonged improvement. Pain radiates from the buttock down the LLE to ant shin & top of her foot. Pt has tried different shoes, changed her mattress, tried sleeping in the recliner and no relief from any of this. Pt denies weakness, but does report numbness/tingling down the LLE. Pt denies saddle anaesthesia or loss of/changes in bowel bladder except bowel due to gall bladder. Pt had gall bladder removed 10/4 and will follow up with gastro.  Pt reports no symptoms at the side of the thigh, just the LB and down into the L calf & ankle.  Pt also reports 30# wt loss with Ozempic. Pt will have A1C checked again soon. Pt is on Naproxen, Oxycodone prn and Celebrex.     Dr. Dav SINCLAIR recommended PT & lumbar epidural steroid injex which will be done 10/19/22. He noted she is not a surgical candidate unless her BMI were to fall below 40.     MRI lumbar spine: Grade 1 spondylolisthesis L4-5 with moderate central and foraminal stenosis. Degenerative changes otherwise without any high-grade central or foraminal stenosis (per Dr. Dav SINCLAIR's 22 note)    PMH: abdominal pain, allergic (Amoxicilling, PCN, Cleocin), arrhythmia, anxiety, depression, headaches, asthma, cholelithiasis, hx heart murmur, AFib  (ablation relieved), hearing loss, GERD, disease of thyroid gland/hypothyroidism, IBS, migraine, iron disorder, morbid obesity, PCOS, sleep apnea, torn R meniscus, PKU, migraine, pre diabetes    PSH: s/p R arthroscopic med/lat meniscect w/ chondromalacia patella 3/26/21 with injex Monovisc in knee in March 2022, s/p shld surg R with debridement, gall bladder removed 10/4/22    Denies hx: pacemaker, metal implants, CA, CVA, seizures, MI, latex allergies    Pain: 2-3/10 current, 1/10 at best, 9/10 at worst    Aggravating/functional factors: varies day to day - sitting >30' in recliner, standing, walking, bending, twisting to wash, squatting, lifting, carrying, pushing, pulling, stairs, in/out of car/bed, rising, sleeping, house work, work activities with sitting (got 3 different chairs)    PLOF: pt notes no prior issues with the above functional activities    Relieving factors: heat, Naproxen    Social Hx: lives with spouse, 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, pt will have intermittent leave for doctor's appts or due to pain      Quality of life: fair    Pain  Quality: sharp, throbbing and radiating    Patient Goals  Patient goals for therapy: decreased pain, improved balance, return to work, increased motion and return to sport/leisure activities  Patient goal: pain relief, movement without pain, be able to sit in recliner to watch a movie           Objective          Active Range of Motion     Lumbar   Flexion: 25 degrees with pain  Extension: 30 degrees   Left Hip   Flexion: 95 degrees with pain  External rotation (90/90): 40 degrees   Internal rotation (90/90): 35 degrees     Right Hip   Flexion: 97 degrees   External rotation (90/90): 35 degrees   Internal rotation (90/90): 30 degrees     Additional Active Range of Motion Details  Repeated flex increased pain in the L buttock, brought on tingling in the L lower leg  Repeated ext relieved buttock pain slightly, but abolished lower leg symptoms    Knee ext lag 10  degrees B        Strength: LEs grossly 4+ to 5 in sitting; pain with MMT hip flex L    Palpation: TTP @ L sacral RUTH; R outflare, L ASIS suprior in supine    Sensation: intact/equal to LT B LEs    Posture: head fwd/rounded shoulders, increased kypholordosis    DTRs: 2+ B LEs    Clonus: (-)    Gait: I without AD, antalgic; increased trunk ext/swayback, reduced gait speed, decreased step length    Balance: fair/good with ROM testing    Transfers: I with fair body mechanics supine to sit, using foot to pull self up with edge of plinth; I sit to stand with UE A      Assessment & Plan     Assessment  Impairments: abnormal coordination, abnormal gait, abnormal muscle firing, abnormal muscle tone, abnormal or restricted ROM, activity intolerance, impaired balance, impaired physical strength, lacks appropriate home exercise program, pain with function, safety issue and weight-bearing intolerance    Assessment details: The patient is a 42 y.o. female who presents to physical therapy today for spondylolisthesis or lumb region. Upon initial evaluation, the patient demonstrates the above & following impairments: pain, reduced posture, SI/IS dysfunction, decreased ROM/flexibility, strength, gait, balance and function. Due to these impairments, the patient is unable to/limited with: sitting >30' in recliner, standing, walking, bending, twisting to wash, squatting, lifting, carrying, pushing, pulling, stairs, in/out of car/bed, rising, sleeping, house work, work activities with sitting. The patient would benefit from skilled PT services to address functional limitations and impairments and to improve patient quality of life.        Barriers to therapy: abdominal pain, arrhythmia, anxiety, depression, headaches, asthma, hx heart murmur/AFib, hearing loss, GERD, disease of thyroid gland/hypothyroidism, IBS, migraine, iron disorder, morbid obesity, PCOS, PKU, migraine, pre diabetes could affect PT Rx/progress/outcomes if  exacerbated/unregulated which could affect tolerance to PT/exs or delay healing  Prognosis: good    Goals  Plan Goals: STGs in 4 weeks:  Decrease pain to 5/10 on average  Increase trunk/LE ROM by 5-10 degrees where limited as much  Increase L hip flex strength to 5/5 and pain free    LTGs by discharge  Pt will be able to ascend/descend stairs reciprocally with or without use of rail(s) and with minimal difficulty or pain  Pt will be able to sit/drive/ride for 30-60 mins without difficulty or pain  Pt will be able to stand 30-60 mins for basic ADLs without difficulty or pain  Pt will be able to walk 30-60 mins for grocery shopping without difficulty, pain or LOB  Pt will be able to bend/twist sufficiently for washing without difficulty or increased pain  Pt will be able to lift/carry laundry baskets, pots/pans, garbage or grocery bags without difficulty or increased pain  Pt will be able to sleep full nights most nights without waking from LBP/LE symptoms       Plan  Therapy options: will be seen for skilled therapy services  Planned modality interventions: cryotherapy, thermotherapy (hydrocollator packs), electrical stimulation/[place] stimulation, ultrasound, traction and dry needling  Planned therapy interventions: manual therapy, neuromuscular re-education, postural training, soft tissue mobilization, spinal/joint mobilization, strengthening, stretching, therapeutic activities, transfer training, abdominal trunk stabilization, ADL retraining, body mechanics training, home exercise program, gait training, functional ROM exercises, flexibility, motor coordination training, balance/weight-bearing training and joint mobilization  Frequency: 3x week  Duration in weeks: 13  Treatment plan discussed with: patient        History # of Personal Factors and/or Comorbidities: HIGH (3+)  Examination of Body System(s): # of elements: HIGH (4+)  Clinical Presentation: EVOLVING  Clinical Decision Making: MODERATE      Timed:          Manual Therapy:         mins  97701;     Therapeutic Exercise:   3      mins  18779;     Neuromuscular Royce:   15     mins  66074;    Therapeutic Activity:          mins  30364;     Gait Training:           mins  07263;     Ultrasound:          mins  34972;    Ionto                                   mins   51412  Self Care                            mins   42593  Canalith Repos         mins 37029      Un-Timed:  Electrical Stimulation:    10     mins  54419 ( );  Dry Needling          mins self-pay  Traction          mins 00754  Low Eval          Mins  84393  Mod Eval    27      Mins  52085  High Eval                            Mins  48532  Re-Eval                               mins  39025        Timed Treatment:   18   mins   Total Treatment:     55   mins    PT SIGNATURE: Krystyna Ornelas, PT   [place] Lic# [id number]  DATE TREATMENT INITIATED: 10/11/2022    Initial Certification  Certification Period: 10/11/5932EMFHIQS5/8/2023  I certify that the therapy services are furnished while this patient is under my care.  The services outlined above are required by this patient, and will be reviewed every 90 days.     PHYSICIAN: Francisco Demarco IV, MD      DATE:     Please sign and return via fax to [phone]. Thank you, [facility].

## 2022-10-14 ENCOUNTER — TREATMENT (OUTPATIENT)
Dept: PHYSICAL THERAPY | Facility: CLINIC | Age: 42
End: 2022-10-14

## 2022-10-14 DIAGNOSIS — M54.16 RADICULOPATHY, LUMBAR REGION: ICD-10-CM

## 2022-10-14 DIAGNOSIS — M99.05 SEGMENTAL AND SOMATIC DYSFUNCTION OF PELVIC REGION: ICD-10-CM

## 2022-10-14 DIAGNOSIS — M43.16 SPONDYLOLISTHESIS OF LUMBAR REGION: Primary | ICD-10-CM

## 2022-10-14 DIAGNOSIS — M99.04 SEGMENTAL AND SOMATIC DYSFUNCTION OF SACRAL REGION: ICD-10-CM

## 2022-10-14 PROCEDURE — 97140 MANUAL THERAPY 1/> REGIONS: CPT | Performed by: PHYSICAL THERAPIST

## 2022-10-14 PROCEDURE — 97110 THERAPEUTIC EXERCISES: CPT | Performed by: PHYSICAL THERAPIST

## 2022-10-14 PROCEDURE — 97014 ELECTRIC STIMULATION THERAPY: CPT | Performed by: PHYSICAL THERAPIST

## 2022-10-17 ENCOUNTER — TREATMENT (OUTPATIENT)
Dept: PHYSICAL THERAPY | Facility: CLINIC | Age: 42
End: 2022-10-17

## 2022-10-17 DIAGNOSIS — M43.16 SPONDYLOLISTHESIS OF LUMBAR REGION: Primary | ICD-10-CM

## 2022-10-17 DIAGNOSIS — M99.04 SEGMENTAL AND SOMATIC DYSFUNCTION OF SACRAL REGION: ICD-10-CM

## 2022-10-17 DIAGNOSIS — M54.16 RADICULOPATHY, LUMBAR REGION: ICD-10-CM

## 2022-10-17 DIAGNOSIS — M99.05 SEGMENTAL AND SOMATIC DYSFUNCTION OF PELVIC REGION: ICD-10-CM

## 2022-10-17 PROCEDURE — 97014 ELECTRIC STIMULATION THERAPY: CPT | Performed by: PHYSICAL THERAPIST

## 2022-10-17 PROCEDURE — 97110 THERAPEUTIC EXERCISES: CPT | Performed by: PHYSICAL THERAPIST

## 2022-10-17 PROCEDURE — 97140 MANUAL THERAPY 1/> REGIONS: CPT | Performed by: PHYSICAL THERAPIST

## 2022-10-17 NOTE — PROGRESS NOTES
Physical Therapy Daily Treatment Note      [facility]              [address], [address]                [facility] [place]  [address]        Patient: Jo Asif   : 1980  Diagnosis/ICD-10 Code:  Spondylolisthesis of lumbar region [M43.16]  Referring practitioner: Francisco Demarco IV, MD  Date of Initial Visit: Type: THERAPY  Noted: 10/11/2022  Today's Date: 10/17/2022  Patient seen for 3 sessions         Jo Asif reports: when she left last time she felt pretty good but then about half way through the day it started to hurt a lot.  She said it started going down her leg again and she couldn't get comfortable.  Her heating pad wasn't even helping that day.  She rated her pain this morning at a 3-4/10.        Subjective     Objective   See Exercise, Manual, and Modality Logs for complete treatment.       Assessment/Plan  Gentle stretching and strengthening.  Continued to hold several exercises d/t increased pain after several hours following last session.  Continued with LAD as this provided pt with relief and she continued with centralization of symptoms during the session.  Heat and estim at end of session for further pain relief.  May try mechanical traction next session based on tolerance.  Information given to pt regarding home estim unit.     Progress per Plan of Care           Timed:  Manual Therapy:    8     mins  33411;  Therapeutic Exercise:    20     mins  51392;     Neuromuscular Royce:        mins  55033;    Therapeutic Activity:          mins  94390;     Gait Training:           mins  99886;     Ultrasound:          mins  96789;     Work Conditioning/Hardening (initial 2 hours)        mins  62815  Work Conditioning/Hardening (each add'l hour)        mins  41208    Untimed:   Electrical Stimulation:    15     mins  96953 ( );  Traction          mins 39577    Timed Treatment:   28   mins   Total Treatment:     50   mins    Gwendolyn Rhodes PTA  Physical Therapist Assistant

## 2022-10-18 RX ORDER — DICYCLOMINE HYDROCHLORIDE 10 MG/1
CAPSULE ORAL
Qty: 90 CAPSULE | Refills: 2 | Status: SHIPPED | OUTPATIENT
Start: 2022-10-18 | End: 2023-02-02

## 2022-10-21 ENCOUNTER — TREATMENT (OUTPATIENT)
Dept: PHYSICAL THERAPY | Facility: CLINIC | Age: 42
End: 2022-10-21

## 2022-10-21 DIAGNOSIS — M43.16 SPONDYLOLISTHESIS OF LUMBAR REGION: Primary | ICD-10-CM

## 2022-10-21 DIAGNOSIS — M23.303 DERANGEMENT OF MEDIAL MENISCUS OF RIGHT KNEE: ICD-10-CM

## 2022-10-21 DIAGNOSIS — M99.05 SEGMENTAL AND SOMATIC DYSFUNCTION OF PELVIC REGION: ICD-10-CM

## 2022-10-21 DIAGNOSIS — M54.16 RADICULOPATHY, LUMBAR REGION: ICD-10-CM

## 2022-10-21 DIAGNOSIS — M99.04 SEGMENTAL AND SOMATIC DYSFUNCTION OF SACRAL REGION: ICD-10-CM

## 2022-10-21 PROCEDURE — 97014 ELECTRIC STIMULATION THERAPY: CPT | Performed by: PHYSICAL THERAPIST

## 2022-10-21 PROCEDURE — 97140 MANUAL THERAPY 1/> REGIONS: CPT | Performed by: PHYSICAL THERAPIST

## 2022-10-21 PROCEDURE — 97110 THERAPEUTIC EXERCISES: CPT | Performed by: PHYSICAL THERAPIST

## 2022-10-21 NOTE — PROGRESS NOTES
Physical Therapy Daily Treatment Note      [facility] [place]              [address], [address]                [place]  [address]        Patient: Jo Asif   : 1980  Diagnosis/ICD-10 Code:  Spondylolisthesis of lumbar region [M43.16]  Referring practitioner: Francisco Demarco IV, MD  Date of Initial Visit: Type: THERAPY  Noted: 10/11/2022  Today's Date: 10/21/2022  Patient seen for 4 sessions         Jo Asif reports: relief for about 1 hr after last session and then the pain came back.  She had an MBB on Wednesday.  3 injections on each side.  It hurts worse to bend over now than it did before.  She rated her pain 2-3/10 today.  She got her home estim unit on Wednesday but hasn't used it yet.        Subjective     Objective   See Exercise, Manual, and Modality Logs for complete treatment.       Assessment/Plan  Added SKTC and reintroduced core strengthening d/t lower level of pain and lack of radicular symptoms at the start of the session.  She tolerated them well.  Modified LAD to (B) manual traction for increased focus on spine.  She reported feeling it more in the back.  Educated and utilized home estim unit for pain control.  Pt verbalized understanding of use.  Will monitor tolerance to changes made this date and continue to modify or progress as able.     Progress per Plan of Care           Timed:  Manual Therapy:    10     mins  52799;  Therapeutic Exercise:    35     mins  09353;     Neuromuscular Royce:        mins  31593;    Therapeutic Activity:          mins  78990;     Gait Training:           mins  55465;     Ultrasound:          mins  37398;     Work Conditioning/Hardening (initial 2 hours)        mins  69429  Work Conditioning/Hardening (each add'l hour)        mins  23662    Untimed:   Electrical Stimulation:    15     mins  25450 ( );  Traction          mins 46100    Timed Treatment:   45   mins   Total Treatment:     60   mins    Gwendolyn Rhodes PTA  Physical  Therapist Assistant

## 2022-10-24 ENCOUNTER — TELEPHONE (OUTPATIENT)
Dept: PHYSICAL THERAPY | Facility: CLINIC | Age: 42
End: 2022-10-24

## 2022-10-26 ENCOUNTER — TREATMENT (OUTPATIENT)
Dept: PHYSICAL THERAPY | Facility: CLINIC | Age: 42
End: 2022-10-26

## 2022-10-26 DIAGNOSIS — M99.05 SEGMENTAL AND SOMATIC DYSFUNCTION OF PELVIC REGION: ICD-10-CM

## 2022-10-26 DIAGNOSIS — M99.04 SEGMENTAL AND SOMATIC DYSFUNCTION OF SACRAL REGION: ICD-10-CM

## 2022-10-26 DIAGNOSIS — M43.16 SPONDYLOLISTHESIS OF LUMBAR REGION: Primary | ICD-10-CM

## 2022-10-26 DIAGNOSIS — M54.16 RADICULOPATHY, LUMBAR REGION: ICD-10-CM

## 2022-10-26 PROCEDURE — 97140 MANUAL THERAPY 1/> REGIONS: CPT | Performed by: PHYSICAL THERAPIST

## 2022-10-26 PROCEDURE — 97110 THERAPEUTIC EXERCISES: CPT | Performed by: PHYSICAL THERAPIST

## 2022-10-26 NOTE — PROGRESS NOTES
Physical Therapy Daily Treatment Note      [facility] [place]              [address], [address]                [facility] [place]  [address]        Patient: Jo Asif   : 1980  Diagnosis/ICD-10 Code:  Spondylolisthesis of lumbar region [M43.16]  Referring practitioner: Francisco Demarco IV, MD  Date of Initial Visit: Type: THERAPY  Noted: 10/11/2022  Today's Date: 10/26/2022  Patient seen for 5 sessions         Jo Asif reports: feeling rough today.  She couldn't get out of bed on Monday.  She saw pain management yesterday and she said she must of strained her back and not the injections is why she couldn't bend over to put her socks on as well.  The TENS unit has been her only saving una.  The pulling on her leg helped more than both legs last time.  Her pain today is 2-3/10.        Subjective     Objective   See Exercise, Manual, and Modality Logs for complete treatment.       Assessment/Plan  Added hamstring and piriformis stretch and continued with core strengthening.  Cues provided for technique with new exercises as well as to avoid hip ext with PPT/trans abd contractions to decrease irritation to low back.  Pt able to return demonstrate changes.  Returned to LAD of (L) LE with pt report of centralization of symptoms.  Held estim and heat as pt has home unit at this time.  Will continue with gentle stretching and strengthening as well as LAD to open joint space and decrease radicular symptoms and overall pain.       Progress per Plan of Care           Timed:  Manual Therapy:    8     mins  74114;  Therapeutic Exercise:    34     mins  07353;     Neuromuscular Royce:    2    mins  45943;    Therapeutic Activity:          mins  59412;     Gait Training:           mins  19772;     Ultrasound:          mins  81070;     Work Conditioning/Hardening (initial 2 hours)        mins  43981  Work Conditioning/Hardening (each add'l hour)        mins  02689    Untimed:   Electrical Stimulation:         mins   48015 ( );  Traction          mins 53206    Timed Treatment:   44   mins   Total Treatment:     44   mins    Gwendolyn Rhodes PTA  Physical Therapist Assistant

## 2022-11-08 ENCOUNTER — OFFICE (OUTPATIENT)
Dept: URBAN - METROPOLITAN AREA CLINIC 64 | Facility: CLINIC | Age: 42
End: 2022-11-08

## 2022-11-08 VITALS
HEIGHT: 64 IN | HEART RATE: 80 BPM | SYSTOLIC BLOOD PRESSURE: 131 MMHG | WEIGHT: 293 LBS | DIASTOLIC BLOOD PRESSURE: 86 MMHG

## 2022-11-08 DIAGNOSIS — R19.7 DIARRHEA, UNSPECIFIED: ICD-10-CM

## 2022-11-08 PROCEDURE — 99214 OFFICE O/P EST MOD 30 MIN: CPT | Performed by: NURSE PRACTITIONER

## 2022-11-08 NOTE — SERVICEHPINOTES
Patient is a 43 yo female with history of asthma, sleep apena, PKU, DM, GERD, and A. fib status post cardiac ablation who presents for follow-up on diarrhea and had cholecystectomy since last visit.
br
br
She had wanted to proceed with cholecystectomy due to findings of gallstones.  She notes having loose stools multiple times per day.  This occurs after she eats and can be urgent.  It began just prior to removal of her gallbladder and is definitely worsened since the gallbladder.  She experiences tenderness in the right buttocks area when she has the loose stools. She denies antibiotic use or changes to medications.  Reports loss of appetite.  Has lots of belching.  Denies bright red blood per rectum or melena.  Feels some upper abdominal tenderness and relates this to a hunger pain.  She can experience some nausea intermittently but no vomiting.  She has previously tried dicyclomine which did not help so she no longer takes this.  She was prescribed Linzess by primary care provider due to constipation prior and possibility of overflow diarrhea from constipation.  She stopped this 2 weeks ago and she reports it made her symptoms much worse and she could not tolerate the loose stools.  She rarely reports taking her oxycodone and does not take it regularly.  We discussed ruling out overflow constipation prior to starting medication.  She does feel occasionally constipated in the mornings and has trouble initiating her bowel movement.No prior known hx of LD. No FH of LD or CRCA. She does not drink alcohol or smoke. No known exposure to hepatitis.Endoscopy:br6/2022 EGD/colonoscopy by Dr. Haq - concentric rings of the esophagus but biopsy negative for EOE, empiric esophageal dilation performed, benign duodenal biopsy, gastric biopsy negative for H. pylori, 1 tubular adenoma colon polyp, benign random colon biopsies.Labs/imaging:
br
8/2022 KUB - moderate stool in the descending colon.  Hemachromatosis gene testing negative.br br7/2022 right upper quadrant ultrasound - hepatic steatosis, cholelithiasis.6/2022 AST 52 and ALT 72 otherwise CMP unremarkable. Ferritin 234 otherwise liver serologies were unremarkable.6/2022 BUN 15, creat 0.56, ALT 74, AST 61, alk phos 56, tb 0.4, WBC 7.16, hgb 13.9, MCV 93, plt 255, total chol 198, trigs 167, HDL 42,

## 2022-11-11 ENCOUNTER — OFFICE (OUTPATIENT)
Dept: URBAN - METROPOLITAN AREA LAB 2 | Facility: LAB | Age: 42
End: 2022-11-11

## 2022-11-11 DIAGNOSIS — B96.20 UNSPECIFIED ESCHERICHIA COLI [E. COLI] AS THE CAUSE OF DISEA: ICD-10-CM

## 2022-11-11 DIAGNOSIS — A04.0 ENTEROPATHOGENIC ESCHERICHIA COLI INFECTION: ICD-10-CM

## 2022-11-11 PROCEDURE — 87505 NFCT AGENT DETECTION GI: CPT | Performed by: INTERNAL MEDICINE

## 2022-11-21 NOTE — PROGRESS NOTES
"     Patient ID: Jo Asif is a 40 y.o. female.  Right knee and shoulder pain with right hand tingling    Review of Systems:        Objective:    /90   Pulse 68   Ht 160 cm (63\")   Wt (!) 148 kg (327 lb)   BMI 57.93 kg/m²     Physical Examination:      She is a pleasant female in no distress. She is alert and oriented x3 and appears her stated age.  Right shoulder demonstrates no scars and no atrophy with mild pain over the bicep groove.  Passive elevation is 170 degrees abduction 130 degrees external rotation 40 degrees internal rotation S1.  She has mild pain and weakness on Speed, [place], supraspinatus testing.  Belly press and liftoff are 5/5 and 4/5  Right elbow demonstrates tenderness over the medial epicondyle and cubital tunnel.  Tinel at the elbow is negative. Sensory and motor exam are intact all distributions. Radial pulse is palpable and capillary refill is less than two seconds to all digits  Right knee demonstrates no scars with tenderness over the lateral joint line.  Knee range of motion 0 to 125 degrees with positive lateral Cecilia.  No instability.  Sensory and motor exam are intact all distributions. Dorsalis pedis and posterior tibialis pulses are palpable and capillary refill is less than two seconds to all digits    Imaging:   MRI of the shoulder demonstrates mild bursitis and mild labral fraying, no cuff tear  MRI of the knee demonstrates undersurface tear the posterior horn of the medial meniscus  EMG is negative for any compressive neuropathy    Assessment:    Right shoulder and arm pain  Right knee medial meniscus tear    Plan:   Recommend continued conservative treatment for her right arm.  Could proceed with sitdown duty with light use of the right arm the next 6 weeks.  For the right knee I recommend right knee arthroscopy partial medial meniscectomy and then sitdown duty after surgery  Risks and benefits, specifically risks of bleeding, scar, infection, stiffness, " retear, nerve, tendon, artery damage, the need for further surgery, DVT, and loss of life or limb were discussed. All questions were answered and addressed. Rehabilitation restrictions and timeframes were discussed  I recommend a cane for use after surgery to offload the knee      Procedures          Disclaimer: Please note that areas of this note were completed with computer voice recognition software.  Quite often unanticipated grammatical, syntax, homophones, and other interpretive errors are inadvertently transcribed by the computer software. Please excuse any errors that have escaped final proofreading.   supervision

## 2022-11-29 ENCOUNTER — DOCUMENTATION (OUTPATIENT)
Dept: PHYSICAL THERAPY | Facility: CLINIC | Age: 42
End: 2022-11-29

## 2022-11-29 NOTE — PROGRESS NOTES
Discharge Summary  Discharge Summary from Physical Therapy Report      Dates  PT visit: 10/11/22-10/26/22  Number of Visits: 5    Discharge Status of Patient: pt no showed 1 and canceled 1 appt after 10/26/22. When contacted by our office, pt noted that her insurance ended at the end of October. She also noted that her insurance is not paying for the therapy, or her medications, so she will call us in the future if she needs to/can come back in.     Goals: Goal status is undetermined as pt never returned to PT after 10/26/22.     Discharge Plan: No specific D/C instructions were given as pt never returned to PT after 10/26/22.     Comments: Pt was given HEP during sessions.     Date of Discharge: 11/29/22        Krystyna Ornelas, PT  Physical Therapist

## 2022-12-16 DIAGNOSIS — F41.9 ANXIETY: ICD-10-CM

## 2022-12-16 DIAGNOSIS — F32.1 CURRENT MODERATE EPISODE OF MAJOR DEPRESSIVE DISORDER WITHOUT PRIOR EPISODE: ICD-10-CM

## 2022-12-16 RX ORDER — FLUOXETINE 10 MG/1
CAPSULE ORAL
Qty: 90 CAPSULE | Refills: 1 | Status: SHIPPED | OUTPATIENT
Start: 2022-12-16

## 2022-12-16 RX ORDER — MONTELUKAST SODIUM 10 MG/1
TABLET ORAL
Qty: 90 TABLET | Refills: 1 | Status: SHIPPED | OUTPATIENT
Start: 2022-12-16

## 2023-02-02 ENCOUNTER — OFFICE VISIT (OUTPATIENT)
Dept: ORTHOPEDIC SURGERY | Facility: CLINIC | Age: 43
End: 2023-02-02
Payer: COMMERCIAL

## 2023-02-02 VITALS — WEIGHT: 293 LBS | BODY MASS INDEX: 50.02 KG/M2 | OXYGEN SATURATION: 99 % | HEIGHT: 64 IN

## 2023-02-02 DIAGNOSIS — M54.2 CERVICAL SPINE PAIN: ICD-10-CM

## 2023-02-02 DIAGNOSIS — M25.511 RIGHT SHOULDER PAIN, UNSPECIFIED CHRONICITY: Primary | ICD-10-CM

## 2023-02-02 DIAGNOSIS — F41.9 ANXIETY: ICD-10-CM

## 2023-02-02 PROCEDURE — 99213 OFFICE O/P EST LOW 20 MIN: CPT | Performed by: PHYSICIAN ASSISTANT

## 2023-02-02 NOTE — PROGRESS NOTES
"   Patient ID: Jo Asif is a 42 y.o. female presents for paraesthesias of the right upper extremity. Reports doing well until one month ago when he started experiencing pins and needles, numbness like it fell asleep in the whole right arm through to the finger tips.   Denies any recent change in activity level, no trauma or other surgeries over hte right upper extremity. She reports being on oxycodone for pain management, Neurontin for sciatica and naproxen for sciata and reports these do not provide relief.       Objective:  Ht 162.6 cm (64\")   Wt (!) 140 kg (308 lb)   SpO2 99%   BMI 52.87 kg/m²     Physical Examination:   Right upper extremity:  Tenderness to palpation globally around the shoulder joint.   Intact skin. Well healed portal incisions from prior arthroscopy.    Active Forward flexion 160, Abduction 100 (limited due to pain), ER 40, IR S1    Negative scarf  Negative Stovall  Negative Speed  No weakness, but pain with supraspinatus and O'evelin testing.    Tender to palpation along the cubital tunnel and lateral epicondyle but denies any radicular pain with palpation.     Negative tinel    Full range of motion at elbow wrist and fingers.    strength 5/5  Finger abduction strength 5/5  Decreased sensation to light touch at all digits when compared to the left side.     Imaging:   XR Shoulder 2+ View Right (02/02/2023 15:00)  Findings:  Bone mineral density is normal. No fractures or dislocations. Glenohumeral joint space is preserved. The acromioclavicular joint has a normal appearance. This is thorax is clear.     IMPRESSION:  Impression:  Normal radiographic appearance of the shoulder.    Assessment:    Diagnoses and all orders for this visit:    1. Right shoulder pain, unspecified chronicity (Primary)  Overview:  Added automatically from request for surgery [id number]    Orders:  -     XR Shoulder 2+ View Right    Plan: Believe this to be of a cervical origin.  I recommend referral to " [facility] for evaluation of cervical spine involvement in right upper extremity pain and paraesthesias.       Disclaimer: Part of this note may be an electronic transcription/translation of spoken language to printed text using the Dragon Dictation System

## 2023-02-03 RX ORDER — HYDROXYZINE HYDROCHLORIDE 10 MG/1
10 TABLET, FILM COATED ORAL EVERY 8 HOURS PRN
Qty: 180 TABLET | Refills: 1 | Status: SHIPPED | OUTPATIENT
Start: 2023-02-03 | End: 2023-03-08

## 2023-02-03 RX ORDER — NAPROXEN 500 MG/1
500 TABLET ORAL 2 TIMES DAILY PRN
Qty: 180 TABLET | Refills: 1 | Status: SHIPPED | OUTPATIENT
Start: 2023-02-03

## 2023-02-16 NOTE — PROGRESS NOTES
Subjective   History of Present Illness: Jo Asif is a 42 y.o. female is being seen for consultation today at the request of Frantz Adler PA-C for neck pain. In the office today patient reports right shoulder pain traveling into her right hand.  Patient has a recent history of shoulder issues for which she was treated by orthopedic surgery.  They do not believe that her shoulder is currently responsible for her symptoms.  She describes right-sided pain radiating from her neck and down to her hand.  This is associated with numbness and tingling.  She has also noticed weakness in her arm particularly with lifting the arm and  strength.  She denies any left-sided symptoms.  She also has a fair amount of neck pain.  Patient also has a history of low back pain and radiculopathy which is well controlled with medications at this point.    Chief Complaint   Patient presents with   • Neck Pain     New evaluation          Previous Treatment: Naproxen, Gabapentin, Oxycodone. Tens unit,    The following portions of the patient's history were reviewed and updated as appropriate: allergies, current medications, past family history, past medical history, past social history, past surgical history and problem list.    Review of Systems   Constitutional: Positive for activity change.   HENT: Negative.    Eyes: Negative.    Respiratory: Negative.    Cardiovascular: Negative.    Endocrine: Negative.    Genitourinary: Negative.    Musculoskeletal:        Right shoulder and arm   Skin: Negative.    Allergic/Immunologic: Negative.    Neurological: Positive for weakness and numbness.        Tingling   Hematological: Negative.    Psychiatric/Behavioral: Positive for sleep disturbance.       Objective     /93   Pulse 85   SpO2 98%    There is no height or weight on file to calculate BMI.      Neurologic Exam     Mental Status   Oriented to person, place, and time.     Motor Exam     Strength   Strength 5/5  except as noted. Right deltoid 4 out of 5  Right  4 out of 5  Right bicep 4-5     Sensory Exam   Patchy sensory loss over right upper extremity and hand     Gait, Coordination, and Reflexes     Reflexes   Right Arias: absent  Left Airas: absent      Assessment & Plan   Independent Review of Radiographic Studies:      I personally reviewed and interpreted the images from the following studies.    CT cervical spine: Degenerative changes     MRI lumbar spine: Grade 1 spondylolisthesis of L4-5 with some central and foraminal stenosis    Medical Decision Making:      Jo Asif is a 42 y.o. female with a history of symptoms concerning for vocal myelopathy and or radiculopathy.  CT shows some degenerative changes, however patient will require an MRI of her cervical spine to rule out stenosis and nerve or spinal cord compression.  I will see the patient back when she completes this imaging.       Diagnoses and all orders for this visit:    1. Cervical radiculopathy (Primary)    2. Cervical myelopathy (HCC)    3. DDD (degenerative disc disease), cervical    4. Spondylolisthesis of lumbar region      No follow-ups on file.    This patient was examined wearing appropriate personal protective equipment.                      Dr. Francisco Demarco IV    02/20/23  10:07 EST

## 2023-02-20 ENCOUNTER — OFFICE VISIT (OUTPATIENT)
Dept: NEUROSURGERY | Facility: CLINIC | Age: 43
End: 2023-02-20
Payer: COMMERCIAL

## 2023-02-20 VITALS — OXYGEN SATURATION: 98 % | SYSTOLIC BLOOD PRESSURE: 160 MMHG | HEART RATE: 85 BPM | DIASTOLIC BLOOD PRESSURE: 93 MMHG

## 2023-02-20 DIAGNOSIS — G95.9 CERVICAL MYELOPATHY: ICD-10-CM

## 2023-02-20 DIAGNOSIS — M43.16 SPONDYLOLISTHESIS OF LUMBAR REGION: ICD-10-CM

## 2023-02-20 DIAGNOSIS — M50.30 DDD (DEGENERATIVE DISC DISEASE), CERVICAL: ICD-10-CM

## 2023-02-20 DIAGNOSIS — M54.12 CERVICAL RADICULOPATHY: Primary | ICD-10-CM

## 2023-02-20 PROCEDURE — 99214 OFFICE O/P EST MOD 30 MIN: CPT | Performed by: NEUROLOGICAL SURGERY

## 2023-02-22 ENCOUNTER — PATIENT ROUNDING (BHMG ONLY) (OUTPATIENT)
Dept: NEUROSURGERY | Facility: CLINIC | Age: 43
End: 2023-02-22
Payer: COMMERCIAL

## 2023-03-07 ENCOUNTER — TELEPHONE (OUTPATIENT)
Dept: NEUROSURGERY | Facility: CLINIC | Age: 43
End: 2023-03-07
Payer: COMMERCIAL

## 2023-03-07 RX ORDER — LINACLOTIDE 72 UG/1
CAPSULE, GELATIN COATED ORAL
Qty: 90 CAPSULE | Refills: 1 | Status: SHIPPED | OUTPATIENT
Start: 2023-03-07

## 2023-03-07 NOTE — TELEPHONE ENCOUNTER
Patient is aware order was sent over to scheduling and they will call her within 3 business day's. Also gave her the # to scheduling if she wants to go ahead and call them herself. Once her MRI is scheduled she should call back for an appointment to be seen by Dr. Demarco.

## 2023-03-07 NOTE — TELEPHONE ENCOUNTER
Caller: Jo Asif    Relationship to patient: Self    Best call back number: [phone]    Patient is needing: PATIENT CALLED, PATIENT STATES HER INS IS NOT WANTING TO FULLY COVER MRI AT [facility]. PATIENT WOULD LIKE TO KNOW IF SHE CAN COMPLETE HER MRI AT [facility] INSTEAD. PLEASE SEND ORDER TO [facility] AND CALL PATIENT TO ADVISE.    THANK YOU

## 2023-03-08 ENCOUNTER — OFFICE VISIT (OUTPATIENT)
Dept: FAMILY MEDICINE CLINIC | Facility: CLINIC | Age: 43
End: 2023-03-08
Payer: COMMERCIAL

## 2023-03-08 VITALS
HEIGHT: 64 IN | DIASTOLIC BLOOD PRESSURE: 82 MMHG | HEART RATE: 71 BPM | WEIGHT: 293 LBS | BODY MASS INDEX: 50.02 KG/M2 | SYSTOLIC BLOOD PRESSURE: 124 MMHG | OXYGEN SATURATION: 97 %

## 2023-03-08 DIAGNOSIS — Z86.79 HISTORY OF ATRIAL FIBRILLATION: ICD-10-CM

## 2023-03-08 DIAGNOSIS — F32.1 CURRENT MODERATE EPISODE OF MAJOR DEPRESSIVE DISORDER WITHOUT PRIOR EPISODE: ICD-10-CM

## 2023-03-08 DIAGNOSIS — N39.46 MIXED STRESS AND URGE URINARY INCONTINENCE: ICD-10-CM

## 2023-03-08 DIAGNOSIS — Z23 NEED FOR PNEUMOCOCCAL VACCINATION: ICD-10-CM

## 2023-03-08 DIAGNOSIS — J45.20 MILD INTERMITTENT ASTHMA WITHOUT COMPLICATION: Primary | ICD-10-CM

## 2023-03-08 DIAGNOSIS — R73.03 PREDIABETES: ICD-10-CM

## 2023-03-08 DIAGNOSIS — I10 PRIMARY HYPERTENSION: ICD-10-CM

## 2023-03-08 DIAGNOSIS — E70.1 PKU (PHENYLKETONURIA): ICD-10-CM

## 2023-03-08 DIAGNOSIS — M51.36 LUMBAR DEGENERATIVE DISC DISEASE: ICD-10-CM

## 2023-03-08 DIAGNOSIS — E03.9 ACQUIRED HYPOTHYROIDISM: ICD-10-CM

## 2023-03-08 DIAGNOSIS — Z00.00 PREVENTATIVE HEALTH CARE: ICD-10-CM

## 2023-03-08 DIAGNOSIS — F41.9 ANXIETY: ICD-10-CM

## 2023-03-08 DIAGNOSIS — K91.5 POST-CHOLECYSTECTOMY SYNDROME: ICD-10-CM

## 2023-03-08 DIAGNOSIS — E66.01 CLASS 3 SEVERE OBESITY WITHOUT SERIOUS COMORBIDITY WITH BODY MASS INDEX (BMI) OF 50.0 TO 59.9 IN ADULT, UNSPECIFIED OBESITY TYPE: ICD-10-CM

## 2023-03-08 DIAGNOSIS — M54.32 SCIATICA OF LEFT SIDE: ICD-10-CM

## 2023-03-08 DIAGNOSIS — K21.9 GASTROESOPHAGEAL REFLUX DISEASE WITHOUT ESOPHAGITIS: ICD-10-CM

## 2023-03-08 DIAGNOSIS — L65.9 HAIR LOSS: ICD-10-CM

## 2023-03-08 DIAGNOSIS — G43.909 MIGRAINE WITHOUT STATUS MIGRAINOSUS, NOT INTRACTABLE, UNSPECIFIED MIGRAINE TYPE: ICD-10-CM

## 2023-03-08 DIAGNOSIS — Z12.31 BREAST CANCER SCREENING BY MAMMOGRAM: ICD-10-CM

## 2023-03-08 PROCEDURE — 86803 HEPATITIS C AB TEST: CPT | Performed by: NURSE PRACTITIONER

## 2023-03-08 PROCEDURE — 90471 IMMUNIZATION ADMIN: CPT | Performed by: NURSE PRACTITIONER

## 2023-03-08 PROCEDURE — 83036 HEMOGLOBIN GLYCOSYLATED A1C: CPT | Performed by: NURSE PRACTITIONER

## 2023-03-08 PROCEDURE — 80053 COMPREHEN METABOLIC PANEL: CPT | Performed by: NURSE PRACTITIONER

## 2023-03-08 PROCEDURE — 90677 PCV20 VACCINE IM: CPT | Performed by: NURSE PRACTITIONER

## 2023-03-08 PROCEDURE — 84443 ASSAY THYROID STIM HORMONE: CPT | Performed by: NURSE PRACTITIONER

## 2023-03-08 PROCEDURE — 36415 COLL VENOUS BLD VENIPUNCTURE: CPT | Performed by: NURSE PRACTITIONER

## 2023-03-08 PROCEDURE — 85027 COMPLETE CBC AUTOMATED: CPT | Performed by: NURSE PRACTITIONER

## 2023-03-08 PROCEDURE — 80061 LIPID PANEL: CPT | Performed by: NURSE PRACTITIONER

## 2023-03-08 PROCEDURE — 99214 OFFICE O/P EST MOD 30 MIN: CPT | Performed by: NURSE PRACTITIONER

## 2023-03-08 PROCEDURE — 99396 PREV VISIT EST AGE 40-64: CPT | Performed by: NURSE PRACTITIONER

## 2023-03-08 RX ORDER — BUSPIRONE HYDROCHLORIDE 10 MG/1
10 TABLET ORAL 2 TIMES DAILY
Qty: 60 TABLET | Refills: 0 | Status: SHIPPED | OUTPATIENT
Start: 2023-03-08 | End: 2023-04-03

## 2023-03-08 NOTE — PROGRESS NOTES
Injection  Injection performed in left arm by Roxanne Garland MA. Patient tolerated the procedure well without complications.  03/08/23   Roxanne Garland MA

## 2023-03-08 NOTE — PROGRESS NOTES
"Chief Complaint  Chief Complaint   Patient presents with   • Follow-up     6 month follow up for HTN   • Medication Problem     Pt says the hydroxyzine is not as effective as it was when she started the med   • Annual Exam           Subjective          Jo Asif presents to [facility] for   History of Present Illness     Patient presents for annual exam, she reports having several new issues    Urinary incontinence, she is now following with urology on myrbetriq, reports medication is ineffective    She will be seeing dermatology for hair loss, reports symptoms have been present 6mo-1 yr and now worsening. She has been on palynziq for PKU for over 15 years, has had no side effects and no hair loss in clinical trial for initial 7 years, she has been under more stress for last year.      Saw employer urgent care for uri, s/s now resolved.     HTN, cough resolved after switching symbicort to advair.  She continues lisinopril/HCTZ, taking daily as directed, denies chest pain, headache, shortness of air, palpitations and swelling of extremities.      GERD, with chronic elevated LFTs, reports \"iron overload\" she follows with gastroenterology, patient underwent cholecystectomy 9/20/2022 per general surgery, she reports having frequent diarrhea that alternates with constipation, she does take Linzess occasionally for days of constipation.  She is no longer taking dicyclomine.  EGD and colonoscopy completed 6/30/2022 polyps and esophageal biopsies negative, no H. pylori.  She reports GERD is stable on omeprazole, denies nausea, vomiting, abdominal pain.     PKU, iron overload in liver, following with     Pre-Diabetes mellitus and hypothyroidism, patient follows with Dr. Bingham yearly.  Labs are collected yearly as well, 5.8 last hgba1c, on ozempic mostly for weight loss     Anxiety/depression, on Prozac and hydroxyzine, she reports hydroxyzine is no longer effective, cannot " tell a difference when she takes the medication and has been taking it twice daily every day.  She is under more stress lately with new diagnoses.      Left-sided sciatica, lumbar degeneration, L4-5 disc bulge, she is now following with [facility] pain management, planning epidural injection.  She is no longer taking gabapentin 100mg TID, was ineffective. She does continue oxycodone only for severe pain.  FMLA was provided in past, she is back to working regular hours.  She reports recently developing right upper extremity numbness and pain, and x-rays showed degeneration of C-spine, she is scheduled to see neurosurgery       Migraines, follows with neuro. Stable on meds, FMLA approved through feb 2022.      FRANK, on cpap.      Allergy/asthma, improved on advair instead of symbicort, using spriva daily and albuterol prn, continues singulair.      Obesity, PCOS, she follows with Dr. Riya Ramachandran for Pap smears last 2022, she reports having irregular menstrual cycles again, mammogram last ordered/completed per Dr. Sanchez at [facility] [id number]        The following portions of the patient's history were reviewed and updated as appropriate: allergies, current medications, past family history, past medical history, past social history, past surgical history and problem list.    Past Medical History:   Diagnosis Date   • Abdominal pain 09/2022   • Allergic    • Anxiety    • Arrhythmia    • Asthma    • Atrial fibrillation (HCC)    • Cholelithiasis    • Depression    • Disease of thyroid gland    • GERD (gastroesophageal reflux disease)    • Heart murmur    • History of medical problems    • HL (hearing loss)    • Hypertension    • Hypothyroidism    • Iron disorder    • Irritable bowel syndrome    • Migraine    • Morbid obesity (HCC)    • Obesity    • PCOS (polycystic ovarian syndrome)    • PKU (phenylketonuria) ([facility])    • Prediabetes    • Rotator cuff tear 03/2022   • Sleep apnea    • Torn meniscus 03/2021     Past Surgical  History:   Procedure Laterality Date   • ABLATION OF DYSRHYTHMIC FOCUS     • CARDIAC CATHETERIZATION     • CARDIAC ELECTROPHYSIOLOGY PROCEDURE N/A 06/10/2020    Procedure: EP/Ablation;  Surgeon: Joseph Andrews MD;  Location: [facility] CATH INVASIVE LOCATION;  Service: Cardiovascular;  Laterality: N/A;   • CARPAL TUNNEL RELEASE Right    • CHOLECYSTECTOMY N/A 09/20/2022    Procedure: CHOLECYSTECTOMY LAPAROSCOPIC WITH DAVINCI ROBOT;  Surgeon: Alf Whalen MD;  Location: [facility] MAIN OR;  Service: Robotics - DaVinci;  Laterality: N/A;   • COLONOSCOPY N/A 06/30/2022    Procedure: COLONOSCOPY with random biopsies R/O microscopic colitis, polypectomy x 1;  Surgeon: Jeffrey Echevarria MD;  Location: [facility] ENDOSCOPY;  Service: Gastroenterology;  Laterality: N/A;   • ENDOSCOPY N/A 06/30/2022    Procedure: ESOPHAGOGASTRODUODENOSCOPY with esophagus, gastric and duodenal biopsies;  Surgeon: Jeffrey Echevarria MD;  Location: [facility] ENDOSCOPY;  Service: Gastroenterology;  Laterality: N/A;   • GALLBLADDER SURGERY  2022   • KNEE ARTHROSCOPY Right 03/26/2021    Procedure: KNEE ARTHROSCOPY with  partial medial lateral meniscectomy;  Surgeon: Kb Gaviria MD;  Location: [facility] MAIN OR;  Service: Orthopedics;  Laterality: Right;   • SHOULDER ARTHROSCOPY W/ ROTATOR CUFF REPAIR Right 03/18/2022    Procedure: SHOULDER ARTHROSCOPY WITH DEBRIDEMENT;  Surgeon: Kb Gaviria MD;  Location: [facility] MAIN OR;  Service: Orthopedics;  Laterality: Right;   • TONSILLECTOMY     • TUBAL ABDOMINAL LIGATION     • UVULECTOMY       Family History   Problem Relation Age of Onset   • Depression Mother    • Hypertension Mother    • Diabetes Mother    • Cancer Mother         Breast   • Hyperlipidemia Mother    • Arthritis Father    • Hypertension Father    • Diabetes Father    • Heart disease Father    • Cancer Father         Lung   • Early death Father    • Hyperlipidemia Father    • COPD Maternal Grandfather     • Diabetes Maternal Grandmother    • Hyperlipidemia Maternal Grandmother    • Cancer Paternal Grandfather    • Diabetes Paternal Grandmother    • Hyperlipidemia Paternal Grandmother      Social History     Tobacco Use   • Smoking status: Former     Packs/day: 0.10     Years: 6.00     Pack years: 0.60     Types: Cigarettes     Quit date: 2009     Years since quittin.5   • Smokeless tobacco: Never   • Tobacco comments:     Rarely smoked   Substance Use Topics   • Alcohol use: No       Current Outpatient Medications:   •  Accu-Chek Guide test strip, , Disp: , Rfl:   •  Accu-Chek Softclix Lancets lancets, , Disp: , Rfl:   •  Ajovy 225 MG/1.5ML solution prefilled syringe, Every 30 (Thirty) Days. For migraines, Disp: , Rfl:   •  albuterol sulfate  (90 Base) MCG/ACT inhaler, Inhale 2 puffs Every 6 (Six) Hours As Needed for Wheezing or Shortness of Air., Disp: 18 g, Rfl: 11  •  Blood Glucose Monitoring Suppl (Accu-Chek Guide Me) w/Device kit, , Disp: , Rfl:   •  EPINEPHrine (EPIPEN) 0.3 MG/0.3ML solution auto-injector injection, Inject 0.3 mg into the appropriate muscle as directed by prescriber., Disp: , Rfl:   •  FLUoxetine (PROzac) 10 MG capsule, TAKE ONE CAPSULE BY MOUTH DAILY, Disp: 90 capsule, Rfl: 1  •  fluticasone-salmeterol (Advair HFA) 230-21 MCG/ACT inhaler, Inhale 2 puffs 2 (Two) Times a Day., Disp: 12 g, Rfl: 11  •  levothyroxine (SYNTHROID, LEVOTHROID) 75 MCG tablet, Take 1 tablet by mouth Every Morning., Disp: , Rfl:   •  Linzess 72 MCG capsule capsule, TAKE ONE CAPSULE BY MOUTH EVERY MORNING BEFORE BREAKFAST (Patient taking differently: As Needed.), Disp: 90 capsule, Rfl: 1  •  lisinopril-hydrochlorothiazide (PRINZIDE,ZESTORETIC) 10-12.5 MG per tablet, Take 1 tablet by mouth Daily., Disp: 90 tablet, Rfl: 1  •  Mirabegron (MYRBETRIQ PO), Take  by mouth., Disp: , Rfl:   •  montelukast (SINGULAIR) 10 MG tablet, TAKE ONE TABLET BY MOUTH ONCE NIGHTLY, Disp: 90 tablet, Rfl: 1  •  naproxen  "(Naprosyn) 500 MG tablet, Take 1 tablet by mouth 2 (Two) Times a Day As Needed for Moderate Pain., Disp: 180 tablet, Rfl: 1  •  omeprazole (priLOSEC) 40 MG capsule, Take 1 capsule by mouth Daily., Disp: 90 capsule, Rfl: 1  •  Ozempic, 1 MG/DOSE, 4 MG/3ML solution pen-injector, Inject 1 mg as directed 1 (One) Time Per Week. Fri, Disp: , Rfl:   •  Palynziq 10 MG/0.5ML solution prefilled syringe, 3 (Three) Times a Week. mwf, Disp: , Rfl:   •  tiotropium bromide monohydrate (SPIRIVA RESPIMAT) 2.5 MCG/ACT aerosol solution inhaler, Inhale 2 puffs Daily., Disp: 12 g, Rfl: 3  •  ubrogepant (UBRELVY) 100 MG tablet, Take 1 tablet by mouth 1 (One) Time As Needed., Disp: , Rfl:   •  busPIRone (BUSPAR) 10 MG tablet, Take 1 tablet by mouth 2 (Two) Times a Day., Disp: 60 tablet, Rfl: 0    Objective   Vital Signs:   /82 (BP Location: Right arm, Patient Position: Sitting, Cuff Size: Large Adult)   Pulse 71   Ht 162.6 cm (64.02\")   Wt 136 kg (299 lb)   SpO2 97%   BMI 51.30 kg/m²           Physical Exam  Vitals and nursing note reviewed.   Constitutional:       General: She is not in acute distress.     Appearance: Normal appearance. She is well-developed. She is obese. She is not ill-appearing or diaphoretic.   HENT:      Head: Normocephalic.      Comments: Thin hair  Neck:      Thyroid: No thyromegaly.      Vascular: No JVD.   Cardiovascular:      Rate and Rhythm: Normal rate.      Heart sounds: Murmur heard.   Pulmonary:      Effort: Pulmonary effort is normal. No respiratory distress.      Breath sounds: No stridor. No wheezing or rhonchi.   Abdominal:      General: Abdomen is flat. Bowel sounds are normal. There is no distension.      Tenderness: There is no abdominal tenderness.   Musculoskeletal:         General: Tenderness (mild/mod L sciatic region ttp with radiation of pain into left posterior leg to ankle, mild/mod limited range of motion.  No mid lumbar spine tenderness. C-spine nontender, good rom) present. " Normal range of motion.   Skin:     General: Skin is warm and dry.   Neurological:      General: No focal deficit present.      Mental Status: She is alert and oriented to person, place, and time. Mental status is at baseline.      Sensory: Sensory deficit (L leg) present.   Psychiatric:         Attention and Perception: Attention normal.         Mood and Affect: Mood is anxious. Mood is not depressed.         Speech: Speech normal.         Behavior: Behavior normal. Behavior is cooperative.         Thought Content: Thought content normal.         Judgment: Judgment normal.          Result Review :     No visits with results within 7 Day(s) from this visit.   Latest known visit with results is:   Admission on 09/20/2022, Discharged on 09/20/2022   Component Date Value Ref Range Status   • HCG, Urine QL 09/20/2022 Negative  Negative Final   • Glucose 09/20/2022 131 (H)  70 - 105 mg/dL Final    Serial Number: [id number]:  [id number]   • Case Report 09/20/2022    Final                    Value:Surgical Pathology Report                         Case: [id number]                                  Authorizing Provider:  Alf Whalen,   Collected:           09/20/2022 10:35 AM                                 MD                                                                           Ordering Location:     [facility] MAIN  Received:            09/20/2022 01:21 PM                                 OR                                                                           Pathologist:           Ramon Pastor MD                                                            Specimen:    Gallbladder                                                                               • Final Diagnosis 09/20/2022    Final                    Value:This result contains rich text formatting which cannot be displayed here.   • Gross Description 09/20/2022    Final                    Value:This result contains rich text  formatting which cannot be displayed here.                  Class 3 Severe Obesity (BMI >=40). Obesity-related health conditions include the following: obstructive sleep apnea, hypertension, diabetes mellitus, dyslipidemias and osteoarthritis. Obesity is improving with lifestyle modifications. BMI is is above average; BMI management plan is completed. We discussed low calorie, low carb based diet program, portion control, increasing exercise and management of depression/anxiety/stress to control compensatory eating.           Assessment and Plan    Diagnoses and all orders for this visit:    1. Mild intermittent asthma without complication (Primary)  Comments:  Improved on Advair and Spiriva, continue and refill when needed    2. Need for pneumococcal vaccination  -     Pneumococcal Conjugate Vaccine 20-Valent (PCV20)    3. Anxiety  Comments:  Symptoms labile at times, continue and refill Prozac when needed  Hydroxyzine lately ineffective, d/c hydroxyzine, trial buspar  consider increasing prozac    4. Current moderate episode of major depressive disorder without prior episode (HCC)  Comments:  Stable on Prozac, continue and refill.     5. Lumbar degenerative disc disease  Comments:  Now following with [facility], planning epidural injections    6. Sciatica of left side  Comments:  Continue with [facility]    7. Gastroesophageal reflux disease without esophagitis  Comments:  Continue omeprazole, follow-up with gastroenterology as directed  following LFT's, will check today    8. Primary hypertension  Comments:  BP stable, continue lisinopril/HCTZ for renal protection in a diabetic  Orders:  -     Comprehensive Metabolic Panel  -     CBC (No Diff)  -     Lipid Panel  -     TSH    9. PKU (phenylketonuria) (HCC)  Comments:  Continue with genetics team, continue Palynziq  Patient has been on Palynziq over 15 years, hair loss likely not secondary to medication    10. Acquired  hypothyroidism  Comments:  Check TSH today  Continue with Dr. Bingham  Continue current dose of levothyroxine    11. Migraine without status migrainosus, not intractable, unspecified migraine type  Comments:  Continue with neurology,  Has been    12. Prediabetes  Comments:  Following with Dr. Bingham  Continue Ozempic for prediabetes and weight loss  A1c today  Orders:  -     Hemoglobin A1c    13. Post-cholecystectomy syndrome  Comments:  Keep follow-ups with gastroenterology, diarrhea alternating with constipation, cont linzess prn for constipation    14. Preventative health care  Comments:  Labs today as ordered   mammogram ordered  Pneumonia vaccine  pap smear UTD per Dr. Riya meadows 2022      Orders:  -     Comprehensive Metabolic Panel  -     CBC (No Diff)  -     Hemoglobin A1c  -     Lipid Panel  -     TSH  -     Hepatitis C Antibody    15. Breast cancer screening by mammogram  -     Mammo Screening Digital Tomosynthesis Bilateral With CAD; Future    16. Hair loss  Comments:  Patient has appointment with dermatology  May likely be combination of stress/anxiety and medication s/e    17. Class 3 severe obesity without serious comorbidity with body mass index (BMI) of 50.0 to 59.9 in adult, unspecified obesity type (HCC)  Comments:  Continue to work on weight loss, healthy heart/diabetic diet, exercise as able    18. History of atrial fibrillation  Comments:  No longer following with cardiology, no recent problems    19. Mixed stress and urge urinary incontinence  Comments:  Continue Myrbetriq, follow-up with urology as directed    Other orders  -     busPIRone (BUSPAR) 10 MG tablet; Take 1 tablet by mouth 2 (Two) Times a Day.  Dispense: 60 tablet; Refill: 0      Age appropriate preventative counseling provided, including healthy lifestyle modifications and exercise    I spent 45 minutes caring for Joishaan Barrettman on this date of service. This time includes time spent by me in the following activities:  preparing for the visit, reviewing tests, performing a medically appropriate examination and/or evaluation , counseling and educating the patient/family/caregiver, ordering medications, tests, or procedures and documenting information in the medical record        Follow Up     Return in about 6 months (around 9/8/2023) for Recheck.  Patient was given instructions and counseling regarding her condition or for health maintenance advice. Please see specific information pulled into the AVS if appropriate.        Part of this note may be an electronic transcription/translation of spoken language to printed text using the Dragon Dictation System

## 2023-03-08 NOTE — PROGRESS NOTES
Venipuncture Blood Specimen Collection  Venipuncture performed in the right hand by Amalia Rizzo MA with good hemostasis. Patient tolerated the procedure well without complications.   03/08/23   Amalia Rizzo MA

## 2023-03-09 ENCOUNTER — HOSPITAL ENCOUNTER (OUTPATIENT)
Dept: MRI IMAGING | Facility: HOSPITAL | Age: 43
Discharge: HOME | End: 2023-03-09
Admitting: NEUROLOGICAL SURGERY
Payer: COMMERCIAL

## 2023-03-09 DIAGNOSIS — M54.12 CERVICAL RADICULOPATHY: ICD-10-CM

## 2023-03-09 LAB
ALBUMIN SERPL-MCNC: 4.1 G/DL (ref 3.5–5.2)
ALBUMIN/GLOB SERPL: 1.4 G/DL
ALP SERPL-CCNC: 68 U/L (ref 39–117)
ALT SERPL W P-5'-P-CCNC: 25 U/L (ref 1–33)
ANION GAP SERPL CALCULATED.3IONS-SCNC: 10 MMOL/L (ref 5–15)
AST SERPL-CCNC: 18 U/L (ref 1–32)
BILIRUB SERPL-MCNC: 0.2 MG/DL (ref 0–1.2)
BUN SERPL-MCNC: 13 MG/DL (ref 6–20)
BUN/CREAT SERPL: 20.3 (ref 7–25)
CALCIUM SPEC-SCNC: 9.2 MG/DL (ref 8.6–10.5)
CHLORIDE SERPL-SCNC: 105 MMOL/L (ref 98–107)
CHOLEST SERPL-MCNC: 186 MG/DL (ref 0–200)
CO2 SERPL-SCNC: 25 MMOL/L (ref 22–29)
CREAT SERPL-MCNC: 0.64 MG/DL (ref 0.57–1)
DEPRECATED RDW RBC AUTO: 41.6 FL (ref 37–54)
EGFRCR SERPLBLD CKD-EPI 2021: 111.9 ML/MIN/1.73
ERYTHROCYTE [DISTWIDTH] IN BLOOD BY AUTOMATED COUNT: 12.6 % (ref 12.3–15.4)
GLOBULIN UR ELPH-MCNC: 3 GM/DL
GLUCOSE SERPL-MCNC: 87 MG/DL (ref 65–99)
HBA1C MFR BLD: 5.5 % (ref 4.8–5.6)
HCT VFR BLD AUTO: 39 % (ref 34–46.6)
HCV AB SER DONR QL: NORMAL
HDLC SERPL-MCNC: 39 MG/DL (ref 40–60)
HGB BLD-MCNC: 13.5 G/DL (ref 12–15.9)
LDLC SERPL CALC-MCNC: 117 MG/DL (ref 0–100)
LDLC/HDLC SERPL: 2.9 {RATIO}
MCH RBC QN AUTO: 31.4 PG (ref 26.6–33)
MCHC RBC AUTO-ENTMCNC: 34.6 G/DL (ref 31.5–35.7)
MCV RBC AUTO: 90.7 FL (ref 79–97)
PLATELET # BLD AUTO: 242 10*3/MM3 (ref 140–450)
PMV BLD AUTO: 10.6 FL (ref 6–12)
POTASSIUM SERPL-SCNC: 3.9 MMOL/L (ref 3.5–5.2)
PROT SERPL-MCNC: 7.1 G/DL (ref 6–8.5)
RBC # BLD AUTO: 4.3 10*6/MM3 (ref 3.77–5.28)
SODIUM SERPL-SCNC: 140 MMOL/L (ref 136–145)
TRIGL SERPL-MCNC: 170 MG/DL (ref 0–150)
TSH SERPL DL<=0.05 MIU/L-ACNC: 3.27 UIU/ML (ref 0.27–4.2)
VLDLC SERPL-MCNC: 30 MG/DL (ref 5–40)
WBC NRBC COR # BLD: 6.06 10*3/MM3 (ref 3.4–10.8)

## 2023-03-09 PROCEDURE — 72141 MRI NECK SPINE W/O DYE: CPT

## 2023-03-13 ENCOUNTER — TELEPHONE (OUTPATIENT)
Dept: NEUROSURGERY | Facility: CLINIC | Age: 43
End: 2023-03-13

## 2023-03-13 NOTE — TELEPHONE ENCOUNTER
Caller: Jo Asif    Relationship: Self    Best call back number: [phone]    What is the best time to reach you:ANYTIME    Who are you requesting to speak with (clinical staff, provider,  specific staff member):CLINICAL STAFF    Do you know the name of the person who called: NA    What was the call regarding: PT CALLED AND MADE A FOLLOW UP APPT. PT STATES THAT SHE HAS NOT RECEIVED HER DISC BACK YET FROM HER LAST APPT. PT STATES THAT IF WE HAVE THEM IN THE OFFICE SHE WOULD LIKE TO PICK THEM UP AT HER UPCOMING APPT. THANK YOU    Do you require a callback:NO

## 2023-03-15 ENCOUNTER — OFFICE VISIT (OUTPATIENT)
Dept: NEUROSURGERY | Facility: CLINIC | Age: 43
End: 2023-03-15
Payer: COMMERCIAL

## 2023-03-15 VITALS
BODY MASS INDEX: 50.02 KG/M2 | WEIGHT: 293 LBS | DIASTOLIC BLOOD PRESSURE: 80 MMHG | HEART RATE: 76 BPM | SYSTOLIC BLOOD PRESSURE: 150 MMHG | HEIGHT: 64 IN | RESPIRATION RATE: 18 BRPM | TEMPERATURE: 97.2 F | OXYGEN SATURATION: 100 %

## 2023-03-15 DIAGNOSIS — M54.12 CERVICAL RADICULOPATHY: ICD-10-CM

## 2023-03-15 DIAGNOSIS — M50.30 DDD (DEGENERATIVE DISC DISEASE), CERVICAL: Primary | ICD-10-CM

## 2023-03-15 PROCEDURE — 99213 OFFICE O/P EST LOW 20 MIN: CPT | Performed by: NEUROLOGICAL SURGERY

## 2023-03-15 NOTE — PROGRESS NOTES
"Subjective   History of Present Illness: Jo Asif is a 42 y.o. female is here today for follow-up on Cervical radiculopathy with a new Cervical MRI. Today in the office patient reports that she has neck pain, right shoulder pain with numbness and tingling BUE.  No changes since she was last seen    Chief Complaint   Patient presents with   • Neck Pain   • Arm Pain          Previous Treatment: Naproxen, Gabapentin, Oxycodone. Tens unit,    The following portions of the patient's history were reviewed and updated as appropriate: allergies, current medications, past family history, past medical history, past social history, past surgical history and problem list.    Review of Systems   Constitutional: Positive for activity change.   Respiratory: Negative for chest tightness and shortness of breath.    Cardiovascular: Negative for chest pain.   Musculoskeletal: Positive for myalgias, neck pain and neck stiffness.        BUE pain   Neurological: Positive for weakness and numbness.        Positive for tingling       Objective     /80   Pulse 76   Temp 97.2 °F (36.2 °C)   Resp 18   Ht 162.6 cm (64.02\")   Wt 136 kg (299 lb)   SpO2 100%   BMI 51.29 kg/m²    Body mass index is 51.29 kg/m².      Neurologic Exam    Assessment & Plan   Independent Review of Radiographic Studies:      I personally reviewed and interpreted the images from the following studies.    MRI cervical spine: Degenerative changes most severe from C3-C6.  There are areas of mild to moderate central and foraminal stenosis with no high-grade central stenosis and no cord compression    Medical Decision Making:      Jo Asif is a 42 y.o. female with a recent history of worsening neck pain and upper extremity radiculopathy.  MRI is overall reassuring with no high-grade central or foraminal stenosis and no cord compression.  Patient is scheduled for a lumbar TERRY, I agree with proceeding with this.  She may also benefit from " cervical TERRY.  No surgical intervention indicated at this time.  She can follow-up as needed.      Diagnoses and all orders for this visit:    1. DDD (degenerative disc disease), cervical (Primary)    2. Cervical radiculopathy      No follow-ups on file.    This patient was examined wearing appropriate personal protective equipment.                      Dr. Francisco Demarco IV    03/15/23  14:27 EDT

## 2023-03-15 NOTE — TELEPHONE ENCOUNTER
Patient is aware we will mail her disc back once we receive it back from Radiology. It can take up to two weeks for us to receive them back in our office.

## 2023-03-30 ENCOUNTER — TELEPHONE (OUTPATIENT)
Dept: NEUROSURGERY | Facility: CLINIC | Age: 43
End: 2023-03-30
Payer: COMMERCIAL

## 2023-03-30 DIAGNOSIS — M54.12 CERVICAL RADICULOPATHY: Primary | ICD-10-CM

## 2023-03-30 DIAGNOSIS — M54.16 LUMBAR RADICULOPATHY: ICD-10-CM

## 2023-03-30 NOTE — TELEPHONE ENCOUNTER
I called patient, to let her know I was mailing back her three cd's. [facility], two from [facility]. Patient hasn't heard anything from PT at [place], Her last apt for Neck

## 2023-04-03 RX ORDER — BUSPIRONE HYDROCHLORIDE 10 MG/1
TABLET ORAL
Qty: 60 TABLET | Refills: 0 | Status: SHIPPED | OUTPATIENT
Start: 2023-04-03

## 2023-04-07 ENCOUNTER — TREATMENT (OUTPATIENT)
Dept: PHYSICAL THERAPY | Facility: CLINIC | Age: 43
End: 2023-04-07
Payer: COMMERCIAL

## 2023-04-07 DIAGNOSIS — M54.16 RADICULOPATHY, LUMBAR REGION: ICD-10-CM

## 2023-04-07 DIAGNOSIS — M54.12 RADICULOPATHY, CERVICAL: Primary | ICD-10-CM

## 2023-04-07 PROCEDURE — 97110 THERAPEUTIC EXERCISES: CPT | Performed by: PHYSICAL THERAPIST

## 2023-04-07 PROCEDURE — 97163 PT EVAL HIGH COMPLEX 45 MIN: CPT | Performed by: PHYSICAL THERAPIST

## 2023-04-07 NOTE — PROGRESS NOTES
Physical Therapy Initial Evaluation and Plan of Care  [facility]  [address], [address]  [facility] [place] [address]    Patient: Jo Asif   : 1980  Diagnosis/ICD-10 Code:  Radiculopathy, cervical [M54.12]  Referring practitioner: Francisco Demarco IV, MD  Date of Initial Visit: 2023  Today's Date: 2023  Patient seen for 1 sessions           Subjective Questionnaire: NDI: 30 = 60% limited, Oswestry: 19 = 42.22% limited (1 question missed), Quick DASH 53 = 95.45% limited      Subjective Evaluation    History of Present Illness  Mechanism of injury: Pt was referred to Dr. Dav SINCLAIR by her PCP for back and L hip pain which began around 22. Pt took 2 steroid dose packs without any significant/prolonged improvement. Pain radiated from the buttock down the LLE to ant shin & top of her foot. Pt denied weakness and numbness initially, but notes she is now getting some numbness.  Pt woke up with pain in the L LB and down the L LE. Pt states she had to stay in bed with her back extended on her side to relieve it. Pt has tried different shoes, changed her mattress, tried sleeping in the recliner, and heat/ice with no relief from any of this. Pt denies weakness, but does report numbness/tingling down the LLE. Pt denies saddle anaesthesia or loss of/changes in bowel bladder except bowel due to gall bladder which was removed. Pt does however have urological issues which will require surgery.    Dr. Dav SINCLAIR recommended PT & lumbar epidural steroid injex. He noted she is not a surgical candidate unless her BMI were to fall below 40. Pt was seen for 5 PT visits in 2022, then her insurance ended.     Pt then saw Frantz Adler PA-C for R UE pain/parasthesia which began in 2023 as she thought it was related to a prior shoulder issue for which she saw Dr. Gaviria in the past. Frantz Adler PA-C felt it was coming from the neck and referred her back to Dr. Dav SINCLAIR.     Pt  notes pain radiates from the R side of the neck and down to the hand with numbness, tingling and weakness lifting the arm and with . At her next follow up on 3/15/23 she reported B UE n/t. Pt has hx migraines and takes preventives, but has had headaches more daily if she's active working or otherwise. When she can relax, she doesn't get them. Headaches are SO & frontal.     IMAGING:   CT cervical spine: Degenerative changes (per Dr. Dav SINCLAIR's 2/20/23 note)  MRI lumbar spine: Grade 1 spondylolisthesis of L4-5 with some central and foraminal stenosis (per Dr. Dav SINCLAIR's 2/20/23 note)  MRI cervical spine: Degenerative changes most severe from C3-C6.  There are areas of mild to moderate central and foraminal stenosis with no high-grade central stenosis and no cord compression (per Dr. Dav SINCLAIR's 3/15/23 note)     Dr. Demarco ordered lumb TERRY & was considering cerv TERRY. No surgical intervention was considered at this time. Pt does note last Sept/Oct she went to [facility] and had a medial branch block which she states almost paralyzed her and she states they kept trying to give her meds. Pt is now going to [facility] and  is using CBD oil to help with sleep. Pt just had a cerv epidural steroid injex since the cerv was worse and on the 17th she is due to have the lumb TERRY. So far, pt feels there hasn't been much change after the injex. Pt has been using a TENS at home & back brace for the last 2 weeks which helps with driving and standing. Pt notes she's also gotten a standing desk at work which is helping some.     Pt is to return to Dr. Demarco if no improvement.     PMH: abdominal pain, allergic (Amoxicillin, PCN, Cleocin), arrhythmia, anxiety, depression, headaches, asthma, cholelithiasis, hx heart murmur, AFib (ablation relieved), hearing loss, GERD, disease of thyroid gland/hypothyroidism, IBS, migraine, iron disorder, morbid obesity, PCOS, sleep apnea, torn R meniscus, PKU, migraine,  pre diabetes    PSH: s/p R arthroscopic med/lat meniscect w/ chondromalacia patella 3/26/21, cholecystectomy, 3/2022 arthroscopic debridement R shld    Denies hx: pacemaker, metal implants, CA, CVA, seizures, MI, DM, latex allergies    Pain: 4-5/10 current, 3/10 at best, 10/10 at worst    Aggravating/functional factors: sitting prolonged, standing only if without brace, walking only if without brace, bending, twisting, squatting, lifting, carrying, washing, dressing, grooming, pushing, pulling, stairs only if aggravated or without brace, in/out of car, rising, sleeping, house work, yard work, driving has to do mostly with LUE, reaching, gripping/holding items, turning neck    PLOF: some prior issues with the above functional activities prior to when the neck started due to shoulder issue and prior sciatic issues    Relieving factors: brace, Naproxen some help    Social Hx: lives with spouse; cat, birds, fish; 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 will whenever she wants to work; 1 step to get in house without difficulty      Quality of life: poor    Pain  Quality: pressure, knife-like, throbbing and dull ache    Hand dominance: right    Patient Goals  Patient goals for therapy: decreased pain, improved balance, increased strength, independence with ADLs/IADLs, return to sport/leisure activities and increased motion  Patient goal: return to PLOF; return to full work schedule as often limited ~1 day/wk due to symptoms           Objective          Active Range of Motion   Cervical/Thoracic Spine   Cervical    Flexion: 30 degrees with pain  Extension: 20 degrees with pain  Left lateral flexion: 15 degrees with pain  Right lateral flexion: 15 degrees with pain  Left rotation: 33 degrees with pain  Right rotation: 35 degrees with pain    Right Shoulder   Flexion: 75 degrees with pain  Extension: 25 degrees with pain  Abduction: 70 degrees with  pain  External rotation 0°: 35 degrees with pain    Lumbar   Flexion: 80 degrees   Extension: 25 degrees   Left lateral flexion: 35 degrees   Right lateral flexion: 40 degrees   Left Hip   External rotation (90/90): 35 degrees   Internal rotation (90/90): 40 degrees     Right Hip   External rotation (90/90): 35 degrees   Internal rotation (90/90): 35 degrees     Additional Active Range of Motion Details  R elbow 0-134 degrees pain at shoulder with flex    R IR BTB to R dist/lat buttock  R ER BTH unable to reach UT  R horiz add to ant pec region L    Strength/Myotome Testing     Right Shoulder     Planes of Motion   Flexion: 2+   Abduction: 2+   External rotation at 0°: 4-   Internal rotation at 0°: 4     Right Elbow   Flexion: 4-  Extension: 4    Right Wrist/Hand   Wrist extension: 4+  Wrist flexion: 4+    Left Hip   Planes of Motion   Flexion: 4+    Right Hip   Planes of Motion   Flexion: 4+    Left Knee   Flexion: 4+  Extension: 4+    Right Knee   Flexion: 4+  Extension: 4+    Left Ankle/Foot   Dorsiflexion: 5  Great toe extension: 4+    Right Ankle/Foot   Dorsiflexion: 5  Great toe extension: 4+    Additional Strength Details  L UE grossly 4+ to 5     Tests   Cervical     Left   Positive Spurling's sign.   Negative active compression ([place]).     Right   Positive Spurling's sign.   Negative active compression ([place]).     Additional Tests Details  Distraction slightly relieved symptoms        Observation: pt wearing lumbar support brace     strength:   L 80, 88 = 89 lb average (middle position)  R 60, 58 = 59 lb average (middle position)    Palpation: TTP @ post R shld/scap region    Sensation: intact/equal to LT B UEs except more numb R hand dorsal/volar; intact/equal to LT B LEs    Posture: head fwd/rounded shoulders; increased kypholordosis, Dowager's    Gait: I without AD, mildly reduced gt speed & step length, outflares noted    Balance: SLS 8 s L/3 s R on level ground without UE support &  SBA    Transfers: I sit to/from stand with some difficulty       Assessment & Plan     Assessment  Impairments: abnormal coordination, abnormal gait, abnormal muscle firing, abnormal muscle tone, abnormal or restricted ROM, activity intolerance, impaired balance, impaired physical strength, lacks appropriate home exercise program, pain with function, safety issue and weight-bearing intolerance    Assessment details: The patient is a 43 y.o. female who presents to physical therapy today for cerv & lumb radiculopathies. Upon initial evaluation, the patient demonstrates the above & following impairments: pain, reduced posture, SI/IS dysfunction, decreased ROM/flexibility, strength, gait, balance and function. Due to these impairments, the patient is unable to/limited with: sitting prolonged, standing only if without brace, walking only if without brace, bending, twisting, squatting, lifting, carrying, washing, dressing, grooming, pushing, pulling, stairs only if aggravated or without brace, in/out of car, rising, sleeping, house work, yard work, driving has to do mostly with LUE, reaching, gripping/holding items, and turning neck. The patient would benefit from skilled PT services to address functional limitations and impairments and to improve patient quality of life.        Barriers to therapy: anxiety, depression, headaches, asthma, hx heart murmur/AFIB (ablation relieved), hearing loss, GERD, hypothyroid, migraine, IBS, PCOS and pre diabetes could affect PT Rx/progress/outcomes if exacerbated/unregulated which could affect tolerance to PT/exs or delay healing  Prognosis: good    Goals  Plan Goals: STGs in 4 weeks:  Decrease pain to 5-6/10 on average  Increase trunk/cerv/R shld ROM by 10 degrees where limited as much  Increase UE/LE strength to 4/5  Assess/improve pelvis/sacral alignment prn  Assess DTRs/clonus and further special tests prn     LTGs by discharge  Increase trunk/LE ROM to WFL/WNL  Increase UE/LE  strength to 5/5   Pt will be able to ascend/descend stairs reciprocally with or without use of rail(s) and with minimal difficulty or pain  Pt will be able to sit/drive/ride for 30-60 mins without difficulty or pain  Pt will be able to stand 30-60 mins for basic ADLs without difficulty or pain  Pt will be able to walk 30-60 mins for grocery shopping without difficulty, pain or LOB  Pt will be able to wash/dress/groom without difficulty or increased pain  Pt will be able to reach into overhead cabinets and grasp/hold items without difficulty or increased pain  Pt will be able to lift/carry laundry baskets, pots/pans, garbage or grocery bags without difficulty or increased pain  Pt will be able to sleep full nights most nights without waking from neck/UE/LBP/LE symptoms      Plan  Therapy options: will be seen for skilled therapy services  Planned modality interventions: cryotherapy, thermotherapy (hydrocollator packs), electrical stimulation/[place] stimulation, ultrasound, traction, dry needling and TENS  Planned therapy interventions: manual therapy, neuromuscular re-education, postural training, soft tissue mobilization, spinal/joint mobilization, strengthening, stretching, therapeutic activities, transfer training, abdominal trunk stabilization, ADL retraining, body mechanics training, home exercise program, gait training, functional ROM exercises, flexibility, motor coordination training, balance/weight-bearing training and joint mobilization  Frequency: 3x week  Duration in weeks: 13  Treatment plan discussed with: patient        History # of Personal Factors and/or Comorbidities: HIGH (3+)  Examination of Body System(s): # of elements: HIGH (4+)  Clinical Presentation: UNSTABLE  chronicity, variable pain levels, high pain levels  Clinical Decision Making: HIGH      Timed:         Manual Therapy:         mins  40011;     Therapeutic Exercise:   10      mins  33620;     Neuromuscular Royce:        mins  61191;     Therapeutic Activity:          mins  69847;     Gait Training:           mins  91942;     Ultrasound:          mins  53289;    Ionto                                   mins   12510  Self Care                            mins   31081  Canalith Repos         mins 99046      Un-Timed:  Electrical Stimulation:         mins  95061 ( );  Dry Needling          mins self-pay  Traction          mins 50628  Low Eval          Mins  08670  Mod Eval          Mins  73499  High Eval                        54    Mins  76289  Re-Eval                               mins  58635        Timed Treatment:   10   mins   Total Treatment:    64    mins    PT SIGNATURE: Krystyna Ornelas, PT   [place] Lic# [id number]  DATE TREATMENT INITIATED: 4/7/2023    Initial Certification  Certification Period: 4/7/20239242ACDEWSL5/5/2023  I certify that the therapy services are furnished while this patient is under my care.  The services outlined above are required by this patient, and will be reviewed every 90 days.     PHYSICIAN: Francisco Demarco IV, MD      DATE:     Please sign and return via fax to [phone]. Thank you, [facility].

## 2023-04-09 DIAGNOSIS — I10 PRIMARY HYPERTENSION: ICD-10-CM

## 2023-04-10 RX ORDER — LISINOPRIL AND HYDROCHLOROTHIAZIDE 12.5; 1 MG/1; MG/1
TABLET ORAL
Qty: 90 TABLET | Refills: 1 | Status: SHIPPED | OUTPATIENT
Start: 2023-04-10

## 2023-04-12 ENCOUNTER — OFFICE (OUTPATIENT)
Dept: URBAN - METROPOLITAN AREA CLINIC 64 | Facility: CLINIC | Age: 43
End: 2023-04-12

## 2023-04-12 ENCOUNTER — TREATMENT (OUTPATIENT)
Dept: PHYSICAL THERAPY | Facility: CLINIC | Age: 43
End: 2023-04-12
Payer: COMMERCIAL

## 2023-04-12 VITALS
HEART RATE: 59 BPM | WEIGHT: 293 LBS | DIASTOLIC BLOOD PRESSURE: 92 MMHG | HEIGHT: 64 IN | SYSTOLIC BLOOD PRESSURE: 151 MMHG

## 2023-04-12 DIAGNOSIS — I48.91 UNSPECIFIED ATRIAL FIBRILLATION: ICD-10-CM

## 2023-04-12 DIAGNOSIS — M54.16 RADICULOPATHY, LUMBAR REGION: ICD-10-CM

## 2023-04-12 DIAGNOSIS — K21.9 GASTRO-ESOPHAGEAL REFLUX DISEASE WITHOUT ESOPHAGITIS: ICD-10-CM

## 2023-04-12 DIAGNOSIS — Z90.49 ACQUIRED ABSENCE OF OTHER SPECIFIED PARTS OF DIGESTIVE TRACT: ICD-10-CM

## 2023-04-12 DIAGNOSIS — M99.04 SEGMENTAL AND SOMATIC DYSFUNCTION OF SACRAL REGION: ICD-10-CM

## 2023-04-12 DIAGNOSIS — K58.1 IRRITABLE BOWEL SYNDROME WITH CONSTIPATION: ICD-10-CM

## 2023-04-12 DIAGNOSIS — K21.9 GASTROESOPHAGEAL REFLUX DISEASE WITHOUT ESOPHAGITIS: ICD-10-CM

## 2023-04-12 DIAGNOSIS — R14.3 FLATULENCE: ICD-10-CM

## 2023-04-12 DIAGNOSIS — M99.05 SEGMENTAL AND SOMATIC DYSFUNCTION OF PELVIC REGION: ICD-10-CM

## 2023-04-12 DIAGNOSIS — M54.12 RADICULOPATHY, CERVICAL: Primary | ICD-10-CM

## 2023-04-12 DIAGNOSIS — R79.89 OTHER SPECIFIED ABNORMAL FINDINGS OF BLOOD CHEMISTRY: ICD-10-CM

## 2023-04-12 DIAGNOSIS — M43.16 SPONDYLOLISTHESIS OF LUMBAR REGION: ICD-10-CM

## 2023-04-12 DIAGNOSIS — J45.909 UNSPECIFIED ASTHMA, UNCOMPLICATED: ICD-10-CM

## 2023-04-12 DIAGNOSIS — R14.1 GAS PAIN: ICD-10-CM

## 2023-04-12 DIAGNOSIS — Z87.891 PERSONAL HISTORY OF NICOTINE DEPENDENCE: ICD-10-CM

## 2023-04-12 DIAGNOSIS — R14.2 ERUCTATION: ICD-10-CM

## 2023-04-12 DIAGNOSIS — G47.30 SLEEP APNEA, UNSPECIFIED: ICD-10-CM

## 2023-04-12 PROCEDURE — 97014 ELECTRIC STIMULATION THERAPY: CPT | Performed by: PHYSICAL THERAPIST

## 2023-04-12 PROCEDURE — 97140 MANUAL THERAPY 1/> REGIONS: CPT | Performed by: PHYSICAL THERAPIST

## 2023-04-12 PROCEDURE — 97110 THERAPEUTIC EXERCISES: CPT | Performed by: PHYSICAL THERAPIST

## 2023-04-12 PROCEDURE — 99213 OFFICE O/P EST LOW 20 MIN: CPT | Performed by: NURSE PRACTITIONER

## 2023-04-12 RX ORDER — LUBIPROSTONE 8 UG/1
16 CAPSULE, GELATIN COATED ORAL
Qty: 180 | Refills: 3 | Status: ACTIVE
Start: 2023-04-12

## 2023-04-12 RX ORDER — OMEPRAZOLE 40 MG/1
CAPSULE, DELAYED RELEASE ORAL
Qty: 90 CAPSULE | Refills: 1 | Status: SHIPPED | OUTPATIENT
Start: 2023-04-12

## 2023-04-12 NOTE — PROGRESS NOTES
Physical Therapy Daily Treatment Note      [facility]              [address], [address]                [facility] [place]  [address]        Patient: Jo Asif   : 1980  Diagnosis/ICD-10 Code:  Radiculopathy, cervical [M54.12]  Referring practitioner: Francisco Demarco IV, MD  Date of Initial Visit: Type: THERAPY  Noted: 2023  Today's Date: 2023  Patient seen for 2 sessions         Subjective    Jo Asif reports: her neck and arm pain are worse and are about 5-6/10.  She said when she does the head exercises she starts to get a sharp/stabbing pain in the back of the neck.  She usually does them first thing in the morning when she is already laying down.  Her back bothers her especially when she doesn't wear her back brace.  She can tell she needs it with standing and driving.           Objective   See Exercise, Manual, and Modality Logs for complete treatment.       Assessment/Plan  Minimal cervical exercises as pt reported irritation with initial HEP thus added manual to assist with pain relief.  Attempted manual stretches to allow pt to relax which should decrease pain however pt unable to tolerate in this position either.  Gentle STM provided to cervical paraspinals and UT however this increased pain.  Relief reported with neutral distraction and SOR.  Decreased shooting pain with cervical distraction in neutral thus plan to try mechanical traction next session.    Also reintroduced gentle core strengthening and stretching as well as LAD to improve back pain/symptoms.  Utilized estim for neck with education provided for proper placement/set up and may defer to home TENS unit in future sessions. At the end of the session, pt reported her neck is moving better but pain/numbness in arm is about the same.  Will monitor symptoms and progress as able.      Progress per Plan of Care           Timed:  Manual Therapy:    15     mins  68808;  Therapeutic Exercise:    15     mins  28037;      Neuromuscular Royce:        mins  59930;    Therapeutic Activity:          mins  02351;     Gait Training:           mins  65214;     Ultrasound:          mins  98947;     Work Conditioning/Hardening (initial 2 hours)        mins  55274  Work Conditioning/Hardening (each add'l hour)        mins  90823    Untimed:   Electrical Stimulation:    15     mins  05898 ( );  Traction          mins 32402    Timed Treatment:   30   mins   Total Treatment:     45   mins    Gwendolyn Rhodes PTA  Physical Therapist Assistant

## 2023-04-14 ENCOUNTER — TREATMENT (OUTPATIENT)
Dept: PHYSICAL THERAPY | Facility: CLINIC | Age: 43
End: 2023-04-14
Payer: COMMERCIAL

## 2023-04-14 DIAGNOSIS — M54.12 RADICULOPATHY, CERVICAL: Primary | ICD-10-CM

## 2023-04-14 DIAGNOSIS — M54.16 RADICULOPATHY, LUMBAR REGION: ICD-10-CM

## 2023-04-14 PROCEDURE — 97014 ELECTRIC STIMULATION THERAPY: CPT | Performed by: PHYSICAL THERAPIST

## 2023-04-14 PROCEDURE — 97140 MANUAL THERAPY 1/> REGIONS: CPT | Performed by: PHYSICAL THERAPIST

## 2023-04-14 PROCEDURE — 97110 THERAPEUTIC EXERCISES: CPT | Performed by: PHYSICAL THERAPIST

## 2023-04-14 PROCEDURE — 97112 NEUROMUSCULAR REEDUCATION: CPT | Performed by: PHYSICAL THERAPIST

## 2023-04-14 NOTE — PROGRESS NOTES
Physical Therapy Treatment Note  [facility]  [address], [address]  [place] [id number]      VISIT#: 3    Subjective   Jo Asif reports: the exs continue to aggravate the neck. Pain is 3-4 at present. The tx helped a little. Pt still c/o B hand symptoms (R>L) & R UE symptoms.       Objective     See Exercise, Manual, and Modality Logs for complete treatment.     Patient Education: cues for therex    Assessment/Plan  Pt with no significant change in pain at the neck/UE with exs. The back felt good, so deferred LAD today. Pt did tolerate increased reps and added marches, DKTC, and ball rolls in sitting well. No significant change in tolerance to cerv rot when performed in sitting in  in flex. Cerv tx with pull to the L & repeated SB L increased arm symptoms per pt. Straight cerv pull was better tolerated and did offer slight relief by end of session. Continued with  modalities due to continued pain. Modalities were well tolerated and reduced pain some as well.     Progress per Plan of Care and Progress strengthening /stabilization /functional activity            Timed:         Manual Therapy:    12     mins  38124;     Therapeutic Exercise:   16     mins  84627;     Neuromuscular Royce:  15      mins  93865;    Therapeutic Activity:          mins  00565;     Gait Training:           mins  11469;     Ultrasound:          mins  03376;    Ionto                                   mins   83415  Self Care                            mins   09837    Un-Timed:  Electrical Stimulation:   10      mins  90487 ( );  Traction          mins 17911  Canalith Repos                   mins  69664  Dry Needle 1-2 ms      ___  mins 20052  Dry Needle  3+ ms              mins 45838  Low Eval          mins  57267  Mod Eval          Mins  74239  High Eval                            Mins  88692  Re-Eval                               mins  84620    Timed Treatment:  43    mins   Total Treatment:     53    mins    Krystyna Ornelas, PT    Physical Therapist

## 2023-04-19 ENCOUNTER — TREATMENT (OUTPATIENT)
Dept: PHYSICAL THERAPY | Facility: CLINIC | Age: 43
End: 2023-04-19
Payer: COMMERCIAL

## 2023-04-19 DIAGNOSIS — M54.12 RADICULOPATHY, CERVICAL: Primary | ICD-10-CM

## 2023-04-19 DIAGNOSIS — M54.16 RADICULOPATHY, LUMBAR REGION: ICD-10-CM

## 2023-04-19 PROCEDURE — 97112 NEUROMUSCULAR REEDUCATION: CPT | Performed by: PHYSICAL THERAPIST

## 2023-04-19 PROCEDURE — 97140 MANUAL THERAPY 1/> REGIONS: CPT | Performed by: PHYSICAL THERAPIST

## 2023-04-19 PROCEDURE — 97110 THERAPEUTIC EXERCISES: CPT | Performed by: PHYSICAL THERAPIST

## 2023-04-19 NOTE — PROGRESS NOTES
Physical Therapy Treatment Note  [facility]  [address], [address]  [place] [address]      VISIT#: 4    Subjective   Jo Asif reports: she had her injex on Monday. Pt states it didn't hurt as bad as the last one, but she's noticed more numbness down the R LE into the toes. Pt states she goes on 5/2/23 to talk to the nurse about the injex and she will be having her neck done next.  Pt states the pain is more in the neck, but still n/t in the R UE as well.     Pain is 3-4 today.     Objective     Cerv AROM  Left lateral flexion: 27 degrees with pain  Right lateral flexion: 30 degrees   Left rotation: 50 degrees with pain  Right rotation: 50 degrees with pain    See Exercise, Manual, and Modality Logs for complete treatment.     Patient Education: cues for therex    Assessment/Plan  Pt started to experience increased hand symptoms with scap retr & notes the same with post shld circles, so deferred. Improved cerv mobility noted as above with pain with all except R lat flex. Progressed per flow sheet. Pt with some reduced pain with some pain lingering at the R superior shld & numbness at the R hand after manual. Pt cont to c/o numbness down the R LE as well. Pt will use her TENS at home post Rx today.     Add trial of mechanical cerv tx as tolerated to see if pt gets more relief from that than from manual tx.     Progress per Plan of Care and Progress strengthening /stabilization /functional activity            Timed:         Manual Therapy:  10      mins  05620;     Therapeutic Exercise:    30     mins  62394;     Neuromuscular Royce:  15      mins  18327;    Therapeutic Activity:          mins  84500;     Gait Training:           mins  10367;     Ultrasound:          mins  06656;    Ionto                                   mins   28011  Self Care                            mins   71008    Un-Timed:  Electrical Stimulation:         mins  65439 ( );  Traction          mins  03343  [facility]                   mins  90656  Dry Needle 1-2 ms      ___  mins 53480  Dry Needle  3+ ms              mins 70305  Low Eval          mins  48919  Mod Eval          Mins  31470  High Eval                            Mins  61405  Re-Eval                               mins  70928    Timed Treatment:   55   mins   Total Treatment:     55   mins    Krystyna Ornelas, PT    Physical Therapist

## 2023-04-21 ENCOUNTER — TREATMENT (OUTPATIENT)
Dept: PHYSICAL THERAPY | Facility: CLINIC | Age: 43
End: 2023-04-21
Payer: COMMERCIAL

## 2023-04-21 DIAGNOSIS — M43.16 SPONDYLOLISTHESIS OF LUMBAR REGION: ICD-10-CM

## 2023-04-21 DIAGNOSIS — M54.16 RADICULOPATHY, LUMBAR REGION: ICD-10-CM

## 2023-04-21 DIAGNOSIS — M54.12 RADICULOPATHY, CERVICAL: Primary | ICD-10-CM

## 2023-04-21 NOTE — PROGRESS NOTES
Physical Therapy Daily Treatment Note  [address] [address] [address]  [place] [address]      Patient: Jo Asif  : 1980  Referring Practitioner: Francisco Demarco IV, MD  Date of Initial Visit: Type: THERAPY  Noted: 2023  Today's Date: 2023  Patient seen for: 5 sessions      Visit Diagnoses:     ICD-10-CM ICD-9-CM   1. Radiculopathy, cervical  M54.12 723.4   2. Radiculopathy, lumbar region  M54.16 724.4   3. Spondylolisthesis of lumbar region  M43.16 738.4       Subjective   Jo Asif reports the nt into the R arm and leg are unchanged.       Pain Level (0-10): 6    Objective   Resting posture with elevated shldr and shounding of the thoracic spine with FHP and OA ext.   Distraction cervical wo change of R radicular c/os   See Exercise, Manual, and Modality Logs for complete treatment.     Patient Education: cotn with home ex.       Assessment/Plan  Jo's subjective reports are mostly unchanged for the cervical and lumbar.  She was able to complete the core ex wo greater pain ( lumbar) .     Progress per Plan of Care          Timed:         Manual Therapy:    13     mins  99217;     Therapeutic Exercise:    14     mins  22075;     Neuromuscular Royce:    13    mins  52700;    Therapeutic Activity:          mins  12793;     Gait Training:           mins  05232;     Ultrasound:          mins  22450;    Ionto                                   mins   91147  Self Care                            mins   82197      Un-Timed:  Electrical Stimulation:         mins  06825 (MC );  Traction          mins 23975      Timed Treatment:   40   mins   Total Treatment:     40   mins              Dulce Luna PT    Physical Therapist

## 2023-04-24 ENCOUNTER — HOSPITAL ENCOUNTER (OUTPATIENT)
Dept: CARDIOLOGY | Facility: HOSPITAL | Age: 43
Discharge: HOME | End: 2023-04-24
Payer: COMMERCIAL

## 2023-04-24 ENCOUNTER — TRANSCRIBE ORDERS (OUTPATIENT)
Dept: ADMINISTRATIVE | Facility: HOSPITAL | Age: 43
End: 2023-04-24
Payer: COMMERCIAL

## 2023-04-24 ENCOUNTER — LAB (OUTPATIENT)
Dept: LAB | Facility: HOSPITAL | Age: 43
End: 2023-04-24
Payer: COMMERCIAL

## 2023-04-24 ENCOUNTER — TREATMENT (OUTPATIENT)
Dept: PHYSICAL THERAPY | Facility: CLINIC | Age: 43
End: 2023-04-24
Payer: COMMERCIAL

## 2023-04-24 DIAGNOSIS — M54.12 RADICULOPATHY, CERVICAL: Primary | ICD-10-CM

## 2023-04-24 DIAGNOSIS — Z01.818 PRE-OP TESTING: Primary | ICD-10-CM

## 2023-04-24 DIAGNOSIS — Z01.818 PRE-OP TESTING: ICD-10-CM

## 2023-04-24 DIAGNOSIS — M54.16 RADICULOPATHY, LUMBAR REGION: ICD-10-CM

## 2023-04-24 LAB
ANION GAP SERPL CALCULATED.3IONS-SCNC: 10 MMOL/L (ref 5–15)
BASOPHILS # BLD AUTO: 0 10*3/MM3 (ref 0–0.2)
BASOPHILS NFR BLD AUTO: 0.6 % (ref 0–1.5)
BUN SERPL-MCNC: 13 MG/DL (ref 6–20)
BUN/CREAT SERPL: 20 (ref 7–25)
CALCIUM SPEC-SCNC: 8.8 MG/DL (ref 8.6–10.5)
CHLORIDE SERPL-SCNC: 102 MMOL/L (ref 98–107)
CO2 SERPL-SCNC: 27 MMOL/L (ref 22–29)
CREAT SERPL-MCNC: 0.65 MG/DL (ref 0.57–1)
DEPRECATED RDW RBC AUTO: 46.8 FL (ref 37–54)
EGFRCR SERPLBLD CKD-EPI 2021: 110.8 ML/MIN/1.73
EOSINOPHIL # BLD AUTO: 0.2 10*3/MM3 (ref 0–0.4)
EOSINOPHIL NFR BLD AUTO: 3.1 % (ref 0.3–6.2)
ERYTHROCYTE [DISTWIDTH] IN BLOOD BY AUTOMATED COUNT: 14.1 % (ref 12.3–15.4)
GLUCOSE SERPL-MCNC: 94 MG/DL (ref 65–99)
HCT VFR BLD AUTO: 40.6 % (ref 34–46.6)
HGB BLD-MCNC: 13.5 G/DL (ref 12–15.9)
LYMPHOCYTES # BLD AUTO: 2.2 10*3/MM3 (ref 0.7–3.1)
LYMPHOCYTES NFR BLD AUTO: 32.4 % (ref 19.6–45.3)
MCH RBC QN AUTO: 32.2 PG (ref 26.6–33)
MCHC RBC AUTO-ENTMCNC: 33.3 G/DL (ref 31.5–35.7)
MCV RBC AUTO: 96.8 FL (ref 79–97)
MONOCYTES # BLD AUTO: 0.5 10*3/MM3 (ref 0.1–0.9)
MONOCYTES NFR BLD AUTO: 7.2 % (ref 5–12)
NEUTROPHILS NFR BLD AUTO: 3.9 10*3/MM3 (ref 1.7–7)
NEUTROPHILS NFR BLD AUTO: 56.7 % (ref 42.7–76)
NRBC BLD AUTO-RTO: 0.1 /100 WBC (ref 0–0.2)
PLATELET # BLD AUTO: 263 10*3/MM3 (ref 140–450)
PMV BLD AUTO: 7.5 FL (ref 6–12)
POTASSIUM SERPL-SCNC: 4.2 MMOL/L (ref 3.5–5.2)
RBC # BLD AUTO: 4.2 10*6/MM3 (ref 3.77–5.28)
SODIUM SERPL-SCNC: 139 MMOL/L (ref 136–145)
WBC NRBC COR # BLD: 6.8 10*3/MM3 (ref 3.4–10.8)

## 2023-04-24 PROCEDURE — 93005 ELECTROCARDIOGRAM TRACING: CPT | Performed by: UROLOGY

## 2023-04-24 PROCEDURE — 97140 MANUAL THERAPY 1/> REGIONS: CPT | Performed by: PHYSICAL THERAPIST

## 2023-04-24 PROCEDURE — 80048 BASIC METABOLIC PNL TOTAL CA: CPT

## 2023-04-24 PROCEDURE — 97112 NEUROMUSCULAR REEDUCATION: CPT | Performed by: PHYSICAL THERAPIST

## 2023-04-24 PROCEDURE — 97110 THERAPEUTIC EXERCISES: CPT | Performed by: PHYSICAL THERAPIST

## 2023-04-24 PROCEDURE — 36415 COLL VENOUS BLD VENIPUNCTURE: CPT

## 2023-04-24 PROCEDURE — 85025 COMPLETE CBC W/AUTO DIFF WBC: CPT

## 2023-04-24 NOTE — PROGRESS NOTES
Physical Therapy Treatment Note  [facility]  [address], [address]  [place] [address]      VISIT#: 6    Subjective   Jo Asif reports: she felt better for awhile after last session then it started hurting and was sore all weekend. Pt states her bra strap aggravated it. Pt reports she still gets n/t in the R leg, but some has subsided since the injex. Pt will be out of town for the rest of the week and has a procedure set for Monday. Pt will let us know if any restrictions or has to hold PT after she talks to the doctor.        Objective   ULTT 1 (+) & ULTT 4 (+)    See Exercise, Manual, and Modality Logs for complete treatment.     Patient Education: cues for therex; discussed that some n/t after gliding can occur, but that overall it should improve over time    Assessment/Plan Progressed per flow sheet with nerve glides/flossing. Pt with some increased n/t with these. Pt tolerated all other tasks fairly well without any significant increased symptoms. Pt will be out of town and is having a procedure on Monday so will let us know if there are restrictions afterwards or if she need to hold PT.       Progress per Plan of Care and Progress strengthening /stabilization /functional activity            Timed:         Manual Therapy:   13      mins  14866;     Therapeutic Exercise:    14     mins  14931;     Neuromuscular Royce:   13     mins  63221;    Therapeutic Activity:          mins  20390;     Gait Training:           mins  43162;     Ultrasound:          mins  08278;    Ionto                                   mins   11151  Self Care                            mins   00189    Un-Timed:  Electrical Stimulation:         mins  44464 ( );  Traction          mins 92134  Canalith Repos                   mins  98445  Dry Needle 1-2 ms      ___  mins 31138  Dry Needle  3+ ms              mins 21083  Low Eval          mins  75620  Mod Eval          Mins  76495  High Eval                             Mins  14398  Re-Eval                               mins  29547    Timed Treatment:   40   mins   Total Treatment:     40   mins    Krystyna Ornelas, PT    Physical Therapist

## 2023-04-26 LAB — QT INTERVAL: 391 MS

## 2023-05-04 ENCOUNTER — TREATMENT (OUTPATIENT)
Dept: PHYSICAL THERAPY | Facility: CLINIC | Age: 43
End: 2023-05-04
Payer: COMMERCIAL

## 2023-05-04 DIAGNOSIS — M54.12 RADICULOPATHY, CERVICAL: Primary | ICD-10-CM

## 2023-05-04 DIAGNOSIS — M54.16 RADICULOPATHY, LUMBAR REGION: ICD-10-CM

## 2023-05-04 DIAGNOSIS — M99.05 SEGMENTAL AND SOMATIC DYSFUNCTION OF PELVIC REGION: ICD-10-CM

## 2023-05-04 DIAGNOSIS — M99.04 SEGMENTAL AND SOMATIC DYSFUNCTION OF SACRAL REGION: ICD-10-CM

## 2023-05-04 DIAGNOSIS — M43.16 SPONDYLOLISTHESIS OF LUMBAR REGION: ICD-10-CM

## 2023-05-04 PROCEDURE — 97110 THERAPEUTIC EXERCISES: CPT | Performed by: PHYSICAL THERAPIST

## 2023-05-04 PROCEDURE — 97140 MANUAL THERAPY 1/> REGIONS: CPT | Performed by: PHYSICAL THERAPIST

## 2023-05-04 NOTE — PROGRESS NOTES
Physical Therapy Daily Treatment Note      [facility] [place]              [address], [address]                [place]  [address]        Patient: Jo Asif   : 1980  Diagnosis/ICD-10 Code:  Radiculopathy, cervical [M54.12]  Referring practitioner: Francisco Demarco IV, MD  Date of Initial Visit: Type: THERAPY  Noted: 2023  Today's Date: 2023  Patient seen for 7 sessions         Subjective    Jo Asif reports: she is iffy today and her sciatica has been acting up.  She would rater her pain 4-5/10.  It hurts to sit since her procedure on Monday so she has been laying down mostly. She has restrictions of no bending or lifting greater than 10# for 4 weeks.  Her arms a re a little better since she hasn't been liftinge.           Objective   See Exercise, Manual, and Modality Logs for complete treatment.       Assessment/Plan  Modified exercises to fit within pt tolerance and restrictions.  Added sciatic nerve glides and gentle core strengthening.  Mild improvement in pain.  No LAD today d/t recent procedure and will consult with women's health specialist prior to initiating.  Continued with manual to cervical spine however manual traction increased nerve symptoms down (R) arm.  Reviewed nerve glides to address.  Will monitor symptoms and tolerance to exercises and reintroduce exercises as appropriate.     Progress per Plan of Care           Timed:  Manual Therapy:    10     mins  42171;  Therapeutic Exercise:    30     mins  62424;     Neuromuscular Royce:        mins  23737;    Therapeutic Activity:          mins  40583;     Gait Training:           mins  42414;     Ultrasound:          mins  65593;     Work Conditioning/Hardening (initial 2 hours)        mins  58038  Work Conditioning/Hardening (each add'l hour)        mins  60855    Untimed:   Electrical Stimulation:         mins  21384 ( );  Traction          mins 25790    Timed Treatment:   40   mins   Total Treatment:     40    cash Rhodes PTA  Physical Therapist Assistant

## 2023-05-09 ENCOUNTER — TREATMENT (OUTPATIENT)
Dept: PHYSICAL THERAPY | Facility: CLINIC | Age: 43
End: 2023-05-09
Payer: COMMERCIAL

## 2023-05-09 DIAGNOSIS — M54.16 RADICULOPATHY, LUMBAR REGION: ICD-10-CM

## 2023-05-09 DIAGNOSIS — M99.04 SEGMENTAL AND SOMATIC DYSFUNCTION OF SACRAL REGION: ICD-10-CM

## 2023-05-09 DIAGNOSIS — M43.16 SPONDYLOLISTHESIS OF LUMBAR REGION: ICD-10-CM

## 2023-05-09 DIAGNOSIS — M54.12 RADICULOPATHY, CERVICAL: Primary | ICD-10-CM

## 2023-05-09 RX ORDER — BUSPIRONE HYDROCHLORIDE 10 MG/1
TABLET ORAL
Qty: 60 TABLET | Refills: 0 | Status: SHIPPED | OUTPATIENT
Start: 2023-05-09

## 2023-05-09 NOTE — PROGRESS NOTES
Physical Therapy 30 - Day Progress and Treatment Note  [address] [address] [address]  [place] [address]      Patient: Jo Asif  : 1980  Referring Practitioner: Francisco Demarco IV, MD  Date of Initial Visit: Type: THERAPY  Noted: 2023  Today's Date: 2023  Patient seen for: 8 sessions      Visit Diagnoses:     ICD-10-CM ICD-9-CM   1. Radiculopathy, cervical  M54.12 723.4   2. Radiculopathy, lumbar region  M54.16 724.4   3. Spondylolisthesis of lumbar region  M43.16 738.4   4. Segmental and somatic dysfunction of sacral region  M99.04 739.4       Subjective   Jo Asif reports she has not been doing much due to the procedure last week.  To return to work tomorrow on light duty.  Overall, reports a slight decrease in pain since the eval date.     Pain Level (0-10): 4    Objective   NDI: 30 = 60% limited, Oswestry: 19 = 42.22% limited (1 question missed), Quick DASH 53 = 95.45% limited  Posture:  Supine w equal hip rotation.   FHP 15 with rounding of the shldr and thoracic area.    Trunk/lumbar NA today with limited ex due to the recent procedure and lifting limitations.    Cervical AROM:  Flex 50, ext 40  BSB 20,  L rotation 40,  R rotation 35        PIVM  1+ with 1- R  R shldr AROM:  Flex 125,  abd 108 ER 45 all with increased pain.  MMT R shldr:   Flex/Abd 3,  ER 3+ with increased pain vs active.   Palpation:  Moderate thickening and tenderness along the cervical paraspinals, UT and R LHB .        See Exercise, Manual, and Modality Logs for complete treatment.     Patient Education:     Assessment/Plan   Jo has been to PT x's 8 sessions, including the 23 eval date.  Subjective reports of min change.   Active motion of cervical and R shldr is greater than at the eval date.   Lumbar not reviewed today due to MD placed restrictions post last week's procedure. She has achieved 4 of the 5 STGs at this time.   OPPT remains indicated to continue.    STGs in 4  weeks:  Decrease pain to 5-6/10 on average met   Increase trunk/cerv/R shld ROM by 10 degrees where limited as much met  Increase UE/LE strength to 4/5 progressed   Assess/improve pelvis/sacral alignment prn  Met, ongoing  Assess DTRs/clonus and further special tests prn     LTGs by discharge  Increase trunk/LE ROM to WFL/WNL  Increase UE/LE strength to 5/5   Pt will be able to ascend/descend stairs reciprocally with or without use of rail(s) and with minimal difficulty or pain  Pt will be able to sit/drive/ride for 30-60 mins without difficulty or pain  Pt will be able to stand 30-60 mins for basic ADLs without difficulty or pain  Pt will be able to walk 30-60 mins for grocery shopping without difficulty, pain or LOB  Pt will be able to wash/dress/groom without difficulty or increased pain  Pt will be able to reach into overhead cabinets and grasp/hold items without difficulty or increased pain  Pt will be able to lift/carry laundry baskets, pots/pans, garbage or grocery bags without difficulty or increased pain  Pt will be able to sleep full nights most nights without waking from neck/UE/LBP/LE symptoms    Progress per Plan of Care    Add prone and shldr isometrics.            Timed:         Manual Therapy:    13     mins  24449;     Therapeutic Exercise:    17     mins  61237;     Neuromuscular Royce:        mins  55319;    Therapeutic Activity:     10     mins  93260;     Gait Training:           mins  88105;     Ultrasound:          mins  13772;    Ionto                                   mins   51000  Self Care                            mins   97979      Un-Timed:  Electrical Stimulation:         mins  30269 ( );  Traction          mins 48866        Timed Treatment:   40   mins   Total Treatment:     40   mins              Dulce Luna, PT    Physical Therapist

## 2023-05-16 ENCOUNTER — TREATMENT (OUTPATIENT)
Dept: PHYSICAL THERAPY | Facility: CLINIC | Age: 43
End: 2023-05-16
Payer: COMMERCIAL

## 2023-05-16 DIAGNOSIS — M54.12 RADICULOPATHY, CERVICAL: Primary | ICD-10-CM

## 2023-05-16 DIAGNOSIS — M43.16 SPONDYLOLISTHESIS OF LUMBAR REGION: ICD-10-CM

## 2023-05-16 DIAGNOSIS — M99.04 SEGMENTAL AND SOMATIC DYSFUNCTION OF SACRAL REGION: ICD-10-CM

## 2023-05-16 DIAGNOSIS — M54.16 RADICULOPATHY, LUMBAR REGION: ICD-10-CM

## 2023-05-16 PROCEDURE — 97112 NEUROMUSCULAR REEDUCATION: CPT | Performed by: PHYSICAL THERAPIST

## 2023-05-16 PROCEDURE — 97140 MANUAL THERAPY 1/> REGIONS: CPT | Performed by: PHYSICAL THERAPIST

## 2023-05-16 PROCEDURE — 97110 THERAPEUTIC EXERCISES: CPT | Performed by: PHYSICAL THERAPIST

## 2023-05-16 NOTE — PROGRESS NOTES
"Physical Therapy Daily Treatment Note  [id number] [address] [address]  [place] [address]      Patient: Jo Asif  : 1980  Referring Practitioner: Francisco Demarco IV, MD  Date of Initial Visit: Type: THERAPY  Noted: 2023  Today's Date: 2023  Patient seen for: 9 sessions      Visit Diagnoses:     ICD-10-CM ICD-9-CM   1. Radiculopathy, cervical  M54.12 723.4   2. Radiculopathy, lumbar region  M54.16 724.4   3. Spondylolisthesis of lumbar region  M43.16 738.4   4. Segmental and somatic dysfunction of sacral region  M99.04 739.4       Subjective  Jo Asif reports that the left sciatica is \"jacked\" today.   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  She did return last Wed to work.     States the neck injection only gave her \"maybe 10% relief\".    The pain magmt office called and considering not to do 2nd injection; TBD.     Pain Level (0-10): lumbar 5-6,   Neck 2-3  shldr R weakness.     Objective   Inspection:   Equal PSIS and normal dip with hip flexion in std and seated.    (-) compression in std and sit.   See Exercise, Manual, and Modality Logs for complete treatment.     Pnt has a TENS unit at home that she uses.   Patient Education: cont with home ex for ROM and positioning.       Assessment/Plan  Jo presents to the clinic in good lumbosacral alignment.    Progress per Plan of Care          Timed:         Manual Therapy:    15     mins  50356;     Therapeutic Exercise:    11     mins  38967;     Neuromuscular Royce:    12    mins  37461;    Therapeutic Activity:          mins  59164;     Gait Training:          mins  10273;     Ultrasound:          mins  49479;    Ionto                                   mins   04139  Self Care                       3     mins   56324      Un-Timed:  Electrical Stimulation:         mins  81934 ( );  Traction          mins 99621      Timed Treatment:   41   " mins   Total Treatment:     41   mins              Dulce Luna, PT    Physical Therapist

## 2023-05-18 ENCOUNTER — TREATMENT (OUTPATIENT)
Dept: PHYSICAL THERAPY | Facility: CLINIC | Age: 43
End: 2023-05-18
Payer: COMMERCIAL

## 2023-05-18 DIAGNOSIS — M54.16 RADICULOPATHY, LUMBAR REGION: ICD-10-CM

## 2023-05-18 DIAGNOSIS — M99.04 SEGMENTAL AND SOMATIC DYSFUNCTION OF SACRAL REGION: ICD-10-CM

## 2023-05-18 DIAGNOSIS — M99.05 SEGMENTAL AND SOMATIC DYSFUNCTION OF PELVIC REGION: ICD-10-CM

## 2023-05-18 DIAGNOSIS — M43.16 SPONDYLOLISTHESIS OF LUMBAR REGION: ICD-10-CM

## 2023-05-18 DIAGNOSIS — M54.12 RADICULOPATHY, CERVICAL: Primary | ICD-10-CM

## 2023-05-18 PROCEDURE — 97140 MANUAL THERAPY 1/> REGIONS: CPT | Performed by: PHYSICAL THERAPIST

## 2023-05-18 NOTE — PROGRESS NOTES
Physical Therapy Daily Treatment Note      [facility] [place]              [address], [address]                [facility] [place]  [address]        Patient: Jo Asif   : 1980  Diagnosis/ICD-10 Code:  Radiculopathy, cervical [M54.12]  Referring practitioner: Francisco Demarco IV, MD  Date of Initial Visit: Type: THERAPY  Noted: 2023  Today's Date: 2023  Patient seen for 10 sessions         Subjective    Jo Asif reports: sciatic has been killing her all day.  Weakness/tingling in the arm.  If she uses the arm it hurts.  The sciatic has been flared up since they did the injection.            Objective   See Exercise, Manual, and Modality Logs for complete treatment.     (L) pirformis and glut tightness and tenderness    Assessment/Plan  Held exercises and performed manual for pain relief.  Increased time with LAD as that has provided relief as well as added STM to piriformis and glut d/t tightness and tenderness.  May add US to piriformis next session if tightness and pain persist.  Continued aggravation of symptoms with manual to neck/shoulder.  Manual distraction felt good however increased numbness thus added manual nerve glides to decrease possible nerve entrapment. Deferred heat/ice/estim as pt can do at home.  Educated pt to perform scapular retraction, neck stretches, and nerve glides however to hold other exercises.      Progress per Plan of Care           Timed:  Manual Therapy:    25     mins  34571;  Therapeutic Exercise:         mins  50586;     Neuromuscular Royce:        mins  76732;    Therapeutic Activity:          mins  51161;     Gait Training:           mins  56162;     Ultrasound:          mins  10056;     Work Conditioning/Hardening (initial 2 hours)        mins  39480  Work Conditioning/Hardening (each add'l hour)        mins  84197    Untimed:   Electrical Stimulation:         mins  79788 (MC );  Traction          mins 55840    Timed Treatment:   25   mins   Total  Treatment:     25   mins    Gwendolyn Rhodes PTA  Physical Therapist Assistant

## 2023-05-23 ENCOUNTER — TREATMENT (OUTPATIENT)
Dept: PHYSICAL THERAPY | Facility: CLINIC | Age: 43
End: 2023-05-23
Payer: COMMERCIAL

## 2023-05-23 DIAGNOSIS — M54.16 RADICULOPATHY, LUMBAR REGION: ICD-10-CM

## 2023-05-23 DIAGNOSIS — M43.16 SPONDYLOLISTHESIS OF LUMBAR REGION: ICD-10-CM

## 2023-05-23 DIAGNOSIS — M54.12 RADICULOPATHY, CERVICAL: Primary | ICD-10-CM

## 2023-05-23 DIAGNOSIS — M99.04 SEGMENTAL AND SOMATIC DYSFUNCTION OF SACRAL REGION: ICD-10-CM

## 2023-05-23 DIAGNOSIS — M99.05 SEGMENTAL AND SOMATIC DYSFUNCTION OF PELVIC REGION: ICD-10-CM

## 2023-05-23 PROCEDURE — 97035 APP MDLTY 1+ULTRASOUND EA 15: CPT | Performed by: PHYSICAL THERAPIST

## 2023-05-23 PROCEDURE — 97140 MANUAL THERAPY 1/> REGIONS: CPT | Performed by: PHYSICAL THERAPIST

## 2023-05-23 NOTE — PROGRESS NOTES
Physical Therapy Daily Treatment Note      [facility] [place]              [address], [address]                [place]  [address]        Patient: Jo Asif   : 1980  Diagnosis/ICD-10 Code:  Radiculopathy, cervical [M54.12]  Referring practitioner: Francisco Demarco IV, MD  Date of Initial Visit: Type: THERAPY  Noted: 2023  Today's Date: 2023  Patient seen for 11 sessions         Subjective    Jo Asif reports: her sciatica is still bothering her.  It got a little better after last session but then it came back.  The nerve glides flare up her shoulder and she can only do about 3 of them.  Everything with her neck flares up her shoulder.  She has also had some dizzy episodes in the last week.           Objective          Palpation   Left   Hypertonic in the gluteus mera, piriformis and TFL.   Tenderness of the gluteus mera, piriformis and TFL.     Tenderness   Cervical Spine   Tenderness in the right 1st rib.       See Exercise, Manual, and Modality Logs for complete treatment.       Assessment/Plan  Continued to focus on pain relief with manual treatment.  Increased focus on shoulder rater than neck as neck has previously aggravated symptoms.  Manual traction has previously felt good on her neck but irritated paraesthesia in UE thus nerve involvement may be in shoulder or first rib.  Added gentle GH distraction in several planes as well as post and inferior mobs.  No complaints with these.  Pt with pain/tenderness and (R) first rib elevated.  Significant tenderness behind clavicle.  Added gentle first rib mob and first rib self stretch.  Issued this for HEP.   Continued tightness and tenderness in (L) IT band, piriformis, and glute.  Continued with IASTM and LAD as well as added US to address.  Will monitor symptoms and continue to progress or modify as appropriate.     Progress per Plan of Care: Plan to add scalene stretch next session.           Timed:  Manual Therapy:     26   mins  27900;  Therapeutic Exercise:    3     mins  79024;     Neuromuscular Royce:        mins  15548;    Therapeutic Activity:          mins  05940;     Gait Training:           mins  26445;     Ultrasound:     10     mins  97665;     Work Conditioning/Hardening (initial 2 hours)        mins  96376  Work Conditioning/Hardening (each add'l hour)        mins  98991    Untimed:   Electrical Stimulation:         mins  68205 ( );  Traction          mins 45663    Timed Treatment:   39   mins   Total Treatment:     39   mins    Gwendolyn Rhodes PTA  Physical Therapist Assistant

## 2023-05-25 ENCOUNTER — TREATMENT (OUTPATIENT)
Dept: PHYSICAL THERAPY | Facility: CLINIC | Age: 43
End: 2023-05-25
Payer: COMMERCIAL

## 2023-05-25 DIAGNOSIS — M43.16 SPONDYLOLISTHESIS OF LUMBAR REGION: ICD-10-CM

## 2023-05-25 DIAGNOSIS — M54.16 RADICULOPATHY, LUMBAR REGION: ICD-10-CM

## 2023-05-25 DIAGNOSIS — M99.04 SEGMENTAL AND SOMATIC DYSFUNCTION OF SACRAL REGION: ICD-10-CM

## 2023-05-25 DIAGNOSIS — M99.05 SEGMENTAL AND SOMATIC DYSFUNCTION OF PELVIC REGION: ICD-10-CM

## 2023-05-25 DIAGNOSIS — M54.12 RADICULOPATHY, CERVICAL: Primary | ICD-10-CM

## 2023-05-25 PROCEDURE — 97140 MANUAL THERAPY 1/> REGIONS: CPT | Performed by: PHYSICAL THERAPIST

## 2023-05-25 PROCEDURE — 97035 APP MDLTY 1+ULTRASOUND EA 15: CPT | Performed by: PHYSICAL THERAPIST

## 2023-05-25 PROCEDURE — 97110 THERAPEUTIC EXERCISES: CPT | Performed by: PHYSICAL THERAPIST

## 2023-05-25 NOTE — PROGRESS NOTES
Physical Therapy Daily Treatment Note      [facility] [place] [place] [facility] [place]              [address], [address]                [place]  [address]        Patient: Jo Asif   : 1980  Diagnosis/ICD-10 Code:  Radiculopathy, cervical [M54.12]  Referring practitioner: Francisco Demarco IV, MD  Date of Initial Visit: Type: THERAPY  Noted: 2023  Today's Date: 2023  Patient seen for 12 sessions         Subjective    Jo Asif reports: it's not getting better.  She said she is now getting dizzy after a few minutes of laying flat and it spins a couple times then stops.  She said her sciatic will flare up and she has to use her TENS unit.  She still has pain when she does anything with her arms.            Objective   See Exercise, Manual, and Modality Logs for complete treatment.     Tenderness with elevation of (R) 1st rib    Hypertonic and tenderness of (L) gluteus max, piriformis, and TFL    Assessment/Plan  Continued with tightness and tenderness as noted.  Continued with LAD and US to address tenderness of pirifomris, glute, and TFL as well as verbal education for piriformis and LTR stretches.  Added supine pec stretch with discomfort reported.  Continued first rib tenderness and elevation thus added scalene stretch and continued with gentle mobs however significant tenderness reported in this area.  Discussed progress and symptoms and encouraged to return to MD.     On hold and pt to follow up with Dr. Demarco as she has not reported improvements.           Timed:  Manual Therapy:    22   mins  05732;  Therapeutic Exercise:    8     mins  46998;     Neuromuscular Royce:        mins  25437;    Therapeutic Activity:          mins  40455;     Gait Training:           mins  05907;     Ultrasound:     10     mins  31650;     Work Conditioning/Hardening (initial 2 hours)        mins  37243  Work Conditioning/Hardening (each add'l hour)        mins  81150    Untimed:   Electrical Stimulation:         mins   93264 ( );  Traction          mins 73960    Timed Treatment:   40   mins   Total Treatment:     40   mins    Gwendolyn Rhodes PTA  Physical Therapist Assistant

## 2023-05-30 ENCOUNTER — TREATMENT (OUTPATIENT)
Dept: PHYSICAL THERAPY | Facility: CLINIC | Age: 43
End: 2023-05-30

## 2023-05-30 ENCOUNTER — TELEPHONE (OUTPATIENT)
Dept: NEUROSURGERY | Facility: CLINIC | Age: 43
End: 2023-05-30

## 2023-05-30 DIAGNOSIS — M43.16 SPONDYLOLISTHESIS OF LUMBAR REGION: ICD-10-CM

## 2023-05-30 DIAGNOSIS — M54.12 RADICULOPATHY, CERVICAL: Primary | ICD-10-CM

## 2023-05-30 DIAGNOSIS — M99.04 SEGMENTAL AND SOMATIC DYSFUNCTION OF SACRAL REGION: ICD-10-CM

## 2023-05-30 DIAGNOSIS — M54.16 RADICULOPATHY, LUMBAR REGION: ICD-10-CM

## 2023-05-30 DIAGNOSIS — M99.05 SEGMENTAL AND SOMATIC DYSFUNCTION OF PELVIC REGION: ICD-10-CM

## 2023-05-30 PROCEDURE — 97110 THERAPEUTIC EXERCISES: CPT | Performed by: PHYSICAL THERAPIST

## 2023-05-30 PROCEDURE — 97140 MANUAL THERAPY 1/> REGIONS: CPT | Performed by: PHYSICAL THERAPIST

## 2023-05-30 NOTE — PROGRESS NOTES
Physical Therapy Daily Treatment Note      [facility] [place]              [address], [address]                [place]  [address]        Patient: Jo Asif   : 1980  Diagnosis/ICD-10 Code:  Radiculopathy, cervical [M54.12]  Referring practitioner: Francisco Demarco IV, MD  Date of Initial Visit: Type: THERAPY  Noted: 2023  Today's Date: 2023  Patient seen for 13 sessions         Subjective    Jo Asif reports: last time helped a little but not lasting, anytime she tries to do anything it flares up, she drove a lot over the weekend and that flared it up, Dav said no surgery on lumbar because of weight, trying to get on books for neck, sees pain management on Friday. Sciatic not bothering her right now but it was this morning.  Arm is about a 4/10 pain with the numbness and tingling.           Objective   See Exercise, Manual, and Modality Logs for complete treatment.     (R) 1st rib tenderness with minimal elevation    Assessment/Plan  Decreased sciatic symptoms this date thus held US however continued with LAD and reintroduced stretches to work towards self-management of condition.  Decreased tenderness and elevation of 1st rib on (R) however continued to be present.  Continued with manual to address cervical and shoulder symptoms.  Will monitor symptoms and progress towards independent performance of stretches and nerve glides as able without increased symptoms as today was the first time she did not report increased symptoms following.    Progress per Plan of Care           Timed:  Manual Therapy:    23     mins  14226;  Therapeutic Exercise:    15     mins  66254;     Neuromuscular Royce:        mins  57676;    Therapeutic Activity:          mins  84835;     Gait Training:           mins  54902;     Ultrasound:          mins  11572;     Work Conditioning/Hardening (initial 2 hours)        mins  09310  Work Conditioning/Hardening (each add'l hour)        mins  33456    Untimed:    Electrical Stimulation:         mins  23374 ( );  Traction          mins 97527    Timed Treatment:   38   mins   Total Treatment:     38   mins    Gwendolyn Rhodes PTA  Physical Therapist Assistant

## 2023-05-30 NOTE — TELEPHONE ENCOUNTER
Caller: Jo Asif    Relationship to patient: Self    Best call back number: [phone]    Chief complaint:CERVICAL PAIN-    Type of visit:FOLLOW UP EXTENDED    Requested date:ASAP    If rescheduling, when is the original appointment: NA    Additional notes:PT CALLED AND STATES THAT SHE IS HAVING ISSUES WITH HER CERVICAL AREA-PT STATES THAT HER LUMBAR IS OK TO DEAL WITH-PT STATES THAT SHE IS STILL HAVING THE SAME ISSUES SHE WAS HAVING BEFORE PHYSICAL THERAPY-AND PAIN MANAGEMENT-PT STATES THAT SHE HAS NOT IMPROVED AT ALL-PT STATES SHE HAS HAD CERVICAL INJECTIONS BUT HAS NOT  GOTTEN ANY BETTER-PT IS WANTING TO COME IN TO BE SEEN-PT ADVISED SHE LEFT A MESSAGE THROUGH [employer]-PLEASE CALL PT BACK TO ADVISE THANK YOU!

## 2023-06-01 ENCOUNTER — TREATMENT (OUTPATIENT)
Dept: PHYSICAL THERAPY | Facility: CLINIC | Age: 43
End: 2023-06-01

## 2023-06-01 DIAGNOSIS — M54.16 RADICULOPATHY, LUMBAR REGION: ICD-10-CM

## 2023-06-01 DIAGNOSIS — M99.05 SEGMENTAL AND SOMATIC DYSFUNCTION OF PELVIC REGION: ICD-10-CM

## 2023-06-01 DIAGNOSIS — M54.12 RADICULOPATHY, CERVICAL: Primary | ICD-10-CM

## 2023-06-01 DIAGNOSIS — M43.16 SPONDYLOLISTHESIS OF LUMBAR REGION: ICD-10-CM

## 2023-06-01 DIAGNOSIS — M99.04 SEGMENTAL AND SOMATIC DYSFUNCTION OF SACRAL REGION: ICD-10-CM

## 2023-06-01 PROCEDURE — 97140 MANUAL THERAPY 1/> REGIONS: CPT | Performed by: PHYSICAL THERAPIST

## 2023-06-01 PROCEDURE — 97035 APP MDLTY 1+ULTRASOUND EA 15: CPT | Performed by: PHYSICAL THERAPIST

## 2023-06-01 PROCEDURE — 97110 THERAPEUTIC EXERCISES: CPT | Performed by: PHYSICAL THERAPIST

## 2023-06-01 NOTE — PROGRESS NOTES
Physical Therapy Daily Treatment Note      [facility] [place]              [address]                [facility] [place]  [address]        Patient: Jo Asif   : 1980  Diagnosis/ICD-10 Code:  Radiculopathy, cervical [M54.12]  Referring practitioner: Francisco Demarco IV, MD  Date of Initial Visit: Type: THERAPY  Noted: 2023  Today's Date: 2023  Patient seen for 14 sessions         Subjective    Jo Asif reports: her legs not as bad today but it bothered her to get up into her car.  The arm is the same.  The pain wasn't as bad in the shoulder as it was the other day but the numbness is still there and hasn't changed.  If she was to go  her phone it would hurt.  Avoiding lifting or moving any weight with her (R) arm.  She sees pain management tomorrow and Dr. Demarco next Wednesday.           Objective   See Exercise, Manual, and Modality Logs for complete treatment.       Assessment/Plan  Continued with gentle lumbar and LE stretches to address sciatic symptoms as pt reported these continued to be minimal this date.  Good tolerance and technique with these.      Pt continued with little to no improvement per subjective regarding neck/shoulder pain/numbness.  Decreased tenderness to first rib however tightness noted as well as tightess and tenderness over the UT/scalene region.  Trigger point also noted in this area.  Continued with manual to address cervical dysfunction as active exercises have increased pain and symptoms previously.  Continued to hold manual traction (have not attempted mechanical d/t poor tolerance to manual) and focus on gentle stretching, PROM, and STM.  Trial of US to this region today to decrease pain and symptoms.  Will assess tolerance to this change next session.     Continued to hold estim as pt compliant with use of TENS unit at home which provides some relief.      Progress per Plan of Care; May adjust treatment per pain management findings.            Timed:  Manual Therapy:    16     mins  35119;  Therapeutic Exercise:    20     mins  22274;     Neuromuscular Royce:        mins  28584;    Therapeutic Activity:          mins  51896;     Gait Training:           mins  23194;     Ultrasound:     8     mins  16600;     Work Conditioning/Hardening (initial 2 hours)        mins  39957  Work Conditioning/Hardening (each add'l hour)        mins  83863    Untimed:   Electrical Stimulation:         mins  92438 ( );  Traction          mins 66473    Timed Treatment:   44   mins   Total Treatment:     44   mins    Gwendolyn Rhodes PTA  Physical Therapist Assistant

## 2023-06-05 NOTE — PROGRESS NOTES
"Subjective   History of Present Illness: Jo Asif is a 43 y.o. female is here today for follow-up for neck pain. Today patient reports no improvement with her neck pain.  Patient also continues to have numbness and tingling in her right forearm and into her first 4 digits.  Notably she has a history of carpal tunnel release on this side.  She underwent an epidural steroid injection without improvement.  She has also been doing physical therapy also without improvement    Chief Complaint   Patient presents with    Neck Pain     Follow up           Previous Treatment: Physical Therapy, Naproxen,Lidacaine patch    The following portions of the patient's history were reviewed and updated as appropriate: allergies, current medications, past family history, past medical history, past social history, past surgical history, and problem list.    Review of Systems   Constitutional:  Positive for activity change.   HENT: Negative.     Eyes: Negative.    Respiratory: Negative.     Cardiovascular: Negative.    Gastrointestinal: Negative.    Endocrine: Negative.    Genitourinary: Negative.    Musculoskeletal:  Positive for arthralgias, myalgias, neck pain and neck stiffness.   Skin: Negative.    Allergic/Immunologic: Negative.    Neurological:  Positive for numbness (tingling/right arm).   Hematological: Negative.    Psychiatric/Behavioral:  Positive for sleep disturbance.      Objective     /81   Pulse 75   Ht 162.6 cm (64.02\")   Wt 134 kg (295 lb)   BMI 50.61 kg/m²    Body mass index is 50.61 kg/m².  Vitals:    06/07/23 1429   PainSc:   4           Neurologic Exam    Assessment & Plan   Independent Review of Radiographic Studies:      I personally reviewed and interpreted the images from the following studies.    No new imaging    Medical Decision Making:      Jo Asif is a 43 y.o. female with a history of neck pain and upper extremity numbness and paresthesias progressive over the course of the " last several months.  In terms of her neck pain, she has multilevel degenerative changes which are mild to moderate.  I do not think surgical intervention is indicated for treatment of her neck pain.  In terms of her upper extremity numbness and paresthesias, MRI does not provide a clear explanation for this.  I recommend EMG nerve conduction study to further evaluate.  She can follow-up when she completes this.      Diagnoses and all orders for this visit:    1. DDD (degenerative disc disease), cervical (Primary)    2. Numbness and tingling in right hand      No follow-ups on file.    This patient was examined wearing appropriate personal protective equipment.                      Dr. Francisco Demarco IV    06/07/23  14:56 EDT

## 2023-06-06 ENCOUNTER — TREATMENT (OUTPATIENT)
Dept: PHYSICAL THERAPY | Facility: CLINIC | Age: 43
End: 2023-06-06
Payer: COMMERCIAL

## 2023-06-06 DIAGNOSIS — M99.04 SEGMENTAL AND SOMATIC DYSFUNCTION OF SACRAL REGION: ICD-10-CM

## 2023-06-06 DIAGNOSIS — M54.16 RADICULOPATHY, LUMBAR REGION: ICD-10-CM

## 2023-06-06 DIAGNOSIS — M43.16 SPONDYLOLISTHESIS OF LUMBAR REGION: ICD-10-CM

## 2023-06-06 DIAGNOSIS — M54.12 RADICULOPATHY, CERVICAL: Primary | ICD-10-CM

## 2023-06-06 NOTE — PROGRESS NOTES
Physical Therapy 30-Day Progress Treatment Note  [address] [address] [address]  [place] [address]      Patient: Jo Asif  : 1980  Referring Practitioner: Francisco Demarco IV, MD  Date of Initial Visit: Type: THERAPY  Noted: 2023  Today's Date: 2023  Patient seen for: 15 sessions      Visit Diagnoses:     ICD-10-CM ICD-9-CM   1. Radiculopathy, cervical  M54.12 723.4   2. Spondylolisthesis of lumbar region  M43.16 738.4   3. Segmental and somatic dysfunction of sacral region  M99.04 739.4   4. Radiculopathy, lumbar region  M54.16 724.4       Subjective   Jo Asif reports that she did see the pain magmnt with a change of plan to use of lidocaine patches and doing lumbar specific injections and no treatment plan for the neck.  She is to see neuro surgeon tomorrow.   Cervical- feels crepitus with rotation.   Pain is 2-7   location is anterior R shldr.  Avg is 3-4 of 10.   Most comfortable is RSL.   The most comfortable position is with the R arm held to held her waist.   Lumbar -  0 - 7/10.  Avg is 203.   Location is L posterior hip and L calf.    She states the patch has helped the L calf pain.    Did a 5 1/2 drive over the weekend and had to take 3 breaks due to the back and R shldr/arm pain.    She has a standing desk at work.   Helps to move and pace vs static std or sit.       Objective   Misc note:   assessed by ortho at the beginning and referred to Dr. Long as they felt the nec/R arm is more neck related.       trunk/lumbar NA today with limited ex due to the recent procedure and lifting limitations.      Cervical AROM:  Flex 50, ext 40  RSB 40 with crepitus,  LSB 30 with tight on R,   L rotation 50,  R rotation 65  no pain with rotation; only crepitus.   DTRs C5C6 normal, C7 trace.  MMT:  EPL 5-, lumbricals 5-, wrist 5 all planes.      PIVM  1+ cervical facet glides darien    Palpation:  Moderate thickening and tenderness along the cervical paraspinals, UT and R LHB .      Tip pinch - 15 R;    17# L   3 pt 15# R;    20# L   Lateral 135#R;     20#  L        70# R     79# L      R shoulder AROM:  flex 125A/ 160AA pain anterior shldr from A/AA @ end range.  abd 90A. 140AA greater pain than flexion,  ER 30 with pain,  BTB IR  sacrum.  (+) belly press, Neer's and Stovall impingement tests.    Weak and painful supraspinatus, teres minor, infraspinatus.   MMT 3+ all planes. R shldr.  Palpation with  moderate thickening and tenderness along the R LHB, greater tuberosity and infraspinatus fossa.       See Exercise, Manual, and Modality Logs for complete treatment.     Patient Education: review of current progress and treatment plan    Assessment/Plan  Jo has been to PT x's 15 sessions, including the eval date of 4/7/23.  The original eval was completed by another therapist with treatment focus on the cervical and lumbar areas.  At this time, subjective reports are mostly unchanged from the eval.  Chief c/o is R anterior shldr pain and R hand numbness.  The current presentation today is with R shldr impingement with associated postural impairments, reduced active and capsular mobility and weakness in the RC, R shldr and scapula.    Cervical ROM is functional with crepitus and void of pain changes.  Cervical and thoracic spine are hypomobile and weak posterior trunk.     Patient is to see neurosurgeon tomorrow.  OPPT will proceed accordingly.  Treatment for R shldr impingement is indicated.  Pnt to continue with lumbar stab ex and stretching at home.       STGs in 4 weeks:  1)Decrease pain to 5-6/10 on average regressed from the last progress note.   2)Increase trunk/cerv/R shld ROM by 10 degrees where limited as much met  3)Increase UE/LE strength to 4/5 progressed LE  4)Assess/improve pelvis/sacral alignment prn  Met, ongoing  Assess DTRs/clonus and further special tests prn delete    LTGs by discharge  1)Increase trunk/LE ROM to WFL/WNL  2) Increase UE/LE strength to 5/5   3)  Pt will be able to ascend/descend stairs reciprocally with or without use of rail(s) and with minimal difficulty or pain  4)Pt will be able to sit/drive/ride for 30-60 mins without difficulty or pain not met, 30 min increases pain.   5)Pt will be able to stand 30-60 mins for basic ADLs without difficulty or pain   6)Pt will be able to walk 30-60 mins for grocery shopping without difficulty, pain or LOB met 6/6/23   7)Pt will be able to wash/dress/groom without difficulty or increased pain upper body dressing completed mostly left handed due to the right arm pain.   8)Pt will be able to reach into overhead cabinets and grasp/hold items without difficulty or increased pain  not met.  Anything above waist level with the right arm increases pain.   9)Pt will be able to lift/carry laundry baskets, pots/pans, garbage or grocery bags without difficulty or increased pain  not met; no change.  10)Pt will be able to sleep full nights most nights without waking from neck/UE/LBP/LE/R shldr  she wakes 3x's per night due to neck/shldr.     Other  pending neurosurgeon visit, begin treatment focus for R shldr impingement.           Timed:         Manual Therapy:    9     mins  17759;     Therapeutic Exercise:         mins  16197;     Neuromuscular Royce:        mins  57286;    Therapeutic Activity:     24     mins  04049;     Gait Training:           mins  39541;     Ultrasound:          mins  87187;    Ionto                                   mins   36391  Self Care                       5     mins   10336      Un-Timed:  Electrical Stimulation:         mins  70567 ( );  Traction          mins 45087        Timed Treatment:   38  mins   Total Treatment:     38   mins              Dulce Luna, PT    Physical Therapist

## 2023-06-07 ENCOUNTER — OFFICE VISIT (OUTPATIENT)
Dept: NEUROSURGERY | Facility: CLINIC | Age: 43
End: 2023-06-07
Payer: COMMERCIAL

## 2023-06-07 VITALS
DIASTOLIC BLOOD PRESSURE: 81 MMHG | BODY MASS INDEX: 50.02 KG/M2 | SYSTOLIC BLOOD PRESSURE: 139 MMHG | WEIGHT: 293 LBS | HEIGHT: 64 IN | HEART RATE: 75 BPM

## 2023-06-07 DIAGNOSIS — R20.0 NUMBNESS AND TINGLING IN RIGHT HAND: ICD-10-CM

## 2023-06-07 DIAGNOSIS — R20.2 NUMBNESS AND TINGLING IN RIGHT HAND: ICD-10-CM

## 2023-06-07 DIAGNOSIS — M50.30 DDD (DEGENERATIVE DISC DISEASE), CERVICAL: Primary | ICD-10-CM

## 2023-06-07 RX ORDER — SPIRONOLACTONE 25 MG/1
25 TABLET ORAL DAILY
COMMUNITY

## 2023-06-07 RX ORDER — MINOXIDIL 2.5 MG/1
2.5 TABLET ORAL DAILY
COMMUNITY

## 2023-06-08 ENCOUNTER — TREATMENT (OUTPATIENT)
Dept: PHYSICAL THERAPY | Facility: CLINIC | Age: 43
End: 2023-06-08
Payer: COMMERCIAL

## 2023-06-08 DIAGNOSIS — M25.511 RIGHT SHOULDER PAIN, UNSPECIFIED CHRONICITY: Primary | ICD-10-CM

## 2023-06-08 DIAGNOSIS — M75.41 CORACOID IMPINGEMENT OF RIGHT SHOULDER: ICD-10-CM

## 2023-06-08 PROCEDURE — 97140 MANUAL THERAPY 1/> REGIONS: CPT | Performed by: PHYSICAL THERAPIST

## 2023-06-08 PROCEDURE — 97110 THERAPEUTIC EXERCISES: CPT | Performed by: PHYSICAL THERAPIST

## 2023-06-08 PROCEDURE — 97014 ELECTRIC STIMULATION THERAPY: CPT | Performed by: PHYSICAL THERAPIST

## 2023-06-08 PROCEDURE — 97035 APP MDLTY 1+ULTRASOUND EA 15: CPT | Performed by: PHYSICAL THERAPIST

## 2023-06-08 NOTE — PROGRESS NOTES
Physical Therapy Daily Treatment Note  [address] [address] [address]  [place] [address]      Patient: Jo Asif  : 1980  Referring Practitioner: Francisco Demarco IV, MD  Date of Initial Visit: Type: THERAPY  Noted: 2023  Today's Date: 2023  Patient seen for: 16 sessions      Visit Diagnoses:     ICD-10-CM ICD-9-CM   1. Right shoulder pain, unspecified chronicity  M25.511 719.41   2. Coracoid impingement of right shoulder  M75.41 726.2       Subjective   Jo Asif reports taht she did see Dr. Demarco earlier this week;  the current plan is for an EMG to be completed.  She has an appt with Dr. Gaviria on 23.        Objective   Treatment focus on the shldr pain.    See Exercise, Manual, and Modality Logs for complete treatment.     Patient Education: review of the shldr ex for home.     Assessment/Plan  Jo was able to compelte the ex wo significantly greater pain in the shldr.  The lower trap and scapular stabilizers and RC are weak with hypertonic UT.  LHB and greater tuberosity tender.    Progress per Plan of Care  teres minor, T  and bicep stretch.   RC strengthening as tolerated and modalities as needed.           Timed:         Manual Therapy:    11     mins  86676;     Therapeutic Exercise:    17    mins  12786;     Neuromuscular Royce:        mins  86112;    Therapeutic Activity:          mins  28524;     Gait Training:           mins  14868;     Ultrasound:     9     mins  62893;    Ionto                                   mins   13491  Self Care                       2     mins   47985      Un-Timed:  Electrical Stimulation:    15     mins  01336 ( );  Traction          mins 26871        Timed Treatment:   39   mins   Total Treatment:     54   mins              Dulce Luna PT    Physical Therapist

## 2023-06-08 NOTE — PROGRESS NOTES
"     Patient ID: Jo Asif is a 42 y.o. female.  3/18/2022: Right shoulder arthroscopy with extensive debridement.    Ms. Asif is a 42-year-old female presenting for postop right shoulder.  She continues a HEP to strengthen right shoulder as prescribed by Dr. Gaviria.     Also, she reports doing well after Monovisc HA (injected 3/17/2022) of the right knee that has improved since her last visit. She reports being able to walk further distances while being pain free.     Also, received referral to [facility] bariatric surgery and has completed the form but has not had her initial appointment as of yet.      Objective:    Ht 162.6 cm (64\")   Wt (!) 154 kg (340 lb)   LMP 05/04/2022   BMI 58.36 kg/m²     Physical Examination:  Active ROM: Abduction 150, forward flexion 170, External rotation 60, internal rotation 40       Imaging:  None to review    Assessment:  Doing well after shoulder arthroscopic debridement.     Diagnoses and all orders for this visit:    1. Orthopedic aftercare (Primary)         Plan:  Continue at HEP to include strengthening exercises; follow up with bariatric. Follow up in 8 weeks for reevaluating right shoulder with Dr. Gaviria.    Procedures   None performed  " Patient called stating Rajni called her this morning stating that the Joao Deter was denied. Pt wants to know why this is happening. Please advise.  [phone]

## 2023-06-11 DIAGNOSIS — F32.1 CURRENT MODERATE EPISODE OF MAJOR DEPRESSIVE DISORDER WITHOUT PRIOR EPISODE: ICD-10-CM

## 2023-06-11 DIAGNOSIS — F41.9 ANXIETY: ICD-10-CM

## 2023-06-12 RX ORDER — FLUOXETINE 10 MG/1
CAPSULE ORAL
Qty: 90 CAPSULE | Refills: 1 | Status: SHIPPED | OUTPATIENT
Start: 2023-06-12

## 2023-06-12 RX ORDER — MONTELUKAST SODIUM 10 MG/1
TABLET ORAL
Qty: 90 TABLET | Refills: 1 | Status: SHIPPED | OUTPATIENT
Start: 2023-06-12

## 2023-06-13 ENCOUNTER — TREATMENT (OUTPATIENT)
Dept: PHYSICAL THERAPY | Facility: CLINIC | Age: 43
End: 2023-06-13
Payer: COMMERCIAL

## 2023-06-13 DIAGNOSIS — M54.12 RADICULOPATHY, CERVICAL: ICD-10-CM

## 2023-06-13 DIAGNOSIS — M25.511 RIGHT SHOULDER PAIN, UNSPECIFIED CHRONICITY: Primary | ICD-10-CM

## 2023-06-13 DIAGNOSIS — M75.41 CORACOID IMPINGEMENT OF RIGHT SHOULDER: ICD-10-CM

## 2023-06-13 PROCEDURE — 97140 MANUAL THERAPY 1/> REGIONS: CPT | Performed by: PHYSICAL THERAPIST

## 2023-06-13 PROCEDURE — 97110 THERAPEUTIC EXERCISES: CPT | Performed by: PHYSICAL THERAPIST

## 2023-06-13 PROCEDURE — 97035 APP MDLTY 1+ULTRASOUND EA 15: CPT | Performed by: PHYSICAL THERAPIST

## 2023-06-13 NOTE — PROGRESS NOTES
Physical Therapy Daily Treatment Note      [facility]              [address], [address]                [place]  [address]        Patient: Jo Asif   : 1980  Diagnosis/ICD-10 Code:  Right shoulder pain, unspecified chronicity [M25.511]  Referring practitioner: Francisco Demarco IV, MD  Date of Initial Visit: Type: THERAPY  Noted: 2023  Today's Date: 2023  Patient seen for 17 sessions         Subjective    Jo Asif reports: she is the same.  She rated in 2-310 but has avoided using it.  She said she used a generic pain patch which left a red philippe so she is watching that area.            Objective   See Exercise, Manual, and Modality Logs for complete treatment.       Assessment/Plan  Added gentle stretches as noted in flow sheet however these irritated radicular symptoms.  Continued with postural strengthening exercises and gentle shoulder mobs/PROM.  STM/IASTM continued to be provided to bicep tendon and deltoid as well as US.  Pt to perform estim at home.  Will monitor tolerance and progress exercises as able.     Progress per Plan of Care           Timed:  Manual Therapy:    12     mins  36480;  Therapeutic Exercise:    23     mins  16419;     Neuromuscular Royce:        mins  86904;    Therapeutic Activity:          mins  16598;     Gait Training:           mins  82940;     Ultrasound:     8     mins  77927;     Work Conditioning/Hardening (initial 2 hours)        mins  87110  Work Conditioning/Hardening (each add'l hour)        mins  04558    Untimed:   Electrical Stimulation:         mins  96292 (MC );  Traction          mins 23044    Timed Treatment:   43   mins   Total Treatment:     43   mins    Gwendolyn Rhodes PTA  Physical Therapist Assistant

## 2023-06-15 ENCOUNTER — TREATMENT (OUTPATIENT)
Dept: PHYSICAL THERAPY | Facility: CLINIC | Age: 43
End: 2023-06-15
Payer: COMMERCIAL

## 2023-06-15 DIAGNOSIS — M25.511 RIGHT SHOULDER PAIN, UNSPECIFIED CHRONICITY: Primary | ICD-10-CM

## 2023-06-15 DIAGNOSIS — M75.41 CORACOID IMPINGEMENT OF RIGHT SHOULDER: ICD-10-CM

## 2023-06-15 DIAGNOSIS — M54.12 RADICULOPATHY, CERVICAL: ICD-10-CM

## 2023-06-15 PROCEDURE — 97140 MANUAL THERAPY 1/> REGIONS: CPT | Performed by: PHYSICAL THERAPIST

## 2023-06-15 PROCEDURE — 97035 APP MDLTY 1+ULTRASOUND EA 15: CPT | Performed by: PHYSICAL THERAPIST

## 2023-06-15 PROCEDURE — 97110 THERAPEUTIC EXERCISES: CPT | Performed by: PHYSICAL THERAPIST

## 2023-06-15 NOTE — PROGRESS NOTES
Physical Therapy Daily Treatment Note      [facility]              [address], [address]                [place]  [address]        Patient: Jo Asif   : 1980  Diagnosis/ICD-10 Code:  Right shoulder pain, unspecified chronicity [M25.511]  Referring practitioner: Francisco Demarco IV, MD  Date of Initial Visit: Type: THERAPY  Noted: 2023  Today's Date: 6/15/2023  Patient seen for 18 sessions         Subjective    Jo Asif reports: her shoulder is it's normal.  She said it was a 3/10 but then suddenly shot up to 5/10.           Objective   See Exercise, Manual, and Modality Logs for complete treatment.       Assessment/Plan  Gentle progression of strengthening to increased reps however monitored closely for popping and modified range or held exercises that caused popping.  She continued to have aggravation of symptoms with all exercises.  Continued tenderness to bicep tendon and deltoid with discomfort during STM/IASTM and US.  Continued with estim at home.  Will monitor tolerance and progress exercises as able.     Progress per Plan of Care           Timed:  Manual Therapy:    12     mins  15707;  Therapeutic Exercise:    25     mins  32877;     Neuromuscular Royce:        mins  98632;    Therapeutic Activity:          mins  73455;     Gait Training:           mins  81110;     Ultrasound:     8     mins  16581;     Work Conditioning/Hardening (initial 2 hours)        mins  82515  Work Conditioning/Hardening (each add'l hour)        mins  93333    Untimed:   Electrical Stimulation:         mins  51544 (MC );  Traction          mins 77141    Timed Treatment:   45   mins   Total Treatment:     45   mins    Gwendolyn Rhodes PTA  Physical Therapist Assistant

## 2023-07-05 ENCOUNTER — TELEPHONE (OUTPATIENT)
Dept: ORTHOPEDIC SURGERY | Facility: CLINIC | Age: 43
End: 2023-07-05

## 2023-07-05 NOTE — TELEPHONE ENCOUNTER
Caller: Jo Asif    Relationship to patient: Self    Best call back number:     Chief complaint: RIGHT SHOULDER     Type of visit: FUP     Requested date: 8/3/23     If rescheduling, when is the original appointment: 8/10/23    Additional notes:PATIENT HAS MRI ON 7/31/23 WOULD LIKE TO GET RESULTS AROUND 7/3/23 IF POSSIBLE

## 2023-07-21 PROBLEM — R20.0 NUMBNESS AND TINGLING IN RIGHT HAND: Status: ACTIVE | Noted: 2023-07-21

## 2023-07-21 PROBLEM — R20.2 NUMBNESS AND TINGLING IN RIGHT HAND: Status: ACTIVE | Noted: 2023-07-21

## 2023-07-25 ENCOUNTER — OFFICE VISIT (OUTPATIENT)
Dept: FAMILY MEDICINE CLINIC | Facility: CLINIC | Age: 43
End: 2023-07-25
Payer: COMMERCIAL

## 2023-07-25 VITALS
RESPIRATION RATE: 18 BRPM | WEIGHT: 282 LBS | SYSTOLIC BLOOD PRESSURE: 126 MMHG | TEMPERATURE: 98.6 F | OXYGEN SATURATION: 99 % | BODY MASS INDEX: 48.14 KG/M2 | DIASTOLIC BLOOD PRESSURE: 76 MMHG | HEART RATE: 75 BPM | HEIGHT: 64 IN

## 2023-07-25 DIAGNOSIS — M50.30 DEGENERATIVE DISC DISEASE, CERVICAL: Primary | ICD-10-CM

## 2023-07-25 DIAGNOSIS — M51.36 LUMBAR DEGENERATIVE DISC DISEASE: ICD-10-CM

## 2023-07-25 DIAGNOSIS — G89.29 CHRONIC RIGHT SHOULDER PAIN: ICD-10-CM

## 2023-07-25 DIAGNOSIS — M25.511 CHRONIC RIGHT SHOULDER PAIN: ICD-10-CM

## 2023-07-25 DIAGNOSIS — E03.9 ACQUIRED HYPOTHYROIDISM: ICD-10-CM

## 2023-07-25 PROCEDURE — 99214 OFFICE O/P EST MOD 30 MIN: CPT | Performed by: NURSE PRACTITIONER

## 2023-07-25 RX ORDER — MULTIVIT-MIN/IRON/FOLIC ACID/K 18-600-40
2000 CAPSULE ORAL DAILY
COMMUNITY

## 2023-07-25 RX ORDER — LEVOTHYROXINE SODIUM 0.07 MG/1
75 TABLET ORAL
Qty: 90 TABLET | Refills: 1 | Status: SHIPPED | OUTPATIENT
Start: 2023-07-25

## 2023-07-25 RX ORDER — LEVOCETIRIZINE DIHYDROCHLORIDE 5 MG/1
5 TABLET, FILM COATED ORAL EVERY EVENING
COMMUNITY

## 2023-07-25 RX ORDER — PLECANATIDE 3 MG/1
1 TABLET ORAL DAILY
COMMUNITY

## 2023-07-25 RX ORDER — FLUTICASONE PROPIONATE 50 MCG
2 SPRAY, SUSPENSION (ML) NASAL DAILY
COMMUNITY

## 2023-07-25 NOTE — PROGRESS NOTES
Chief Complaint  Chief Complaint   Patient presents with    Back Pain     Follow up on DDD of lumbar. Completed 4 months of physical therapy in June with no relief. Needing FMLA forms completed and wants a parking placard. Followed by Pain Management- [facility]    Neck Pain     Follow up           Subjective          Jo Asif presents to [facility] for   History of Present Illness      Left-sided sciatica, lumbar degeneration, L4-5 disc bulge, she is now following with [facility] pain management, has received epidural injections.  She is no longer taking gabapentin, was ineffective. She does continue naproxen.  FMLA has been provided in past, she is back to working regular hours but missing for appt's and flare ups.     -completed 4mo PT for lumbar and cervical  -wearing back brace, standing desk    Cervical degeneration, she has right upper extremity numbness and pain, she burned it last night d/t not feeling it. She has a history of carpal tunnel release. x-rays showed degeneration of C-spine, Cervical spine MRI showed multilevel spondylosis, disc bulges. She is following with Dr. Dav BETTS. EMG normal. R shoulder still hurts as well, can not lift, push or pull, she will be seeing Dr. Gaviria for rotator cuff eval and has MRI planned.   MRI Cervical Spine Without Contrast (03/09/2023 16:56)     Migraines, f/w Dr. Jacob. Has FMLA already in place until feb 2023    She is requesting FMLA for 4-5 visits/mo for treatment/appts for C/L spine and shoulder, 1 /week for flare up/episodes        The following portions of the patient's history were reviewed and updated as appropriate: allergies, current medications, past family history, past medical history, past social history, past surgical history and problem list.    Past Medical History:   Diagnosis Date    Abdominal pain 09/2022    Allergic     Anxiety     Arrhythmia     Asthma     Atrial fibrillation      Cholelithiasis     Depression     Disease of thyroid gland     GERD (gastroesophageal reflux disease)     Heart murmur     History of medical problems     HL (hearing loss)     Hypertension     Hypothyroidism     Iron disorder     Irritable bowel syndrome     Migraine     Morbid obesity     Obesity     PCOS (polycystic ovarian syndrome)     PKU (phenylketonuria)     Prediabetes     Rotator cuff tear 03/2022    Sleep apnea     Torn meniscus 03/2021     Past Surgical History:   Procedure Laterality Date    ABLATION OF DYSRHYTHMIC FOCUS      BLADDER SLING MODIFIED, ANTERIOR AND POSTERIOR VAGINAL REPAIR      CARDIAC CATHETERIZATION      CARDIAC ELECTROPHYSIOLOGY PROCEDURE N/A 06/10/2020    Procedure: EP/Ablation;  Surgeon: Joseph Andrews MD;  Location: [facility] CATH INVASIVE LOCATION;  Service: Cardiovascular;  Laterality: N/A;    CARPAL TUNNEL RELEASE Right     CHOLECYSTECTOMY N/A 09/20/2022    Procedure: CHOLECYSTECTOMY LAPAROSCOPIC WITH DAVINCI ROBOT;  Surgeon: Alf Whalen MD;  Location: [facility] MAIN OR;  Service: Robotics - DaVinci;  Laterality: N/A;    COLONOSCOPY N/A 06/30/2022    Procedure: COLONOSCOPY with random biopsies R/O microscopic colitis, polypectomy x 1;  Surgeon: Jeffrey Echevarria MD;  Location: [facility] ENDOSCOPY;  Service: Gastroenterology;  Laterality: N/A;    ENDOSCOPY N/A 06/30/2022    Procedure: ESOPHAGOGASTRODUODENOSCOPY with esophagus, gastric and duodenal biopsies;  Surgeon: Jeffrey Echevarria MD;  Location: [facility] ENDOSCOPY;  Service: Gastroenterology;  Laterality: N/A;    GALLBLADDER SURGERY  2022    KNEE ARTHROSCOPY Right 03/26/2021    Procedure: KNEE ARTHROSCOPY with  partial medial lateral meniscectomy;  Surgeon: Kb Gaviria MD;  Location: [facility] MAIN OR;  Service: Orthopedics;  Laterality: Right;    SHOULDER ARTHROSCOPY W/ ROTATOR CUFF REPAIR Right 03/18/2022    Procedure: SHOULDER ARTHROSCOPY WITH DEBRIDEMENT;  Surgeon: Kb Gaviria  MD;  Location: [facility] MAIN OR;  Service: Orthopedics;  Laterality: Right;    TONSILLECTOMY      TUBAL ABDOMINAL LIGATION      UVULECTOMY       Family History   Problem Relation Age of Onset    Depression Mother     Hypertension Mother     Diabetes Mother     Cancer Mother         Breast    Hyperlipidemia Mother     Arthritis Father     Hypertension Father     Diabetes Father     Heart disease Father     Cancer Father         Lung    Early death Father     Hyperlipidemia Father     COPD Maternal Grandfather     Diabetes Maternal Grandmother     Hyperlipidemia Maternal Grandmother     Cancer Paternal Grandfather     Diabetes Paternal Grandmother     Hyperlipidemia Paternal Grandmother      Social History     Tobacco Use    Smoking status: Former     Packs/day: 0.10     Years: 6.00     Pack years: 0.60     Types: Cigarettes     Quit date: 2009     Years since quittin.9    Smokeless tobacco: Never    Tobacco comments:     Rarely smoked   Substance Use Topics    Alcohol use: No       Current Outpatient Medications:     Accu-Chek Guide test strip, , Disp: , Rfl:     Accu-Chek Softclix Lancets lancets, , Disp: , Rfl:     Ajovy 225 MG/1.5ML solution prefilled syringe, Every 30 (Thirty) Days. For migraines, Disp: , Rfl:     albuterol sulfate  (90 Base) MCG/ACT inhaler, Inhale 2 puffs Every 6 (Six) Hours As Needed for Wheezing or Shortness of Air., Disp: 18 g, Rfl: 11    Blood Glucose Monitoring Suppl (Accu-Chek Guide Me) w/Device kit, , Disp: , Rfl:     busPIRone (BUSPAR) 10 MG tablet, TAKE ONE TABLET BY MOUTH TWICE A DAY, Disp: 60 tablet, Rfl: 0    Cholecalciferol (Vitamin D) 50 MCG (2000 UT) capsule, Take 1 capsule by mouth Daily., Disp: , Rfl:     EPINEPHrine (EPIPEN) 0.3 MG/0.3ML solution auto-injector injection, Inject 0.3 mg into the appropriate muscle as directed by prescriber., Disp: , Rfl:     FLUoxetine (PROzac) 10 MG capsule, TAKE ONE CAPSULE BY MOUTH DAILY, Disp: 90 capsule, Rfl: 1    fluticasone  "(FLONASE) 50 MCG/ACT nasal spray, 2 sprays into the nostril(s) as directed by provider Daily., Disp: , Rfl:     fluticasone-salmeterol (Advair HFA) 230-21 MCG/ACT inhaler, Inhale 2 puffs 2 (Two) Times a Day., Disp: 12 g, Rfl: 11    HM LIDOCAINE PATCH EX, Apply 1 patch topically Daily. 5%. On for 12 hours, off for 12 hours, Disp: , Rfl:     levocetirizine (XYZAL) 5 MG tablet, Take 1 tablet by mouth Every Evening., Disp: , Rfl:     levothyroxine (SYNTHROID, LEVOTHROID) 75 MCG tablet, Take 1 tablet by mouth Every Morning., Disp: 90 tablet, Rfl: 1    lisinopril-hydrochlorothiazide (PRINZIDE,ZESTORETIC) 10-12.5 MG per tablet, TAKE ONE TABLET BY MOUTH DAILY, Disp: 90 tablet, Rfl: 1    minoxidil (LONITEN) 2.5 MG tablet, Take 1.25 mg by mouth Daily., Disp: , Rfl:     montelukast (SINGULAIR) 10 MG tablet, TAKE ONE TABLET BY MOUTH ONCE NIGHTLY, Disp: 90 tablet, Rfl: 1    naproxen (Naprosyn) 500 MG tablet, Take 1 tablet by mouth 2 (Two) Times a Day As Needed for Moderate Pain., Disp: 180 tablet, Rfl: 1    omeprazole (priLOSEC) 40 MG capsule, TAKE ONE CAPSULE BY MOUTH DAILY, Disp: 90 capsule, Rfl: 1    Ozempic, 1 MG/DOSE, 4 MG/3ML solution pen-injector, Inject 1 mg as directed 1 (One) Time Per Week. Fri, Disp: , Rfl:     Palynziq 10 MG/0.5ML solution prefilled syringe, 3 (Three) Times a Week. mwf, Disp: , Rfl:     Plecanatide (Trulance) 3 MG tablet, Take 1 tablet by mouth Daily., Disp: , Rfl:     spironolactone (ALDACTONE) 25 MG tablet, Take 1 tablet by mouth Daily., Disp: , Rfl:     tiotropium bromide monohydrate (SPIRIVA RESPIMAT) 2.5 MCG/ACT aerosol solution inhaler, Inhale 2 puffs Daily., Disp: 12 g, Rfl: 3    ubrogepant (UBRELVY) 100 MG tablet, Take 1 tablet by mouth 1 (One) Time As Needed., Disp: , Rfl:     Objective   Vital Signs:   /76 (BP Location: Left arm, Patient Position: Sitting, Cuff Size: Large Adult)   Pulse 75   Temp 98.6 °F (37 °C) (Oral)   Resp 18   Ht 162.6 cm (64\")   Wt 128 kg (282 lb)   SpO2 " 99% Comment: Room air  BMI 48.41 kg/m²           Physical Exam  Vitals and nursing note reviewed.   Constitutional:       General: She is not in acute distress.     Appearance: She is well-developed. She is not diaphoretic.   Neck:      Thyroid: No thyromegaly.      Vascular: No JVD.   Musculoskeletal:         General: Tenderness (R shoulder mod espino rom, C-spine mod espino rom, pain radiates into R arm, R hand numbness. L-spine with L sided sciatica) present. Normal range of motion.   Skin:     General: Skin is warm and dry.   Neurological:      Mental Status: She is alert and oriented to person, place, and time.      Sensory: No sensory deficit.   Psychiatric:         Behavior: Behavior normal.         Thought Content: Thought content normal.         Judgment: Judgment normal.        Result Review :     No visits with results within 7 Day(s) from this visit.   Latest known visit with results is:   Hospital Outpatient Visit on 04/24/2023   Component Date Value Ref Range Status    QT Interval 04/24/2023 391  ms Final                              Assessment and Plan    Diagnoses and all orders for this visit:    1. Degenerative disc disease, cervical (Primary)    2. Lumbar degenerative disc disease    3. Acquired hypothyroidism    4. Chronic right shoulder pain    Other orders  -     levothyroxine (SYNTHROID, LEVOTHROID) 75 MCG tablet; Take 1 tablet by mouth Every Morning.  Dispense: 90 tablet; Refill: 1    -FMLA paperwork completed for 4-5 visits/mo for treatment/appts for C/L spine and shoulder, 1 episode/week for flare up/episodes 8 hours each occurrence  -temp handicap placard paperwork given d/t pain, asthma, difficulty walking   -see's Dr. Bingham for thyroid, but needs levothyroxine rf    I spent 30 minutes caring for Jo Asif on this date of service. This time includes time spent by me in the following activities: preparing for the visit, reviewing tests, performing a medically appropriate examination  and/or evaluation , counseling and educating the patient/family/caregiver, ordering medications, tests, or procedures and documenting information in the medical record        Follow Up     Return if symptoms worsen or fail to improve, for Next scheduled follow up.  Patient was given instructions and counseling regarding her condition or for health maintenance advice. Please see specific information pulled into the AVS if appropriate.        Part of this note may be an electronic transcription/translation of spoken language to printed text using the Dragon Dictation System

## 2023-07-31 ENCOUNTER — HOSPITAL ENCOUNTER (OUTPATIENT)
Dept: MRI IMAGING | Facility: HOSPITAL | Age: 43
Discharge: HOME | End: 2023-07-31
Admitting: ORTHOPAEDIC SURGERY
Payer: COMMERCIAL

## 2023-07-31 DIAGNOSIS — M25.511 RIGHT SHOULDER PAIN, UNSPECIFIED CHRONICITY: ICD-10-CM

## 2023-07-31 PROCEDURE — 73221 MRI JOINT UPR EXTREM W/O DYE: CPT

## 2023-08-03 ENCOUNTER — OFFICE VISIT (OUTPATIENT)
Dept: ORTHOPEDIC SURGERY | Facility: CLINIC | Age: 43
End: 2023-08-03
Payer: COMMERCIAL

## 2023-08-03 VITALS — HEART RATE: 81 BPM | OXYGEN SATURATION: 98 % | WEIGHT: 282 LBS | BODY MASS INDEX: 48.41 KG/M2

## 2023-08-03 DIAGNOSIS — M25.511 RIGHT SHOULDER PAIN, UNSPECIFIED CHRONICITY: Primary | ICD-10-CM

## 2023-08-03 RX ORDER — TRIAMCINOLONE ACETONIDE 40 MG/ML
40 INJECTION, SUSPENSION INTRA-ARTICULAR; INTRAMUSCULAR ONCE
Status: COMPLETED | OUTPATIENT
Start: 2023-08-03 | End: 2023-08-03

## 2023-08-03 RX ADMIN — TRIAMCINOLONE ACETONIDE 40 MG: 40 INJECTION, SUSPENSION INTRA-ARTICULAR; INTRAMUSCULAR at 10:56

## 2023-08-09 ENCOUNTER — TREATMENT (OUTPATIENT)
Dept: PHYSICAL THERAPY | Facility: CLINIC | Age: 43
End: 2023-08-09
Payer: COMMERCIAL

## 2023-08-09 ENCOUNTER — TELEPHONE (OUTPATIENT)
Dept: FAMILY MEDICINE CLINIC | Facility: CLINIC | Age: 43
End: 2023-08-09
Payer: COMMERCIAL

## 2023-08-09 DIAGNOSIS — M54.12 RADICULOPATHY, CERVICAL: Primary | ICD-10-CM

## 2023-08-09 DIAGNOSIS — M54.2 PAIN, NECK: ICD-10-CM

## 2023-08-09 PROCEDURE — 97164 PT RE-EVAL EST PLAN CARE: CPT | Performed by: PHYSICAL THERAPIST

## 2023-08-09 NOTE — TELEPHONE ENCOUNTER
Caller: Jo Asif    Relationship: Self    Best call back number: [phone]     What orders are you requesting (i.e. lab or imaging): PHYSICAL THERAPY    In what timeframe would the patient need to come in: AS SOON AS POSSIBLE    Where will you receive your lab/imaging services: [facility]    Additional notes: PATIENT STATES THAT SHE NEEDS A NEW ORDER FOR PHYSICAL THERAPY FOR HER BACK SENT TO [facility] OFFICE.  SHE SAYS THAT SHE SEES MARY AT THAT OFFICE.    PLEASE ADVISE.

## 2023-08-09 NOTE — PROGRESS NOTES
"  Re-Assessment / Re-Certification  [facility]  [address],  [address]  [place]   [address]         Patient: Jo Asif   : 1980  Diagnosis/ICD-10 Code:  Radiculopathy, cervical [M54.12]  Referring practitioner: ROSENDO Norwood   Date of Initial Evaluation:  Type: THERAPY  Noted: 2023  Patient seen for 22 sessions      Subjective:   Jo Asif reports: that she did see ortho and had an MRI on 23.  The results were of tendonitis wo tears.   She received a cortisone shot with FU in 4-6 weeks.    She is hoping to get PRP if the pain presists. Jo understood that she is not to do PT until after the 4-6 week philippe.   She also states now that she has not received PT/traction the neck pain is greater.   No relief with the last epidural injection ( 2nd).     NT in the right hand - constant.  All fingertips ( the sensation is just like after a burn).   States that standing with the R arm at her side and elbow straight feels like a pulling sensation in the R shldr.    EMG - normal unchanged from 2 yrs ago.      Chief c/o:  neck is tight and painful.  Feels like she is losing motion.     Pain:  neck avg 2.   Lo 2  to Hi    Lumbar/sciatica is \"back to normal\"     Treatment:     has a Zynex however, trouble the leads and limited supplies.   Subjective Questionnaire: NDI:3150  Clinical Progress: worse since stopping PT 23  Home Program Compliance: No  Treatment has included: therapeutic exercise, therAct, manual, traction, IFC, US, Ktape    Objective   Posture    FHP 11 degrees.   AC to ear 16 cm    Cervical AROM:  Flex 50 shooting pain within the spine, ext 30 with a \"crumpling\" feeling      ext > flex.   Repeated flex and ext both increased pain.      RSB 35 with pain in the cervical spine, ,  LSB 30 with tight on R,     L rotation     60  ( eval 50)   R rotation 70  ( last PN 65)  no pain with rotationcrepitus with ext and sometimes with rotation.    Passive motion " cervical:  hypomobile darien facet.   Less pain with distraction    thoracic: PA 1-  DTRs C5C6 normal, C7 trace.    MMT:  EPL 5-, lumbricals 5-, wrist 5 all plane     Tip pinch   6#   3-pt pinch  9#  Lateral pinch:  6#     Assessment/Plan  Jo is here today for a re-evaluation due to ongoing cervical and RUE pain.  She also continues to report shldr pain;however had an injection in the shldr and understood the ortho to say no PT x's 4-6 weeks.    The cervical presention is basically unchanged regarding posture, motion and strength.  There are significant postural imbalances with posterior trunk weakness, coupled with RC and scapular weakness.  OPPT could be restarted for a period in order to resolve/less the cervical impairments and achieve greater function.      Progress toward previous goals: Partially Met    Goals  STGs in 4 weeks:  1)Decrease pain to 5-6/10 on average not met   2)Increase cervical ROM by 10 degrees where limited as much met  3)Increase UE/LE strength to 4/5 progressed LE  5)  pnt to complete correct technique for home ex.  6)  resting posture of < 9 degrees forward head.    LTGs by discharge  1)Increase trunk/LE ROM to WFL/WNL  2) Increase UE/LE strength =/> 4+/5   3) Pt will report pain levels 0-4/10 in cervical and Ues during functional tasks at home and work   4)Pt will be able to stand 30-60 mins for basic ADLs without difficulty or pain   5)Pt will be able to wash/dress/groom without difficulty or increased pain upper body dressing completed mostly left handed due to the right arm pain.   6)Pt will be able to reach into overhead cabinets and grasp/hold items without difficulty or increased pain  not met.  Anything above waist level with the right arm increases pain.   7)Pt will be able to lift/carry laundry baskets, pots/pans, garbage or grocery bags without difficulty or increased pain  not met; no change.  8)Pt will be able to sleep full nights most nights without waking from  neck/UE/LBP/LE/R shldr  she wakes 3x's per night due to neck/shldr.      Plan  Therapy options: will be seen for skilled therapy services  Planned modality interventions: cryotherapy, thermotherapy (hydrocollator packs), electrical stimulation/Gambian stimulation, ultrasound, traction, dry needling and TENS  Planned therapy interventions: manual therapy, neuromuscular re-education, postural training, soft tissue mobilization, spinal/joint mobilization, strengthening, stretching, therapeutic activities, transfer training, abdominal trunk stabilization, ADL retraining, body mechanics training, home exercise program, gait training, functional ROM exercises, flexibility, motor coordination training, balance/weight-bearing training and joint mobilization  Frequency: 3x week  Duration in weeks: 13    Prognosis to achieve goals: fair    PT Signature: Dulce Luna PT      Based upon review of the patient's progress and continued therapy plan, it is my medical opinion that Jo Asif should continue physical therapy treatment at [facility]  [address]  [place] [address]  [phone].    Re-Certification  Period: 8/9/2023 to 11/7/2023  I certify that the therapy services are furnished while this patient is under my care.  The services outlined above are required by this patient, and will be reviewed every 90 days.      Signature: __________________________________    Kimberlee Cavanaugh         Un-Timed:  Low Eval          Mins  59112  Mod Eval          Mins  49484  High Eval                            Mins  05867  Re-Eval                           38    mins  07830        Timed Treatment:      mins   Total Treatment:     38   mins

## 2023-08-10 DIAGNOSIS — M51.36 LUMBAR DEGENERATIVE DISC DISEASE: ICD-10-CM

## 2023-08-10 DIAGNOSIS — M50.30 DEGENERATIVE DISC DISEASE, CERVICAL: Primary | ICD-10-CM

## 2023-08-10 DIAGNOSIS — M54.32 SCIATICA OF LEFT SIDE: ICD-10-CM

## 2023-08-14 RX ORDER — BUSPIRONE HYDROCHLORIDE 10 MG/1
TABLET ORAL
Qty: 180 TABLET | Refills: 0 | Status: SHIPPED | OUTPATIENT
Start: 2023-08-14

## 2023-08-15 ENCOUNTER — TREATMENT (OUTPATIENT)
Dept: PHYSICAL THERAPY | Facility: CLINIC | Age: 43
End: 2023-08-15
Payer: COMMERCIAL

## 2023-08-15 DIAGNOSIS — M54.2 PAIN, NECK: ICD-10-CM

## 2023-08-15 DIAGNOSIS — M54.12 RADICULOPATHY, CERVICAL: Primary | ICD-10-CM

## 2023-08-15 DIAGNOSIS — M25.511 RIGHT SHOULDER PAIN, UNSPECIFIED CHRONICITY: ICD-10-CM

## 2023-08-15 NOTE — PROGRESS NOTES
Physical Therapy Daily Treatment Note    Patient: Jo Asif  : 1980  Referring practitioner: ROSENDO Dwyer  Today's Date: 8/15/2023    VISIT#: 23      Subjective   Jo Asif reports: Says she is doing ok, upper back and neck are sore, about a 2-3/10.       Objective     See Exercise, Manual, and Modality Logs for complete treatment.     Patient Education: provided HEP    Assessment/Plan  Initiated manual therapy for her neck today, good tolerance and slightly less tension after. Performed ther ex for her neck, careful to avoid aggravating shoulder symptoms. Continues to have mild numbness in her right hand, this is constant, did not change throughout session.     Progress per Plan of Care            Timed:         Manual Therapy:    15     mins  13093;     Therapeutic Exercise:    15     mins  16444;     Neuromuscular Royce:        mins  52314;    Therapeutic Activity:     10     mins  87319;     Gait Training:           mins  13016;     Ultrasound:          mins  75846;    Ionto:                                   mins   01527  Self Care:                            mins   60371    Un-Timed:  Electrical Stimulation:         mins  94333 ( );  Dry Needling          mins self-pay  Traction          mins 22414  Re-Eval                               mins  21554    Timed Treatment:   40   mins   Total Treatment:     40   mins    Mae Padilla, PT  Physical Therapist  [place] PT license #: [id number]  [place] PT license #: [id number]

## 2023-08-23 ENCOUNTER — TREATMENT (OUTPATIENT)
Dept: PHYSICAL THERAPY | Facility: CLINIC | Age: 43
End: 2023-08-23
Payer: COMMERCIAL

## 2023-08-23 DIAGNOSIS — M54.2 PAIN, NECK: ICD-10-CM

## 2023-08-23 DIAGNOSIS — M54.12 RADICULOPATHY, CERVICAL: Primary | ICD-10-CM

## 2023-08-23 NOTE — PROGRESS NOTES
Physical Therapy Daily Treatment Note  [address] [address] [address]  [place] [address]      Patient: Jo Asif  : 1980  Referring Practitioner: Francisco Demarco IV, MD  Date of Initial Visit: Type: THERAPY  Noted: 2023  Today's Date: 2023  Patient seen for: 24 sessions      Visit Diagnoses:     ICD-10-CM ICD-9-CM   1. Radiculopathy, cervical  M54.12 723.4   2. Pain, neck  M54.2 723.1       Subjective   Jo Asif reports that she had a flare up in the lower back on . The only comfortable position was laying down.    Somewhat better today.    She also states the shoulder is not any better.    Has not contacted [facility] for the PRP.     Pain Level (0-10): 3 cervical     Objective   Moderate thickening of darien cervical paraspinals.    Point tenderness darien QL.    Mild reduction in cervical facet glides.   See Exercise, Manual, and Modality Logs for complete treatment.     Patient Education:     Assessment/Plan  Jo presents today with continued cervical pain. She also reports a lumbar flare up on ; still recovering today.  Postural imbalances remain that remain a factor in the cervical, shoulder and lumbar dysfunction.       Plan:  cont with treatment as tolerated.   Cont to focus on postural stretches and strengthening.           Timed:         Manual Therapy:    16     mins  60883;     Therapeutic Exercise:    16     mins  16999;     Neuromuscular Royce:        mins  13768;    Therapeutic Activity:     10     mins  80246;     Gait Training:           mins  47976;     Ultrasound:       9   mins  55404;    Ionto                                   mins   04271  Self Care                            mins   27522      Un-Timed:  Electrical Stimulation:         mins  47525 ( );  Traction          mins 62115      Timed Treatment:   51   mins   Total Treatment:     451  mins              Dulce Luna PT    Physical Therapist

## 2023-08-25 ENCOUNTER — TELEPHONE (OUTPATIENT)
Dept: ORTHOPEDIC SURGERY | Facility: CLINIC | Age: 43
End: 2023-08-25
Payer: COMMERCIAL

## 2023-08-25 DIAGNOSIS — M25.511 RIGHT SHOULDER PAIN, UNSPECIFIED CHRONICITY: Primary | ICD-10-CM

## 2023-08-25 NOTE — TELEPHONE ENCOUNTER
I spoke to patient and she wants to go ahead and schedule RT SH PT appointment.  She is trying to figure out the financial part of the PRP.      PT order pended to Dr Gaviria

## 2023-08-25 NOTE — TELEPHONE ENCOUNTER
I spoke to patient and she would like to have PRP performed and wants to start PT for Right shoulder.  She is discussing with financial aid regarding PRP payment.  I advised her she is required to pay $400 the day of the PRP.  She expressed an understanding and will call back to schedule PRP appt.  She was advised to make appointment at least 2 weeks out due to needing to stop Naproxen.  Patient has the PRP pamphlet for instructions.      Dr Gaviria, Patient would like to start PT for Right shoulder but do you want her to have PRP then start PT?  She would like PT at [facility].

## 2023-08-25 NOTE — TELEPHONE ENCOUNTER
Caller: ARNAUD   Relationship to Patient: SELF  Phone Number: [phone]  Reason for Call: PATIENT WANTING TO GO AHEAD AND GET AN ORDER PUT IN FOR HER RIGHT SHOULDER, SHE IS ALSO ASKING IF SHE SHOULD START THE PT FOR HER SHOULDER OR WAIT UNTIL AFTER PT. IF HE DOES WANT HER TO START PT THEN SHE IS ASKING FOR AN ORDER TO [facility]

## 2023-08-30 ENCOUNTER — TREATMENT (OUTPATIENT)
Dept: PHYSICAL THERAPY | Facility: CLINIC | Age: 43
End: 2023-08-30
Payer: COMMERCIAL

## 2023-08-30 DIAGNOSIS — M54.2 PAIN, NECK: ICD-10-CM

## 2023-08-30 DIAGNOSIS — M54.12 RADICULOPATHY, CERVICAL: Primary | ICD-10-CM

## 2023-08-30 DIAGNOSIS — M25.511 RIGHT SHOULDER PAIN, UNSPECIFIED CHRONICITY: ICD-10-CM

## 2023-08-30 NOTE — PROGRESS NOTES
"Physical Therapy Daily Treatment Note  [id number] [address] [address]  [place] [address]      Patient: Jo Asif  : 1980  Referring Practitioner: Francisco Demarco IV, MD  Date of Initial Visit: Type: THERAPY  Noted: 2023  Today's Date: 2023  Patient seen for: 25 sessions      Visit Diagnoses:     ICD-10-CM ICD-9-CM   1. Radiculopathy, cervical  M54.12 723.4   2. Pain, neck  M54.2 723.1   3. Right shoulder pain, unspecified chronicity  M25.511 719.41       Subjective   Jo Asif reports that she did get an order for treatment of the R shldr to resume.  Jo is hopeful that she will be able to receive the PRP injection as the shdlr pain is not changing.  Conts with intermittent NT into the right hand.       Objective   R shldr ROM:  flex 158, abd 155 with pain in both planes from mid range through end of the motion.   ER @ 0 abd 50 with pain at end range.   BTB IR sacrum  MMT:  unable to tolerate light pressure in any shldr directions with reports of pain.  Min shdlr hike with supraspinatus.  Posture:  as noted previously with rounding of the anterior trunk/shldrs with shldr elevation and FHP.   See Exercise, Manual, and Modality Logs for complete treatment.     Patient Education: review of neutral postural alignment and to complete frequent \"posture checks\" throughout the day.    Assessment/Plan  Jo does cont to report pain, soreness and nt into the RUE;as well as shldr and neck pain.   A contributing factor is the postural imbalance as this does place undo stress on the cervical and shldr tissue/jt capsule   OPPT remains indicated in order to achieve the stated goals a    Progress per Plan of Care          Timed:         Manual Therapy:    12     mins  05114;     Therapeutic Exercise:    15     mins  79521;     Neuromuscular Royce:        mins  57668;    Therapeutic Activity:     11     mins  45998;     Gait Training:           mins  30430;     Ultrasound:          " mins  51301;    Ionto                                   mins   02651  Self Care                            mins   81069      Timed Treatment:   38   mins   Total Treatment:     38   mins              Dulce Luna, PT    Physical Therapist

## 2023-09-03 ENCOUNTER — APPOINTMENT (OUTPATIENT)
Dept: GENERAL RADIOLOGY | Facility: HOSPITAL | Age: 43
End: 2023-09-03
Payer: COMMERCIAL

## 2023-09-03 ENCOUNTER — HOSPITAL ENCOUNTER (EMERGENCY)
Facility: HOSPITAL | Age: 43
Discharge: HOME | End: 2023-09-03
Attending: EMERGENCY MEDICINE
Payer: COMMERCIAL

## 2023-09-03 VITALS
HEIGHT: 64 IN | RESPIRATION RATE: 16 BRPM | HEART RATE: 74 BPM | BODY MASS INDEX: 43.54 KG/M2 | SYSTOLIC BLOOD PRESSURE: 124 MMHG | WEIGHT: 255 LBS | TEMPERATURE: 97.9 F | DIASTOLIC BLOOD PRESSURE: 74 MMHG | OXYGEN SATURATION: 98 %

## 2023-09-03 DIAGNOSIS — R07.89 LEFT-SIDED CHEST WALL PAIN: ICD-10-CM

## 2023-09-03 DIAGNOSIS — S80.12XA CONTUSION OF LEFT LOWER LEG, INITIAL ENCOUNTER: ICD-10-CM

## 2023-09-03 DIAGNOSIS — S80.01XA CONTUSION OF RIGHT KNEE, INITIAL ENCOUNTER: ICD-10-CM

## 2023-09-03 DIAGNOSIS — S29.019A THORACIC MYOFASCIAL STRAIN, INITIAL ENCOUNTER: Primary | ICD-10-CM

## 2023-09-03 PROCEDURE — 73562 X-RAY EXAM OF KNEE 3: CPT

## 2023-09-03 PROCEDURE — 99282 EMERGENCY DEPT VISIT SF MDM: CPT

## 2023-09-03 PROCEDURE — 73590 X-RAY EXAM OF LOWER LEG: CPT

## 2023-09-03 PROCEDURE — 71046 X-RAY EXAM CHEST 2 VIEWS: CPT

## 2023-09-03 RX ORDER — CYCLOBENZAPRINE HCL 10 MG
10 TABLET ORAL 3 TIMES DAILY PRN
Qty: 30 TABLET | Refills: 0 | Status: SHIPPED | OUTPATIENT
Start: 2023-09-03

## 2023-09-03 NOTE — Clinical Note
[facility] [address] [place] [address] [place] [address]  Phone: [phone]    Jo Asif was seen and treated in our emergency department on 9/3/2023.  She may return to work on 09/06/2023.         Thank you for choosing [facility].    Artemio Morse MD

## 2023-09-03 NOTE — DISCHARGE INSTRUCTIONS
Apply cold compress to sore areas for the next 2 or 3 days.  Slow stretching exercise to reduce spasms.  Take Flexeril as prescribed every 8 hours as needed for pain and spasms.  Please follow-up with your provider.  Seek immediate medical attention if having worsening symptoms or any concerns.

## 2023-09-03 NOTE — FSED PROVIDER NOTE
Subjective   History of Present Illness  Patient 43-year-old woman who presents with injuries to her left upper back and chest wall, left forearm, right hand, right knee, and left lower leg.  Patient states that she was working at the [employer] football game in [facility] when she tripped over a raised curb and fell forward.  She denies loss of consciousness or neck pain.  No abdominal pain.  No vomiting.  No numbness or weakness.  Pains are moderate intensity and worse with movement.  Pain to the right hand left forearm are minimal.  No open wounds.    Review of Systems    Past Medical History:   Diagnosis Date    Abdominal pain 09/2022    Allergic     Anxiety     Arrhythmia     Asthma     no inhalers    Atrial fibrillation     had ablation, no issues now    Cholelithiasis     Depression     Disease of thyroid gland     GERD (gastroesophageal reflux disease)     Heart murmur     History of medical problems     HL (hearing loss)     Hypertension     Hypothyroidism     Iron disorder     excess in labwork    Irritable bowel syndrome     Migraine     Morbid obesity     Obesity     PCOS (polycystic ovarian syndrome)     PKU (phenylketonuria)     Prediabetes     Rotator cuff tear 03/2022    right    Sleep apnea     no machine    Torn meniscus 03/2021    right       Allergies   Allergen Reactions    Amoxicillin Hives    Cleocin [Clindamycin Hcl] Anaphylaxis    Penicillins Hives       Past Surgical History:   Procedure Laterality Date    ABLATION OF DYSRHYTHMIC FOCUS      BLADDER SLING MODIFIED, ANTERIOR AND POSTERIOR VAGINAL REPAIR      CARDIAC CATHETERIZATION      CARDIAC ELECTROPHYSIOLOGY PROCEDURE N/A 06/10/2020    Procedure: EP/Ablation;  Surgeon: Joseph Andrews MD;  Location: [facility] INVASIVE LOCATION;  Service: Cardiovascular;  Laterality: N/A;    CARPAL TUNNEL RELEASE Right     CHOLECYSTECTOMY N/A 09/20/2022    Procedure: CHOLECYSTECTOMY LAPAROSCOPIC WITH DAVINCI ROBOT;  Surgeon: Alf Whalen,  MD;  Location: [facility] MAIN OR;  Service: Robotics - DaVinci;  Laterality: N/A;    COLONOSCOPY N/A 2022    Procedure: COLONOSCOPY with random biopsies R/O microscopic colitis, polypectomy x 1;  Surgeon: Jeffrey Echevarria MD;  Location: [facility] ENDOSCOPY;  Service: Gastroenterology;  Laterality: N/A;    ENDOSCOPY N/A 2022    Procedure: ESOPHAGOGASTRODUODENOSCOPY with esophagus, gastric and duodenal biopsies;  Surgeon: Jeffrey Echevarria MD;  Location: [facility] ENDOSCOPY;  Service: Gastroenterology;  Laterality: N/A;    GALLBLADDER SURGERY      KNEE ARTHROSCOPY Right 2021    Procedure: KNEE ARTHROSCOPY with  partial medial lateral meniscectomy;  Surgeon: Kb Gaviria MD;  Location: [facility] MAIN OR;  Service: Orthopedics;  Laterality: Right;    SHOULDER ARTHROSCOPY W/ ROTATOR CUFF REPAIR Right 2022    Procedure: SHOULDER ARTHROSCOPY WITH DEBRIDEMENT;  Surgeon: Kb Gaviria MD;  Location: [facility] MAIN OR;  Service: Orthopedics;  Laterality: Right;    TONSILLECTOMY      TUBAL ABDOMINAL LIGATION      UVULECTOMY         Family History   Problem Relation Age of Onset    Depression Mother     Hypertension Mother     Diabetes Mother     Cancer Mother         Breast    Hyperlipidemia Mother     Arthritis Father     Hypertension Father     Diabetes Father     Heart disease Father     Cancer Father         Lung    Early death Father     Hyperlipidemia Father     COPD Maternal Grandfather     Diabetes Maternal Grandmother     Hyperlipidemia Maternal Grandmother     Cancer Paternal Grandfather     Diabetes Paternal Grandmother     Hyperlipidemia Paternal Grandmother        Social History     Socioeconomic History    Marital status:    Tobacco Use    Smoking status: Former     Packs/day: 0.10     Years: 6.00     Pack years: 0.60     Types: Cigarettes     Quit date: 2009     Years since quittin.0    Smokeless tobacco: Never    Tobacco comments:     Rarely  smoked   Vaping Use    Vaping Use: Never used   Substance and Sexual Activity    Alcohol use: No    Drug use: No    Sexual activity: Yes     Partners: Male     Birth control/protection: Surgical, Tubal ligation           Objective   Physical Exam  Vitals and nursing note reviewed.   Constitutional:       General: She is not in acute distress.     Appearance: Normal appearance. She is not ill-appearing or toxic-appearing.   HENT:      Head: Normocephalic and atraumatic.      Mouth/Throat:      Mouth: Mucous membranes are moist.      Pharynx: Oropharynx is clear.   Eyes:      Extraocular Movements: Extraocular movements intact.      Conjunctiva/sclera: Conjunctivae normal.      Pupils: Pupils are equal, round, and reactive to light.   Cardiovascular:      Rate and Rhythm: Normal rate and regular rhythm.      Pulses: Normal pulses.      Heart sounds: Normal heart sounds.   Pulmonary:      Effort: Pulmonary effort is normal.      Breath sounds: Normal breath sounds.      Comments: Anterior left chest wall tenderness which is reproducible with twisting and movement and deep inspiration.  No crepitus.  Chest:      Chest wall: Tenderness present.   Abdominal:      General: Bowel sounds are normal.      Palpations: Abdomen is soft.      Tenderness: There is no abdominal tenderness. There is no right CVA tenderness, left CVA tenderness or guarding.   Musculoskeletal:         General: Tenderness present. No swelling. Normal range of motion.      Cervical back: Normal range of motion and neck supple.      Right lower leg: No edema.      Left lower leg: No edema.      Comments: Left upper back tenderness without midline vertebral tenderness.  No crepitus.  Right hand examination reveals normal inspection without any gross deformity.  There is no snuffbox tenderness.  Full range of motion.  There is minimal tenderness to the thumb but full range of motion and no swelling or ecchymosis.  No wrist pain.  Left forearm with  posterior soft tissue tenderness without elbow pain.  No bony tenderness.  Patient able to supinate and pronate without difficulty.  No elbow or wrist pain.  Patient is neurovascular tact in both hands with cap refill in the fingertips less than 2 seconds patient has 2+ equal and symmetrical bilateral radial pulses.  Left leg examination with upper third anterior tibial tenderness with ecchymosis.  No open wounds.  No knee or ankle pain.  Patient is neurovascular intact in the left foot.  Right knee examination reveals tenderness to the anterior aspect of the knee.  There is full range of motion.  No evidence of dislocation or laxity.  No hip pain bilaterally.  No ankle pain bilaterally.  Patient is resting intact in the right foot.   Skin:     General: Skin is warm and dry.      Capillary Refill: Capillary refill takes less than 2 seconds.   Neurological:      General: No focal deficit present.      Mental Status: She is alert.      Sensory: No sensory deficit.      Motor: No weakness.       Procedures           ED Course                                           Medical Decision Making  Amount and/or Complexity of Data Reviewed  Radiology: ordered.    Patient 43-year-old woman who had a mechanical fall earlier today sustaining multiple injuries.  Patient states initially the pains were mild but then gradually worsening.  She has pains to the left upper back and left-sided chest wall.  There is injury to the soft tissue of the left forearm without bony tenderness.  Minimal pain to the right hand mostly when she believes she jammed her thumb.  With full range of motion of the thumb no ecchymosis or swelling and no snuffbox tenderness or wrist pain.  Injury to the left anterior tibia upper third with swelling and ecchymosis but no open wounds.    Right anterior knee tenderness.  Full range of motion on passive and active movement however pain to the anterior knee without crepitus.  No ankle pain bilaterally.  Patient  is neurovascular intact in both feet and both hands.  X-rays of the right knee and the left tib-fib and chest x-ray were all unremarkable without any acute findings.  Patient advised of contusions and no fractures at this time.  Patient will be placed on Flexeril.  Was going to prescribe Motrin however the patient takes Naprosyn.  She has been advised to continue taking this.      Final diagnoses:   Thoracic myofascial strain, initial encounter   Left-sided chest wall pain   Contusion of right knee, initial encounter   Contusion of left lower leg, initial encounter       ED Disposition  ED Disposition       ED Disposition   Discharge    Condition   Stable    Comment   --               Kimberlee Cavanaugh, APRN  [address]  [place] [address] [phone]    In 1 week           Medication List        New Prescriptions      cyclobenzaprine 10 MG tablet  Commonly known as: FLEXERIL  Take 1 tablet by mouth 3 (Three) Times a Day As Needed for Muscle Spasms.               Where to Get Your Medications        These medications were sent to [facility] [id number] - [facility] [place] - [facility] - [phone] [facility] [phone] [address] [place] [address]      Phone: [phone]   cyclobenzaprine 10 MG tablet

## 2023-09-06 ENCOUNTER — TELEMEDICINE (OUTPATIENT)
Dept: FAMILY MEDICINE CLINIC | Facility: CLINIC | Age: 43
End: 2023-09-06
Payer: COMMERCIAL

## 2023-09-06 DIAGNOSIS — J45.21 MILD INTERMITTENT ASTHMA WITH ACUTE EXACERBATION: Primary | ICD-10-CM

## 2023-09-06 DIAGNOSIS — S29.019A THORACIC MYOFASCIAL STRAIN, INITIAL ENCOUNTER: ICD-10-CM

## 2023-09-06 RX ORDER — METHYLPREDNISOLONE 4 MG/1
TABLET ORAL
Qty: 21 TABLET | Refills: 0 | Status: SHIPPED | OUTPATIENT
Start: 2023-09-06

## 2023-09-06 RX ORDER — AZITHROMYCIN 250 MG/1
TABLET, FILM COATED ORAL
Qty: 6 TABLET | Refills: 0 | Status: SHIPPED | OUTPATIENT
Start: 2023-09-06

## 2023-09-06 NOTE — PROGRESS NOTES
Chief Complaint   Patient presents with    Cough    Asthma     [facility] follow up for muscle strain       You have chosen to receive care through a telemedicine visit. Do you consent to use a telemedicine visit for your medical care today? Yes.  The patient was located in her home and the provider located at [facility] primary care office    HPI    Patient was seen at [facility] urgent care/ED on 9/30/2023 after a fall onto the left chest wall, right knee and twisted the left leg.  X-rays negative, she was diagnosed with thoracic myofascial strain, left-sided chest wall pain, contusion of the right knee and contusion of the left lower leg.  She was provided Flexeril to use 3 times a day as needed.  Today she reports having chest congestion, wheezing, sore throat and yellow, thick mucus production that started on 9/4/23.  She does have a history of asthma, she is taking Advair, Spiriva and Singulair daily, she has not used albuterol rescue inhaler.      The following portions of the patient's history were reviewed and updated as appropriate: allergies, current medications, past family history, past medical history, past social history, past surgical history and problem list.  Past Medical History:   Diagnosis Date    Abdominal pain 09/2022    Allergic     Anxiety     Arrhythmia     Asthma     Atrial fibrillation     Cholelithiasis     Depression     Disease of thyroid gland     GERD (gastroesophageal reflux disease)     Heart murmur     History of medical problems     HL (hearing loss)     Hypertension     Hypothyroidism     Iron disorder     Irritable bowel syndrome     Migraine     Morbid obesity     Obesity     PCOS (polycystic ovarian syndrome)     PKU (phenylketonuria)     Prediabetes     Rotator cuff tear 03/2022    Sleep apnea     Torn meniscus 03/2021     Past Surgical History:   Procedure Laterality Date    ABLATION OF DYSRHYTHMIC FOCUS      BLADDER SLING MODIFIED, ANTERIOR AND POSTERIOR VAGINAL REPAIR       CARDIAC CATHETERIZATION      CARDIAC ELECTROPHYSIOLOGY PROCEDURE N/A 06/10/2020    Procedure: EP/Ablation;  Surgeon: Joseph Andrews MD;  Location: [facility] CATH INVASIVE LOCATION;  Service: Cardiovascular;  Laterality: N/A;    CARPAL TUNNEL RELEASE Right     CHOLECYSTECTOMY N/A 09/20/2022    Procedure: CHOLECYSTECTOMY LAPAROSCOPIC WITH DAVINCI ROBOT;  Surgeon: Alf Whalen MD;  Location: [facility] MAIN OR;  Service: Robotics - DaVinci;  Laterality: N/A;    COLONOSCOPY N/A 06/30/2022    Procedure: COLONOSCOPY with random biopsies R/O microscopic colitis, polypectomy x 1;  Surgeon: Jeffrey Echevarria MD;  Location: [facility] ENDOSCOPY;  Service: Gastroenterology;  Laterality: N/A;    ENDOSCOPY N/A 06/30/2022    Procedure: ESOPHAGOGASTRODUODENOSCOPY with esophagus, gastric and duodenal biopsies;  Surgeon: Jeffrey Echevarria MD;  Location: [facility] ENDOSCOPY;  Service: Gastroenterology;  Laterality: N/A;    GALLBLADDER SURGERY  2022    KNEE ARTHROSCOPY Right 03/26/2021    Procedure: KNEE ARTHROSCOPY with  partial medial lateral meniscectomy;  Surgeon: Kb Gaviria MD;  Location: [facility] MAIN OR;  Service: Orthopedics;  Laterality: Right;    SHOULDER ARTHROSCOPY W/ ROTATOR CUFF REPAIR Right 03/18/2022    Procedure: SHOULDER ARTHROSCOPY WITH DEBRIDEMENT;  Surgeon: Kb Gaviria MD;  Location: [facility] MAIN OR;  Service: Orthopedics;  Laterality: Right;    TONSILLECTOMY      TUBAL ABDOMINAL LIGATION      UVULECTOMY       Family History   Problem Relation Age of Onset    Depression Mother     Hypertension Mother     Diabetes Mother     Cancer Mother         Breast    Hyperlipidemia Mother     Arthritis Father     Hypertension Father     Diabetes Father     Heart disease Father     Cancer Father         Lung    Early death Father     Hyperlipidemia Father     COPD Maternal Grandfather     Diabetes Maternal Grandmother     Hyperlipidemia Maternal Grandmother     Cancer Paternal  Grandfather     Diabetes Paternal Grandmother     Hyperlipidemia Paternal Grandmother      Social History     Tobacco Use    Smoking status: Former     Packs/day: 0.10     Years: 6.00     Pack years: 0.60     Types: Cigarettes     Quit date: 2009     Years since quittin.0    Smokeless tobacco: Never    Tobacco comments:     Rarely smoked   Substance Use Topics    Alcohol use: No         Current Outpatient Medications:     Accu-Chek Guide test strip, , Disp: , Rfl:     Accu-Chek Softclix Lancets lancets, , Disp: , Rfl:     Ajovy 225 MG/1.5ML solution prefilled syringe, Every 30 (Thirty) Days. For migraines, Disp: , Rfl:     albuterol sulfate  (90 Base) MCG/ACT inhaler, Inhale 2 puffs Every 6 (Six) Hours As Needed for Wheezing or Shortness of Air., Disp: 18 g, Rfl: 11    azithromycin (Zithromax) 250 MG tablet, Take 2 tablets the first day, then 1 tablet daily for 4 days., Disp: 6 tablet, Rfl: 0    Blood Glucose Monitoring Suppl (Accu-Chek Guide Me) w/Device kit, , Disp: , Rfl:     busPIRone (BUSPAR) 10 MG tablet, TAKE 1 TABLET BY MOUTH TWICE A DAY, Disp: 180 tablet, Rfl: 0    Cholecalciferol (Vitamin D) 50 MCG (2000 UT) capsule, Take 1 capsule by mouth Daily., Disp: , Rfl:     cyclobenzaprine (FLEXERIL) 10 MG tablet, Take 1 tablet by mouth 3 (Three) Times a Day As Needed for Muscle Spasms., Disp: 30 tablet, Rfl: 0    EPINEPHrine (EPIPEN) 0.3 MG/0.3ML solution auto-injector injection, Inject 0.3 mL into the appropriate muscle as directed by prescriber., Disp: , Rfl:     FLUoxetine (PROzac) 10 MG capsule, TAKE ONE CAPSULE BY MOUTH DAILY, Disp: 90 capsule, Rfl: 1    fluticasone (FLONASE) 50 MCG/ACT nasal spray, 2 sprays into the nostril(s) as directed by provider Daily., Disp: , Rfl:     fluticasone-salmeterol (Advair HFA) 230-21 MCG/ACT inhaler, Inhale 2 puffs 2 (Two) Times a Day., Disp: 12 g, Rfl: 11    HM LIDOCAINE PATCH EX, Apply 1 patch topically Daily. 5%. On for 12 hours, off for 12 hours, Disp: ,  Rfl:     levocetirizine (XYZAL) 5 MG tablet, Take 1 tablet by mouth Every Evening., Disp: , Rfl:     levothyroxine (SYNTHROID, LEVOTHROID) 75 MCG tablet, Take 1 tablet by mouth Every Morning., Disp: 90 tablet, Rfl: 1    lisinopril-hydrochlorothiazide (PRINZIDE,ZESTORETIC) 10-12.5 MG per tablet, TAKE ONE TABLET BY MOUTH DAILY, Disp: 90 tablet, Rfl: 1    methylPREDNISolone (MEDROL) 4 MG dose pack, Take as directed on package instructions., Disp: 21 tablet, Rfl: 0    minoxidil (LONITEN) 2.5 MG tablet, Take 0.5 tablets by mouth Daily., Disp: , Rfl:     montelukast (SINGULAIR) 10 MG tablet, TAKE ONE TABLET BY MOUTH ONCE NIGHTLY, Disp: 90 tablet, Rfl: 1    naproxen (Naprosyn) 500 MG tablet, Take 1 tablet by mouth 2 (Two) Times a Day As Needed for Moderate Pain., Disp: 180 tablet, Rfl: 1    omeprazole (priLOSEC) 40 MG capsule, TAKE ONE CAPSULE BY MOUTH DAILY, Disp: 90 capsule, Rfl: 1    Ozempic, 1 MG/DOSE, 4 MG/3ML solution pen-injector, Inject 1 mg as directed 1 (One) Time Per Week. Fri, Disp: , Rfl:     Palynziq 10 MG/0.5ML solution prefilled syringe, 3 (Three) Times a Week. mwf, Disp: , Rfl:     Plecanatide (Trulance) 3 MG tablet, Take 1 tablet by mouth Daily., Disp: , Rfl:     spironolactone (ALDACTONE) 25 MG tablet, Take 1 tablet by mouth Daily., Disp: , Rfl:     tiotropium bromide monohydrate (SPIRIVA RESPIMAT) 2.5 MCG/ACT aerosol solution inhaler, Inhale 2 puffs Daily., Disp: 12 g, Rfl: 3    ubrogepant (UBRELVY) 100 MG tablet, Take 1 tablet by mouth 1 (One) Time As Needed., Disp: , Rfl:           There were no vitals filed for this visit.  There is no height or weight on file to calculate BMI.    Physical Exam  Constitutional:       General: She is not in acute distress.     Appearance: She is ill-appearing.      Comments: Exam otherwise deferred through video/audio visit   Pulmonary:      Effort: Pulmonary effort is normal. Respiratory distress present.      Comments: Harsh, congested cough  Neurological:       Mental Status: She is alert and oriented to person, place, and time.   Psychiatric:         Behavior: Behavior normal.         Thought Content: Thought content normal.         Judgment: Judgment normal.       No visits with results within 7 Day(s) from this visit.   Latest known visit with results is:   Hospital Outpatient Visit on 04/24/2023   Component Date Value Ref Range Status    QT Interval 04/24/2023 391  ms Final       Diagnoses and all orders for this visit:    1. Mild intermittent asthma with acute exacerbation (Primary)    2. Thoracic myofascial strain, initial encounter    Other orders  -     methylPREDNISolone (MEDROL) 4 MG dose pack; Take as directed on package instructions.  Dispense: 21 tablet; Refill: 0  -     azithromycin (Zithromax) 250 MG tablet; Take 2 tablets the first day, then 1 tablet daily for 4 days.  Dispense: 6 tablet; Refill: 0      Start Z-Alec and Medrol Dosepak  Recommend Robitussin as needed  Continue Advair, Spiriva, recommend albuterol rescue inhaler for wheezing or shortness of air  Continue rest, ice or heat and Flexeril for muscle strain      Return if symptoms worsen or fail to improve 3 to 4 days, for Next scheduled follow up 9/26/2023.    This was an audio and video enabled telemedicine encounter.  Total time of discussion was 22 minutes     EMR Dragon transcription disclaimer:  Part of this note may be an electronic transcription/translation of spoken language to printed text using the Dragon Dictation System.

## 2023-09-07 ENCOUNTER — TREATMENT (OUTPATIENT)
Dept: PHYSICAL THERAPY | Facility: CLINIC | Age: 43
End: 2023-09-07
Payer: COMMERCIAL

## 2023-09-07 DIAGNOSIS — M75.41 CORACOID IMPINGEMENT OF RIGHT SHOULDER: ICD-10-CM

## 2023-09-07 DIAGNOSIS — M54.2 PAIN, NECK: ICD-10-CM

## 2023-09-07 DIAGNOSIS — M54.12 RADICULOPATHY, CERVICAL: Primary | ICD-10-CM

## 2023-09-07 DIAGNOSIS — M75.81 TENDINITIS OF RIGHT ROTATOR CUFF: ICD-10-CM

## 2023-09-07 DIAGNOSIS — M25.511 RIGHT SHOULDER PAIN, UNSPECIFIED CHRONICITY: ICD-10-CM

## 2023-09-07 NOTE — PROGRESS NOTES
"Physical Therapy 30-Day Progress and    Treatment Note  [address] [address] [address]  [place] [address]      Patient: Jo Asif  : 1980  Referring Practitioner: Francisco Demarco IV, MD  Date of Initial Visit: Type: THERAPY  Noted: 2023  Today's Date: 2023  Patient seen for: 26 sessions      Visit Diagnoses:     ICD-10-CM ICD-9-CM   1. Radiculopathy, cervical  M54.12 723.4   2. Pain, neck  M54.2 723.1   3. Right shoulder pain, unspecified chronicity  M25.511 719.41   4. Coracoid impingement of right shoulder  M75.41 726.2   5. Tendinitis of right rotator cuff  M75.81 726.10       Subjective   Jo Asif reports that she feels the soft tissue is working regarding the lower neck pain level and frequency.        Pain Level (0-10): neck   Lo 2 hi 5  current 3        R shldr  Lo 1  Hi 7 current     Objective   Subjective Questionnaire: NDI:26/50 = 52%  Posture    FHP 11 degrees.   AC to ear 16 cm    Cervical AROM:  Flex 60 pain in the spine, ext 40 with a \"crumpling\" feeling      ext > flex.        RSB 40 without pain ,  LSB 30 with tight on R,     L rotation     60  ( eval 50)   R rotation 70  r pain   Passive motion cervical: mild hypomobile darien facet.   Less pain with distraction    thoracic: PA 1-  Palpations:  shortening of the UT,  no specific TP noted.  LHB mild thickening.     MMT:  EPL 5-, lumbricals 5-, wrist 5 all plane     See Exercise, Manual, and Modality Logs for complete treatment.     Patient Education: reinforced postural balance with cervical retraction vs AO extension.    Assessment/Plan  Jo has been to PT x's 4 visits since the reeval date of 23.   She does continue to report pain in the cervical spine and the R shldr.   1 of the STGs have been achieve.  Partial achievement of STGs # 2 and 5.  Postural imbalance remain present.  It is noted the pain has been ongoing with prior treatment for the neck and shldr from  through .  She took a 6 " week break and then returned to PT with cont'd c/os.as noted at the April eval.  OPPT will continue for several weeks if no progress of posture or mobility noted, will assess need for skilled PT intervention.    STGs in 4 weeks:  1)Decrease pain to 5-6/10 on average not met   2)Increase cervical ROM by 10 degrees where limited as much met  3)Increase UE/LE strength to 4/5 progressed LE  5)  pnt to complete correct technique for home ex. Partial met    6)  resting posture of < 9 degrees forward head. Not met 0/7/23    LTGs by discharge  1)Increase trunk/LE ROM to WFL/WNL  2) Increase UE/LE strength =/> 4+/5   3) Pt will report pain levels 0-4/10 in cervical and Ues during functional tasks at home and work   4)Pt will be able to stand 30-60 mins for basic ADLs without difficulty or pain   5)Pt will be able to wash/dress/groom without difficulty or increased pain upper body dressing completed mostly left handed due to the right arm pain.   6)Pt will be able to reach into overhead cabinets and grasp/hold items without difficulty or increased pain  not met.  Anything above waist level with the right arm increases pain.   7)Pt will be able to lift/carry laundry baskets, pots/pans, garbage or grocery bags without difficulty or increased pain  not met; no change.  8)Pt will be able to sleep full nights most nights without waking from neck/UE/LBP/LE/R shldr  she wakes 3x's per night due to neck/shldr.      Other  cont another 2 weeks, assess mobility.           Timed:         Manual Therapy:    16     mins  78394;     Therapeutic Exercise:         mins  26446;     Neuromuscular Royce:        mins  88722;    Therapeutic Activity:    12      mins  62121;     Gait Training:           mins  72978;     Ultrasound:          mins  10937;    Ionto                                   mins   15305  Self Care                            mins   32724      Un-Timed:  Electrical Stimulation:         mins  80082 ( );  Traction           mins 13208    No Charge:   [place]ack:        mins  Hydrocollator MHP:         mins      Timed Treatment:   28   mins   Total Treatment:     28   mins              Dulce Luna PT    Physical Therapist

## 2023-09-19 ENCOUNTER — TREATMENT (OUTPATIENT)
Dept: PHYSICAL THERAPY | Facility: CLINIC | Age: 43
End: 2023-09-19
Payer: COMMERCIAL

## 2023-09-19 DIAGNOSIS — M25.511 RIGHT SHOULDER PAIN, UNSPECIFIED CHRONICITY: ICD-10-CM

## 2023-09-19 DIAGNOSIS — M54.12 RADICULOPATHY, CERVICAL: Primary | ICD-10-CM

## 2023-09-19 DIAGNOSIS — M54.2 PAIN, NECK: ICD-10-CM

## 2023-09-19 NOTE — PROGRESS NOTES
Physical Therapy Daily Treatment Note      [facility]              [address], [address]                [facility] [place]  [address]        Patient: Jo Asif   : 1980  Diagnosis/ICD-10 Code:  Radiculopathy, cervical [M54.12]  Referring practitioner: Francisco Demarco IV, MD  Date of Initial Visit: Type: THERAPY  Noted: 2023  Today's Date: 2023  Patient seen for 27 sessions         Subjective    Jo Asif reports: her sciatica is flaring up more here and there.  Neck is better unless she moves her arm.            Objective   See Exercise, Manual, and Modality Logs for complete treatment.       Assessment/Plan  Continued to focus on  pain relief with manual as well as scapular strengthening to support neck and shoulder.  Added gentle shoulder ROM exercises for decreased pain with functional use of arm.  Instructed to avoid symptoms with this progression.  Will monitor tolerance with exercises and progress as able.     Progress per Plan of Care           Timed:  Manual Therapy:    8     mins  03203;  Therapeutic Exercise:    10     mins  65327;     Neuromuscular Royce:        mins  79938;    Therapeutic Activity:     10     mins  16422;     Gait Training:           mins  70652;     Ultrasound:          mins  78132;     Work Conditioning/Hardening (initial 2 hours)        mins  74447  Work Conditioning/Hardening (each add'l hour)        mins  37425    Untimed:   Electrical Stimulation:         mins  28780 ( );  Traction          mins 60593    Timed Treatment:   28   mins   Total Treatment:     28   mins    Gwendolyn Rhodes PTA  Physical Therapist Assistant

## 2023-09-26 ENCOUNTER — OFFICE VISIT (OUTPATIENT)
Dept: FAMILY MEDICINE CLINIC | Facility: CLINIC | Age: 43
End: 2023-09-26
Payer: COMMERCIAL

## 2023-09-26 VITALS
RESPIRATION RATE: 18 BRPM | BODY MASS INDEX: 45.34 KG/M2 | HEART RATE: 78 BPM | WEIGHT: 265.6 LBS | HEIGHT: 64 IN | TEMPERATURE: 98.9 F | OXYGEN SATURATION: 98 % | DIASTOLIC BLOOD PRESSURE: 88 MMHG | SYSTOLIC BLOOD PRESSURE: 124 MMHG

## 2023-09-26 DIAGNOSIS — Z53.21 PATIENT LEFT WITHOUT BEING SEEN: ICD-10-CM

## 2023-09-26 DIAGNOSIS — E66.01 OBESITY, MORBID, BMI 50 OR HIGHER: Primary | Chronic | ICD-10-CM

## 2023-09-26 RX ORDER — SEMAGLUTIDE 1.34 MG/ML
1 INJECTION, SOLUTION SUBCUTANEOUS WEEKLY
Qty: 9 ML | Refills: 1 | Status: SHIPPED | OUTPATIENT
Start: 2023-09-26

## 2023-09-26 RX ORDER — SEMAGLUTIDE 1.34 MG/ML
1 INJECTION, SOLUTION SUBCUTANEOUS WEEKLY
Qty: 3 ML | Refills: 5 | Status: CANCELLED | OUTPATIENT
Start: 2023-09-26

## 2023-09-26 NOTE — TELEPHONE ENCOUNTER
PATIENT WAS HERE FOR APPT.  SHE GOT CALLED INTO WORK AND HAD TO LEAVE AND CANCEL.  CAN YOU PLEASE REFILL MED.

## 2023-09-27 ENCOUNTER — TREATMENT (OUTPATIENT)
Dept: PHYSICAL THERAPY | Facility: CLINIC | Age: 43
End: 2023-09-27
Payer: COMMERCIAL

## 2023-09-27 DIAGNOSIS — M25.511 RIGHT SHOULDER PAIN, UNSPECIFIED CHRONICITY: ICD-10-CM

## 2023-09-27 DIAGNOSIS — M54.12 RADICULOPATHY, CERVICAL: Primary | ICD-10-CM

## 2023-09-27 DIAGNOSIS — M54.2 PAIN, NECK: ICD-10-CM

## 2023-09-27 PROCEDURE — 97140 MANUAL THERAPY 1/> REGIONS: CPT | Performed by: PHYSICAL THERAPIST

## 2023-09-27 PROCEDURE — 97110 THERAPEUTIC EXERCISES: CPT | Performed by: PHYSICAL THERAPIST
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
## 2023-10-06 ENCOUNTER — TELEPHONE (OUTPATIENT)
Dept: FAMILY MEDICINE CLINIC | Facility: CLINIC | Age: 43
End: 2023-10-06
Payer: COMMERCIAL

## 2023-10-06 RX ORDER — ORAL SEMAGLUTIDE 7 MG/1
1 TABLET ORAL DAILY
Qty: 30 TABLET | Refills: 0 | Status: SHIPPED | OUTPATIENT
Start: 2023-10-06

## 2023-10-06 NOTE — TELEPHONE ENCOUNTER
Caller: Jo Asif    Relationship: Self    Best call back number: [phone]    What medications are you currently taking:   Current Outpatient Medications on File Prior to Visit   Medication Sig Dispense Refill    Accu-Chek Guide test strip       Accu-Chek Softclix Lancets lancets       Ajovy 225 MG/1.5ML solution prefilled syringe Every 30 (Thirty) Days. For migraines      albuterol sulfate  (90 Base) MCG/ACT inhaler Inhale 2 puffs Every 6 (Six) Hours As Needed for Wheezing or Shortness of Air. 18 g 11    azithromycin (Zithromax) 250 MG tablet Take 2 tablets the first day, then 1 tablet daily for 4 days. 6 tablet 0    Blood Glucose Monitoring Suppl (Accu-Chek Guide Me) w/Device kit       busPIRone (BUSPAR) 10 MG tablet TAKE 1 TABLET BY MOUTH TWICE A  tablet 0    Cholecalciferol (Vitamin D) 50 MCG (2000 UT) capsule Take 1 capsule by mouth Daily.      cyclobenzaprine (FLEXERIL) 10 MG tablet Take 1 tablet by mouth 3 (Three) Times a Day As Needed for Muscle Spasms. 30 tablet 0    EPINEPHrine (EPIPEN) 0.3 MG/0.3ML solution auto-injector injection Inject 0.3 mL into the appropriate muscle as directed by prescriber.      FLUoxetine (PROzac) 10 MG capsule TAKE ONE CAPSULE BY MOUTH DAILY 90 capsule 1    fluticasone (FLONASE) 50 MCG/ACT nasal spray 2 sprays into the nostril(s) as directed by provider Daily.      fluticasone-salmeterol (Advair HFA) 230-21 MCG/ACT inhaler Inhale 2 puffs 2 (Two) Times a Day. 12 g 11    HM LIDOCAINE PATCH EX Apply 1 patch topically Daily. 5%. On for 12 hours, off for 12 hours      levocetirizine (XYZAL) 5 MG tablet Take 1 tablet by mouth Every Evening.      levothyroxine (SYNTHROID, LEVOTHROID) 75 MCG tablet Take 1 tablet by mouth Every Morning. 90 tablet 1    lisinopril-hydrochlorothiazide (PRINZIDE,ZESTORETIC) 10-12.5 MG per tablet TAKE ONE TABLET BY MOUTH DAILY 90 tablet 1    methylPREDNISolone (MEDROL) 4 MG dose pack Take as directed on package instructions. 21  tablet 0    minoxidil (LONITEN) 2.5 MG tablet Take 0.5 tablets by mouth Daily.      montelukast (SINGULAIR) 10 MG tablet TAKE ONE TABLET BY MOUTH ONCE NIGHTLY 90 tablet 1    naproxen (Naprosyn) 500 MG tablet Take 1 tablet by mouth 2 (Two) Times a Day As Needed for Moderate Pain. 180 tablet 1    omeprazole (priLOSEC) 40 MG capsule TAKE ONE CAPSULE BY MOUTH DAILY 90 capsule 1    Ozempic, 1 MG/DOSE, 4 MG/3ML solution pen-injector Inject 1 mg under the skin into the appropriate area as directed 1 (One) Time Per Week. On Friday 9 mL 1    Palynziq 10 MG/0.5ML solution prefilled syringe 3 (Three) Times a Week. mwf      Plecanatide (Trulance) 3 MG tablet Take 1 tablet by mouth Daily.      spironolactone (ALDACTONE) 50 MG tablet Take 1 tablet by mouth Daily.      tiotropium bromide monohydrate (SPIRIVA RESPIMAT) 2.5 MCG/ACT aerosol solution inhaler Inhale 2 puffs Daily. 12 g 3    ubrogepant (UBRELVY) 100 MG tablet Take 1 tablet by mouth 1 (One) Time As Needed.       No current facility-administered medications on file prior to visit.          When did you start taking these medications: N/A    Which medication are you concerned about: OZEMPIC    Who prescribed you this medication: DAPHNE GRIMALDO    What are your concerns: PATIENT CALLED AND SAID SHE IS WANTING TO SEE IF ADPHNE GRIMALDO WOULD BE ABLE TO PRESCRIBE RYBELSUS FOR HER TO REPLACE OZEMPIC    SHE SAID THE PHARMACY HAS TOLD HER THEY CANNOT GET OZEMPIC CURRENTLY AND SHE SAID HER INSURANCE WILL NOT ALLOW HER TO SWITCH TO [facility], SHE CANNOT AFFORD THE MEDICATION WITHOUT INSURANCE     How long have you had these concerns: N/A

## 2023-10-11 DIAGNOSIS — I10 PRIMARY HYPERTENSION: ICD-10-CM

## 2023-10-11 RX ORDER — LISINOPRIL AND HYDROCHLOROTHIAZIDE 12.5; 1 MG/1; MG/1
TABLET ORAL
Qty: 90 TABLET | Refills: 1 | Status: SHIPPED | OUTPATIENT
Start: 2023-10-11

## 2023-10-13 ENCOUNTER — TREATMENT (OUTPATIENT)
Dept: PHYSICAL THERAPY | Facility: CLINIC | Age: 43
End: 2023-10-13
Payer: COMMERCIAL

## 2023-10-13 DIAGNOSIS — M75.81 TENDINITIS OF RIGHT ROTATOR CUFF: ICD-10-CM

## 2023-10-13 DIAGNOSIS — M75.41 CORACOID IMPINGEMENT OF RIGHT SHOULDER: ICD-10-CM

## 2023-10-13 DIAGNOSIS — M25.511 RIGHT SHOULDER PAIN, UNSPECIFIED CHRONICITY: ICD-10-CM

## 2023-10-13 DIAGNOSIS — M54.2 PAIN, NECK: ICD-10-CM

## 2023-10-13 DIAGNOSIS — M54.12 RADICULOPATHY, CERVICAL: Primary | ICD-10-CM

## 2023-10-13 NOTE — PROGRESS NOTES
Re-Assessment / Re-Certification      Patient: Jo Asif   : 1980  Diagnosis/ICD-10 Code:  Radiculopathy, cervical [M54.12]  Referring practitioner: Francisco Demarco IV, MD  Date of Initial Visit: Type: THERAPY  Noted: 2023  Today's Date: 10/13/2023  Patient seen for 29 sessions    Visit Diagnoses:      ICD-10-CM ICD-9-CM   1. Radiculopathy, cervical  M54.12 723.4   2. Pain, neck  M54.2 723.1   3. Right shoulder pain, unspecified chronicity  M25.511 719.41   4. Coracoid impingement of right shoulder  M75.41 726.2   5. Tendinitis of right rotator cuff  M75.81 726.10       Subjective:     Jo Asif reports that the pain in her neck has decreased since she has started therapy. She states that she still has pain and problems with her shoulder and that she still has headaches and pain between her shoulder blades. She states that she has problems doing anything with her R arm, such as lifting objects and raising her arm.    Subjective Questionnaire: NDI: 29 ; Oswestry: 21  Clinical Progress: improved  Home Program Compliance: Yes  Treatment has included: therapeutic exercise, neuromuscular re-education, manual therapy, therapeutic activity, electrical stimulation, and ultrasound    Subjective   Objective          Strength/Myotome Testing     Left Shoulder     Planes of Motion   Flexion: 5   Abduction: 5   External rotation at 0ø: 5   Internal rotation at 0ø: 5     Right Shoulder     Planes of Motion   Flexion: 4+   Abduction: 4+   External rotation at 0ø: 4+   Internal rotation at 0ø: 5     Left Elbow   Flexion: 5  Extension: 5    Right Elbow   Flexion: 5  Extension: 5    Left Wrist/Hand   Wrist extension: 5  Wrist flexion: 5    Right Wrist/Hand   Wrist extension: 5  Wrist flexion: 5    Left Hip   Planes of Motion   Flexion: 5    Right Hip   Planes of Motion   Flexion: 4    Left Knee   Flexion: 5  Extension: 5    Right Knee   Flexion: 4+  Extension: 4+    Left Ankle/Foot   Dorsiflexion: 5    Right  Ankle/Foot   Dorsiflexion: 5    Additional Strength Details  Pain with all RUE MMT      Assessment & Plan       Assessment  Assessment details: Pt demonstrates progress toward goals, including increased UE/LE strength, and has met several goals. Deficits remain in strength and ROM as well as pain. NDI score 29, Modified Oswestry 21. Pt reports improvement in her neck but ongoing pain and problems with her R shoulder. She requires continued skilled therapy addressing deficits in order to return to PLOF with decreased symptoms. Plan to progress exercises next session as pt's tolerance allows.    Goals  Plan Goals: STGs in 4 weeks:    1) Decrease pain to 5-6/10 on average -- MET, 2-3/10   2) Increase cervical ROM by 10 degrees where limited as much -- MET  3) Increase UE/LE strength to 4/5 progressed LE -- PROGRESSING  5) pnt to complete correct technique for home ex. -- MET   6) resting posture of < 9 degrees forward head. -- NOT MET    LTGs by discharge:    1) Increase trunk/LE ROM to WFL/WNL -- NOT ASSESSED  2) Increase UE/LE strength =/> 4+/5 -- PROGRESSING  3) Pt will report pain levels 0-4/10 in cervical and UEs during functional tasks at home and work -- PROGRESSING, 4-5/10  4) Pt will be able to stand 30-60 mins for basic ADLs without difficulty or pain -- PROGRESSING, 15-20 min currently  5) Pt will be able to wash/dress/groom without difficulty or increased pain upper body dressing completed -- NOT MET, reports pain any time she has to move her RUE   6) Pt will be able to reach into overhead cabinets and grasp/hold items without difficulty or increased pain not met -- PROGRESSING, reports ability to raise RUE higher without pain but still not to overhead cabinet   7) Pt will be able to lift/carry laundry baskets, pots/pans, garbage or grocery bags without difficulty or increased pain -- NOT MET  8) Pt will be able to sleep full nights most nights without waking from neck/UE/LBP/LE/R shldr -- NOT  MET      Progress toward previous goals: Partially Met      Recommendations: Continue as planned  Timeframe: 3 months  Prognosis to achieve goals: good        Timed:         Manual Therapy:    4     mins  72571;     Therapeutic Exercise:    41     mins  08236;     Neuromuscular Royce:        mins  10115;    Therapeutic Activity:          mins  27784;     Gait Training:           mins  67728;     Ultrasound:          mins  84387;    Ionto                                   mins   23907  Self Care                            mins   87015    Un-Timed:  Electrical Stimulation:         mins  59291 ( );  Canalith Repos         mins 10690  Dry Needling          mins 60094/77123  Traction          mins 23749  Re-Eval                               mins  00318      Timed Treatment:   45   mins   Total Treatment:     45   mins       PT Signature: Maryana North PT  [place] License #: [id number]  [place] License #: [id number]      Certification Period: 10/13/2023 thru 1/10/2024  I certify that the therapy services are furnished while this patient is under my care.  The services outlined above are required by this patient, and will be reviewed every 90 days.    Based upon review of the patient's progress and continued therapy plan, it is my medical opinion that Jo Asif should continue physical therapy treatment at [facility]  [address]  [place] [address] [phone].

## 2023-10-19 ENCOUNTER — TREATMENT (OUTPATIENT)
Dept: PHYSICAL THERAPY | Facility: CLINIC | Age: 43
End: 2023-10-19
Payer: COMMERCIAL

## 2023-10-19 DIAGNOSIS — M75.41 CORACOID IMPINGEMENT OF RIGHT SHOULDER: ICD-10-CM

## 2023-10-19 DIAGNOSIS — M25.511 RIGHT SHOULDER PAIN, UNSPECIFIED CHRONICITY: ICD-10-CM

## 2023-10-19 DIAGNOSIS — M54.2 PAIN, NECK: ICD-10-CM

## 2023-10-19 DIAGNOSIS — M75.81 TENDINITIS OF RIGHT ROTATOR CUFF: ICD-10-CM

## 2023-10-19 DIAGNOSIS — M54.12 RADICULOPATHY, CERVICAL: Primary | ICD-10-CM

## 2023-10-19 PROCEDURE — 97140 MANUAL THERAPY 1/> REGIONS: CPT | Performed by: PHYSICAL THERAPIST

## 2023-10-19 PROCEDURE — 97014 ELECTRIC STIMULATION THERAPY: CPT | Performed by: PHYSICAL THERAPIST

## 2023-10-19 NOTE — PROGRESS NOTES
Physical Therapy Daily Treatment Note      [facility]              [address], [address]                [place]  [address]        Patient: Jo Asif   : 1980  Diagnosis/ICD-10 Code:  Radiculopathy, cervical [M54.12]  Referring practitioner: Francisco Demarco IV, MD  Date of Initial Visit: Type: THERAPY  Noted: 2023  Today's Date: 10/19/2023  Patient seen for 30 sessions         Subjective    Jo Asif reports: she is in a lot of pain today.  She said her neck and back are killing her.  She isn't sure if she slept on it wrong or what.  She has been laying on her 's hard mattress whenever she got a chance today.            Objective   See Exercise, Manual, and Modality Logs for complete treatment.     (B) UT, levator, cervical paraspinals, and scalene tightness    Assessment/Plan  Pt with significant pain thus focused on pain relief methods today.  Manual to address tightness and tenderness.  As well as added estim and heat for pain control. Will monitor tolerance and plan to reintroduce mechanical traction as pt with good relief from manual as well as exercise if pain in more under control.      Progress per Plan of Care           Timed:  Manual Therapy:    23     mins  71226;  Therapeutic Exercise:         mins  06090;     Neuromuscular Royce:        mins  42516;    Therapeutic Activity:          mins  46048;     Gait Training:           mins  77135;     Ultrasound:          mins  51223;     Work Conditioning/Hardening (initial 2 hours)        mins  15946  Work Conditioning/Hardening (each add'l hour)        mins  33396    Untimed:   Electrical Stimulation:    15     mins  55708 ( );  Traction          mins 28856    Timed Treatment:   23   mins   Total Treatment:     38   mins    Gwendolyn Rhodes PTA  Physical Therapist Assistant

## 2023-10-23 ENCOUNTER — TREATMENT (OUTPATIENT)
Dept: PHYSICAL THERAPY | Facility: CLINIC | Age: 43
End: 2023-10-23
Payer: COMMERCIAL

## 2023-10-23 DIAGNOSIS — M54.12 RADICULOPATHY, CERVICAL: Primary | ICD-10-CM

## 2023-10-23 DIAGNOSIS — M54.2 PAIN, NECK: ICD-10-CM

## 2023-10-23 DIAGNOSIS — M25.511 RIGHT SHOULDER PAIN, UNSPECIFIED CHRONICITY: ICD-10-CM

## 2023-10-23 PROCEDURE — 97012 MECHANICAL TRACTION THERAPY: CPT | Performed by: PHYSICAL THERAPIST

## 2023-10-23 PROCEDURE — 97110 THERAPEUTIC EXERCISES: CPT | Performed by: PHYSICAL THERAPIST

## 2023-10-23 PROCEDURE — 97140 MANUAL THERAPY 1/> REGIONS: CPT | Performed by: PHYSICAL THERAPIST

## 2023-10-23 NOTE — PROGRESS NOTES
Physical Therapy Daily Treatment Note      [facility]              [address]                [facility] [place]  [address]        Patient: Jo Asif   : 1980  Diagnosis/ICD-10 Code:  Radiculopathy, cervical [M54.12]  Referring practitioner: Francisco Demarco IV, MD  Date of Initial Visit: Type: THERAPY  Noted: 2023  Today's Date: 10/23/2023  Patient seen for 31 sessions         Subjective    Jo Asif reports: its better than last week but still there.  Her shoulder is her biggest issue.           Objective   See Exercise, Manual, and Modality Logs for complete treatment.     (B) UT tightness, (R) 1st rib elevated,     Assessment/Plan  Continued (B) UT tightness, (R) greater than (L).  Able to reintroduce gentle exercises without increased complaints.  Continued with STM and SOR for pain relief however held manual traction for trial of mechanical.  She reported relief from this.  Deferred estim today.  Will monitor tolerance and progress as able.     Progress per Plan of Care           Timed:  Manual Therapy:    15     mins  17624;  Therapeutic Exercise:    15     mins  42811;     Neuromuscular Royce:        mins  08758;    Therapeutic Activity:          mins  04267;     Gait Training:           mins  67705;     Ultrasound:          mins  24587;     Work Conditioning/Hardening (initial 2 hours)        mins  15329  Work Conditioning/Hardening (each add'l hour)        mins  29495    Untimed:   Electrical Stimulation:         mins  65485 ( );  Traction     10     mins 30274    Timed Treatment:   30   mins   Total Treatment:     40   mins    Gwendolyn Rhodes PTA  Physical Therapist Assistant

## 2023-11-08 ENCOUNTER — TREATMENT (OUTPATIENT)
Dept: PHYSICAL THERAPY | Facility: CLINIC | Age: 43
End: 2023-11-08
Payer: COMMERCIAL

## 2023-11-08 DIAGNOSIS — M54.2 PAIN, NECK: ICD-10-CM

## 2023-11-08 DIAGNOSIS — M75.41 CORACOID IMPINGEMENT OF RIGHT SHOULDER: ICD-10-CM

## 2023-11-08 DIAGNOSIS — M75.81 TENDINITIS OF RIGHT ROTATOR CUFF: ICD-10-CM

## 2023-11-08 DIAGNOSIS — M54.12 RADICULOPATHY, CERVICAL: Primary | ICD-10-CM

## 2023-11-08 DIAGNOSIS — M25.511 RIGHT SHOULDER PAIN, UNSPECIFIED CHRONICITY: ICD-10-CM

## 2023-11-08 NOTE — PROGRESS NOTES
"Re-Assessment / Re-Certification      Patient: Jo Asif   : 1980  Diagnosis/ICD-10 Code:  Radiculopathy, cervical [M54.12]  Referring practitioner: Francisco Demarco IV, MD  Date of Initial Visit: Type: THERAPY  Noted: 2023  Today's Date: 2023  Patient seen for 32 sessions    Visit Diagnoses:      ICD-10-CM ICD-9-CM   1. Radiculopathy, cervical  M54.12 723.4   2. Pain, neck  M54.2 723.1   3. Right shoulder pain, unspecified chronicity  M25.511 719.41   4. Coracoid impingement of right shoulder  M75.41 726.2   5. Tendinitis of right rotator cuff  M75.81 726.10       Subjective:     Jo Asif reports she has noticed improvement in sleeping and sciatica symptoms since beginning therapy but states her shoulder is still causing her significant issues. She states she can tell a difference in her neck on weeks when she has not been to therapy. She reports difficulty with \"anything involving [her] R arm\" such as lifting a pot, opening a jar, or carrying a load of laundry.    Subjective Questionnaire: QuickDASH: 77.27 ; NDI: 20 ; Modified Oswestry: 16  Clinical Progress: improved  Home Program Compliance: Yes  Treatment has included: therapeutic exercise, manual therapy, therapeutic activity, electrical stimulation, traction, and moist heat    Subjective   Objective          Active Range of Motion     Lumbar   Flexion: 90 degrees   Extension: 23 degrees   Left rotation: with pain  Right rotation: with pain    Strength/Myotome Testing     Right Shoulder     Planes of Motion   Flexion: 4+   Extension: 5   Abduction: 4+   External rotation at 0°: 4+   Internal rotation at 0°: 5     Right Elbow   Flexion: 5  Extension: 5    Right Hip   Planes of Motion   Flexion: 4    Right Knee   Flexion: 5  Extension: 5    Additional Strength Details  Pain with resisted R shoulder flex, abd, ext rot, and elbow ext      Assessment & Plan       Assessment  Assessment details: Pt demonstrates improvement, meeting all " "short term goals as well as one long term goal with progress being made toward several others. She reports improvement in her neck and back but states she still has significant pain and problems with her R shoulder. QuickDASH score 77.27 today. Pt demonstrates improving strength but has some remaining limitations of RUE strength. Most unmet goals are limited by pain at this time. She requires skilled therapy addressing remaining deficits in order to return to PLOF with decreased symptoms. Plan to progress as pt's tolerance allows.  Prognosis: good    Goals  Plan Goals: STGs in 4 weeks:    1) Decrease pain to 5-6/10 on average -- MET, 2-3/10   2) Increase cervical ROM by 10 degrees where limited as much -- MET  3) Increase UE/LE strength to 4/5 progressed LE -- MET  5) pnt to complete correct technique for home ex. -- MET   6) resting posture of < 9 degrees forward head. -- MET    LTGs by discharge:    1) Increase trunk/LE ROM to WFL/WNL -- PROGRESSING  2) Increase UE/LE strength =/> 4+/5 -- PROGRESSING  3) Pt will report pain levels 0-4/10 in cervical and UEs during functional tasks at home and work -- PARTIALLY MET, met for neck but not met for RUE  4) Pt will be able to stand 30-60 mins for basic ADLs without difficulty or pain -- MET  5) Pt will be able to wash/dress/groom without difficulty or increased pain upper body dressing completed -- NOT MET, reports it is \"about the same\"   6) Pt will be able to reach into overhead cabinets and grasp/hold items without difficulty or increased pain not met -- PROGRESSING, reports ability to raise RUE higher but still has trouble with higher cabinets  7) Pt will be able to lift/carry laundry baskets, pots/pans, garbage or grocery bags without difficulty or increased pain -- NOT MET  8) Pt will be able to sleep full nights most nights without waking from neck/UE/LBP/LE/R shldr -- PROGRESSING, reports improvement in sleeping      Progress toward previous goals: Partially " Met        Recommendations: Continue as planned  Timeframe: 3 months  Prognosis to achieve goals: good        Timed:         Manual Therapy:    10     mins  78656;     Therapeutic Exercise:    20     mins  41523;     Neuromuscular Royce:        mins  87354;    Therapeutic Activity:          mins  69428;     Gait Training:           mins  22140;     Ultrasound:          mins  82103;    Ionto                                   mins   78983  Self Care                            mins   47381    Un-Timed:  Electrical Stimulation:         mins  30175 (MC );  Traction          mins 15834  Re-Eval                               mins  95855      Timed Treatment:   30   mins   Total Treatment:     30   mins       PT Signature: Maryana North PT  [place] License #: [id number]  [place] License #: [id number]      Certification Period: 11/8/2023 thru 2/5/2024  I certify that the therapy services are furnished while this patient is under my care.  The services outlined above are required by this patient, and will be reviewed every 90 days.    Based upon review of the patient's progress and continued therapy plan, it is my medical opinion that Jo Asif should continue physical therapy treatment at [facility]  [address] [facility] [address]  [place] [id number] [phone].

## 2023-11-20 DIAGNOSIS — F32.1 CURRENT MODERATE EPISODE OF MAJOR DEPRESSIVE DISORDER WITHOUT PRIOR EPISODE: ICD-10-CM

## 2023-11-20 DIAGNOSIS — F41.9 ANXIETY: ICD-10-CM

## 2023-11-20 DIAGNOSIS — J45.20 MILD INTERMITTENT ASTHMA WITHOUT COMPLICATION: ICD-10-CM

## 2023-11-21 ENCOUNTER — TELEPHONE (OUTPATIENT)
Dept: FAMILY MEDICINE CLINIC | Facility: CLINIC | Age: 43
End: 2023-11-21
Payer: COMMERCIAL

## 2023-11-22 DIAGNOSIS — M25.511 CHRONIC RIGHT SHOULDER PAIN: ICD-10-CM

## 2023-11-22 DIAGNOSIS — G89.29 CHRONIC RIGHT SHOULDER PAIN: ICD-10-CM

## 2023-11-22 DIAGNOSIS — S29.019A THORACIC MYOFASCIAL STRAIN, INITIAL ENCOUNTER: Primary | ICD-10-CM

## 2023-11-22 DIAGNOSIS — M50.30 DEGENERATIVE DISC DISEASE, CERVICAL: ICD-10-CM

## 2023-11-22 RX ORDER — FLUOXETINE 10 MG/1
10 CAPSULE ORAL DAILY
Qty: 90 CAPSULE | Refills: 1 | Status: SHIPPED | OUTPATIENT
Start: 2023-11-22

## 2023-11-22 RX ORDER — TIOTROPIUM BROMIDE INHALATION SPRAY 3.12 UG/1
SPRAY, METERED RESPIRATORY (INHALATION)
Qty: 12 G | Refills: 3 | Status: SHIPPED | OUTPATIENT
Start: 2023-11-22

## 2023-11-22 RX ORDER — BUSPIRONE HYDROCHLORIDE 10 MG/1
TABLET ORAL
Qty: 180 TABLET | Refills: 1 | Status: SHIPPED | OUTPATIENT
Start: 2023-11-22

## 2023-12-11 RX ORDER — FLUTICASONE PROPIONATE AND SALMETEROL XINAFOATE 230; 21 UG/1; UG/1
2 AEROSOL, METERED RESPIRATORY (INHALATION) 2 TIMES DAILY
Qty: 36 G | Refills: 3 | Status: SHIPPED | OUTPATIENT
Start: 2023-12-11

## 2024-02-06 ENCOUNTER — OFFICE VISIT (OUTPATIENT)
Dept: FAMILY MEDICINE CLINIC | Facility: CLINIC | Age: 44
End: 2024-02-06
Payer: COMMERCIAL

## 2024-02-06 VITALS
SYSTOLIC BLOOD PRESSURE: 126 MMHG | HEART RATE: 74 BPM | RESPIRATION RATE: 18 BRPM | TEMPERATURE: 97.8 F | DIASTOLIC BLOOD PRESSURE: 82 MMHG | BODY MASS INDEX: 50.02 KG/M2 | OXYGEN SATURATION: 100 % | HEIGHT: 64 IN | WEIGHT: 293 LBS

## 2024-02-06 DIAGNOSIS — G89.29 CHRONIC RIGHT SHOULDER PAIN: Primary | ICD-10-CM

## 2024-02-06 DIAGNOSIS — M25.511 CHRONIC RIGHT SHOULDER PAIN: Primary | ICD-10-CM

## 2024-02-06 PROCEDURE — 99214 OFFICE O/P EST MOD 30 MIN: CPT | Performed by: NURSE PRACTITIONER

## 2024-02-06 RX ORDER — BUSPIRONE HYDROCHLORIDE 10 MG/1
10 TABLET ORAL
COMMUNITY

## 2024-02-06 RX ORDER — MELOXICAM 15 MG/1
15 TABLET ORAL DAILY
Qty: 90 TABLET | Refills: 0 | Status: SHIPPED | OUTPATIENT
Start: 2024-02-06

## 2024-02-06 NOTE — PROGRESS NOTES
"Chief Complaint  Shoulder Pain (Right shoulder, she has went to ortho and they wont do anything more. Has been to PT and they have not helped. Been to PM and that hasn't helped either. She doesn't know what to do next. She can't  anything. Hard to hold phone right now. Her pain level is a 6-7. Uses ice, heat, tens unit, pain patches. )    Subjective        Jo Asif presents to [facility]  History of Present Illness    Right shoulder pain:  Onset of pain began 12/2022. Severity of pain is moderate to severe. Frequency is intermittent to constant. Status is no change.  Location is anterior shoulder. There is no radiation. The quality of the pain is stabbing. There is hx injury 2021; arthroscopy March 2021 right shoulder per ortho. Currently following with ortho.  Visit with neurosurgery; dx cervical disc disease; EMG 7/21/23 RUE normal. PT isolated cause of pain to shoulder; has since stopped PT--could not afford co-pays. Following with pain management; declined opioids (no visits specifically for neck pain).  Aggravating factors include lifting, keyboarding. Relieving factors include injection, heat, ice, and lidocaine patches. Unrelieving factors include medication, rest, and physical therapy.  Associated symptoms include decreased mobility, weakness, spasms, numbness, and joint tenderness.  Pertinent negatives include crepitus, fever, bruising, difficulty initiating sleep, locking, popping, swelling, and tingling in arms.         Objective   Vital Signs:  /82 (BP Location: Right arm, Patient Position: Sitting, Cuff Size: Adult)   Pulse 74   Temp 97.8 °F (36.6 °C)   Resp 18   Ht 162.6 cm (64\")   Wt (!) 141 kg (310 lb)   SpO2 100%   BMI 53.21 kg/m²   Estimated body mass index is 53.21 kg/m² as calculated from the following:    Height as of this encounter: 162.6 cm (64\").    Weight as of this encounter: 141 kg (310 lb).               Physical " Exam  Constitutional:       General: She is not in acute distress.     Appearance: She is obese.   Cardiovascular:      Rate and Rhythm: Normal rate.      Chest Wall: PMI is not displaced.      Heart sounds: Normal heart sounds, S1 normal and S2 normal.   Pulmonary:      Effort: Pulmonary effort is normal.      Breath sounds: Normal breath sounds and air entry.   Musculoskeletal:      Right shoulder: Tenderness present. Decreased range of motion.   Skin:     General: Skin is warm and dry.   Neurological:      Mental Status: She is alert and oriented to person, place, and time.      Gait: Gait is intact.   Psychiatric:         Mood and Affect: Mood normal.         Thought Content: Thought content normal.        Result Review :      CMP          3/8/2023    16:17 4/24/2023    09:24   CMP   Glucose 87  94    BUN 13  13    Creatinine 0.64  0.65    EGFR 111.9  110.8    Sodium 140  139    Potassium 3.9  4.2    Chloride 105  102    Calcium 9.2  8.8    Total Protein 7.1     Albumin 4.1     Globulin 3.0     Total Bilirubin 0.2     Alkaline Phosphatase 68     AST (SGOT) 18     ALT (SGPT) 25     Albumin/Globulin Ratio 1.4     BUN/Creatinine Ratio 20.3  20.0    Anion Gap 10.0  10.0        3/8/23    Hemoglobin A1C  4.80 - 5.60 % 5.50       MRI SHOULDER RIGHT WO CONTRAST     Date of Exam: 7/31/2023 2:00 PM EDT     Indication: Right shoulder pain.     Comparison: Shoulder x-ray 2/2/2023     Technique:  Routine multiplanar/multisequence images of the right shoulder were obtained without contrast administration.       Findings:  There is limited evaluation of the body habitus and positioning within the magnet.  Rotator cuff:  Supraspinatus tendon: Mild distal tendinosis. Limited evaluation. No full-thickness tear. No significant muscle atrophy.  Infraspinatus tendon: Mild distal tendinosis. Limited evaluation. No full-thickness tear. No significant muscle atrophy.  Subscapularis tendon: Mild tendinosis suspected. No significant  muscle atrophy.  Teres minor tendon: No tendon abnormality. Moderate muscle atrophy. No quadrilateral space mass.     Glenohumeral joint:  Humeral head is centrally located within the glenoid. Physiologic joint fluid is present. No cartilage defects are identified. The inferior joint capsule is normal thickness and normal signal intensity.     Labrum:  No displaced labral tears. No paralabral cysts.     Biceps tendon:  Long head of the biceps tendon is appropriately located within the bicipital groove. It has normal signal intensity and morphology. It inserts appropriately on the labrum.      Acromioclavicular Joint:  Normal for age. No abnormal bone marrow edema. No os acromiale. No significant lateral downsloping of the acromion.     Bone:  No fractures or aggressive osseous lesions. Bone marrow signal intensity is normal.     Miscellaneous:  No significant subacromial subdeltoid fluid. No pathologically enlarged axillary lymph nodes. Deltoid muscle has normal size and signal intensity.     IMPRESSION:  Limited evaluation due to patient's body habitus and positioning.  No significant rotator cuff tears. There is tendinosis of the supraspinatus, infraspinatus and subscapularis.  There is moderate atrophy of the teres minor muscle without quadrilateral space mass.         MRI CERVICAL SPINE WO CONTRAST     Date of Exam: 3/9/2023 4:30 PM EST     Indication: Eval stenosis.     Comparison: CT 11/16/2022     Technique:  Routine multiplanar/multisequence sequence images of the cervical spine were obtained without contrast administration.       Findings:        Bones:  Vertebral body heights are maintained. No acute fracture. No focal lesions.     Spinal cord: Normal size, contour, and signal without evidence of mass or lesion.     Alignment: Anatomic without subluxation.     Degenerative changes:  - C1-C2: Unremarkable  - C2-C3: Mild broad-based disc bulge without spinal canal stenosis, cord compression or cord edema. No  neural foraminal narrowing.  - C3-C4: Moderate broad-based disc bulge with focal central disc protrusion causing mild indentation on the spinal cord without cord edema. Uncovertebral facet hypertrophy. No neural foraminal narrowing.  - C4-C5: Moderate broad-based disc bulge with mild indentation on the spinal cord without cord edema. Uncovertebral and facet hypertrophy causing moderate right and mild left neural foraminal narrowing.  - C5-C6: Moderate broad-based disc bulge with focal central disc protrusion causing mild indentation on the spinal cord without cord edema. Uncovertebral and facet hypertrophy. No neural foraminal narrowing.  - C6-C7: Mild broad-based disc bulge with mild effacement of thecal sac without cord compression or cord edema. No neural foraminal narrowing. Uncovertebral and facet hypertrophy.  - C7-T1: Unremarkable     PARASPINAL TISSUES:  Normal.     CRANIOCERVICAL JUNCTION:   Normal.      MISC:   N/A.     IMPRESSION:  Multilevel spondylosis.      Assessment and Plan     Diagnoses and all orders for this visit:    1. Chronic right shoulder pain (Primary)  Comments:  Recommended visit with pain manage to discuss potential benefit of cervical neck injection (discussed normal EMG).  Orders:  -     meloxicam (Mobic) 15 MG tablet; Take 1 tablet by mouth Daily.  Dispense: 90 tablet; Refill: 0           I spent 30 minutes caring for Jo on this date of service. This time includes time spent by me in the following activities:reviewing tests, obtaining and/or reviewing a separately obtained history, performing a medically appropriate examination and/or evaluation , counseling and educating the patient/family/caregiver, ordering medications, tests, or procedures, and documenting information in the medical record  Follow Up     Return for Follow up 8 weeks for right shoulder/Meloxicam update .  Patient was given instructions and counseling regarding her condition or for health maintenance advice.  Please see specific information pulled into the AVS if appropriate.

## 2024-02-08 ENCOUNTER — PRIOR AUTHORIZATION (OUTPATIENT)
Dept: FAMILY MEDICINE CLINIC | Facility: CLINIC | Age: 44
End: 2024-02-08
Payer: COMMERCIAL

## 2024-02-08 NOTE — TELEPHONE ENCOUNTER
Patient coverage Not found ,  I have updated to most recent insurance .   Response: Prior Authorization Department is unable to review this request at this time. Our records indicate that this is not one of our members. Please review the member's insurance demographics and submit to the appropriate party.

## 2024-02-09 NOTE — TELEPHONE ENCOUNTER
Disregard Ozempic , out of pocket cost is too high.   Patient spoke w/ insurance and is requesting Rybelsus in place of ozempic.

## 2024-02-12 ENCOUNTER — TELEPHONE (OUTPATIENT)
Dept: FAMILY MEDICINE CLINIC | Facility: CLINIC | Age: 44
End: 2024-02-12

## 2024-02-12 NOTE — TELEPHONE ENCOUNTER
Spoke with Jo. Informed that we received two sets of FMLA forms and both were completed and faxed on 02/08. Form that matches faxed number provided was refaxed again this morning. Informed Jo. Stated she will  a copy of FMLA forms at next appt.

## 2024-02-12 NOTE — TELEPHONE ENCOUNTER
Caller: Jo Asif    Relationship to patient: Self    Best call back number: [phone]     Patient is needing: NEEDING FMLA PAPERWORK REFAXED. WENT TO [employer] THAT HER EMPLOYER NO LONGER USES, PLEASE FAX AS SOON AS POSSIBLE [phone]. ONCE FAXED PLEASE CALL PATIENT AND ADVISE IT HAS BEEN RESENT.

## 2024-02-13 DIAGNOSIS — E66.01 OBESITY, MORBID, BMI 50 OR HIGHER: Primary | ICD-10-CM

## 2024-02-13 RX ORDER — ORAL SEMAGLUTIDE 7 MG/1
7 TABLET ORAL WEEKLY
Qty: 30 TABLET | Refills: 0 | Status: SHIPPED | OUTPATIENT
Start: 2024-02-13

## 2024-02-19 ENCOUNTER — OFFICE VISIT (OUTPATIENT)
Dept: FAMILY MEDICINE CLINIC | Facility: CLINIC | Age: 44
End: 2024-02-19
Payer: COMMERCIAL

## 2024-02-19 ENCOUNTER — OFFICE VISIT (OUTPATIENT)
Dept: ORTHOPEDIC SURGERY | Facility: CLINIC | Age: 44
End: 2024-02-19
Payer: COMMERCIAL

## 2024-02-19 ENCOUNTER — PREP FOR SURGERY (OUTPATIENT)
Dept: OTHER | Facility: HOSPITAL | Age: 44
End: 2024-02-19
Payer: COMMERCIAL

## 2024-02-19 VITALS
HEART RATE: 74 BPM | WEIGHT: 293 LBS | RESPIRATION RATE: 18 BRPM | BODY MASS INDEX: 50.02 KG/M2 | SYSTOLIC BLOOD PRESSURE: 158 MMHG | OXYGEN SATURATION: 99 % | DIASTOLIC BLOOD PRESSURE: 79 MMHG | HEIGHT: 64 IN | TEMPERATURE: 98.1 F

## 2024-02-19 VITALS — BODY MASS INDEX: 50.02 KG/M2 | HEIGHT: 64 IN | WEIGHT: 293 LBS | HEART RATE: 77 BPM

## 2024-02-19 DIAGNOSIS — R73.03 PREDIABETES: ICD-10-CM

## 2024-02-19 DIAGNOSIS — M51.36 LUMBAR DEGENERATIVE DISC DISEASE: ICD-10-CM

## 2024-02-19 DIAGNOSIS — M50.30 DEGENERATIVE DISC DISEASE, CERVICAL: ICD-10-CM

## 2024-02-19 DIAGNOSIS — E70.1 PKU (PHENYLKETONURIA): ICD-10-CM

## 2024-02-19 DIAGNOSIS — M25.511 CHRONIC RIGHT SHOULDER PAIN: Primary | ICD-10-CM

## 2024-02-19 DIAGNOSIS — E66.01 OBESITY, MORBID, BMI 50 OR HIGHER: ICD-10-CM

## 2024-02-19 DIAGNOSIS — J45.40 MODERATE PERSISTENT ASTHMA WITHOUT COMPLICATION: ICD-10-CM

## 2024-02-19 DIAGNOSIS — G43.909 MIGRAINE WITHOUT STATUS MIGRAINOSUS, NOT INTRACTABLE, UNSPECIFIED MIGRAINE TYPE: ICD-10-CM

## 2024-02-19 DIAGNOSIS — G89.29 CHRONIC RIGHT SHOULDER PAIN: Primary | ICD-10-CM

## 2024-02-19 DIAGNOSIS — M25.511 RIGHT SHOULDER PAIN, UNSPECIFIED CHRONICITY: Primary | ICD-10-CM

## 2024-02-19 DIAGNOSIS — F41.9 ANXIETY: ICD-10-CM

## 2024-02-19 PROCEDURE — 99214 OFFICE O/P EST MOD 30 MIN: CPT | Performed by: ORTHOPAEDIC SURGERY

## 2024-02-19 RX ORDER — FLUTICASONE FUROATE, UMECLIDINIUM BROMIDE AND VILANTEROL TRIFENATATE 200; 62.5; 25 UG/1; UG/1; UG/1
1 POWDER RESPIRATORY (INHALATION) DAILY
Qty: 60 EACH | Refills: 11 | Status: SHIPPED | OUTPATIENT
Start: 2024-02-19

## 2024-02-19 RX ORDER — ALBUTEROL SULFATE AND BUDESONIDE 90; 80 UG/1; UG/1
2 AEROSOL, METERED RESPIRATORY (INHALATION) EVERY 6 HOURS PRN
Qty: 10.7 G | Refills: 5 | Status: SHIPPED | OUTPATIENT
Start: 2024-02-19

## 2024-02-19 RX ORDER — FLUTICASONE FUROATE, UMECLIDINIUM BROMIDE AND VILANTEROL TRIFENATATE 200; 62.5; 25 UG/1; UG/1; UG/1
1 POWDER RESPIRATORY (INHALATION) DAILY
Qty: 60 EACH | Refills: 0 | Status: SHIPPED | OUTPATIENT
Start: 2024-02-19

## 2024-02-19 NOTE — PROGRESS NOTES
"     Patient ID: Jo Asif is a 43 y.o. female.  3/18/22 right shoulder arthroscopy with extensive debridement   And a steroid shot and physical therapy since I saw her in August.  Neck range of motion is improving little bit still with significant pain over the bicep groove    Review of Systems:        Objective:    Pulse 77   Ht 162.6 cm (64\")   Wt (!) 141 kg (310 lb)   LMP 01/30/2024   BMI 53.21 kg/m²     Physical Examination:   Right shoulder healed incisions with moderate pain over the bicep groove passive elevation 170 abduction 140 external rotation 60 but with pain but no weakness on Speed, Mingo, supraspinatus testing.  Belly press and liftoff are 5/5 and 4/5.  Sensory and motor exam are intact all distributions. Radial pulse is palpable and capillary refill is less than two seconds to all digits       Imaging:       Assessment:    Right shoulder pain possible increase in bicep tendinopathy    Plan:   Further options such as PRP therapy and/or diagnostic shoulder arthroscopy were discussed.  Shoulder arthroscopy could involve findings of a cuff tear not identified on MRI could involve bicep procedure such as bicep tenotomy discussed the functional cosmetic outcomes of tenotomy versus tenodesis.  She would like to proceed with diagnostic shoulder arthroscopy possible cuff repair possible bicep tenotomy  Risks and benefits, specifically risks of bleeding, scar, infection, fracture, stiffness, retear, nerve, tendon or artery damage, the need for further surgery, DVT, and loss of life or limb and rehab were discussed. All questions were answered and addressed.      Procedures          Disclaimer: Part of this note may be an electronic transcription/translation of spoken language to printed text using the Dragon Dictation System  "

## 2024-02-19 NOTE — PROGRESS NOTES
Chief Complaint  Chief Complaint   Patient presents with    Follow-up     FMLA, SOB and medication concerns due to new insurance .            Subjective          Jo Asif presents to [facility] for   History of Present Illness    She changed insurance, her co-pay for several meds is very high. She has pt assist forms for ubrelvy, synthroid, rybelsus and prozac.     Obesity, pt was on ozempic 1mg weekly, with insurance it is now >$3000, will not cover rybelsus either. She has pt assist forms    Asthma, not covering ventolin it is $45, stopped singulair d/t nightmares, spiriva and advair now too costly. Advair is not being covered. She was on symbicort in the past with side effects    Left-sided sciatica, lumbar degeneration, L4-5 disc bulge, she is following with [facility] pain management, has received epidural injections.  She is no longer taking gabapentin, was ineffective. She does continue naproxen.  FMLA has been provided in past, she is back to working regular hours but missing for appt's and flare ups.     -completed 4mo PT for lumbar and cervical  -wearing back brace, standing desk     Cervical degeneration, she has right upper extremity numbness and pain, with a history of carpal tunnel release. x-rays showed degeneration of C-spine, Cervical spine MRI showed multilevel spondylosis, disc bulges. She is following with Dr. Dav BETTS. EMG normal. R shoulder still hurts as well, can not lift, push or pull, she is following with Dr. Gaviria for rotator cuff, MRI completed, planning surgery for repair  MRI Cervical Spine Without Contrast (03/09/2023 16:56)      Migraines, f/w Dr. Jacob, she is on ajovy and now too costly as well, it is the only medication that has controlled migraines along with Ubrelvy for break through. FMLA recently updated, good through feb 2025.      She is requesting to continue FMLA the same for 4-5 visits/mo for treatment/appts for C/L spine and  shoulder, 1/week for flare up/episodes            The following portions of the patient's history were reviewed and updated as appropriate: allergies, current medications, past family history, past medical history, past social history, past surgical history and problem list.    Past Medical History:   Diagnosis Date    Abdominal pain 09/2022    Allergic     Anxiety     Arrhythmia     Asthma     Atrial fibrillation     Cholelithiasis     Depression     Disease of thyroid gland     GERD (gastroesophageal reflux disease)     Heart murmur     History of medical problems     HL (hearing loss)     Hypertension     Hypothyroidism     Iron disorder     Irritable bowel syndrome     Migraine     Morbid obesity     Obesity     PCOS (polycystic ovarian syndrome)     PKU (phenylketonuria)     Prediabetes     Rotator cuff tear 03/2022    Sleep apnea     Torn meniscus 03/2021     Past Surgical History:   Procedure Laterality Date    ABLATION OF DYSRHYTHMIC FOCUS      BLADDER SLING MODIFIED, ANTERIOR AND POSTERIOR VAGINAL REPAIR      CARDIAC CATHETERIZATION      CARDIAC ELECTROPHYSIOLOGY PROCEDURE N/A 06/10/2020    Procedure: EP/Ablation;  Surgeon: Joseph Andrews MD;  Location: [facility] CATH INVASIVE LOCATION;  Service: Cardiovascular;  Laterality: N/A;    CARPAL TUNNEL RELEASE Right     CHOLECYSTECTOMY N/A 09/20/2022    Procedure: CHOLECYSTECTOMY LAPAROSCOPIC WITH DAVINCI ROBOT;  Surgeon: Alf Whalen MD;  Location: [facility] MAIN OR;  Service: Robotics - DaVinci;  Laterality: N/A;    COLONOSCOPY N/A 06/30/2022    Procedure: COLONOSCOPY with random biopsies R/O microscopic colitis, polypectomy x 1;  Surgeon: Jeffrey Echevarria MD;  Location: [facility] ENDOSCOPY;  Service: Gastroenterology;  Laterality: N/A;    ENDOSCOPY N/A 06/30/2022    Procedure: ESOPHAGOGASTRODUODENOSCOPY with esophagus, gastric and duodenal biopsies;  Surgeon: Jeffrey Echevarria MD;  Location: [facility] ENDOSCOPY;  Service:  Gastroenterology;  Laterality: N/A;    GALLBLADDER SURGERY      KNEE ARTHROSCOPY Right 2021    Procedure: KNEE ARTHROSCOPY with  partial medial lateral meniscectomy;  Surgeon: Kb Gaviria MD;  Location: [facility] MAIN OR;  Service: Orthopedics;  Laterality: Right;    SHOULDER ARTHROSCOPY W/ ROTATOR CUFF REPAIR Right 2022    Procedure: SHOULDER ARTHROSCOPY WITH DEBRIDEMENT;  Surgeon: Kb Gaviria MD;  Location: [facility] MAIN OR;  Service: Orthopedics;  Laterality: Right;    TONSILLECTOMY      TUBAL ABDOMINAL LIGATION      UVULECTOMY       Family History   Problem Relation Age of Onset    Depression Mother     Hypertension Mother     Diabetes Mother     Cancer Mother         Breast    Hyperlipidemia Mother     Arthritis Father     Hypertension Father     Diabetes Father     Heart disease Father     Cancer Father         Lung    Early death Father     Hyperlipidemia Father     COPD Maternal Grandfather     Diabetes Maternal Grandmother     Hyperlipidemia Maternal Grandmother     Cancer Paternal Grandfather     Diabetes Paternal Grandmother     Hyperlipidemia Paternal Grandmother      Social History     Tobacco Use    Smoking status: Former     Packs/day: 0.10     Years: 6.00     Additional pack years: 0.00     Total pack years: 0.60     Types: Cigarettes     Quit date: 2009     Years since quittin.4    Smokeless tobacco: Never    Tobacco comments:     Rarely smoked   Substance Use Topics    Alcohol use: No       Current Outpatient Medications:     Accu-Chek Guide test strip, , Disp: , Rfl:     Accu-Chek Softclix Lancets lancets, , Disp: , Rfl:     Ajovy 225 MG/1.5ML solution prefilled syringe, Every 30 (Thirty) Days. For migraines, Disp: , Rfl:     albuterol sulfate  (90 Base) MCG/ACT inhaler, Inhale 2 puffs Every 6 (Six) Hours As Needed for Wheezing or Shortness of Air., Disp: 18 g, Rfl: 11    Blood Glucose Monitoring Suppl (Accu-Chek Guide Me) w/Device kit, , Disp: ,  Rfl:     busPIRone (BUSPAR) 10 MG tablet, TAKE 1 TABLET BY MOUTH TWICE A DAY, Disp: 180 tablet, Rfl: 1    busPIRone (BUSPAR) 10 MG tablet, Apply 1 tablet to the mouth or throat., Disp: , Rfl:     Cholecalciferol (Vitamin D) 50 MCG (2000 UT) capsule, Take 1 capsule by mouth Daily., Disp: , Rfl:     EPINEPHrine (EPIPEN) 0.3 MG/0.3ML solution auto-injector injection, Inject 0.3 mL into the appropriate muscle as directed by prescriber., Disp: , Rfl:     FLUoxetine (PROzac) 10 MG capsule, TAKE 1 CAPSULE BY MOUTH DAILY, Disp: 90 capsule, Rfl: 1    fluticasone (FLONASE) 50 MCG/ACT nasal spray, 2 sprays into the nostril(s) as directed by provider Daily., Disp: , Rfl:     HM LIDOCAINE PATCH EX, Apply 1 patch topically Daily. 5%. On for 12 hours, off for 12 hours, Disp: , Rfl:     levocetirizine (XYZAL) 5 MG tablet, Take 1 tablet by mouth Every Evening., Disp: , Rfl:     levothyroxine (SYNTHROID, LEVOTHROID) 75 MCG tablet, Take 1 tablet by mouth Every Morning., Disp: 90 tablet, Rfl: 1    lisinopril-hydrochlorothiazide (PRINZIDE,ZESTORETIC) 10-12.5 MG per tablet, TAKE ONE TABLET BY MOUTH DAILY, Disp: 90 tablet, Rfl: 1    meloxicam (Mobic) 15 MG tablet, Take 1 tablet by mouth Daily., Disp: 90 tablet, Rfl: 0    minoxidil (LONITEN) 2.5 MG tablet, Take 0.5 tablets by mouth Daily., Disp: , Rfl:     omeprazole (priLOSEC) 40 MG capsule, TAKE ONE CAPSULE BY MOUTH DAILY, Disp: 90 capsule, Rfl: 1    Palynziq 10 MG/0.5ML solution prefilled syringe, 3 (Three) Times a Week. mwf, Disp: , Rfl:     Plecanatide (Trulance) 3 MG tablet, Take 1 tablet by mouth Daily., Disp: , Rfl:     Semaglutide (Rybelsus) 7 MG tablet, Take 7 mg by mouth 1 (One) Time Per Week., Disp: 30 tablet, Rfl: 0    spironolactone (ALDACTONE) 50 MG tablet, Take 1 tablet by mouth Daily., Disp: , Rfl:     ubrogepant (UBRELVY) 100 MG tablet, Take 1 tablet by mouth 1 (One) Time As Needed., Disp: , Rfl:     Albuterol-Budesonide (Airsupra) 90-80 MCG/ACT aerosol, Inhale 2 puffs  "Every 6 (Six) Hours As Needed (wheezing, shortness of air)., Disp: 10.7 g, Rfl: 5    Fluticasone-Umeclidin-Vilant (Trelegy Ellipta) 200-62.5-25 MCG/ACT aerosol powder , Inhale 1 puff Daily. (Free 30 day voucher), Disp: 60 each, Rfl: 0    Fluticasone-Umeclidin-Vilant (Trelegy Ellipta) 200-62.5-25 MCG/ACT aerosol powder , Inhale 1 puff Daily. (Use copay card), Disp: 60 each, Rfl: 11    Objective   Vital Signs:   /79 (BP Location: Left arm, Patient Position: Sitting, Cuff Size: Large Adult)   Pulse 74   Temp 98.1 °F (36.7 °C) (Oral)   Resp 18   Ht 162.6 cm (64\")   Wt (!) 142 kg (314 lb)   SpO2 99%   BMI 53.90 kg/m²           Physical Exam  Vitals and nursing note reviewed.   Constitutional:       General: She is not in acute distress.     Appearance: Normal appearance. She is well-developed. She is obese. She is not ill-appearing or diaphoretic.   HENT:      Head: Normocephalic.      Comments: Thin hair  Neck:      Thyroid: No thyromegaly.      Vascular: No JVD.   Cardiovascular:      Rate and Rhythm: Normal rate and regular rhythm.      Heart sounds: No murmur heard.  Pulmonary:      Effort: Pulmonary effort is normal. No respiratory distress.      Breath sounds: No stridor. No wheezing or rhonchi.   Abdominal:      General: Abdomen is flat. Bowel sounds are normal. There is no distension.      Tenderness: There is no abdominal tenderness.   Musculoskeletal:         General: Tenderness (mild/mod L sciatic region ttp with radiation of pain into left posterior leg to ankle, mild limited range of motion.  No mid lumbar spine tenderness. C-spine nontender, good rom) present. Normal range of motion.   Skin:     General: Skin is warm and dry.   Neurological:      General: No focal deficit present.      Mental Status: She is alert and oriented to person, place, and time. Mental status is at baseline.      Sensory: Sensory deficit (L leg) present.      Motor: No weakness.      Gait: Gait normal.   Psychiatric:    "      Attention and Perception: Attention normal.         Mood and Affect: Mood is anxious. Mood is not depressed.         Speech: Speech normal.         Behavior: Behavior normal. Behavior is cooperative.         Thought Content: Thought content normal.         Judgment: Judgment normal.          Result Review :     No visits with results within 7 Day(s) from this visit.   Latest known visit with results is:   Hospital Outpatient Visit on 04/24/2023   Component Date Value Ref Range Status    QT Interval 04/24/2023 391  ms Final                              Assessment and Plan    Diagnoses and all orders for this visit:    1. Chronic right shoulder pain (Primary)    2. Degenerative disc disease, cervical    3. Lumbar degenerative disc disease    4. Moderate persistent asthma without complication  -     Fluticasone-Umeclidin-Vilant (Trelegy Ellipta) 200-62.5-25 MCG/ACT aerosol powder ; Inhale 1 puff Daily. (Free 30 day voucher)  Dispense: 60 each; Refill: 0  -     Fluticasone-Umeclidin-Vilant (Trelegy Ellipta) 200-62.5-25 MCG/ACT aerosol powder ; Inhale 1 puff Daily. (Use copay card)  Dispense: 60 each; Refill: 11  -     Albuterol-Budesonide (Airsupra) 90-80 MCG/ACT aerosol; Inhale 2 puffs Every 6 (Six) Hours As Needed (wheezing, shortness of air).  Dispense: 10.7 g; Refill: 5    5. Prediabetes    6. Obesity, morbid, BMI 50 or higher    7. Migraine without status migrainosus, not intractable, unspecified migraine type    8. PKU (phenylketonuria)    9. Anxiety      Pt to check with insurance if they use a specific mail order for cost  Will try for Trelegy, provided sample, copay card and free 30 day   Stop spiriva  Recommend trial of Airsupra, provided $0 copay card  Will complete pt assist forms for meds: increase ozempic to 2mg, Ubrelvy, Prozac, synthroid   FMLA forms were recently completed and faxed  Continue with pain management  Continue with Dr. Gaviria for shoulder and poss upcoming surgery  Pt to check with   [place] for copay card or pt assistant program for [employer]      I spent 45 minutes caring for Jo Asif on this date of service. This time includes time spent by me in the following activities: preparing for the visit, reviewing tests, performing a medically appropriate examination and/or evaluation , counseling and educating the patient/family/caregiver, ordering medications, tests, or procedures and documenting information in the medical record        Follow Up     Return for Next scheduled follow up.  Patient was given instructions and counseling regarding her condition or for health maintenance advice. Please see specific information pulled into the AVS if appropriate.        Part of this note may be an electronic transcription/translation of spoken language to printed text using the Dragon Dictation System

## 2024-03-12 DIAGNOSIS — E66.01 OBESITY, MORBID, BMI 50 OR HIGHER: ICD-10-CM

## 2024-03-12 DIAGNOSIS — F41.9 ANXIETY: ICD-10-CM

## 2024-03-12 DIAGNOSIS — E03.9 ACQUIRED HYPOTHYROIDISM: ICD-10-CM

## 2024-03-12 DIAGNOSIS — R73.03 PREDIABETES: ICD-10-CM

## 2024-03-12 DIAGNOSIS — J45.40 MODERATE PERSISTENT ASTHMA WITHOUT COMPLICATION: Primary | ICD-10-CM

## 2024-03-12 DIAGNOSIS — G43.709 CHRONIC MIGRAINE WITHOUT AURA WITHOUT STATUS MIGRAINOSUS, NOT INTRACTABLE: ICD-10-CM

## 2024-03-12 DIAGNOSIS — F32.1 CURRENT MODERATE EPISODE OF MAJOR DEPRESSIVE DISORDER WITHOUT PRIOR EPISODE: ICD-10-CM

## 2024-03-12 RX ORDER — FLUTICASONE FUROATE, UMECLIDINIUM BROMIDE AND VILANTEROL TRIFENATATE 200; 62.5; 25 UG/1; UG/1; UG/1
1 POWDER RESPIRATORY (INHALATION) DAILY
Qty: 60 EACH | Refills: 11 | Status: SHIPPED | OUTPATIENT
Start: 2024-03-12

## 2024-03-12 RX ORDER — SEMAGLUTIDE 2.68 MG/ML
2 INJECTION, SOLUTION SUBCUTANEOUS WEEKLY
Qty: 3 ML | Refills: 11 | Status: SHIPPED | OUTPATIENT
Start: 2024-03-12

## 2024-03-12 RX ORDER — LEVOTHYROXINE SODIUM 0.07 MG/1
75 TABLET ORAL
Qty: 90 TABLET | Refills: 3 | Status: SHIPPED | OUTPATIENT
Start: 2024-03-12

## 2024-03-12 RX ORDER — FLUOXETINE 10 MG/1
10 CAPSULE ORAL DAILY
Qty: 90 CAPSULE | Refills: 3 | Status: SHIPPED | OUTPATIENT
Start: 2024-03-12

## 2024-03-12 RX ORDER — FREMANEZUMAB-VFRM 225 MG/1.5ML
1.5 INJECTION SUBCUTANEOUS
Qty: 1.68 ML | Refills: 11 | Status: SHIPPED | OUTPATIENT
Start: 2024-03-12

## 2024-03-15 ENCOUNTER — TELEPHONE (OUTPATIENT)
Dept: FAMILY MEDICINE CLINIC | Facility: CLINIC | Age: 44
End: 2024-03-15
Payer: COMMERCIAL

## 2024-03-15 ENCOUNTER — HOSPITAL ENCOUNTER (OUTPATIENT)
Dept: CARDIOLOGY | Facility: HOSPITAL | Age: 44
Discharge: HOME | End: 2024-03-15
Payer: COMMERCIAL

## 2024-03-15 ENCOUNTER — LAB (OUTPATIENT)
Dept: LAB | Facility: HOSPITAL | Age: 44
End: 2024-03-15
Payer: COMMERCIAL

## 2024-03-15 DIAGNOSIS — M25.511 RIGHT SHOULDER PAIN, UNSPECIFIED CHRONICITY: ICD-10-CM

## 2024-03-15 LAB
ANION GAP SERPL CALCULATED.3IONS-SCNC: 10.9 MMOL/L (ref 5–15)
APTT PPP: 24.9 SECONDS (ref 24–31)
BASOPHILS # BLD AUTO: 0.05 10*3/MM3 (ref 0–0.2)
BASOPHILS NFR BLD AUTO: 0.6 % (ref 0–1.5)
BUN SERPL-MCNC: 6 MG/DL (ref 6–20)
BUN/CREAT SERPL: 10 (ref 7–25)
CALCIUM SPEC-SCNC: 8.9 MG/DL (ref 8.6–10.5)
CHLORIDE SERPL-SCNC: 103 MMOL/L (ref 98–107)
CO2 SERPL-SCNC: 26.1 MMOL/L (ref 22–29)
CREAT SERPL-MCNC: 0.6 MG/DL (ref 0.57–1)
DEPRECATED RDW RBC AUTO: 40.9 FL (ref 37–54)
EGFRCR SERPLBLD CKD-EPI 2021: 113 ML/MIN/1.73
EOSINOPHIL # BLD AUTO: 0.19 10*3/MM3 (ref 0–0.4)
EOSINOPHIL NFR BLD AUTO: 2.2 % (ref 0.3–6.2)
ERYTHROCYTE [DISTWIDTH] IN BLOOD BY AUTOMATED COUNT: 12.2 % (ref 12.3–15.4)
GLUCOSE SERPL-MCNC: 86 MG/DL (ref 65–99)
HCT VFR BLD AUTO: 38.7 % (ref 34–46.6)
HGB BLD-MCNC: 13.2 G/DL (ref 12–15.9)
IMM GRANULOCYTES # BLD AUTO: 0.04 10*3/MM3 (ref 0–0.05)
IMM GRANULOCYTES NFR BLD AUTO: 0.5 % (ref 0–0.5)
INR PPP: 0.95 (ref 0.93–1.1)
LYMPHOCYTES # BLD AUTO: 2.01 10*3/MM3 (ref 0.7–3.1)
LYMPHOCYTES NFR BLD AUTO: 23.6 % (ref 19.6–45.3)
MCH RBC QN AUTO: 31.7 PG (ref 26.6–33)
MCHC RBC AUTO-ENTMCNC: 34.1 G/DL (ref 31.5–35.7)
MCV RBC AUTO: 93 FL (ref 79–97)
MONOCYTES # BLD AUTO: 0.57 10*3/MM3 (ref 0.1–0.9)
MONOCYTES NFR BLD AUTO: 6.7 % (ref 5–12)
NEUTROPHILS NFR BLD AUTO: 5.66 10*3/MM3 (ref 1.7–7)
NEUTROPHILS NFR BLD AUTO: 66.4 % (ref 42.7–76)
NRBC BLD AUTO-RTO: 0 /100 WBC (ref 0–0.2)
PLATELET # BLD AUTO: 265 10*3/MM3 (ref 140–450)
PMV BLD AUTO: 9.9 FL (ref 6–12)
POTASSIUM SERPL-SCNC: 3.6 MMOL/L (ref 3.5–5.2)
PROTHROMBIN TIME: 10.4 SECONDS (ref 9.6–11.7)
QT INTERVAL: 409 MS
QTC INTERVAL: 438 MS
RBC # BLD AUTO: 4.16 10*6/MM3 (ref 3.77–5.28)
SODIUM SERPL-SCNC: 140 MMOL/L (ref 136–145)
WBC NRBC COR # BLD AUTO: 8.52 10*3/MM3 (ref 3.4–10.8)

## 2024-03-15 PROCEDURE — 85730 THROMBOPLASTIN TIME PARTIAL: CPT

## 2024-03-15 PROCEDURE — 80048 BASIC METABOLIC PNL TOTAL CA: CPT

## 2024-03-15 PROCEDURE — 93005 ELECTROCARDIOGRAM TRACING: CPT | Performed by: ORTHOPAEDIC SURGERY

## 2024-03-15 PROCEDURE — 85025 COMPLETE CBC W/AUTO DIFF WBC: CPT

## 2024-03-15 PROCEDURE — 85610 PROTHROMBIN TIME: CPT

## 2024-03-15 PROCEDURE — 36415 COLL VENOUS BLD VENIPUNCTURE: CPT

## 2024-03-15 NOTE — TELEPHONE ENCOUNTER
Spoke with patient to provid [employer] Notification to have patient call . Number provided below for future reference.   [phone]

## 2024-03-19 LAB
QT INTERVAL: 409 MS
QTC INTERVAL: 438 MS

## 2024-03-28 RX ORDER — PROMETHAZINE HYDROCHLORIDE 12.5 MG/1
12.5 TABLET ORAL EVERY 6 HOURS PRN
Qty: 21 TABLET | Refills: 1 | Status: SHIPPED | OUTPATIENT
Start: 2024-03-28

## 2024-03-28 RX ORDER — OXYCODONE HYDROCHLORIDE AND ACETAMINOPHEN 5; 325 MG/1; MG/1
1 TABLET ORAL EVERY 6 HOURS PRN
Qty: 10 TABLET | Refills: 0 | Status: SHIPPED | OUTPATIENT
Start: 2024-03-28

## 2024-03-28 RX ORDER — CEPHALEXIN 500 MG/1
500 CAPSULE ORAL 4 TIMES DAILY
Qty: 4 CAPSULE | Refills: 0 | Status: SHIPPED | OUTPATIENT
Start: 2024-03-28 | End: 2024-03-29

## 2024-03-28 RX ORDER — NAPROXEN 500 MG/1
500 TABLET ORAL 2 TIMES DAILY WITH MEALS
Qty: 28 TABLET | Refills: 0 | Status: SHIPPED | OUTPATIENT
Start: 2024-03-28

## 2024-03-28 NOTE — H&P
[facility]   HISTORY AND PHYSICAL    Patient Name: Jo Asif  : 1980  MRN: [id number]  Primary Care Physician:  Kimberlee Cavanaugh APRN  Date of admission: (Not on file)    Subjective   Subjective     Chief Complaint: Right shoulder pain    This is a 43-year-old female with right shoulder pain presenting for diagnostic shoulder arthroscopy possible cuff repair possible bicep tenotomy        Review of Systems   Cardiovascular:  Negative for chest pain.   Musculoskeletal:  Positive for arthralgias.        Personal History     Past Medical History:   Diagnosis Date    Abdominal pain 2022    Allergic     Anxiety     Arrhythmia     Asthma     no inhalers    Atrial fibrillation     had ablation, no issues now    Cholelithiasis     Depression     Disease of thyroid gland     GERD (gastroesophageal reflux disease)     Heart murmur     History of medical problems     HL (hearing loss)     Hypertension     Hypothyroidism     Iron disorder     excess in labwork    Irritable bowel syndrome     Migraine     Morbid obesity     Obesity     PCOS (polycystic ovarian syndrome)     PKU (phenylketonuria)     Prediabetes     Rotator cuff tear 2022    right    Sleep apnea     no machine    Torn meniscus 2021    right       Past Surgical History:   Procedure Laterality Date    ABLATION OF DYSRHYTHMIC FOCUS      BLADDER SLING MODIFIED, ANTERIOR AND POSTERIOR VAGINAL REPAIR      CARDIAC CATHETERIZATION      CARDIAC ELECTROPHYSIOLOGY PROCEDURE N/A 06/10/2020    Procedure: EP/Ablation;  Surgeon: Joseph Andrews MD;  Location: [facility] CATH INVASIVE LOCATION;  Service: Cardiovascular;  Laterality: N/A;    CARPAL TUNNEL RELEASE Right     CHOLECYSTECTOMY N/A 2022    Procedure: CHOLECYSTECTOMY LAPAROSCOPIC WITH DAVINCI ROBOT;  Surgeon: Alf Whalen MD;  Location: [facility] MAIN OR;  Service: Robotics - DaVinci;  Laterality: N/A;    COLONOSCOPY N/A 2022    Procedure: COLONOSCOPY with random  biopsies R/O microscopic colitis, polypectomy x 1;  Surgeon: Jeffrey Echevarria MD;  Location: [facility] ENDOSCOPY;  Service: Gastroenterology;  Laterality: N/A;    ENDOSCOPY N/A 06/30/2022    Procedure: ESOPHAGOGASTRODUODENOSCOPY with esophagus, gastric and duodenal biopsies;  Surgeon: Jeffrey Echevarria MD;  Location: [facility] ENDOSCOPY;  Service: Gastroenterology;  Laterality: N/A;    GALLBLADDER SURGERY  2022    KNEE ARTHROSCOPY Right 03/26/2021    Procedure: KNEE ARTHROSCOPY with  partial medial lateral meniscectomy;  Surgeon: Kb Gaviria MD;  Location: [facility] MAIN OR;  Service: Orthopedics;  Laterality: Right;    SHOULDER ARTHROSCOPY W/ ROTATOR CUFF REPAIR Right 03/18/2022    Procedure: SHOULDER ARTHROSCOPY WITH DEBRIDEMENT;  Surgeon: Kb Gaviria MD;  Location: [facility] MAIN OR;  Service: Orthopedics;  Laterality: Right;    TONSILLECTOMY      TUBAL ABDOMINAL LIGATION      UVULECTOMY         Family History: family history includes Arthritis in her father; COPD in her maternal grandfather; Cancer in her father, mother, and paternal grandfather; Depression in her mother; Diabetes in her father, maternal grandmother, mother, and paternal grandmother; Early death in her father; Heart disease in her father; Hyperlipidemia in her father, maternal grandmother, mother, and paternal grandmother; Hypertension in her father and mother. Otherwise pertinent FHx was reviewed and not pertinent to current issue.    Social History:  reports that she quit smoking about 14 years ago. Her smoking use included cigarettes. She started smoking about 20 years ago. She has a 0.6 pack-year smoking history. She has never used smokeless tobacco. She reports that she does not drink alcohol and does not use drugs.    Home Medications:  Accu-Chek Guide Me, Accu-Chek Softclix Lancets, EPINEPHrine, FLUoxetine, Fluticasone-Umeclidin-Vilant, Fremanezumab-vfrm, Lidocaine, Pegvaliase-pqpz, Plecanatide, Semaglutide  (2 MG/DOSE), Vitamin D, albuterol sulfate HFA, busPIRone, fluticasone, glucose blood, levocetirizine, levothyroxine, lisinopril-hydrochlorothiazide, meloxicam, minoxidil, omeprazole, spironolactone, and ubrogepant    Allergies:  Allergies   Allergen Reactions    Amoxicillin Hives    Cleocin [Clindamycin Hcl] Anaphylaxis    Penicillins Hives       Objective    Objective   Right shoulder healed incisions with moderate pain over the bicep groove passive elevation 170 abduction 140 external rotation 60 but with pain but no weakness on Speed, [place], supraspinatus testing.  Belly press and liftoff are 5/5 and 4/5.  Sensory and motor exam are intact all distributions. Radial pulse is palpable and capillary refill is less than two seconds to all digits        Imaging:         Assessment:    Right shoulder pain possible increase in bicep tendinopathy     Plan:   Further options such as PRP therapy and/or diagnostic shoulder arthroscopy were discussed.  Shoulder arthroscopy could involve findings of a cuff tear not identified on MRI could involve bicep procedure such as bicep tenotomy discussed the functional cosmetic outcomes of tenotomy versus tenodesis.  She would like to proceed with diagnostic shoulder arthroscopy possible cuff repair possible bicep tenotomy  Risks and benefits, specifically risks of bleeding, scar, infection, fracture, stiffness, retear, nerve, tendon or artery damage, the need for further surgery, DVT, and loss of life or limb and rehab were discussed. All questions were answered and addressed.    Kb Gaviria MD

## 2024-03-29 ENCOUNTER — ANESTHESIA (OUTPATIENT)
Dept: PERIOP | Facility: HOSPITAL | Age: 44
End: 2024-03-29
Payer: COMMERCIAL

## 2024-03-29 ENCOUNTER — ANESTHESIA EVENT (OUTPATIENT)
Dept: PERIOP | Facility: HOSPITAL | Age: 44
End: 2024-03-29
Payer: COMMERCIAL

## 2024-03-29 ENCOUNTER — HOSPITAL ENCOUNTER (OUTPATIENT)
Facility: HOSPITAL | Age: 44
Setting detail: HOSPITAL OUTPATIENT SURGERY
Discharge: HOME | End: 2024-03-29
Attending: ORTHOPAEDIC SURGERY | Admitting: ORTHOPAEDIC SURGERY
Payer: COMMERCIAL

## 2024-03-29 VITALS
TEMPERATURE: 97.9 F | RESPIRATION RATE: 11 BRPM | HEART RATE: 74 BPM | OXYGEN SATURATION: 99 % | BODY MASS INDEX: 50.02 KG/M2 | HEIGHT: 64 IN | DIASTOLIC BLOOD PRESSURE: 63 MMHG | WEIGHT: 293 LBS | SYSTOLIC BLOOD PRESSURE: 114 MMHG

## 2024-03-29 DIAGNOSIS — M25.511 RIGHT SHOULDER PAIN, UNSPECIFIED CHRONICITY: Primary | ICD-10-CM

## 2024-03-29 LAB — B-HCG UR QL: NEGATIVE

## 2024-03-29 PROCEDURE — 25010000002 EPINEPHRINE PER 0.1 MG: Performed by: ORTHOPAEDIC SURGERY

## 2024-03-29 PROCEDURE — 25010000002 PROPOFOL 200 MG/20ML EMULSION

## 2024-03-29 PROCEDURE — 25010000002 LIDOCAINE 1 % SOLUTION 20 ML VIAL: Performed by: ORTHOPAEDIC SURGERY

## 2024-03-29 PROCEDURE — 25010000002 SUGAMMADEX 200 MG/2ML SOLUTION

## 2024-03-29 PROCEDURE — 25810000003 LACTATED RINGERS PER 1000 ML: Performed by: ORTHOPAEDIC SURGERY

## 2024-03-29 PROCEDURE — 25010000002 CEFAZOLIN 3 G RECONSTITUTED SOLUTION 1 EACH VIAL: Performed by: ORTHOPAEDIC SURGERY

## 2024-03-29 PROCEDURE — 29823 SHO ARTHRS SRG XTNSV DBRDMT: CPT | Performed by: PHYSICIAN ASSISTANT

## 2024-03-29 PROCEDURE — 25010000002 HYDROMORPHONE 1 MG/ML SOLUTION

## 2024-03-29 PROCEDURE — 25010000002 KETOROLAC TROMETHAMINE PER 15 MG: Performed by: ORTHOPAEDIC SURGERY

## 2024-03-29 PROCEDURE — 29823 SHO ARTHRS SRG XTNSV DBRDMT: CPT | Performed by: ORTHOPAEDIC SURGERY

## 2024-03-29 PROCEDURE — 25010000002 DEXAMETHASONE PER 1 MG

## 2024-03-29 PROCEDURE — 25010000002 FENTANYL CITRATE (PF) 100 MCG/2ML SOLUTION

## 2024-03-29 PROCEDURE — 25010000002 SUCCINYLCHOLINE PER 20 MG

## 2024-03-29 PROCEDURE — 25010000002 ONDANSETRON PER 1 MG

## 2024-03-29 PROCEDURE — 25010000002 BUPIVACAINE (PF) 0.25 % SOLUTION 10 ML VIAL: Performed by: ORTHOPAEDIC SURGERY

## 2024-03-29 PROCEDURE — 81025 URINE PREGNANCY TEST: CPT | Performed by: ORTHOPAEDIC SURGERY

## 2024-03-29 RX ORDER — ROCURONIUM BROMIDE 10 MG/ML
INJECTION, SOLUTION INTRAVENOUS AS NEEDED
Status: DISCONTINUED | OUTPATIENT
Start: 2024-03-29 | End: 2024-03-29 | Stop reason: SURG

## 2024-03-29 RX ORDER — FENTANYL CITRATE 50 UG/ML
25 INJECTION, SOLUTION INTRAMUSCULAR; INTRAVENOUS
Status: DISCONTINUED | OUTPATIENT
Start: 2024-03-29 | End: 2024-03-29 | Stop reason: HOSPADM

## 2024-03-29 RX ORDER — HYDROCODONE BITARTRATE AND ACETAMINOPHEN 7.5; 325 MG/1; MG/1
1 TABLET ORAL EVERY 4 HOURS PRN
Status: DISCONTINUED | OUTPATIENT
Start: 2024-03-29 | End: 2024-03-29 | Stop reason: HOSPADM

## 2024-03-29 RX ORDER — ACETAMINOPHEN 650 MG/1
325 SUPPOSITORY RECTAL EVERY 4 HOURS PRN
Status: DISCONTINUED | OUTPATIENT
Start: 2024-03-29 | End: 2024-03-29 | Stop reason: HOSPADM

## 2024-03-29 RX ORDER — LIDOCAINE HYDROCHLORIDE 10 MG/ML
0.5 INJECTION, SOLUTION INFILTRATION; PERINEURAL ONCE AS NEEDED
Status: DISCONTINUED | OUTPATIENT
Start: 2024-03-29 | End: 2024-03-29 | Stop reason: HOSPADM

## 2024-03-29 RX ORDER — ONDANSETRON 2 MG/ML
4 INJECTION INTRAMUSCULAR; INTRAVENOUS ONCE AS NEEDED
Status: DISCONTINUED | OUTPATIENT
Start: 2024-03-29 | End: 2024-03-29 | Stop reason: HOSPADM

## 2024-03-29 RX ORDER — PROCHLORPERAZINE EDISYLATE 5 MG/ML
10 INJECTION INTRAMUSCULAR; INTRAVENOUS ONCE AS NEEDED
Status: DISCONTINUED | OUTPATIENT
Start: 2024-03-29 | End: 2024-03-29 | Stop reason: HOSPADM

## 2024-03-29 RX ORDER — HYDRALAZINE HYDROCHLORIDE 20 MG/ML
5 INJECTION INTRAMUSCULAR; INTRAVENOUS
Status: DISCONTINUED | OUTPATIENT
Start: 2024-03-29 | End: 2024-03-29 | Stop reason: HOSPADM

## 2024-03-29 RX ORDER — SODIUM CHLORIDE 0.9 % (FLUSH) 0.9 %
10 SYRINGE (ML) INJECTION AS NEEDED
Status: DISCONTINUED | OUTPATIENT
Start: 2024-03-29 | End: 2024-03-29 | Stop reason: HOSPADM

## 2024-03-29 RX ORDER — DEXAMETHASONE SODIUM PHOSPHATE 4 MG/ML
INJECTION, SOLUTION INTRA-ARTICULAR; INTRALESIONAL; INTRAMUSCULAR; INTRAVENOUS; SOFT TISSUE AS NEEDED
Status: DISCONTINUED | OUTPATIENT
Start: 2024-03-29 | End: 2024-03-29 | Stop reason: SURG

## 2024-03-29 RX ORDER — LABETALOL HYDROCHLORIDE 5 MG/ML
5 INJECTION, SOLUTION INTRAVENOUS
Status: DISCONTINUED | OUTPATIENT
Start: 2024-03-29 | End: 2024-03-29 | Stop reason: HOSPADM

## 2024-03-29 RX ORDER — NALOXONE HCL 0.4 MG/ML
0.4 VIAL (ML) INJECTION AS NEEDED
Status: DISCONTINUED | OUTPATIENT
Start: 2024-03-29 | End: 2024-03-29 | Stop reason: HOSPADM

## 2024-03-29 RX ORDER — ALBUTEROL SULFATE 2.5 MG/3ML
2.5 SOLUTION RESPIRATORY (INHALATION) ONCE AS NEEDED
Status: DISCONTINUED | OUTPATIENT
Start: 2024-03-29 | End: 2024-03-29 | Stop reason: HOSPADM

## 2024-03-29 RX ORDER — DIPHENHYDRAMINE HYDROCHLORIDE 50 MG/ML
12.5 INJECTION INTRAMUSCULAR; INTRAVENOUS
Status: DISCONTINUED | OUTPATIENT
Start: 2024-03-29 | End: 2024-03-29 | Stop reason: HOSPADM

## 2024-03-29 RX ORDER — FENTANYL CITRATE 50 UG/ML
INJECTION, SOLUTION INTRAMUSCULAR; INTRAVENOUS AS NEEDED
Status: DISCONTINUED | OUTPATIENT
Start: 2024-03-29 | End: 2024-03-29 | Stop reason: SURG

## 2024-03-29 RX ORDER — SUCCINYLCHOLINE CHLORIDE 20 MG/ML
INJECTION INTRAMUSCULAR; INTRAVENOUS AS NEEDED
Status: DISCONTINUED | OUTPATIENT
Start: 2024-03-29 | End: 2024-03-29 | Stop reason: SURG

## 2024-03-29 RX ORDER — ACETAMINOPHEN 325 MG/1
650 TABLET ORAL ONCE AS NEEDED
Status: DISCONTINUED | OUTPATIENT
Start: 2024-03-29 | End: 2024-03-29 | Stop reason: HOSPADM

## 2024-03-29 RX ORDER — PROPOFOL 10 MG/ML
INJECTION, EMULSION INTRAVENOUS AS NEEDED
Status: DISCONTINUED | OUTPATIENT
Start: 2024-03-29 | End: 2024-03-29 | Stop reason: SURG

## 2024-03-29 RX ORDER — FENTANYL CITRATE 50 UG/ML
50 INJECTION, SOLUTION INTRAMUSCULAR; INTRAVENOUS
Status: DISCONTINUED | OUTPATIENT
Start: 2024-03-29 | End: 2024-03-29 | Stop reason: HOSPADM

## 2024-03-29 RX ORDER — LIDOCAINE HYDROCHLORIDE 20 MG/ML
INJECTION, SOLUTION EPIDURAL; INFILTRATION; INTRACAUDAL; PERINEURAL AS NEEDED
Status: DISCONTINUED | OUTPATIENT
Start: 2024-03-29 | End: 2024-03-29 | Stop reason: SURG

## 2024-03-29 RX ORDER — HYDROCODONE BITARTRATE AND ACETAMINOPHEN 5; 325 MG/1; MG/1
1 TABLET ORAL ONCE AS NEEDED
Status: DISCONTINUED | OUTPATIENT
Start: 2024-03-29 | End: 2024-03-29 | Stop reason: HOSPADM

## 2024-03-29 RX ORDER — SODIUM CHLORIDE, SODIUM LACTATE, POTASSIUM CHLORIDE, CALCIUM CHLORIDE 600; 310; 30; 20 MG/100ML; MG/100ML; MG/100ML; MG/100ML
1000 INJECTION, SOLUTION INTRAVENOUS CONTINUOUS
Status: DISCONTINUED | OUTPATIENT
Start: 2024-03-29 | End: 2024-03-29 | Stop reason: HOSPADM

## 2024-03-29 RX ORDER — ONDANSETRON 2 MG/ML
INJECTION INTRAMUSCULAR; INTRAVENOUS AS NEEDED
Status: DISCONTINUED | OUTPATIENT
Start: 2024-03-29 | End: 2024-03-29 | Stop reason: SURG

## 2024-03-29 RX ORDER — EPHEDRINE SULFATE 5 MG/ML
INJECTION INTRAVENOUS AS NEEDED
Status: DISCONTINUED | OUTPATIENT
Start: 2024-03-29 | End: 2024-03-29 | Stop reason: SURG

## 2024-03-29 RX ADMIN — ROCURONIUM 10 MG: 50 INJECTION, SOLUTION INTRAVENOUS at 08:14

## 2024-03-29 RX ADMIN — ONDANSETRON 4 MG: 2 INJECTION INTRAMUSCULAR; INTRAVENOUS at 08:22

## 2024-03-29 RX ADMIN — FENTANYL CITRATE 50 MCG: 50 INJECTION, SOLUTION INTRAMUSCULAR; INTRAVENOUS at 08:43

## 2024-03-29 RX ADMIN — PROPOFOL 280 MG: 10 INJECTION, EMULSION INTRAVENOUS at 07:48

## 2024-03-29 RX ADMIN — LIDOCAINE HYDROCHLORIDE 100 MG: 20 INJECTION, SOLUTION EPIDURAL; INFILTRATION; INTRACAUDAL; PERINEURAL at 07:48

## 2024-03-29 RX ADMIN — HYDROMORPHONE HYDROCHLORIDE 0.5 MG: 1 INJECTION, SOLUTION INTRAMUSCULAR; INTRAVENOUS; SUBCUTANEOUS at 09:07

## 2024-03-29 RX ADMIN — SODIUM CHLORIDE 3 G: 900 INJECTION INTRAVENOUS at 07:54

## 2024-03-29 RX ADMIN — PROPOFOL 50 MG: 10 INJECTION, EMULSION INTRAVENOUS at 08:14

## 2024-03-29 RX ADMIN — DEXAMETHASONE SODIUM PHOSPHATE 8 MG: 4 INJECTION, SOLUTION INTRAMUSCULAR; INTRAVENOUS at 08:04

## 2024-03-29 RX ADMIN — FENTANYL CITRATE 25 MCG: 50 INJECTION, SOLUTION INTRAMUSCULAR; INTRAVENOUS at 08:16

## 2024-03-29 RX ADMIN — HYDROCODONE BITARTRATE AND ACETAMINOPHEN 1 TABLET: 7.5; 325 TABLET ORAL at 09:30

## 2024-03-29 RX ADMIN — ROCURONIUM 20 MG: 50 INJECTION, SOLUTION INTRAVENOUS at 07:48

## 2024-03-29 RX ADMIN — EPHEDRINE SULFATE 5 MG: 5 INJECTION INTRAVENOUS at 08:22

## 2024-03-29 RX ADMIN — SUCCINYLCHOLINE CHLORIDE 160 MG: 20 INJECTION, SOLUTION INTRAMUSCULAR; INTRAVENOUS at 07:48

## 2024-03-29 RX ADMIN — FENTANYL CITRATE 50 MCG: 50 INJECTION, SOLUTION INTRAMUSCULAR; INTRAVENOUS at 08:47

## 2024-03-29 RX ADMIN — HYDROMORPHONE HYDROCHLORIDE 0.5 MG: 1 INJECTION, SOLUTION INTRAMUSCULAR; INTRAVENOUS; SUBCUTANEOUS at 09:20

## 2024-03-29 RX ADMIN — SUGAMMADEX 250 MG: 100 INJECTION, SOLUTION INTRAVENOUS at 08:36

## 2024-03-29 RX ADMIN — EPHEDRINE SULFATE 5 MG: 5 INJECTION INTRAVENOUS at 08:21

## 2024-03-29 RX ADMIN — FENTANYL CITRATE 75 MCG: 50 INJECTION, SOLUTION INTRAMUSCULAR; INTRAVENOUS at 07:48

## 2024-03-29 RX ADMIN — SODIUM CHLORIDE, SODIUM LACTATE, POTASSIUM CHLORIDE, AND CALCIUM CHLORIDE: .6; .31; .03; .02 INJECTION, SOLUTION INTRAVENOUS at 07:41

## 2024-03-29 NOTE — ANESTHESIA POSTPROCEDURE EVALUATION
Patient: Jo Asif    Procedure Summary       Date: 03/29/24 Room / Location: [facility] OR 12 / [facility] MAIN OR    Anesthesia Start: 0741 Anesthesia Stop: 0850    Procedure: SHOULDER ARTHROSCOPY WITH extensive debridement and bicep tenotomy (Right: Shoulder) Diagnosis:       Right shoulder pain, unspecified chronicity      (Right shoulder pain, unspecified chronicity [M25.511])    Surgeons: Kb Gaviria MD Provider: Felton Lemus MD    Anesthesia Type: general ASA Status: 4            Anesthesia Type: general    Vitals  Vitals Value Taken Time   /74 03/29/24 0945   Temp 97.7 °F (36.5 °C) 03/29/24 0945   Pulse 70 03/29/24 0946   Resp 11 03/29/24 0945   SpO2 96 % 03/29/24 0946   Vitals shown include unfiled device data.        Post Anesthesia Care and Evaluation    Patient location during evaluation: PACU  Patient participation: complete - patient participated  Level of consciousness: awake  Pain scale: See nurse's notes for pain score.  Pain management: adequate    Airway patency: patent  Anesthetic complications: No anesthetic complications  PONV Status: none  Cardiovascular status: acceptable  Respiratory status: acceptable and spontaneous ventilation  Hydration status: acceptable    Comments: Patient seen and examined postoperatively; vital signs stable; SpO2 greater than or equal to 90%; cardiopulmonary status stable; nausea/vomiting adequately controlled; pain adequately controlled; no apparent anesthesia complications; patient discharged from anesthesia care when discharge criteria were met

## 2024-03-29 NOTE — ANESTHESIA PREPROCEDURE EVALUATION
Anesthesia Evaluation     Patient summary reviewed and Nursing notes reviewed   no history of anesthetic complications:   NPO Solid Status: > 8 hours  NPO Liquid Status: > 8 hours           Airway   Mallampati: II  TM distance: >3 FB  Neck ROM: full  No difficulty expected  Dental - normal exam     Pulmonary - normal exam   (+) a smoker Former, asthma,sleep apnea  Cardiovascular - normal exam    ECG reviewed    (+) hypertension, valvular problems/murmurs MR and murmur, dysrhythmias Paroxysmal Atrial Fib, hyperlipidemia      Neuro/Psych  (+) headaches, numbness, psychiatric history Anxiety and Depression  GI/Hepatic/Renal/Endo    (+) morbid obesity, GERD, liver disease history of elevated LFT, thyroid problem hypothyroidism    Musculoskeletal     (+) chronic pain  Abdominal  - normal exam   Substance History      OB/GYN          Other        ROS/Med Hx Other: Biliary colic, migraines, allergies, palpitations, near syncope, PKU, sciatica, dysphagia, pre-DM, knee and shoulder pain, iron d/o, abd pain, cholelithiasis, PCOS, IBS    H/O Afib - s/p ablation    Echo  Clinically difficult study therefore Lumason contrast was used.  Normal LV size and contractility EF of 60 to 65%  Normal RV size  Normal atrial size  Aortic valve, mitral valve, tricuspid valve appears structurally normal, mild mitral regurgitation seen.  No pericardial effusion seen.  Proximal aorta appears normal in size.    Stress  · Findings consistent with a normal ECG stress test.  · Left ventricular ejection fraction is normal. (Calculated EF = 62%).  · Myocardial perfusion imaging indicates a normal myocardial perfusion study with no evidence of ischemia.  · Impressions are consistent with a low risk study.  · PVCs were noted during the stress test.        PSH  CARPAL TUNNEL RELEASE TONSILLECTOMY  TUBAL ABDOMINAL LIGATION UVULECTOMY  CARDIAC ELECTROPHYSIOLOGY PROCEDURE ABLATION OF DYSRHYTHMIC FOCUS  KNEE ARTHROSCOPY SHOULDER ARTHROSCOPY W/ ROTATOR  CUFF REPAIR  ENDOSCOPY COLONOSCOPY  CARDIAC CATHETERIZATION                     Anesthesia Plan    ASA 4     general     (Patient identified; pre-operative vital signs, all relevant labs/studies, complete medical/surgical/anesthetic history, full medication list, full allergy list, and NPO status obtained/reviewed; physical assessment performed; anesthetic options, side effects, potential complications, risks, and benefits discussed; questions answered; written anesthesia consent obtained; patient cleared for procedure; anesthesia machine and equipment checked and functioning)  intravenous induction     Anesthetic plan, risks, benefits, and alternatives have been provided, discussed and informed consent has been obtained with: patient.    Plan discussed with CRNA.        CODE STATUS:

## 2024-03-29 NOTE — OP NOTE
SHOULDER ARTHROSCOPY WITH ROTATOR CUFF REPAIR  Procedure Report    Patient Name:  Jo Asif  YOB: 1980    Date of Surgery:  3/29/2024     Indications: This is a 43 y.o. female with pain to the right shoulder.  Imaging demonstrated rotator cuff tear and possible bicep tendinopathy.Treatment options were discussed.  They desired to proceed with shoulder arthroscopy with rotator cuff repair after discussing the risks including bleeding, scarring, infection, stiffness, nerve damage, tendon damage, artery damage, continued pain, DVT, loss of life or limb, and a need for further surgery.      Pre-op Diagnosis:   Right shoulder pain, unspecified chronicity [M25.511]       Post-op Diagnosis:    Right shoulder partial subscapularis and supraspinatus tears, circumferential labral tear, grade 2 chondral defect humeral head and 50% tear of the bicep tendon    Procedure/CPT® Codes: 85380    Procedure(s): Right  SHOULDER ARTHROSCOPY WITH extensive debridement and bicep tenotomy    Assistant: Frantz Adler physician assistant    was responsible for performing the following activities: Retraction, Suction, Irrigation, Suturing, Closing, and Placing Dressing and their skilled assistance was necessary for the success of this case.         Anesthesia: General with Block    IV fluids: See anesthesia record    Estimated Blood Loss: minimal    Implants:    Nothing was implanted during the procedure      Complications: None    Specimens:none    Description of Procedure: The patient's operative site was marked.  Regional anesthesia was administered.  They were brought to the operating room and placed  on the operating room table.  General anesthesia was administered. Antibiotics were dosed.  A timeout was taken, confirming the correct operative site and procedure.  Exam under anesthesia indicated full range of motion and no instability.  They were placed in a semilateral position.  An axillary roll and SCDs were  placed.  The right shoulder was prepped and draped in the standard surgical fashion.  The shoulder was injected with local anesthetic and was placed into traction.  A posterior portal was created.  A camera was inserted.  The glenoid chondral surface was intact.  The humerus surface had several grade 2 flaps despite the bicep groove.  The subscapularis was torn over its upper 10%.  The biceps was torn in the joint as it near the bicep anchor.  The labrum was torn circumferentially including the bicep anchor.  The undersurface of the cuff demonstrated partial anterior supraspinatus tear. A shaver was inserted.  The labrum was debrided circumferentially along with the bicep anchor and the chondral defects of the humeral head.  Upper subscapularis was debrided no exposed footprint, the anterior supraspinatus was debrided tear depth measured 1 to 2 mm over a length of 1 cm and was tagged with a PDS suture.  The biceps was pulled into the shoulder and found to be intact in the groove however demonstrated 50% tearing at the bicep anchor and along with the significant tearing of the bicep anchor itself tenotomy was performed.  The axillary pouch was free of synovitis or loose bodies. The instruments were placed in the subacromial space.  A full-thickness bursectomy was performed.  The CA ligament was intact.  No impingement was noted.  An accessory portal was created.  Dorsal surface of the cuff is examined demonstrate no bursal sided tearing so the PDS suture was removed.   The instruments were removed.  The wounds were closed with suture and Steri-Strips.    30 mg of Toradol and lidocaine were injected into the deltoid  and a sterile dressing was applied to the rest of the shoulder.  They were placed in a sling and taken to the recovery room.  There were no complications.  I was present for all portions.  All counts were correct.  Good capillary refill was noted to the hand.      Kb Gaviria MD     Date:  3/29/2024  Time: 08:31 EDT

## 2024-03-29 NOTE — ANESTHESIA PROCEDURE NOTES
Airway  Urgency: elective    Date/Time: 3/29/2024 7:52 AM  Airway not difficult    General Information and Staff    Patient location during procedure: OR  Anesthesiologist: Felton Lemus MD  CRNA/CAA: Anna Patel CRNA    Indications and Patient Condition  Indications for airway management: airway protection    Preoxygenated: yes  MILS maintained throughout  Mask difficulty assessment: 1 - vent by mask    Final Airway Details  Final airway type: endotracheal airway      Successful airway: ETT  Cuffed: yes   Successful intubation technique: RSI and video laryngoscopy  Facilitating devices/methods: intubating stylet  Endotracheal tube insertion site: oral  Blade: Garrett  Blade size: 3  ETT size (mm): 7.0  Cormack-Lehane Classification: grade I - full view of glottis  Placement verified by: chest auscultation and capnometry   Measured from: lips  ETT/EBT  to lips (cm): 21  Number of attempts at approach: 1  Assessment: lips, teeth, and gum same as pre-op and atraumatic intubation

## 2024-04-01 ENCOUNTER — OFFICE VISIT (OUTPATIENT)
Dept: ORTHOPEDIC SURGERY | Facility: CLINIC | Age: 44
End: 2024-04-01
Payer: COMMERCIAL

## 2024-04-01 VITALS — BODY MASS INDEX: 50.02 KG/M2 | HEIGHT: 64 IN | WEIGHT: 293 LBS | HEART RATE: 89 BPM

## 2024-04-01 DIAGNOSIS — Z47.89 ORTHOPEDIC AFTERCARE: Primary | ICD-10-CM

## 2024-04-01 PROCEDURE — 99024 POSTOP FOLLOW-UP VISIT: CPT | Performed by: ORTHOPAEDIC SURGERY

## 2024-04-01 NOTE — PROGRESS NOTES
"     Patient ID: Jo Asif is a 43 y.o. female.  3/29/24 right shoulder arthroscopy with extensive debridement and bicep tenotomy  Pain controlled    Objective:    Pulse 89   Ht 162.6 cm (64\")   Wt (!) 137 kg (302 lb)   BMI 51.84 kg/m²     Physical Examination:    Wounds are well approximated without infection.  Sensory and motor exam are intact in all distributions. Radial pulse is palpable and capillary refill is less than two seconds to all digits.    Imaging:      Assessment:  Doing well after shoulder arthroscopy    Plan:  Wounds were cleaned and redressed. Restrictions discussed.  Begin therapy and see me in 4 weeks.  Remove dressings in two weeks.  "

## 2024-04-01 NOTE — PATIENT INSTRUCTIONS
Shoulder Arthroscopy: Post- Operative Visit Objectives    Review the operative findings, procedures and photos.  Make sure medications are effective and not causing problems.  Pain: Oxycodone or hydrocodone is for pain. You may take 1 tablet every 6 hours as necessary.  Some patients don’t require the use of these…in those circumstances just use Tylenol extra-strength 1 or 2 tablets every 4-6 hours.   Naproxen 500 mg. For pain and inflammation.  You should take 1 every twice a day.  Do not use Aleve, Ibuprofen, Motrin or Advil during this time.  If you have had any problems stop taking these medicines and please tell us!  Keflex (cephalexin). This is an antibiotic to be taken as a preventive medicine.  Take 1 pill every 6 hours for 1 day. If you have a penicillin allergy this will be replaced by other options.  Wound Care:  Today we will change your dressings and cover your wounds with a plastic covering called Tegaderm. This will allow you to shower immediately.  Remove the tegaderm and underlying dressings in two weeks then ok to get wet in a shower. No Baths or swimming until 4 weeks after surgery.  Keep a towel on the dressing while applying ice.  Exercises and Physical Therapy Remember the protocol is 3 phases:  Healing (6 wks)  Continue the use of the sling for 6 weeks; depending on procedure utilized this may be shorter. You can do gentle range of motion exercise of the elbow and wrist immediately with the arm at the side.  Formal physical therapy will usually start in two weeks.    Rehabilitative (6 wks) You begin a more aggressive phase of physical therapy at the 6 week philippe. Light strengthening and a continued emphasis on protecting the repair are important at this stage.  Restorative (4 wks)  Back to your sports and job activities gradually   Follow Up appointments Schedule Follow up visits as directed usually in 4 weeks. Physical therapy will be ordered today and you should receive a phone call or can  schedule yourself if appropriate.  Notes  Make sure you have all necessary notes and documentation for school or work.  Issues: Remember our goal is to make this process smooth and easy if there is any thing you need please ask us or call back [phone]

## 2024-04-24 DIAGNOSIS — M25.511 RIGHT SHOULDER PAIN, UNSPECIFIED CHRONICITY: Primary | ICD-10-CM

## 2024-04-24 RX ORDER — TRAMADOL HYDROCHLORIDE 50 MG/1
50 TABLET ORAL EVERY 6 HOURS PRN
Qty: 28 TABLET | Refills: 0 | Status: SHIPPED | OUTPATIENT
Start: 2024-04-24

## 2024-04-29 ENCOUNTER — OFFICE VISIT (OUTPATIENT)
Dept: ORTHOPEDIC SURGERY | Facility: CLINIC | Age: 44
End: 2024-04-29
Payer: COMMERCIAL

## 2024-04-29 VITALS — HEART RATE: 82 BPM | BODY MASS INDEX: 50.02 KG/M2 | HEIGHT: 64 IN | WEIGHT: 293 LBS

## 2024-04-29 DIAGNOSIS — Z47.89 ORTHOPEDIC AFTERCARE: Primary | ICD-10-CM

## 2024-04-29 PROCEDURE — 99024 POSTOP FOLLOW-UP VISIT: CPT | Performed by: ORTHOPAEDIC SURGERY

## 2024-04-29 RX ORDER — MELOXICAM 15 MG/1
15 TABLET ORAL DAILY
COMMUNITY

## 2024-04-29 NOTE — PROGRESS NOTES
"     Patient ID: Jo Asif is a 44 y.o. female.    3/29/24 right shoulder arthroscopy with extensive debridement and bicep tenotomy  Pain controlled    Objective:    Pulse 82   Ht 162.6 cm (64\")   Wt (!) 137 kg (302 lb)   BMI 51.84 kg/m²     Physical Examination:  Incisions are healed passive elevation 180 abduction 150 external rotation 60 active elevation 130 degrees       Imaging:      Assessment:    Mild pain under bicep tenotomy  Plan:    Continue range of motion avoid bicep strengthening see me in a month  "

## 2024-05-08 DIAGNOSIS — R73.03 PREDIABETES: Primary | ICD-10-CM

## 2024-05-08 RX ORDER — BLOOD SUGAR DIAGNOSTIC
STRIP MISCELLANEOUS
Qty: 100 EACH | Refills: 0 | Status: SHIPPED | OUTPATIENT
Start: 2024-05-08

## 2024-05-08 RX ORDER — BLOOD-GLUCOSE METER
1 EACH MISCELLANEOUS TAKE AS DIRECTED
Qty: 1 KIT | Refills: 0 | Status: SHIPPED | OUTPATIENT
Start: 2024-05-08

## 2024-05-08 RX ORDER — LANCETS 33 GAUGE
1 EACH MISCELLANEOUS DAILY PRN
Qty: 100 EACH | Refills: 0 | Status: SHIPPED | OUTPATIENT
Start: 2024-05-08

## 2024-05-30 ENCOUNTER — OFFICE VISIT (OUTPATIENT)
Dept: ORTHOPEDIC SURGERY | Facility: CLINIC | Age: 44
End: 2024-05-30
Payer: COMMERCIAL

## 2024-05-30 VITALS — HEART RATE: 85 BPM | WEIGHT: 293 LBS | HEIGHT: 64 IN | BODY MASS INDEX: 50.02 KG/M2

## 2024-05-30 DIAGNOSIS — M25.511 RIGHT SHOULDER PAIN, UNSPECIFIED CHRONICITY: Primary | ICD-10-CM

## 2024-05-30 NOTE — PROGRESS NOTES
"     Patient ID: Jo Asif is a 44 y.o. female.  3/29/24 right shoulder arthroscopy with extensive debridement and bicep tenotomy     Pain improving  Objective:    Pulse 85   Ht 162.6 cm (64\")   Wt (!) 137 kg (302 lb)   BMI 51.84 kg/m²     Physical Examination:    Passive elevation 160 abduction 130 external rotation 40 mild pain but no weakness on cuff testing    Imaging:      Assessment:  Doing well after cuff debridement and bicep tenotomy    Plan:  Activity as tolerated and see me as needed     "

## 2024-07-08 ENCOUNTER — PATIENT MESSAGE (OUTPATIENT)
Dept: FAMILY MEDICINE CLINIC | Facility: CLINIC | Age: 44
End: 2024-07-08
Payer: COMMERCIAL

## 2024-07-08 DIAGNOSIS — J45.40 MODERATE PERSISTENT ASTHMA WITHOUT COMPLICATION: Primary | ICD-10-CM

## 2024-07-09 RX ORDER — ALBUTEROL SULFATE 90 UG/1
2 AEROSOL, METERED RESPIRATORY (INHALATION) EVERY 6 HOURS PRN
Qty: 18 G | Refills: 11 | Status: CANCELLED | OUTPATIENT
Start: 2024-07-09

## 2024-07-09 NOTE — TELEPHONE ENCOUNTER
Spoke with Jo. Informed paperwork is complete. Jo would like prescription sent to [facility].

## 2024-07-10 RX ORDER — LEVALBUTEROL TARTRATE 45 UG/1
1-2 AEROSOL, METERED ORAL EVERY 4 HOURS PRN
Qty: 15 G | Refills: 11 | Status: SHIPPED | OUTPATIENT
Start: 2024-07-10

## 2024-11-27 RX ORDER — AZITHROMYCIN 500 MG/1
500 TABLET, FILM COATED ORAL DAILY
Qty: 5 TABLET | Refills: 0 | Status: SHIPPED | OUTPATIENT
Start: 2024-11-27 | End: 2024-12-02

## 2024-11-27 RX ORDER — METHYLPREDNISOLONE 4 MG/1
TABLET ORAL
Qty: 21 TABLET | Refills: 0 | Status: SHIPPED | OUTPATIENT
Start: 2024-11-27

## 2024-12-22 DIAGNOSIS — F41.9 ANXIETY: ICD-10-CM

## 2024-12-22 DIAGNOSIS — F32.1 CURRENT MODERATE EPISODE OF MAJOR DEPRESSIVE DISORDER WITHOUT PRIOR EPISODE: ICD-10-CM

## 2024-12-23 RX ORDER — FLUOXETINE HCL 10 MG
10 CAPSULE ORAL DAILY
Qty: 90 CAPSULE | Refills: 0 | Status: SHIPPED | OUTPATIENT
Start: 2024-12-23 | End: 2024-12-27

## 2024-12-27 RX ORDER — FLUOXETINE 10 MG/1
10 CAPSULE ORAL DAILY
Qty: 90 CAPSULE | Refills: 3 | Status: SHIPPED | OUTPATIENT
Start: 2024-12-27

## 2025-01-24 DIAGNOSIS — G43.709 CHRONIC MIGRAINE WITHOUT AURA WITHOUT STATUS MIGRAINOSUS, NOT INTRACTABLE: ICD-10-CM

## 2025-01-24 RX ORDER — FREMANEZUMAB-VFRM 225 MG/1.5ML
INJECTION SUBCUTANEOUS
Qty: 1.5 ML | Refills: 2 | Status: SHIPPED | OUTPATIENT
Start: 2025-01-24

## 2025-01-30 NOTE — PROGRESS NOTES
Physical Therapy Daily Treatment Note      [facility]              [address], [address]                [facility] [place]  [address]        Patient: Jo Asif   : 1980  Diagnosis/ICD-10 Code:  Spondylolisthesis of lumbar region [M43.16]  Referring practitioner: Francisco Demarco IV, MD  Date of Initial Visit: Type: THERAPY  Noted: 10/11/2022  Today's Date: 10/14/2022  Patient seen for 2 sessions         Jo Asif reports: she is doing worse today. Today is a bad day.  Not one of her worst but a bad day.  She said her pain is a 5-6/10 probably closer to the 6.  She said the estim helped a lot and she is thinking about getting one.        Subjective     Objective   See Exercise, Manual, and Modality Logs for complete treatment.       Assessment/Plan  Focused on pain relief this date d/t increased pain today.  Added some gentle stretching and strengthening.  Held several exercises d/t time.  Added LAD d/t pain level and radiating symptoms.  Pt reported centralization of symptoms during and following LAD as well as decreased pain to 4-5/10.  Continued with estim and heat for pain relief with report of decreased pain to 1-2/10.  Will monitor pain and progress strengthening as tolerated as well as possible adding mechanical traction for continued improvement in symptoms.       Progress per Plan of Care; Will consult with [facility] for home estim unit.           Timed:  Manual Therapy:    8     mins  38460;  Therapeutic Exercise:    20     mins  34751;     Neuromuscular Royce:        mins  06508;    Therapeutic Activity:          mins  33328;     Gait Training:           mins  04255;     Ultrasound:          mins  54255;     Work Conditioning/Hardening (initial 2 hours)        mins  77053  Work Conditioning/Hardening (each add'l hour)        mins  82514    Untimed:   Electrical Stimulation:    15     mins  62148 ( );  Traction          mins 80022    Timed Treatment:   28   mins   Total Treatment:    PHYSICAL / OCCUPATIONAL THERAPY - DAILY TREATMENT NOTE     Patient Name: Osvaldo Burks    Date: 2025    : 2009  Insurance: Payor: CHELY CHANG / Plan: [facility] / Product Type: *No Product type* /      Patient  verified Yes     Visit #   Current / Total 7 24   Time   In / Out 1:25 2:04   Pain   In / Out Left hip 7/10 Hip is better   Subjective Functional Status/Changes: Reports that he may have been running funning. Reports that it is always his left hip. Reports that the pain makes it hard to walk.   Changes to:  Allergies, Med Hx, Sx Hx?   no       TREATMENT AREA =  Other low back pain [M54.59]    If an interpreting service is utilized for treatment of this patient, the contents of this document represent the material reviewed with the patient via the .     OBJECTIVE    Therapeutic Procedures:  Tx Min Billable or 1:1 Min (if diff from Tx Min) Procedure, Rationale, Specifics   8  69281 Therapeutic Exercise (timed):  increase ROM, strength, coordination, balance, and proprioception to improve patient's ability to progress to PLOF and address remaining functional goals. (see flow sheet as applicable)    Details if applicable:       15  96339 Therapeutic Activity (timed):  use of dynamic activities replicating functional movements to increase ROM, strength, coordination, balance, and proprioception in order to improve patient's ability to progress to PLOF and address remaining functional goals.  (see flow sheet as applicable)    Details if applicable:     16  16655 Neuromuscular Re-Education (timed):  improve balance, coordination, kinesthetic sense, posture, core stability and proprioception to improve patient's ability to develop conscious control of individual muscles and awareness of position of extremities in order to progress to PLOF and address remaining functional goals. (see flow sheet as applicable)     Details if applicable:           Details if applicable:            Details if    48   mins    Gwendolyn Rhodes, PTA  Physical Therapist Assistant   applicable:     39  [facility] Totals Reminder: bill using total billable min of TIMED therapeutic procedures (example: do not include dry needle or estim unattended, both untimed codes, in totals to left)  8-22 min = 1 unit; 23-37 min = 2 units; 38-52 min = 3 units; 53-67 min = 4 units; 68-82 min = 5 units   Total Total     TOTAL TREATMENT TIME:        39     [x]  Patient Education billed concurrently with other procedures   [x] Review HEP    [] Progressed/Changed HEP, detail:    [] Other detail:       Objective Information/Functional Measures/Assessment     Pretest/Post  Adductor Drop Test: right: positive/negative    left: positive/negative  Lower trunk rotation (inches): Right: 15.5/NT left: 14/NT  HGIR: right: 70/90 Left: 76/90    Next visit:   Kickstand RDL  Assess split stance dead lift  Lat inhibition with balloon           Assessment: Patient tolerated therapy session well as there were no adverse reactions today. Added retro stairs and pt had a hard time with shifting posteriorly, may need to kickstand RDLs prior. Patient would benefit from continuation of skilled physical therapy to address the remaining limitations.           Patient will continue to benefit from skilled PT services to modify and progress therapeutic interventions, analyze and address functional mobility deficits, analyze and address ROM deficits, analyze and address strength deficits, analyze and address soft tissue restrictions, analyze and cue for proper movement patterns, analyze and modify for postural abnormalities, and instruct in home and community integration to address functional deficits and attain remaining goals.        Progress toward goals / Updated goals:  []  See Progress Note/Recertification     Short Term Goals:   To be accomplished in 6 weeks:  1..Patient will report compliance with HEP 1x/day to aid in rehabilitation program.  Status at IE:Pt was given HEP handout and appeared to understand.  Last PN: 1/14/2024: not

## 2025-03-13 ENCOUNTER — OFFICE VISIT (OUTPATIENT)
Dept: FAMILY MEDICINE CLINIC | Facility: CLINIC | Age: 45
End: 2025-03-13
Payer: COMMERCIAL

## 2025-03-13 VITALS
SYSTOLIC BLOOD PRESSURE: 118 MMHG | HEART RATE: 81 BPM | HEIGHT: 64 IN | OXYGEN SATURATION: 99 % | BODY MASS INDEX: 51.49 KG/M2 | DIASTOLIC BLOOD PRESSURE: 70 MMHG

## 2025-03-13 DIAGNOSIS — M25.562 ACUTE PAIN OF LEFT KNEE: ICD-10-CM

## 2025-03-13 DIAGNOSIS — M25.572 ACUTE LEFT ANKLE PAIN: ICD-10-CM

## 2025-03-13 DIAGNOSIS — M79.89 PAIN AND SWELLING OF LEFT LOWER LEG: ICD-10-CM

## 2025-03-13 DIAGNOSIS — M79.662 PAIN AND SWELLING OF LEFT LOWER LEG: ICD-10-CM
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
## 2025-03-13 RX ORDER — HYDROCODONE BITARTRATE AND ACETAMINOPHEN 5; 325 MG/1; MG/1
1 TABLET ORAL EVERY 6 HOURS PRN
Qty: 30 TABLET | Refills: 0 | Status: SHIPPED | OUTPATIENT
Start: 2025-03-13

## 2025-03-13 RX ORDER — NAPROXEN 500 MG/1
500 TABLET ORAL 2 TIMES DAILY PRN
Qty: 60 TABLET | Refills: 2 | Status: SHIPPED | OUTPATIENT
Start: 2025-03-13

## 2025-03-13 NOTE — PROGRESS NOTES
Chief Complaint  Chief Complaint   Patient presents with    Follow-up     ER follow up left ankle sprain and knee pain--had a fall on Tuesday morning            Subjective          Jo Asif presents to [facility] for   History of Present Illness      Pt presents with complaints of left ankle pain, following a fall on 3/11/25, she states the ankle gave out and rolled inward and she fell to her left knee.  She went to the ER on 3/11/2025, x-ray shows left knee tricompartmental osteoarthritis, no evidence of fracture and left ankle shows no fracture or dislocation.  Today she reports the pain is most severe in the left lateral ankle and tib-fib region, even elevating the ankle/tib-fib on a pillow causes severe pain.  It is swelling, bruised with severe limited mobility.  She is using a knee brace and left ankle brace and crutches.  She cannot tolerate ibuprofen due to stomach upset and does not often take Tylenol due to PKU and hx of elevated liver enzymes.         The following portions of the patient's history were reviewed and updated as appropriate: allergies, current medications, past family history, past medical history, past social history, past surgical history and problem list.    Past Medical History:   Diagnosis Date    Abdominal pain 09/2022    Allergic     Anxiety     Arrhythmia     Asthma     Atrial fibrillation     Cholelithiasis     Depression     Disease of thyroid gland     GERD (gastroesophageal reflux disease)     Heart murmur     History of medical problems     HL (hearing loss)     Hypertension     Hypothyroidism     Iron disorder     Irritable bowel syndrome     Migraine     Morbid obesity     Obesity     PCOS (polycystic ovarian syndrome)     PKU (phenylketonuria)     Prediabetes     Rotator cuff tear 03/2022    Sleep apnea     Torn meniscus 03/2021     Past Surgical History:   Procedure Laterality Date    ABLATION OF DYSRHYTHMIC FOCUS      BLADDER SLING  MODIFIED, ANTERIOR AND POSTERIOR VAGINAL REPAIR      CARDIAC CATHETERIZATION      CARDIAC ELECTROPHYSIOLOGY PROCEDURE N/A 06/10/2020    Procedure: EP/Ablation;  Surgeon: Joseph Andrews MD;  Location: [facility] CATH INVASIVE LOCATION;  Service: Cardiovascular;  Laterality: N/A;    CARPAL TUNNEL RELEASE Right     CHOLECYSTECTOMY N/A 09/20/2022    Procedure: CHOLECYSTECTOMY LAPAROSCOPIC WITH DAVINCI ROBOT;  Surgeon: Alf Whalen MD;  Location: [facility] MAIN OR;  Service: Robotics - DaVinci;  Laterality: N/A;    COLONOSCOPY N/A 06/30/2022    Procedure: COLONOSCOPY with random biopsies R/O microscopic colitis, polypectomy x 1;  Surgeon: Jeffrey Echevarria MD;  Location: [facility] ENDOSCOPY;  Service: Gastroenterology;  Laterality: N/A;    ENDOSCOPY N/A 06/30/2022    Procedure: ESOPHAGOGASTRODUODENOSCOPY with esophagus, gastric and duodenal biopsies;  Surgeon: Jeffrey Echevarria MD;  Location: [facility] ENDOSCOPY;  Service: Gastroenterology;  Laterality: N/A;    GALLBLADDER SURGERY  2022    KNEE ARTHROSCOPY Right 03/26/2021    Procedure: KNEE ARTHROSCOPY with  partial medial lateral meniscectomy;  Surgeon: Kb Gaviria MD;  Location: [facility] MAIN OR;  Service: Orthopedics;  Laterality: Right;    SHOULDER ARTHROSCOPY W/ ROTATOR CUFF REPAIR Right 03/18/2022    Procedure: SHOULDER ARTHROSCOPY WITH DEBRIDEMENT;  Surgeon: Kb Gaviria MD;  Location: [facility] MAIN OR;  Service: Orthopedics;  Laterality: Right;    SHOULDER ARTHROSCOPY W/ ROTATOR CUFF REPAIR Right 3/29/2024    Procedure: SHOULDER ARTHROSCOPY WITH extensive debridement and bicep tenotomy;  Surgeon: Kb Gaviria MD;  Location: [facility] MAIN OR;  Service: Orthopedics;  Laterality: Right;    TONSILLECTOMY      TUBAL ABDOMINAL LIGATION      UVULECTOMY       Family History   Problem Relation Age of Onset    Depression Mother     Hypertension Mother     Diabetes Mother     Cancer Mother         Breast    Hyperlipidemia  Mother     Arthritis Father     Hypertension Father     Diabetes Father     Heart disease Father     Cancer Father         Lung    Early death Father     Hyperlipidemia Father     COPD Maternal Grandfather     Diabetes Maternal Grandmother     Hyperlipidemia Maternal Grandmother     Cancer Paternal Grandfather     Diabetes Paternal Grandmother     Hyperlipidemia Paternal Grandmother      Social History     Tobacco Use    Smoking status: Former     Current packs/day: 0.00     Average packs/day: 0.1 packs/day for 6.0 years (0.6 ttl pk-yrs)     Types: Cigarettes     Start date: 9/1/2003     Quit date: 9/1/2009     Years since quitting: 15.5    Smokeless tobacco: Never    Tobacco comments:     Rarely smoked   Substance Use Topics    Alcohol use: No       Current Outpatient Medications:     Accu-Chek Softclix Lancets lancets, , Disp: , Rfl:     albuterol sulfate  (90 Base) MCG/ACT inhaler, Inhale 2 puffs Every 6 (Six) Hours As Needed for Wheezing or Shortness of Air., Disp: 18 g, Rfl: 11    Blood Glucose Monitoring Suppl (Accu-Chek Guide Me) w/Device kit, , Disp: , Rfl:     Blood Glucose Monitoring Suppl (Accu-Chek Guide) w/Device kit, Use 1 each Take As Directed. To check blood glucose daily as needed, Disp: 1 kit, Rfl: 0    busPIRone (BUSPAR) 10 MG tablet, TAKE 1 TABLET BY MOUTH TWICE A DAY (Patient taking differently: Take 1 tablet by mouth 3 (Three) Times a Day.), Disp: 180 tablet, Rfl: 1    Cholecalciferol (Vitamin D) 50 MCG (2000 UT) capsule, Take 1 capsule by mouth Daily., Disp: , Rfl:     EPINEPHrine (EPIPEN) 0.3 MG/0.3ML solution auto-injector injection, Inject 0.3 mL into the appropriate muscle as directed by prescriber., Disp: , Rfl:     FLUoxetine (PROzac) 10 MG capsule, Take 1 capsule by mouth Daily., Disp: 90 capsule, Rfl: 3    fluticasone (FLONASE) 50 MCG/ACT nasal spray, Administer 2 sprays into the nostril(s) as directed by provider Daily., Disp: , Rfl:     Fluticasone-Umeclidin-Vilant (Trelegy  Ellipta) 200-62.5-25 MCG/ACT aerosol powder , Inhale 1 puff Daily. (Free 30 day voucher), Disp: 60 each, Rfl: 0    Fremanezumab-vfrm (Ajovy) 225 MG/1.5ML solution prefilled syringe, INJECT 225 MG (1 SYRINGE) SUBCUTANEOUSLY ONE TIME MONTHLY, Disp: 1.5 mL, Rfl: 2    glucose blood (Accu-Chek Guide) test strip, Use to check blood glucose daily as needed, Disp: 100 each, Rfl: 0    Lancets 33G misc, Use 1 each Daily As Needed (to check blood sugar)., Disp: 100 each, Rfl: 0    levocetirizine (XYZAL) 5 MG tablet, Take 1 tablet by mouth Every Evening., Disp: , Rfl:     levothyroxine (SYNTHROID, LEVOTHROID) 75 MCG tablet, Take 1 tablet by mouth Every Morning., Disp: 90 tablet, Rfl: 3    lisinopril-hydrochlorothiazide (PRINZIDE,ZESTORETIC) 10-12.5 MG per tablet, TAKE ONE TABLET BY MOUTH DAILY (Patient taking differently: Take 1 tablet by mouth Daily.), Disp: 90 tablet, Rfl: 1    minoxidil (LONITEN) 2.5 MG tablet, Take 0.5 tablets by mouth Daily., Disp: , Rfl:     naproxen (NAPROSYN) 500 MG tablet, Take 1 tablet by mouth 2 (Two) Times a Day As Needed for Moderate Pain., Disp: 60 tablet, Rfl: 2    omeprazole (priLOSEC) 40 MG capsule, TAKE ONE CAPSULE BY MOUTH DAILY (Patient taking differently: Take 1 capsule by mouth Daily.), Disp: 90 capsule, Rfl: 1    Palynziq 10 MG/0.5ML solution prefilled syringe, Take 10 mg by mouth 3 (Three) Times a Week. mwf, Disp: , Rfl:     Plecanatide (Trulance) 3 MG tablet, Take 1 tablet by mouth Daily As Needed (IBS)., Disp: , Rfl:     Semaglutide, 2 MG/DOSE, (Ozempic, 2 MG/DOSE,) 8 MG/3ML solution pen-injector, Inject 2 mg under the skin into the appropriate area as directed 1 (One) Time Per Week. (Patient taking differently: Inject 2 mg under the skin into the appropriate area as directed 1 (One) Time Per Week. mon), Disp: 3 mL, Rfl: 11    spironolactone (ALDACTONE) 50 MG tablet, Take 1 tablet by mouth Daily., Disp: , Rfl:     ubrogepant (UBRELVY) 100 MG tablet, Take 1 tablet by mouth 1 (One) Time  "As Needed (migraines)., Disp: 30 tablet, Rfl: 11    HYDROcodone-acetaminophen (NORCO) 5-325 MG per tablet, Take 1 tablet by mouth Every 6 (Six) Hours As Needed for Severe Pain., Disp: 30 tablet, Rfl: 0    Objective   Vital Signs:   Vitals:    03/13/25 1606   BP: 118/70   BP Location: Left arm   Patient Position: Sitting   Cuff Size: Large Adult   Pulse: 81   SpO2: 99%   Height: 162.6 cm (64\")   PainSc: 3    PainLoc: Ankle       Body mass index is 51.49 kg/m².    Physical Exam  Vitals and nursing note reviewed.   Constitutional:       General: She is not in acute distress.     Appearance: She is well-developed. She is not diaphoretic.   Neck:      Thyroid: No thyromegaly.      Vascular: No JVD.   Musculoskeletal:         General: Swelling, tenderness (L foot/ankle, severe ttp left lateral malleolus and tib-fib region. L knee in brace, swelling present, mild espino rom) and signs of injury present. Normal range of motion.   Skin:     General: Skin is warm and dry.   Neurological:      Mental Status: She is alert and oriented to person, place, and time.      Sensory: No sensory deficit.      Motor: Weakness present.      Gait: Gait abnormal.   Psychiatric:         Behavior: Behavior normal.         Thought Content: Thought content normal.         Judgment: Judgment normal.          Result Review :     No visits with results within 7 Day(s) from this visit.   Latest known visit with results is:   Admission on 03/29/2024, Discharged on 03/29/2024   Component Date Value Ref Range Status    HCG, Urine QL 03/29/2024 Negative  Negative Final                         Assessment and Plan    Diagnoses and all orders for this visit:    1. Fall, initial encounter (Primary)    2. Acute left ankle pain  -     Ambulatory Referral to Podiatry  -     HYDROcodone-acetaminophen (NORCO) 5-325 MG per tablet; Take 1 tablet by mouth Every 6 (Six) Hours As Needed for Severe Pain.  Dispense: 30 tablet; Refill: 0  -     XR Tibia Fibula 2 View Left " (In Office); Future    3. Acute pain of left knee    4. Pain and swelling of left lower leg  -     Ambulatory Referral to Podiatry  -     HYDROcodone-acetaminophen (NORCO) 5-325 MG per tablet; Take 1 tablet by mouth Every 6 (Six) Hours As Needed for Severe Pain.  Dispense: 30 tablet; Refill: 0  -     XR Tibia Fibula 2 View Left (In Office); Future    Other orders  -     naproxen (NAPROSYN) 500 MG tablet; Take 1 tablet by mouth 2 (Two) Times a Day As Needed for Moderate Pain.  Dispense: 60 tablet; Refill: 2      Pt [facility] for xray of L tib/fib  Reviewed ER xrays with pt  Cont crutches and limited weight bearing  Continue rest, ice, compression, elevation  Recommend naproxen as needed, patient does tolerate the and ok for a few norco for severe pain.         I spent 30 minutes caring for Jo Asif on this date of service. This time includes time spent by me in the following activities: preparing for the visit, reviewing tests, performing a medically appropriate examination and/or evaluation , counseling and educating the patient/family/caregiver, ordering medications, tests, or procedures and documenting information in the medical record        Follow Up     Return for Next scheduled follow up or earlier prn.  Patient was given instructions and counseling regarding her condition or for health maintenance advice. Please see specific information pulled into the AVS if appropriate.        Part of this note may be an electronic transcription/translation of spoken language to printed text using the Dragon Dictation System

## 2025-03-17 ENCOUNTER — OFFICE VISIT (OUTPATIENT)
Age: 45
End: 2025-03-17
Payer: COMMERCIAL

## 2025-03-17 ENCOUNTER — PATIENT ROUNDING (BHMG ONLY) (OUTPATIENT)
Age: 45
End: 2025-03-17
Payer: COMMERCIAL

## 2025-03-17 VITALS — HEART RATE: 81 BPM | BODY MASS INDEX: 50.02 KG/M2 | OXYGEN SATURATION: 99 % | WEIGHT: 293 LBS | HEIGHT: 64 IN

## 2025-03-17 DIAGNOSIS — S93.402A MODERATE ANKLE SPRAIN, LEFT, INITIAL ENCOUNTER: ICD-10-CM

## 2025-03-17 DIAGNOSIS — E66.01 MORBID OBESITY WITH BMI OF 50.0-59.9, ADULT: ICD-10-CM

## 2025-03-17 DIAGNOSIS — M25.572 ACUTE LEFT ANKLE PAIN: Primary | ICD-10-CM

## 2025-03-17 PROCEDURE — 99204 OFFICE O/P NEW MOD 45 MIN: CPT | Performed by: PODIATRIST

## 2025-03-17 RX ORDER — METHYLPREDNISOLONE 4 MG/1
TABLET ORAL
Qty: 21 TABLET | Refills: 0 | Status: SHIPPED | OUTPATIENT
Start: 2025-03-17

## 2025-03-17 NOTE — PROGRESS NOTES
03/17/2025  Foot and Ankle Surgery - New Patient   Provider: Dr. Tk Garnica DPM  Location: [facility]    Subjective:  Jo Asif is a 44 y.o. female.     Chief Complaint   Patient presents with    Left Lower Leg - Pain, Initial Evaluation        OSSEOUS STRUCTURES: There is no evidence of an acute osseous fracture.       Left Ankle - Pain, Initial Evaluation       Medial malleolus and lateral malleolus intact. Ankle mortise maintained. Talar dome intact. Small area of osseous spurring dorsally at the midfoot. No fracture or dislocation       Left Foot - Pain, Initial Evaluation    Initial Evaluation     Kimberlee Cavanaugh SOPHIA, APRN  3/13/25       History of Present Illness  The patient presents for evaluation of her left ankle.    She sustained an injury to her left ankle on Tuesday, which was initially evaluated at [facility]. Subsequently, her primary care physician ordered a tib-fib x-ray on Thursday. She reports no significant bruising but experiences numbness in her toes. She also mentions difficulty in bearing weight on her heel. She is currently employed and utilizes orthotics in her tennis shoes.    MEDICATIONS  Current: naproxen       Allergies   Allergen Reactions    Amoxicillin Hives    Cleocin [Clindamycin Hcl] Anaphylaxis    Penicillins Hives       Past Medical History:   Diagnosis Date    Abdominal pain 09/2022    Allergic     Anxiety     Arrhythmia     Asthma     no inhalers    Atrial fibrillation     had ablation, no issues now    Cholelithiasis     Depression     Disease of thyroid gland     GERD (gastroesophageal reflux disease)     Heart murmur     History of medical problems     HL (hearing loss)     Hypertension     Hypothyroidism     Iron disorder     excess in labwork    Irritable bowel syndrome     Migraine     Morbid obesity     Obesity     PCOS (polycystic ovarian syndrome)     PKU (phenylketonuria)     Prediabetes     Rotator cuff tear 03/2022    right    Sleep apnea      no machine    Torn meniscus 03/2021    right       Past Surgical History:   Procedure Laterality Date    ABLATION OF DYSRHYTHMIC FOCUS      BLADDER SLING MODIFIED, ANTERIOR AND POSTERIOR VAGINAL REPAIR      CARDIAC CATHETERIZATION      CARDIAC ELECTROPHYSIOLOGY PROCEDURE N/A 06/10/2020    Procedure: EP/Ablation;  Surgeon: Joseph Andrews MD;  Location: [facility] CATH INVASIVE LOCATION;  Service: Cardiovascular;  Laterality: N/A;    CARPAL TUNNEL RELEASE Right     CHOLECYSTECTOMY N/A 09/20/2022    Procedure: CHOLECYSTECTOMY LAPAROSCOPIC WITH DAVINCI ROBOT;  Surgeon: lAf Whalen MD;  Location: [facility] MAIN OR;  Service: Robotics - DaVinci;  Laterality: N/A;    COLONOSCOPY N/A 06/30/2022    Procedure: COLONOSCOPY with random biopsies R/O microscopic colitis, polypectomy x 1;  Surgeon: Jeffrey Echevarria MD;  Location: [facility] ENDOSCOPY;  Service: Gastroenterology;  Laterality: N/A;    ENDOSCOPY N/A 06/30/2022    Procedure: ESOPHAGOGASTRODUODENOSCOPY with esophagus, gastric and duodenal biopsies;  Surgeon: Jeffrey Echevarria MD;  Location: [facility] ENDOSCOPY;  Service: Gastroenterology;  Laterality: N/A;    GALLBLADDER SURGERY  2022    KNEE ARTHROSCOPY Right 03/26/2021    Procedure: KNEE ARTHROSCOPY with  partial medial lateral meniscectomy;  Surgeon: Kb Gaviria MD;  Location: [facility] MAIN OR;  Service: Orthopedics;  Laterality: Right;    SHOULDER ARTHROSCOPY W/ ROTATOR CUFF REPAIR Right 03/18/2022    Procedure: SHOULDER ARTHROSCOPY WITH DEBRIDEMENT;  Surgeon: Kb Gaviria MD;  Location: [facility] MAIN OR;  Service: Orthopedics;  Laterality: Right;    SHOULDER ARTHROSCOPY W/ ROTATOR CUFF REPAIR Right 3/29/2024    Procedure: SHOULDER ARTHROSCOPY WITH extensive debridement and bicep tenotomy;  Surgeon: Kb Gaviria MD;  Location: [facility] MAIN OR;  Service: Orthopedics;  Laterality: Right;    TONSILLECTOMY      TUBAL ABDOMINAL LIGATION      UVULECTOMY          Family History   Problem Relation Age of Onset    Depression Mother     Hypertension Mother     Diabetes Mother     Cancer Mother         Breast    Hyperlipidemia Mother     Arthritis Father     Hypertension Father     Diabetes Father     Heart disease Father     Cancer Father         Lung    Early death Father     Hyperlipidemia Father     COPD Maternal Grandfather     Diabetes Maternal Grandmother     Hyperlipidemia Maternal Grandmother     Cancer Paternal Grandfather     Diabetes Paternal Grandmother     Hyperlipidemia Paternal Grandmother        Social History     Socioeconomic History    Marital status:    Tobacco Use    Smoking status: Former     Current packs/day: 0.00     Average packs/day: 0.1 packs/day for 6.0 years (0.6 ttl pk-yrs)     Types: Cigarettes     Start date: 9/1/2003     Quit date: 9/1/2009     Years since quitting: 15.5    Smokeless tobacco: Never    Tobacco comments:     Rarely smoked   Vaping Use    Vaping status: Never Used   Substance and Sexual Activity    Alcohol use: No    Drug use: No    Sexual activity: Yes     Partners: Male     Birth control/protection: Surgical, Tubal ligation        Current Outpatient Medications on File Prior to Visit   Medication Sig Dispense Refill    Accu-Chek Softclix Lancets lancets       albuterol sulfate  (90 Base) MCG/ACT inhaler Inhale 2 puffs Every 6 (Six) Hours As Needed for Wheezing or Shortness of Air. 18 g 11    Blood Glucose Monitoring Suppl (Accu-Chek Guide Me) w/Device kit       Blood Glucose Monitoring Suppl (Accu-Chek Guide) w/Device kit Use 1 each Take As Directed. To check blood glucose daily as needed 1 kit 0    busPIRone (BUSPAR) 10 MG tablet TAKE 1 TABLET BY MOUTH TWICE A DAY (Patient taking differently: Take 1 tablet by mouth 3 (Three) Times a Day.) 180 tablet 1    Cholecalciferol (Vitamin D) 50 MCG (2000 UT) capsule Take 1 capsule by mouth Daily.      EPINEPHrine (EPIPEN) 0.3 MG/0.3ML solution auto-injector injection  Inject 0.3 mL into the appropriate muscle as directed by prescriber.      FLUoxetine (PROzac) 10 MG capsule Take 1 capsule by mouth Daily. 90 capsule 3    fluticasone (FLONASE) 50 MCG/ACT nasal spray Administer 2 sprays into the nostril(s) as directed by provider Daily.      Fluticasone-Umeclidin-Vilant (Trelegy Ellipta) 200-62.5-25 MCG/ACT aerosol powder  Inhale 1 puff Daily. (Free 30 day voucher) 60 each 0    Fremanezumab-vfrm (Ajovy) 225 MG/1.5ML solution prefilled syringe INJECT 225 MG (1 SYRINGE) SUBCUTANEOUSLY ONE TIME MONTHLY 1.5 mL 2    glucose blood (Accu-Chek Guide) test strip Use to check blood glucose daily as needed 100 each 0    HYDROcodone-acetaminophen (NORCO) 5-325 MG per tablet Take 1 tablet by mouth Every 6 (Six) Hours As Needed for Severe Pain. 30 tablet 0    Lancets 33G misc Use 1 each Daily As Needed (to check blood sugar). 100 each 0    levocetirizine (XYZAL) 5 MG tablet Take 1 tablet by mouth Every Evening.      levothyroxine (SYNTHROID, LEVOTHROID) 75 MCG tablet Take 1 tablet by mouth Every Morning. 90 tablet 3    lisinopril-hydrochlorothiazide (PRINZIDE,ZESTORETIC) 10-12.5 MG per tablet TAKE ONE TABLET BY MOUTH DAILY (Patient taking differently: Take 1 tablet by mouth Daily.) 90 tablet 1    minoxidil (LONITEN) 2.5 MG tablet Take 0.5 tablets by mouth Daily.      naproxen (NAPROSYN) 500 MG tablet Take 1 tablet by mouth 2 (Two) Times a Day As Needed for Moderate Pain. 60 tablet 2    omeprazole (priLOSEC) 40 MG capsule TAKE ONE CAPSULE BY MOUTH DAILY (Patient taking differently: Take 1 capsule by mouth Daily.) 90 capsule 1    Palynziq 10 MG/0.5ML solution prefilled syringe Take 10 mg by mouth 3 (Three) Times a Week. mwf      Plecanatide (Trulance) 3 MG tablet Take 1 tablet by mouth Daily As Needed (IBS).      Semaglutide, 2 MG/DOSE, (Ozempic, 2 MG/DOSE,) 8 MG/3ML solution pen-injector Inject 2 mg under the skin into the appropriate area as directed 1 (One) Time Per Week. (Patient taking  "differently: Inject 2 mg under the skin into the appropriate area as directed 1 (One) Time Per Week. mon) 3 mL 11    spironolactone (ALDACTONE) 50 MG tablet Take 1 tablet by mouth Daily.      ubrogepant (UBRELVY) 100 MG tablet Take 1 tablet by mouth 1 (One) Time As Needed (migraines). 30 tablet 11     No current facility-administered medications on file prior to visit.         Objective   Pulse 81   Ht 162.6 cm (64\")   Wt 136 kg (300 lb)   SpO2 99%   BMI 51.49 kg/m²     Foot/Ankle Exam    GENERAL  Appearance:  appears stated age and obese  Orientation:  AAOx3  Affect:  appropriate    VASCULAR     Left Foot Vascularity   Normal vascular exam    Dorsalis pedis:  2+  Posterior tibial:  2+  Skin temperature:  warm  Edema grading:  None  CFT:  < 3 seconds  Pedal hair growth:  Present  Varicosities:  none     NEUROLOGIC     Left Foot Neurologic   Light touch sensation: normal  Hot/Cold sensation:  normal  Achilles reflex:  2+    MUSCULOSKELETAL     Left Foot Musculoskeletal   Ecchymosis:  dorsal foot  Tenderness:  ankle joint tenderness and dorsal foot tenderness  Arch:  Normal    DERMATOLOGIC        Left Foot Dermatologic   Skin  Left foot skin is intact.      Left foot additional comments: Range of motion, muscle strength, and instability testing deferred secondary to guarding.  No resting deformity.  No proximal fibular pain.    Physical Exam         Results  Imaging  Ankle and knee imaging showed no fractures or dislocations. Tib-fib imaging also showed no fractures or dislocations.       Assessment & Plan   Diagnoses and all orders for this visit:    1. Acute left ankle pain (Primary)    2. Moderate ankle sprain, left, initial encounter    3. Morbid obesity with BMI of 50.0-59.9, adult    Other orders  -     methylPREDNISolone (MEDROL) 4 MG dose pack; Take as directed  Dispense: 21 tablet; Refill: 0       Assessment & Plan    The patient rolled her left ankle on Tuesday. Imaging results, including ankle, knee, " and tib-fib, showed no fractures or dislocations. The diagnosis is a grade 2 or early grade 3 sprain, with significant soft tissue damage. Symptoms include swelling, mild bruising, and numbness in the toes. The inflammatory phase is expected to last for the first two weeks. She is advised to rest, ice, compress, elevate, and use anti-inflammatories for symptom management. A boot will be provided to keep her upright and mobile. She should start moving her ankle up and down, then progress to circles and drawing the alphabet. Over-the-counter arch supports are recommended. Warm water soaks and exercises to loosen soft tissue and scar tissue are suggested. She should avoid ibuprofen or Aleve while on the Medrol Dosepak. If work becomes too difficult, she can request time off. A Medrol Dosepak will be prescribed to suppress inflammation. She should wear the boot until reassessment in 2 weeks. If there is no improvement, an MRI may be considered, but it will not change the current treatment plan.Reviewed proper basic stretching and manual therapy exercises along with appropriate shoes and activity.  Discussed proper use and/or avoidance of OTC anti-inflammatories.  Patient is to call with any additional issues or concerns.  Greater than 45 minutes was spent before, during, and after evaluation for patient care.    The encounter note is created with the use of AI technology.  I do apologize if there are typos and/or confusion within the note.  Please feel free to contact me or my office with any questions or concerns.    Follow-up  The patient will follow up in 2 weeks.           Patient or patient representative verbalized consent for the use of Ambient Listening during the visit with  TATI Garnica DPM for chart documentation. 3/17/2025  10:49 EDT    TATI Garnica DPM

## 2025-04-01 ENCOUNTER — TELEPHONE (OUTPATIENT)
Dept: PODIATRY | Age: 45
End: 2025-04-01

## 2025-04-01 NOTE — TELEPHONE ENCOUNTER
Hub staff attempted to follow warm transfer process and was unsuccessful     Caller: Jo Asif    Relationship to patient: Self    Best call back number: [phone]    Patient is needing: PT IS CALLING TO SPEAK WITH THE .. PLEASE ADVISE..

## 2025-04-01 NOTE — TELEPHONE ENCOUNTER
Spoke with patient, informed her that due to our co-pay policy we are unable to see her. I advised her that the provider reviewed the chart and she was told at last visit that this injury can take up to 6 weeks to get better and if she came in today, no change in treatment would happen. We have to wait a few more weeks for healing time. If she is not better in a few weeks, she can call to schedule a follow up. I advised patient that she should apply for financial assistance and if she qualifies, our services will be covered under that. She states she has started the process but in review of chart no financial assistance has been started. She will call back if she apply's if not we can see her in 4 weeks or so. A copay will be required at that visit as well. Patient understood and appointment was cancelled.

## 2025-04-03 ENCOUNTER — OFFICE VISIT (OUTPATIENT)
Dept: FAMILY MEDICINE CLINIC | Facility: CLINIC | Age: 45
End: 2025-04-03
Payer: COMMERCIAL

## 2025-04-03 VITALS
BODY MASS INDEX: 50.02 KG/M2 | DIASTOLIC BLOOD PRESSURE: 86 MMHG | HEIGHT: 64 IN | TEMPERATURE: 98.6 F | WEIGHT: 293 LBS | SYSTOLIC BLOOD PRESSURE: 128 MMHG | OXYGEN SATURATION: 99 % | HEART RATE: 69 BPM

## 2025-04-03 DIAGNOSIS — K21.9 GASTROESOPHAGEAL REFLUX DISEASE WITHOUT ESOPHAGITIS: ICD-10-CM

## 2025-04-03 DIAGNOSIS — Z12.31 BREAST CANCER SCREENING BY MAMMOGRAM: ICD-10-CM

## 2025-04-03 DIAGNOSIS — M79.89 PAIN AND SWELLING OF LEFT LOWER LEG: ICD-10-CM

## 2025-04-03 DIAGNOSIS — N92.1 MENOMETRORRHAGIA: ICD-10-CM

## 2025-04-03 DIAGNOSIS — Z00.00 PREVENTATIVE HEALTH CARE: Primary | ICD-10-CM

## 2025-04-03 DIAGNOSIS — E70.1 PKU (PHENYLKETONURIA): ICD-10-CM

## 2025-04-03 DIAGNOSIS — I10 PRIMARY HYPERTENSION: ICD-10-CM

## 2025-04-03 DIAGNOSIS — M79.662 PAIN AND SWELLING OF LEFT LOWER LEG: ICD-10-CM

## 2025-04-03 DIAGNOSIS — G43.709 CHRONIC MIGRAINE WITHOUT AURA WITHOUT STATUS MIGRAINOSUS, NOT INTRACTABLE: ICD-10-CM

## 2025-04-03 DIAGNOSIS — F41.9 ANXIETY: ICD-10-CM

## 2025-04-03 DIAGNOSIS — J45.20 MILD INTERMITTENT ASTHMA WITHOUT COMPLICATION: ICD-10-CM

## 2025-04-03 DIAGNOSIS — R73.03 PREDIABETES: ICD-10-CM

## 2025-04-03 DIAGNOSIS — J45.40 MODERATE PERSISTENT ASTHMA WITHOUT COMPLICATION: ICD-10-CM

## 2025-04-03 DIAGNOSIS — M25.511 CHRONIC RIGHT SHOULDER PAIN: ICD-10-CM

## 2025-04-03 DIAGNOSIS — G89.29 CHRONIC RIGHT SHOULDER PAIN: ICD-10-CM

## 2025-04-03 DIAGNOSIS — E03.9 ACQUIRED HYPOTHYROIDISM: ICD-10-CM

## 2025-04-03 DIAGNOSIS — F32.1 CURRENT MODERATE EPISODE OF MAJOR DEPRESSIVE DISORDER WITHOUT PRIOR EPISODE: ICD-10-CM

## 2025-04-03 DIAGNOSIS — E66.01 OBESITY, MORBID, BMI 50 OR HIGHER: ICD-10-CM

## 2025-04-03 DIAGNOSIS — M25.572 ACUTE LEFT ANKLE PAIN: ICD-10-CM

## 2025-04-03 DIAGNOSIS — N95.1 PERIMENOPAUSAL SYMPTOMS: ICD-10-CM

## 2025-04-03 PROCEDURE — 80061 LIPID PANEL: CPT | Performed by: NURSE PRACTITIONER

## 2025-04-03 PROCEDURE — 80050 GENERAL HEALTH PANEL: CPT | Performed by: NURSE PRACTITIONER

## 2025-04-03 PROCEDURE — 83036 HEMOGLOBIN GLYCOSYLATED A1C: CPT | Performed by: NURSE PRACTITIONER

## 2025-04-03 PROCEDURE — 82670 ASSAY OF TOTAL ESTRADIOL: CPT | Performed by: NURSE PRACTITIONER

## 2025-04-03 PROCEDURE — 82626 DEHYDROEPIANDROSTERONE: CPT | Performed by: NURSE PRACTITIONER

## 2025-04-03 PROCEDURE — 84403 ASSAY OF TOTAL TESTOSTERONE: CPT | Performed by: NURSE PRACTITIONER

## 2025-04-03 PROCEDURE — 36415 COLL VENOUS BLD VENIPUNCTURE: CPT | Performed by: NURSE PRACTITIONER

## 2025-04-03 PROCEDURE — 82672 ASSAY OF ESTROGEN: CPT | Performed by: NURSE PRACTITIONER

## 2025-04-03 PROCEDURE — 84144 ASSAY OF PROGESTERONE: CPT | Performed by: NURSE PRACTITIONER

## 2025-04-03 RX ORDER — LEVOTHYROXINE SODIUM 75 UG/1
75 TABLET ORAL
Qty: 90 TABLET | Refills: 3 | Status: SHIPPED | OUTPATIENT
Start: 2025-04-03 | End: 2025-04-11 | Stop reason: SDUPTHER

## 2025-04-03 RX ORDER — FLUOXETINE 10 MG/1
10 CAPSULE ORAL DAILY
Qty: 90 CAPSULE | Refills: 3 | Status: SHIPPED | OUTPATIENT
Start: 2025-04-03

## 2025-04-03 RX ORDER — BUSPIRONE HYDROCHLORIDE 10 MG/1
10 TABLET ORAL 3 TIMES DAILY
Qty: 270 TABLET | Refills: 1 | Status: SHIPPED | OUTPATIENT
Start: 2025-04-03

## 2025-04-03 RX ORDER — LISINOPRIL AND HYDROCHLOROTHIAZIDE 10; 12.5 MG/1; MG/1
1 TABLET ORAL DAILY
Qty: 90 TABLET | Refills: 1 | Status: SHIPPED | OUTPATIENT
Start: 2025-04-03

## 2025-04-03 RX ORDER — FREMANEZUMAB-VFRM 225 MG/1.5ML
1.5 INJECTION SUBCUTANEOUS
Qty: 1.5 ML | Refills: 11 | Status: SHIPPED | OUTPATIENT
Start: 2025-04-03

## 2025-04-03 RX ORDER — SPIRONOLACTONE 50 MG/1
50 TABLET, FILM COATED ORAL DAILY
Qty: 90 TABLET | Refills: 1 | Status: SHIPPED | OUTPATIENT
Start: 2025-04-03

## 2025-04-03 RX ORDER — ALBUTEROL SULFATE AND BUDESONIDE 90; 80 UG/1; UG/1
2 AEROSOL, METERED RESPIRATORY (INHALATION) EVERY 4 HOURS PRN
Qty: 10.7 G | Refills: 11 | Status: SHIPPED | OUTPATIENT
Start: 2025-04-03

## 2025-04-03 RX ORDER — OMEPRAZOLE 40 MG/1
40 CAPSULE, DELAYED RELEASE ORAL DAILY
Qty: 90 CAPSULE | Refills: 1 | Status: SHIPPED | OUTPATIENT
Start: 2025-04-03

## 2025-04-03 RX ORDER — FLUTICASONE FUROATE, UMECLIDINIUM BROMIDE AND VILANTEROL TRIFENATATE 200; 62.5; 25 UG/1; UG/1; UG/1
1 POWDER RESPIRATORY (INHALATION) DAILY
Qty: 60 EACH | Refills: 11 | Status: SHIPPED | OUTPATIENT
Start: 2025-04-03

## 2025-04-03 NOTE — PROGRESS NOTES
Chief Complaint  Chief Complaint   Patient presents with    Annual Exam     Requesting rf buspirone, fluoxetine, levothyroxine, lisinopril-hctz, omeprazole, spirinolactone, ubrogepant  Pt would like to discuss possibly being premenopausal- irregular periods  Does not feel like prozac has been helping             Subjective          Jo Asif presents to [facility] for   History of Present Illness    Patient presents for annual exam, needs medication refills as above, She has pt assist forms for ajovy, ubrelvy, synthroid, rybelsus and prozac. Mammogram is due.  Pap smear per Dr. Riya Ramachandran. She is having severe painful, heavy menstrual cycles, irregular, skipping 2-3 months, has no sex drive. She is no longer seeing Dr. Bingham or Riya Ramachandran d/t  insurance change, ran out of most meds.      HTN, stable on meds and takes as directed, denies chest pain, headache, shortness of air, palpitations and swelling of extremities.     Migraines, she followed with Dr. Jacob in past, lost insurance, now lost ajovy coverage, was getting through pt assist    Asthma, had side effects with Symbicort, insurance was not covering Spiriva or Advair, stopped Singulair due to nightmares    Cervical spine degeneration, left-sided sciatica, lumbar degeneration and L4-5 disc bulge, she follows with [facility] pain management    Hypothyroidism, stable on medication, denies symptoms of constipation, weight gain/loss, hot or cold intolerance, hair loss, abnormal heart rate and fatigue.     PKU, on palynziq    Obesity, on ozempic through pt assist       The following portions of the patient's history were reviewed and updated as appropriate: allergies, current medications, past family history, past medical history, past social history, past surgical history and problem list.    Past Medical History:   Diagnosis Date    Abdominal pain 09/2022    Allergic     Anxiety     Arrhythmia     Asthma     Atrial  fibrillation     Cervical disc disorder     Cholelithiasis     CTS (carpal tunnel syndrome) 2005    Depression     Diabetes mellitus     Disease of thyroid gland     Fracture, foot 2007    GERD (gastroesophageal reflux disease)     Heart murmur     History of medical problems     HL (hearing loss)     Hypertension     Hypothyroidism     Iron disorder     Irritable bowel syndrome     Knee swelling 2020    Low back pain Aug 2022    Low back strain     Migraine     Morbid obesity     Obesity     PCOS (polycystic ovarian syndrome)     PKU (phenylketonuria)     Prediabetes     Rotator cuff syndrome 2021    Rotator cuff tear 03/2022    Sleep apnea     Tear of meniscus of knee 2020    Thyroid nodule     Torn meniscus 03/2021     Past Surgical History:   Procedure Laterality Date    ABLATION OF DYSRHYTHMIC FOCUS      BLADDER SLING MODIFIED, ANTERIOR AND POSTERIOR VAGINAL REPAIR      CARDIAC CATHETERIZATION      CARDIAC ELECTROPHYSIOLOGY PROCEDURE N/A 06/10/2020    Procedure: EP/Ablation;  Surgeon: Joseph Andrews MD;  Location: [facility] CATH INVASIVE LOCATION;  Service: Cardiovascular;  Laterality: N/A;    CARPAL TUNNEL RELEASE Right     CHOLECYSTECTOMY N/A 09/20/2022    Procedure: CHOLECYSTECTOMY LAPAROSCOPIC WITH DAVINCI ROBOT;  Surgeon: Alf Whalen MD;  Location: [facility] MAIN OR;  Service: Robotics - DaVinci;  Laterality: N/A;    COLONOSCOPY N/A 06/30/2022    Procedure: COLONOSCOPY with random biopsies R/O microscopic colitis, polypectomy x 1;  Surgeon: Jeffrey Echevarria MD;  Location: [facility] ENDOSCOPY;  Service: Gastroenterology;  Laterality: N/A;    ENDOSCOPY N/A 06/30/2022    Procedure: ESOPHAGOGASTRODUODENOSCOPY with esophagus, gastric and duodenal biopsies;  Surgeon: Jeffrey Echevarria MD;  Location: [facility] ENDOSCOPY;  Service: Gastroenterology;  Laterality: N/A;    GALLBLADDER SURGERY  2022    HAND SURGERY  2005    KNEE ARTHROSCOPY Right 03/26/2021    Procedure: KNEE ARTHROSCOPY with   partial medial lateral meniscectomy;  Surgeon: Kb Gaviria MD;  Location: [facility] MAIN OR;  Service: Orthopedics;  Laterality: Right;    KNEE SURGERY  3/24/2021    SHOULDER ARTHROSCOPY W/ ROTATOR CUFF REPAIR Right 03/18/2022    Procedure: SHOULDER ARTHROSCOPY WITH DEBRIDEMENT;  Surgeon: Kb Gaviria MD;  Location: [facility] MAIN OR;  Service: Orthopedics;  Laterality: Right;    SHOULDER ARTHROSCOPY W/ ROTATOR CUFF REPAIR Right 03/29/2024    Procedure: SHOULDER ARTHROSCOPY WITH extensive debridement and bicep tenotomy;  Surgeon: Kb Gaviria MD;  Location: [facility] MAIN OR;  Service: Orthopedics;  Laterality: Right;    SHOULDER SURGERY  3/18/2022    TONSILLECTOMY      TRIGGER POINT INJECTION      TUBAL ABDOMINAL LIGATION      UVULECTOMY      WRIST SURGERY       Family History   Problem Relation Age of Onset    Depression Mother     Hypertension Mother     Diabetes Mother     Cancer Mother         Breast    Hyperlipidemia Mother     Heart disease Mother     Arthritis Father     Hypertension Father     Diabetes Father     Heart disease Father     Cancer Father         Lung    Early death Father     Hyperlipidemia Father     Osteoporosis Father     COPD Maternal Grandfather     Diabetes Maternal Grandmother     Hyperlipidemia Maternal Grandmother     Heart disease Maternal Grandmother     Cancer Paternal Grandfather     Diabetes Paternal Grandmother     Hyperlipidemia Paternal Grandmother     Heart disease Paternal Grandmother     Cancer Paternal Uncle         Stomach     Social History     Tobacco Use    Smoking status: Former     Current packs/day: 0.00     Average packs/day: 0.1 packs/day for 6.0 years (0.6 ttl pk-yrs)     Types: Cigarettes     Start date: 9/1/2003     Quit date: 9/1/2009     Years since quitting: 15.6    Smokeless tobacco: Never    Tobacco comments:     Rarely smoked   Substance Use Topics    Alcohol use: No       Current Outpatient Medications:     Accu-Chek Softclix  Lancets lancets, , Disp: , Rfl:     Blood Glucose Monitoring Suppl (Accu-Chek Guide Me) w/Device kit, , Disp: , Rfl:     Blood Glucose Monitoring Suppl (Accu-Chek Guide) w/Device kit, Use 1 each Take As Directed. To check blood glucose daily as needed, Disp: 1 kit, Rfl: 0    busPIRone (BUSPAR) 10 MG tablet, Take 1 tablet by mouth 3 (Three) Times a Day., Disp: 270 tablet, Rfl: 1    EPINEPHrine (EPIPEN) 0.3 MG/0.3ML solution auto-injector injection, Inject 0.3 mL into the appropriate muscle as directed by prescriber., Disp: , Rfl:     FLUoxetine (PROzac) 10 MG capsule, Take 1 capsule by mouth Daily., Disp: 90 capsule, Rfl: 3    Fluticasone-Umeclidin-Vilant (Trelegy Ellipta) 200-62.5-25 MCG/ACT inhaler, Inhale 1 puff Daily. (Free 30 day voucher), Disp: 60 each, Rfl: 11    Fremanezumab-vfrm (Ajovy) 225 MG/1.5ML solution prefilled syringe, Inject 1.5 mL into the appropriate muscle as directed by prescriber Every 30 (Thirty) Days., Disp: 1.5 mL, Rfl: 11    glucose blood (Accu-Chek Guide) test strip, Use to check blood glucose daily as needed, Disp: 100 each, Rfl: 0    Lancets 33G misc, Use 1 each Daily As Needed (to check blood sugar)., Disp: 100 each, Rfl: 0    levocetirizine (XYZAL) 5 MG tablet, Take 1 tablet by mouth Every Evening., Disp: , Rfl:     levothyroxine (SYNTHROID, LEVOTHROID) 75 MCG tablet, Take 1 tablet by mouth Every Morning., Disp: 90 tablet, Rfl: 3    lisinopril-hydrochlorothiazide (PRINZIDE,ZESTORETIC) 10-12.5 MG per tablet, Take 1 tablet by mouth Daily., Disp: 90 tablet, Rfl: 1    omeprazole (priLOSEC) 40 MG capsule, Take 1 capsule by mouth Daily., Disp: 90 capsule, Rfl: 1    Palynziq 10 MG/0.5ML solution prefilled syringe, Take 10 mg by mouth 3 (Three) Times a Week. mw, Disp: , Rfl:     Semaglutide, 2 MG/DOSE, (Ozempic, 2 MG/DOSE,) 8 MG/3ML solution pen-injector, Inject 2 mg under the skin into the appropriate area as directed 1 (One) Time Per Week. (Patient taking differently: Inject 2 mg under the  "skin into the appropriate area as directed 1 (One) Time Per Week. mon), Disp: 3 mL, Rfl: 11    spironolactone (ALDACTONE) 50 MG tablet, Take 1 tablet by mouth Daily., Disp: 90 tablet, Rfl: 1    ubrogepant (UBRELVY) 100 MG tablet, Take 1 tablet by mouth Daily As Needed (migraines)., Disp: 16 tablet, Rfl: 11    Albuterol-Budesonide (Airsupra) 90-80 MCG/ACT aerosol, Inhale 2 puffs Every 4 (Four) Hours As Needed (wheezing, shortness of air)., Disp: 10.7 g, Rfl: 11    Objective   Vital Signs:   Vitals:    04/03/25 1427   BP: 128/86   BP Location: Left arm   Patient Position: Sitting   Cuff Size: Large Adult   Pulse: 69   Temp: 98.6 °F (37 °C)   TempSrc: Oral   SpO2: 99%   Weight: 136 kg (300 lb)   Height: 162.6 cm (64\")   PainSc: 3    PainLoc: Ankle       Body mass index is 51.49 kg/m².    Physical Exam  Vitals and nursing note reviewed.   Constitutional:       General: She is not in acute distress.     Appearance: Normal appearance. She is well-developed. She is obese. She is not ill-appearing or diaphoretic.   HENT:      Head: Normocephalic.   Eyes:      Conjunctiva/sclera: Conjunctivae normal.      Pupils: Pupils are equal, round, and reactive to light.   Neck:      Thyroid: No thyromegaly.      Vascular: No JVD.   Cardiovascular:      Rate and Rhythm: Normal rate and regular rhythm.      Heart sounds: Normal heart sounds. No murmur heard.  Pulmonary:      Effort: Pulmonary effort is normal. No respiratory distress.      Breath sounds: Normal breath sounds. No wheezing or rhonchi.   Abdominal:      General: Bowel sounds are normal. There is no distension.      Palpations: Abdomen is soft.      Tenderness: There is no abdominal tenderness.   Musculoskeletal:         General: Tenderness (L foot/ankle, lateral malleolus, L knee mod espino rom. using crutch for mobility) and signs of injury present. No swelling. Normal range of motion.      Cervical back: Normal range of motion and neck supple. No tenderness. "   Lymphadenopathy:      Cervical: No cervical adenopathy.   Skin:     General: Skin is warm and dry.      Coloration: Skin is not jaundiced.      Findings: No erythema or rash.   Neurological:      General: No focal deficit present.      Mental Status: She is alert and oriented to person, place, and time. Mental status is at baseline.      Sensory: No sensory deficit.      Motor: No weakness.      Gait: Gait normal.   Psychiatric:         Mood and Affect: Mood normal.         Behavior: Behavior normal.         Thought Content: Thought content normal.         Judgment: Judgment normal.          Result Review :     Office Visit on 04/03/2025   Component Date Value Ref Range Status    Total Cholesterol 04/03/2025 201 (H)  0 - 200 mg/dL Final    Triglycerides 04/03/2025 106  0 - 150 mg/dL Final    HDL Cholesterol 04/03/2025 49  40 - 60 mg/dL Final    LDL Cholesterol  04/03/2025 133 (H)  0 - 100 mg/dL Final    VLDL Cholesterol 04/03/2025 19  5 - 40 mg/dL Final    LDL/HDL Ratio 04/03/2025 2.67   Final    Glucose 04/03/2025 95  65 - 99 mg/dL Final    BUN 04/03/2025 11  6 - 20 mg/dL Final    Creatinine 04/03/2025 0.70  0.57 - 1.00 mg/dL Final    Sodium 04/03/2025 136  136 - 145 mmol/L Final    Potassium 04/03/2025 3.9  3.5 - 5.2 mmol/L Final    Chloride 04/03/2025 104  98 - 107 mmol/L Final    CO2 04/03/2025 25.2  22.0 - 29.0 mmol/L Final    Calcium 04/03/2025 9.4  8.6 - 10.5 mg/dL Final    Total Protein 04/03/2025 7.2  6.0 - 8.5 g/dL Final    Albumin 04/03/2025 4.1  3.5 - 5.2 g/dL Final    ALT (SGPT) 04/03/2025 17  1 - 33 U/L Final    AST (SGOT) 04/03/2025 17  1 - 32 U/L Final    Alkaline Phosphatase 04/03/2025 64  39 - 117 U/L Final    Total Bilirubin 04/03/2025 0.2  0.0 - 1.2 mg/dL Final    Globulin 04/03/2025 3.1  gm/dL Final    A/G Ratio 04/03/2025 1.3  g/dL Final    BUN/Creatinine Ratio 04/03/2025 15.7  7.0 - 25.0 Final    Anion Gap 04/03/2025 6.8  5.0 - 15.0 mmol/L Final    eGFR 04/03/2025 107.5  >60.0 mL/min/1.73  Final    WBC 04/03/2025 6.87  3.40 - 10.80 10*3/mm3 Final    RBC 04/03/2025 4.00  3.77 - 5.28 10*6/mm3 Final    Hemoglobin 04/03/2025 12.9  12.0 - 15.9 g/dL Final    Hematocrit 04/03/2025 37.4  34.0 - 46.6 % Final    MCV 04/03/2025 93.5  79.0 - 97.0 fL Final    MCH 04/03/2025 32.3  26.6 - 33.0 pg Final    MCHC 04/03/2025 34.5  31.5 - 35.7 g/dL Final    RDW 04/03/2025 12.2 (L)  12.3 - 15.4 % Final    RDW-SD 04/03/2025 41.6  37.0 - 54.0 fl Final    MPV 04/03/2025 10.2  6.0 - 12.0 fL Final    Platelets 04/03/2025 282  140 - 450 10*3/mm3 Final    TSH 04/03/2025 6.290 (H)  0.270 - 4.200 uIU/mL Final    Hemoglobin A1C 04/03/2025 5.40  4.80 - 5.60 % Final    Testosterone, Total 04/03/2025 <2.50 (L)  8.40 - 48.10 ng/dL Final    Estradiol 04/03/2025 46.9  pg/mL Final    Estrogen 04/03/2025 116  pg/mL Final                             Prepubertal             < 40                           Female Cycle:                             1-10 Days         16 - 328                             11-20 Days        34 - 501                             21-30 Days        48 - 350                             Post-Menopausal   40 - 244    Progesterone 04/03/2025 0.06  ng/mL Final                  Class 3 Severe Obesity (BMI >=40). Obesity-related health conditions include the following: hypertension, diabetes mellitus, and dyslipidemias. Obesity is improving with treatment. BMI is is above average; BMI management plan is completed. We discussed low calorie, low carb based diet program, portion control, increasing exercise, and pharmacologic options including ozempic .           Assessment and Plan    Diagnoses and all orders for this visit:    1. Preventative health care (Primary)  -     Lipid Panel  -     Comprehensive Metabolic Panel  -     CBC (No Diff)  -     TSH  -     Hemoglobin A1c  -     Testosterone  -     Estradiol  -     Estrogens, Total  -     DHEA  -     Progesterone    2. Anxiety  Comments:  Symptoms labile, continue and refill  Prozac daily and hydroxyzine as needed  add buspirone  Orders:  -     busPIRone (BUSPAR) 10 MG tablet; Take 1 tablet by mouth 3 (Three) Times a Day.  Dispense: 270 tablet; Refill: 1  -     FLUoxetine (PROzac) 10 MG capsule; Take 1 capsule by mouth Daily.  Dispense: 90 capsule; Refill: 3    3. Current moderate episode of major depressive disorder without prior episode  Comments:  Improved on Prozac, continue and refill.   Orders:  -     FLUoxetine (PROzac) 10 MG capsule; Take 1 capsule by mouth Daily.  Dispense: 90 capsule; Refill: 3    4. Moderate persistent asthma without complication  -     Fluticasone-Umeclidin-Vilant (Trelegy Ellipta) 200-62.5-25 MCG/ACT inhaler; Inhale 1 puff Daily. (Free 30 day voucher)  Dispense: 60 each; Refill: 11    5. Chronic migraine without aura without status migrainosus, not intractable  -     Fremanezumab-vfrm (Ajovy) 225 MG/1.5ML solution prefilled syringe; Inject 1.5 mL into the appropriate muscle as directed by prescriber Every 30 (Thirty) Days.  Dispense: 1.5 mL; Refill: 11  -     ubrogepant (UBRELVY) 100 MG tablet; Take 1 tablet by mouth Daily As Needed (migraines).  Dispense: 16 tablet; Refill: 11    6. Acquired hypothyroidism  -     levothyroxine (SYNTHROID, LEVOTHROID) 75 MCG tablet; Take 1 tablet by mouth Every Morning.  Dispense: 90 tablet; Refill: 3  -     TSH    7. Primary hypertension  Comments:  cont lisinopril with HCTZ for renal protection in a diabetic, for hypertension and edema.   Orders:  -     lisinopril-hydrochlorothiazide (PRINZIDE,ZESTORETIC) 10-12.5 MG per tablet; Take 1 tablet by mouth Daily.  Dispense: 90 tablet; Refill: 1  -     Lipid Panel  -     Comprehensive Metabolic Panel  -     CBC (No Diff)  -     TSH    8. Gastroesophageal reflux disease without esophagitis  Comments:  stable, cont/rf Prilosec  Orders:  -     omeprazole (priLOSEC) 40 MG capsule; Take 1 capsule by mouth Daily.  Dispense: 90 capsule; Refill: 1    9. Acute left ankle pain    10. Pain  and swelling of left lower leg    11. Prediabetes  -     Hemoglobin A1c    12. Obesity, morbid, BMI 50 or higher    13. Chronic right shoulder pain    14. Mild intermittent asthma without complication    15. PKU (phenylketonuria)    16. Perimenopausal symptoms  -     Testosterone  -     Estradiol  -     Estrogens, Total  -     DHEA  -     Progesterone    17. Menometrorrhagia  -     Testosterone  -     Estradiol  -     Estrogens, Total  -     DHEA  -     Progesterone    18. Breast cancer screening by mammogram  -     Mammo Screening Digital Tomosynthesis Bilateral With CAD; Future    Other orders  -     spironolactone (ALDACTONE) 50 MG tablet; Take 1 tablet by mouth Daily.  Dispense: 90 tablet; Refill: 1  -     Albuterol-Budesonide (Airsupra) 90-80 MCG/ACT aerosol; Inhale 2 puffs Every 4 (Four) Hours As Needed (wheezing, shortness of air).  Dispense: 10.7 g; Refill: 11      Labs today as ordered, will notify results  Patient is no longer following with Dr. Jacob, Dr. Bingham or Dr. Ramachandran  Needs Pap smear  She has had patient assistance for multiple medications when she lost her insurance last year.  Will send all meds to pharmacy today and determine other options if needed   Airsupra co-pay card provided  Continue current medication regimen  Female hormones today, consider topical HRT  Will renew handicap placard when needed  Continue Prozac 10 mg, patient has been off of buspirone, will resume and if anxiety is still high will consider increasing Prozac  Follow-up with Dr. Garnica for left foot  Age appropriate preventative counseling provided, including healthy lifestyle modifications and exercise      I spent 45 minutes caring for Jo Asif on this date of service. This time includes time spent by me in the following activities: preparing for the visit, reviewing tests, performing a medically appropriate examination and/or evaluation , counseling and educating the patient/family/caregiver, ordering  medications, tests, or procedures and documenting information in the medical record        Follow Up     Return in about 6 months (around 10/3/2025) for Recheck.  Patient was given instructions and counseling regarding her condition or for health maintenance advice. Please see specific information pulled into the AVS if appropriate.        Part of this note may be an electronic transcription/translation of spoken language to printed text using the Dragon Dictation System

## 2025-04-03 NOTE — PROGRESS NOTES
Venipuncture Blood Specimen Collection  Venipuncture performed in left arm by Marli Soto MA with good hemostasis. Patient tolerated the procedure well without complications.   04/03/25   Marli Soto MA

## 2025-04-04 LAB
ALBUMIN SERPL-MCNC: 4.1 G/DL (ref 3.5–5.2)
ALBUMIN/GLOB SERPL: 1.3 G/DL
ALP SERPL-CCNC: 64 U/L (ref 39–117)
ALT SERPL W P-5'-P-CCNC: 17 U/L (ref 1–33)
ANION GAP SERPL CALCULATED.3IONS-SCNC: 6.8 MMOL/L (ref 5–15)
AST SERPL-CCNC: 17 U/L (ref 1–32)
BILIRUB SERPL-MCNC: 0.2 MG/DL (ref 0–1.2)
BUN SERPL-MCNC: 11 MG/DL (ref 6–20)
BUN/CREAT SERPL: 15.7 (ref 7–25)
CALCIUM SPEC-SCNC: 9.4 MG/DL (ref 8.6–10.5)
CHLORIDE SERPL-SCNC: 104 MMOL/L (ref 98–107)
CHOLEST SERPL-MCNC: 201 MG/DL (ref 0–200)
CO2 SERPL-SCNC: 25.2 MMOL/L (ref 22–29)
CREAT SERPL-MCNC: 0.7 MG/DL (ref 0.57–1)
DEPRECATED RDW RBC AUTO: 41.6 FL (ref 37–54)
EGFRCR SERPLBLD CKD-EPI 2021: 107.5 ML/MIN/1.73
ERYTHROCYTE [DISTWIDTH] IN BLOOD BY AUTOMATED COUNT: 12.2 % (ref 12.3–15.4)
ESTRADIOL SERPL HS-MCNC: 46.9 PG/ML
GLOBULIN UR ELPH-MCNC: 3.1 GM/DL
GLUCOSE SERPL-MCNC: 95 MG/DL (ref 65–99)
HBA1C MFR BLD: 5.4 % (ref 4.8–5.6)
HCT VFR BLD AUTO: 37.4 % (ref 34–46.6)
HDLC SERPL-MCNC: 49 MG/DL (ref 40–60)
HGB BLD-MCNC: 12.9 G/DL (ref 12–15.9)
LDLC SERPL CALC-MCNC: 133 MG/DL (ref 0–100)
LDLC/HDLC SERPL: 2.67 {RATIO}
MCH RBC QN AUTO: 32.3 PG (ref 26.6–33)
MCHC RBC AUTO-ENTMCNC: 34.5 G/DL (ref 31.5–35.7)
MCV RBC AUTO: 93.5 FL (ref 79–97)
PLATELET # BLD AUTO: 282 10*3/MM3 (ref 140–450)
PMV BLD AUTO: 10.2 FL (ref 6–12)
POTASSIUM SERPL-SCNC: 3.9 MMOL/L (ref 3.5–5.2)
PROGEST SERPL-MCNC: 0.06 NG/ML
PROT SERPL-MCNC: 7.2 G/DL (ref 6–8.5)
RBC # BLD AUTO: 4 10*6/MM3 (ref 3.77–5.28)
SODIUM SERPL-SCNC: 136 MMOL/L (ref 136–145)
TESTOST SERPL-MCNC: <2.5 NG/DL (ref 8.4–48.1)
TRIGL SERPL-MCNC: 106 MG/DL (ref 0–150)
TSH SERPL DL<=0.05 MIU/L-ACNC: 6.29 UIU/ML (ref 0.27–4.2)
VLDLC SERPL-MCNC: 19 MG/DL (ref 5–40)
WBC NRBC COR # BLD AUTO: 6.87 10*3/MM3 (ref 3.4–10.8)

## 2025-04-08 LAB — ESTROGEN SERPL-MCNC: 116 PG/ML

## 2025-04-11 ENCOUNTER — RESULTS FOLLOW-UP (OUTPATIENT)
Dept: FAMILY MEDICINE CLINIC | Facility: CLINIC | Age: 45
End: 2025-04-11
Payer: COMMERCIAL

## 2025-04-11 DIAGNOSIS — E03.9 ACQUIRED HYPOTHYROIDISM: ICD-10-CM

## 2025-04-13 RX ORDER — LEVOTHYROXINE SODIUM 100 UG/1
100 TABLET ORAL
Qty: 90 TABLET | Refills: 1 | Status: SHIPPED | OUTPATIENT
Start: 2025-04-13

## 2025-04-14 LAB — DHEA SERPL-MCNC: 45 NG/DL (ref 31–701)

## 2025-04-18 ENCOUNTER — PATIENT MESSAGE (OUTPATIENT)
Dept: FAMILY MEDICINE CLINIC | Facility: CLINIC | Age: 45
End: 2025-04-18

## 2025-05-19 NOTE — TELEPHONE ENCOUNTER
LVM regarding mammogram order. Inquiring if has been scheduled or has been completed, Gave phone number to [facility] at [phone] to call and schedule    Relay

## 2025-07-14 RX ORDER — METHYLPREDNISOLONE 4 MG/1
TABLET ORAL
Qty: 21 TABLET | Refills: 0 | Status: SHIPPED | OUTPATIENT
Start: 2025-07-14

## 2025-07-25 RX ORDER — METHYLPREDNISOLONE 4 MG/1
TABLET ORAL
Qty: 21 TABLET | Refills: 0 | Status: SHIPPED | OUTPATIENT
Start: 2025-07-25

## 2025-07-25 RX ORDER — MONTELUKAST SODIUM 10 MG/1
10 TABLET ORAL NIGHTLY
Qty: 90 TABLET | Refills: 0 | Status: SHIPPED | OUTPATIENT
Start: 2025-07-25

## 2025-07-27 ENCOUNTER — APPOINTMENT (OUTPATIENT)
Dept: GENERAL RADIOLOGY | Facility: HOSPITAL | Age: 45
End: 2025-07-27
Payer: COMMERCIAL

## 2025-07-27 ENCOUNTER — HOSPITAL ENCOUNTER (EMERGENCY)
Facility: HOSPITAL | Age: 45
Discharge: HOME | End: 2025-07-27
Attending: EMERGENCY MEDICINE | Admitting: EMERGENCY MEDICINE
Payer: COMMERCIAL

## 2025-07-27 VITALS
OXYGEN SATURATION: 98 % | HEIGHT: 64 IN | SYSTOLIC BLOOD PRESSURE: 137 MMHG | DIASTOLIC BLOOD PRESSURE: 77 MMHG | WEIGHT: 293 LBS | HEART RATE: 75 BPM | TEMPERATURE: 98.3 F | RESPIRATION RATE: 20 BRPM | BODY MASS INDEX: 50.02 KG/M2

## 2025-07-27 DIAGNOSIS — R07.89 MUSCULOSKELETAL CHEST PAIN: Primary | ICD-10-CM

## 2025-07-27 DIAGNOSIS — E66.9 SUPER OBESE: ICD-10-CM

## 2025-07-27 LAB
ALBUMIN SERPL-MCNC: 4 G/DL (ref 3.5–5.2)
ALBUMIN/GLOB SERPL: 1.6 G/DL
ALP SERPL-CCNC: 70 U/L (ref 39–117)
ALT SERPL W P-5'-P-CCNC: 21 U/L (ref 1–33)
ANION GAP SERPL CALCULATED.3IONS-SCNC: 6.4 MMOL/L (ref 5–15)
AST SERPL-CCNC: 13 U/L (ref 1–32)
BASOPHILS # BLD AUTO: 0.03 10*3/MM3 (ref 0–0.2)
BASOPHILS NFR BLD AUTO: 0.4 % (ref 0–1.5)
BILIRUB SERPL-MCNC: 0.2 MG/DL (ref 0–1.2)
BUN SERPL-MCNC: 13.2 MG/DL (ref 6–20)
BUN/CREAT SERPL: 24.4 (ref 7–25)
CALCIUM SPEC-SCNC: 8.9 MG/DL (ref 8.6–10.5)
CHLORIDE SERPL-SCNC: 107 MMOL/L (ref 98–107)
CO2 SERPL-SCNC: 21.6 MMOL/L (ref 22–29)
CREAT SERPL-MCNC: 0.54 MG/DL (ref 0.57–1)
D DIMER PPP FEU-MCNC: 0.49 MCGFEU/ML (ref 0–0.5)
DEPRECATED RDW RBC AUTO: 45.8 FL (ref 37–54)
EGFRCR SERPLBLD CKD-EPI 2021: 114.4 ML/MIN/1.73
EOSINOPHIL # BLD AUTO: 0.19 10*3/MM3 (ref 0–0.4)
EOSINOPHIL NFR BLD AUTO: 2.8 % (ref 0.3–6.2)
ERYTHROCYTE [DISTWIDTH] IN BLOOD BY AUTOMATED COUNT: 12.7 % (ref 12.3–15.4)
GLOBULIN UR ELPH-MCNC: 2.5 GM/DL
GLUCOSE SERPL-MCNC: 282 MG/DL (ref 65–99)
HCT VFR BLD AUTO: 35.4 % (ref 34–46.6)
HGB BLD-MCNC: 11.6 G/DL (ref 12–15.9)
IMM GRANULOCYTES # BLD AUTO: 0.02 10*3/MM3 (ref 0–0.05)
IMM GRANULOCYTES NFR BLD AUTO: 0.3 % (ref 0–0.5)
LIPASE SERPL-CCNC: 54 U/L (ref 13–60)
LYMPHOCYTES # BLD AUTO: 2.13 10*3/MM3 (ref 0.7–3.1)
LYMPHOCYTES NFR BLD AUTO: 31.4 % (ref 19.6–45.3)
MCH RBC QN AUTO: 31.4 PG (ref 26.6–33)
MCHC RBC AUTO-ENTMCNC: 32.8 G/DL (ref 31.5–35.7)
MCV RBC AUTO: 95.7 FL (ref 79–97)
MONOCYTES # BLD AUTO: 0.54 10*3/MM3 (ref 0.1–0.9)
MONOCYTES NFR BLD AUTO: 8 % (ref 5–12)
NEUTROPHILS NFR BLD AUTO: 3.88 10*3/MM3 (ref 1.7–7)
NEUTROPHILS NFR BLD AUTO: 57.1 % (ref 42.7–76)
PLATELET # BLD AUTO: 216 10*3/MM3 (ref 140–450)
PMV BLD AUTO: 9.7 FL (ref 6–12)
POTASSIUM SERPL-SCNC: 3.7 MMOL/L (ref 3.5–5.2)
PROT SERPL-MCNC: 6.5 G/DL (ref 6–8.5)
RBC # BLD AUTO: 3.7 10*6/MM3 (ref 3.77–5.28)
SODIUM SERPL-SCNC: 135 MMOL/L (ref 136–145)
TROPONIN T SERPL HS-MCNC: 8 NG/L
WBC NRBC COR # BLD AUTO: 6.79 10*3/MM3 (ref 3.4–10.8)

## 2025-07-27 PROCEDURE — 93005 ELECTROCARDIOGRAM TRACING: CPT | Performed by: EMERGENCY MEDICINE

## 2025-07-27 PROCEDURE — 84484 ASSAY OF TROPONIN QUANT: CPT | Performed by: EMERGENCY MEDICINE

## 2025-07-27 PROCEDURE — 85379 FIBRIN DEGRADATION QUANT: CPT | Performed by: EMERGENCY MEDICINE

## 2025-07-27 PROCEDURE — 71045 X-RAY EXAM CHEST 1 VIEW: CPT

## 2025-07-27 PROCEDURE — 25010000002 KETOROLAC TROMETHAMINE PER 15 MG: Performed by: EMERGENCY MEDICINE

## 2025-07-27 PROCEDURE — 85025 COMPLETE CBC W/AUTO DIFF WBC: CPT | Performed by: EMERGENCY MEDICINE

## 2025-07-27 PROCEDURE — 99284 EMERGENCY DEPT VISIT MOD MDM: CPT

## 2025-07-27 PROCEDURE — 80053 COMPREHEN METABOLIC PANEL: CPT | Performed by: EMERGENCY MEDICINE

## 2025-07-27 PROCEDURE — 99283 EMERGENCY DEPT VISIT LOW MDM: CPT | Performed by: EMERGENCY MEDICINE

## 2025-07-27 PROCEDURE — 83690 ASSAY OF LIPASE: CPT | Performed by: EMERGENCY MEDICINE

## 2025-07-27 PROCEDURE — 96372 THER/PROPH/DIAG INJ SC/IM: CPT

## 2025-07-27 RX ORDER — LIDOCAINE HYDROCHLORIDE 20 MG/ML
5 SOLUTION OROPHARYNGEAL ONCE
Status: COMPLETED | OUTPATIENT
Start: 2025-07-27 | End: 2025-07-27

## 2025-07-27 RX ORDER — KETOROLAC TROMETHAMINE 30 MG/ML
30 INJECTION, SOLUTION INTRAMUSCULAR; INTRAVENOUS ONCE
Status: COMPLETED | OUTPATIENT
Start: 2025-07-27 | End: 2025-07-27

## 2025-07-27 RX ADMIN — ALUMINUM HYDROXIDE, MAGNESIUM HYDROXIDE, AND DIMETHICONE: 400; 400; 40 SUSPENSION ORAL at 22:24

## 2025-07-27 RX ADMIN — KETOROLAC TROMETHAMINE 30 MG: 30 INJECTION INTRAMUSCULAR; INTRAVENOUS at 21:11

## 2025-07-27 RX ADMIN — LIDOCAINE HYDROCHLORIDE 5 ML: 20 SOLUTION ORAL at 22:24

## 2025-07-27 NOTE — Clinical Note
[facility] FSED [place] [id number] [place] [address] [place] [address]  Phone: [phone]    Jo Asif was seen and treated in our emergency department on 7/27/2025.  She may return to work on 07/29/2025.         Thank you for choosing [facility].    Alf Brooks MD

## 2025-07-28 ENCOUNTER — TELEPHONE (OUTPATIENT)
Dept: FAMILY MEDICINE CLINIC | Facility: CLINIC | Age: 45
End: 2025-07-28
Payer: COMMERCIAL

## 2025-07-28 DIAGNOSIS — E66.01 OBESITY, MORBID, BMI 50 OR HIGHER: ICD-10-CM

## 2025-07-28 DIAGNOSIS — R73.03 PREDIABETES: ICD-10-CM

## 2025-07-28 LAB
QT INTERVAL: 386 MS
QTC INTERVAL: 439 MS

## 2025-07-28 RX ORDER — SEMAGLUTIDE 2.68 MG/ML
2 INJECTION, SOLUTION SUBCUTANEOUS WEEKLY
Qty: 3 ML | Refills: 11 | Status: SHIPPED | OUTPATIENT
Start: 2025-07-28

## 2025-07-28 NOTE — ED NOTES
Pt discharged home in NAD. Pt verbalized an understanding home care and follow up instructions. Pt educated on tylenol and motrin administration, no further needs expressed.

## 2025-07-28 NOTE — TELEPHONE ENCOUNTER
DEANNA Mac (Glynn: [id number])  PA Case ID #: [id number]  Approved  Effective Date: 7/28/2025  Authorization Expiration Date: 7/28/2026

## 2025-07-28 NOTE — FSED PROVIDER NOTE
HPI: 45-year-old female arrives complaining of chest pain and shortness of breath.  This has been bothering her for the last several days.  Her chest pain is in the substernal region and worse with movement or deep breathing.  She denies fever chills syncope palpitations or unilateral leg swelling.  No history of VTE    PMFSH: see problem list... Depression, anxiety, PCOS, morbid obesity, tonsillectomy, uvulectomy, arthritis, cholecystectomy, heart catheterization    ROS: As above.  All other systems are reviewed and negative.    PHYSICAL EXAM: On BMI is 50, patient in no distress    Lungs clear to auscultation    Heart regular rate and rhythm    Thorax mildly tender on palpation of the midline    MDM: Suspect musculoskeletal chest pain in this patient will start with ketorolac.  Consider also GI pulmonary cardiac and pleural causes.    ED COURSE: Ketorolac was unhelpful so a GI cocktail was ordered which was also unhelpful so blood work and an EKG were ordered to evaluate for the possibility of VTE, cardiac ischemia, or electrolyte abnormalities.  Consider also lipase to rule out pancreatitis.  D-dimer was 0.5, lipase 54, troponin 8, CBC with differential normal except hemoglobin 11.6, CMP normal but glucose 282, creatinine 0.5, sodium 135.  The patient received instructions to take Motrin or Tylenol as needed for pain and follow-up with primary care if pains persist.    DIAGNOSIS: Musculoskeletal chest pain    DISPOSITION: Patient is discharged from the ED in good condition.

## 2025-07-28 NOTE — TELEPHONE ENCOUNTER
Bubba waiting for PA??  Been out since April.. She needs    Ozempic please call in to [facility] on [address] [facility].

## 2025-08-01 ENCOUNTER — TELEPHONE (OUTPATIENT)
Dept: FAMILY MEDICINE CLINIC | Facility: CLINIC | Age: 45
End: 2025-08-01
Payer: COMMERCIAL

## 2025-08-01 RX ORDER — IBUPROFEN 800 MG/1
800 TABLET, FILM COATED ORAL EVERY 8 HOURS PRN
Qty: 90 TABLET | Refills: 1 | Status: SHIPPED | OUTPATIENT
Start: 2025-08-01

## 2025-08-01 RX ORDER — BACLOFEN 10 MG/1
10 TABLET ORAL 3 TIMES DAILY PRN
Qty: 30 TABLET | Refills: 0 | Status: SHIPPED | OUTPATIENT
Start: 2025-08-01

## 2025-08-01 NOTE — TELEPHONE ENCOUNTER
RELAYED MESSAGE FROM DAPHNE/ TAKE 800 MGS. OF IBUPROFEN 3 X A DAY.  DAPHNE SAID SHE IS GOING TO CALL IN A MUSCLE RELAXER AND IBUPROFEN THIS AFTERNOON.  PT UNDERSTOOD.

## 2025-08-05 ENCOUNTER — OFFICE VISIT (OUTPATIENT)
Dept: FAMILY MEDICINE CLINIC | Facility: CLINIC | Age: 45
End: 2025-08-05
Payer: COMMERCIAL

## 2025-08-05 VITALS
HEART RATE: 81 BPM | BODY MASS INDEX: 50.02 KG/M2 | OXYGEN SATURATION: 97 % | WEIGHT: 293 LBS | SYSTOLIC BLOOD PRESSURE: 124 MMHG | HEIGHT: 64 IN | DIASTOLIC BLOOD PRESSURE: 80 MMHG

## 2025-08-05 DIAGNOSIS — G47.19 DAYTIME HYPERSOMNOLENCE: ICD-10-CM

## 2025-08-05 DIAGNOSIS — I10 PRIMARY HYPERTENSION: ICD-10-CM

## 2025-08-05 DIAGNOSIS — J45.40 MODERATE PERSISTENT ASTHMA WITHOUT COMPLICATION: Primary | ICD-10-CM

## 2025-08-05 DIAGNOSIS — E03.9 ACQUIRED HYPOTHYROIDISM: ICD-10-CM

## 2025-08-05 DIAGNOSIS — R07.89 ANTERIOR CHEST WALL PAIN: ICD-10-CM

## 2025-08-05 DIAGNOSIS — R00.2 PALPITATIONS: ICD-10-CM

## 2025-08-05 DIAGNOSIS — K76.0 HEPATIC STEATOSIS: ICD-10-CM

## 2025-08-05 DIAGNOSIS — E66.01 OBESITY, MORBID, BMI 50 OR HIGHER: ICD-10-CM

## 2025-08-05 DIAGNOSIS — E11.65 TYPE 2 DIABETES MELLITUS WITH HYPERGLYCEMIA, WITHOUT LONG-TERM CURRENT USE OF INSULIN: ICD-10-CM

## 2025-08-05 DIAGNOSIS — G47.33 OSA (OBSTRUCTIVE SLEEP APNEA): ICD-10-CM

## 2025-08-05 LAB
ERYTHROCYTE [SEDIMENTATION RATE] IN BLOOD: 7 MM/HR (ref 0–20)
HBA1C MFR BLD: 6.8 % (ref 4.8–5.6)

## 2025-08-05 PROCEDURE — 85652 RBC SED RATE AUTOMATED: CPT | Performed by: NURSE PRACTITIONER

## 2025-08-05 PROCEDURE — 86038 ANTINUCLEAR ANTIBODIES: CPT | Performed by: NURSE PRACTITIONER

## 2025-08-05 PROCEDURE — 86431 RHEUMATOID FACTOR QUANT: CPT | Performed by: NURSE PRACTITIONER

## 2025-08-05 PROCEDURE — 84550 ASSAY OF BLOOD/URIC ACID: CPT | Performed by: NURSE PRACTITIONER

## 2025-08-05 PROCEDURE — 36415 COLL VENOUS BLD VENIPUNCTURE: CPT | Performed by: NURSE PRACTITIONER

## 2025-08-05 PROCEDURE — 86140 C-REACTIVE PROTEIN: CPT | Performed by: NURSE PRACTITIONER

## 2025-08-05 PROCEDURE — 83036 HEMOGLOBIN GLYCOSYLATED A1C: CPT | Performed by: NURSE PRACTITIONER

## 2025-08-05 PROCEDURE — 84443 ASSAY THYROID STIM HORMONE: CPT | Performed by: NURSE PRACTITIONER

## 2025-08-05 RX ORDER — SEMAGLUTIDE 2.68 MG/ML
2 INJECTION, SOLUTION SUBCUTANEOUS WEEKLY
Qty: 3 ML | Refills: 11 | Status: SHIPPED | OUTPATIENT
Start: 2025-08-05

## 2025-08-05 RX ORDER — FERROUS SULFATE 325(65) MG
325 TABLET ORAL
Qty: 90 TABLET | Refills: 1 | Status: SHIPPED | OUTPATIENT
Start: 2025-08-05

## 2025-08-05 RX ORDER — PREDNISONE 10 MG/1
TABLET ORAL
Qty: 33 TABLET | Refills: 0 | Status: SHIPPED | OUTPATIENT
Start: 2025-08-05

## 2025-08-06 ENCOUNTER — DOCUMENTATION (OUTPATIENT)
Dept: FAMILY MEDICINE CLINIC | Facility: CLINIC | Age: 45
End: 2025-08-06
Payer: COMMERCIAL

## 2025-08-06 LAB
ANA SER QL: NEGATIVE
CHROMATIN AB SERPL-ACNC: <10 IU/ML (ref 0–14)
CRP SERPL-MCNC: 0.34 MG/DL (ref 0–0.5)
TSH SERPL DL<=0.05 MIU/L-ACNC: 6.04 UIU/ML (ref 0.27–4.2)
URATE SERPL-MCNC: 3.6 MG/DL (ref 2.4–5.7)

## 2025-08-06 RX ORDER — LEVOTHYROXINE SODIUM 125 UG/1
125 TABLET ORAL
Qty: 90 TABLET | Refills: 1 | Status: SHIPPED | OUTPATIENT
Start: 2025-08-06

## (undated) DEVICE — SPNG GZ AVANT 6PLY 4X4IN STRL PK/2

## (undated) DEVICE — CONTRST ISOVUE300 61PCT 50ML

## (undated) DEVICE — BLANKT WARM UPPR/BDY ARM/OUT 57X196CM

## (undated) DEVICE — KT SURG TURNOVER 050

## (undated) DEVICE — TBG PRESS/MONITOR FIX M/F LL A/ 24IN STRL

## (undated) DEVICE — BNDG ELAS ELITE V/CLOSE 6IN 5YD LF STRL

## (undated) DEVICE — GLV SURG SIGNATURE ESSENTIAL PF LTX SZ8

## (undated) DEVICE — GLV SURG SENSICARE PI ORTHO SZ8 LF STRL

## (undated) DEVICE — SOL IRRIG H2O 1000ML STRL

## (undated) DEVICE — ARM DRAPE

## (undated) DEVICE — TUBING, SUCTION, 1/4" X 12', STRAIGHT: Brand: MEDLINE

## (undated) DEVICE — ABL ASP APOLLO RF XL 90D

## (undated) DEVICE — PAD,ABDOMINAL,5"X9",STERILE,LF,1/PK: Brand: MEDLINE INDUSTRIES, INC.

## (undated) DEVICE — KT PK ANGIOPLASTY ACC 9 MERIT

## (undated) DEVICE — GLV SURG SIGNATURE ESSENTIAL PF LTX SZ8.5

## (undated) DEVICE — BG RETRV TISS SUPERBAG INTRO RIP/STOP NLY 10MM 240ML MD

## (undated) DEVICE — CANN TRPL DAM 7X7MM

## (undated) DEVICE — PK SHLDR ARTHROSCOPY 50

## (undated) DEVICE — BLD SHAVER EXCALIBUR CRV 4MM

## (undated) DEVICE — SOLUTION,WATER,IRRIGATION,1000ML,STERILE: Brand: MEDLINE

## (undated) DEVICE — TBG ARTHSCP PT W CONN/REDUC 8FT

## (undated) DEVICE — SYR LUERLOK 30CC

## (undated) DEVICE — PREF.GUIDING SHEATH W/MULT.CRV: Brand: PREFACE

## (undated) DEVICE — SKIN PREP TRAY W/CHG: Brand: MEDLINE INDUSTRIES, INC.

## (undated) DEVICE — PROVE COVER: Brand: UNBRANDED

## (undated) DEVICE — SUT PDS 1 CTX 36IN VIO PDP371T

## (undated) DEVICE — ELECTRD DEFIB M/FUNC PROPADZ RADIOL 2PK

## (undated) DEVICE — SHEATH FLXCATH STEER 12FR

## (undated) DEVICE — UNDYED BRAIDED (POLYGLACTIN 910), SYNTHETIC ABSORBABLE SUTURE: Brand: COATED VICRYL

## (undated) DEVICE — Device: Brand: EZ STEER NAV DS

## (undated) DEVICE — CABL CONN CATH EP UMB 48IN

## (undated) DEVICE — PK ENDO GI 50

## (undated) DEVICE — Device: Brand: WEBSTER CS

## (undated) DEVICE — LAPAROSCOPIC GAS CONDITIONING DEVICE.: Brand: INSUFLOW

## (undated) DEVICE — UNDERGLV SURG BIOGEL INDICAT PI SZ8 BLU

## (undated) DEVICE — UNDERGLV SURG BIOGEL/PI PF SYNTH SURG SZ8.5 BLU 50/BX

## (undated) DEVICE — SLV SCD CALF HEMOFORCE DVT THERP REPROC MD

## (undated) DEVICE — PAD CAST SPECIST 6IN STRL

## (undated) DEVICE — 3M™ STERI-STRIP™ REINFORCED ADHESIVE SKIN CLOSURES, R1547, 1/2 IN X 4 IN (12 MM X 100 MM), 6 STRIPS/ENVELOPE: Brand: 3M™ STERI-STRIP™

## (undated) DEVICE — Device: Brand: PENTARAY NAV

## (undated) DEVICE — TBG ARTHSCP PUMP W CONN/REDUC 8FT

## (undated) DEVICE — PAD E/S GRND SGL/FOIL 9FT/CORD DISP

## (undated) DEVICE — PAD UNDERCAST WYTEX 4IN 4YD LF STRL

## (undated) DEVICE — ADHS LIQ MASTISOL 2/3ML

## (undated) DEVICE — GOWN,SLEEVE,STERILE,W/CSR WRAP,1/P: Brand: MEDLINE

## (undated) DEVICE — GLV SURG BIOGEL LTX PF 7

## (undated) DEVICE — COLUMN DRAPE

## (undated) DEVICE — SUCTION IRRIGATOR: Brand: ENDOWRIST

## (undated) DEVICE — SOL IRRIG NACL 1000ML

## (undated) DEVICE — SOL IRR NACL 0.9PCT ARTHROMATIC 3000ML

## (undated) DEVICE — CANNULA SEAL

## (undated) DEVICE — Device: Brand: CARTO 3

## (undated) DEVICE — CATH ABL ACHIEVE MP 3.3F20MM 165CM

## (undated) DEVICE — CANN PASSPORT BUTN 8MM 4CM

## (undated) DEVICE — U-DRAPE: Brand: CONVERTORS

## (undated) DEVICE — CABL CATH ABLAT ACHIEVE 196CM 1P/U

## (undated) DEVICE — Device: Brand: NRG TRANSSEPTAL NEEDLE

## (undated) DEVICE — SNAR POLYP HOTSNARE/BRAIDED OVL/MINI 7F 2.8X10MM 230CM 1P/U

## (undated) DEVICE — UNDERGLV SURG BIOGEL INDICATOR LF PF 7.5

## (undated) DEVICE — ST INTRO PERFORMER W/GW J/TP .038IN 14FR

## (undated) DEVICE — PK KN ARTHROSCOPY 50

## (undated) DEVICE — BITEBLOCK ENDO W/STRAP 60F A/ LF DISP

## (undated) DEVICE — TBG ARTHSCP DUALWAVE

## (undated) DEVICE — SUT VIC 0/0 UR6 27IN DYED J603H

## (undated) DEVICE — Device

## (undated) DEVICE — UNDRGLV SURG BIOGEL PIMICROINDICATOR SYNTH SZ8 LF STRL

## (undated) DEVICE — DRAPE SHEET ULTRAGARD: Brand: MEDLINE

## (undated) DEVICE — REDUCER: Brand: ENDOWRIST

## (undated) DEVICE — GENERAL LAPAROSCOPY CDS: Brand: MEDLINE INDUSTRIES, INC.

## (undated) DEVICE — NDL HYPO PRECISIONGLIDE REG 22G 1 1/2

## (undated) DEVICE — GW XCHG AMPLTZ XSTIF PTFE CRV .035IN 3X180CM

## (undated) DEVICE — MAT PREVALON MOBL TRANSFR AIR W/PAD REPROC 39X81IN

## (undated) DEVICE — UNDERGLV SURG BIOGEL INDICAT PI SZ8.5 BLU

## (undated) DEVICE — NDL HYPO PRECISIONGLIDE/REG 18G 11/2 PNK

## (undated) DEVICE — PINNACLE INTRODUCER SHEATH: Brand: PINNACLE

## (undated) DEVICE — CABL CONN CATH EP COAXL UMB 72IN

## (undated) DEVICE — Device: Brand: SOUNDSTAR

## (undated) DEVICE — GAUZE,SPONGE,FLUFF,6"X6.75",STRL,5/TRAY: Brand: MEDLINE

## (undated) DEVICE — Device: Brand: REFERENCE PATCH CARTO 3

## (undated) DEVICE — TRAP WIDEEYE POLYP

## (undated) DEVICE — ADHS SKIN PREMIERPRO EXOFIN TOPICAL HI/VISC .5ML

## (undated) DEVICE — ANTIBACTERIAL UNDYED BRAIDED (POLYGLACTIN 910), SYNTHETIC ABSORBABLE SUTURE: Brand: COATED VICRYL

## (undated) DEVICE — Device: Brand: RFP-100A CONNECTOR CABLE

## (undated) DEVICE — SEAL

## (undated) DEVICE — TOWEL,OR,DSP,ST,WHITE,DLX,4/PK,20PK/CS: Brand: MEDLINE

## (undated) DEVICE — BLADELESS OBTURATOR, LONG: Brand: WECK VISTA

## (undated) DEVICE — COVER,MAYO STAND,STERILE: Brand: MEDLINE

## (undated) DEVICE — BLADELESS OBTURATOR: Brand: WECK VISTA

## (undated) DEVICE — PASS SUT PRO BARIATRIC XL W/TROC SWABS

## (undated) DEVICE — TBG IV DRIP CHAMBER MACRO SGL 72IN

## (undated) DEVICE — PK TRY HEART CATH 50

## (undated) DEVICE — VISUALIZATION SYSTEM: Brand: CLEARIFY

## (undated) DEVICE — SINGLE-USE BIOPSY FORCEPS: Brand: RADIAL JAW 4

## (undated) DEVICE — ABL ASP APOLLORF I90 90DEG

## (undated) DEVICE — GLV SURG SENSICARE PI LF PF 8 GRN STRL

## (undated) DEVICE — SUT PDS 0 CT-1 Z340H

## (undated) DEVICE — CATH ABL ARCTIC FRNT ADV 10.5F3.5X28MM